# Patient Record
Sex: FEMALE | Race: WHITE
[De-identification: names, ages, dates, MRNs, and addresses within clinical notes are randomized per-mention and may not be internally consistent; named-entity substitution may affect disease eponyms.]

---

## 2017-03-10 ENCOUNTER — HOSPITAL ENCOUNTER (EMERGENCY)
Dept: HOSPITAL 25 - ED | Age: 72
Discharge: HOME | End: 2017-03-10
Payer: MEDICARE

## 2017-03-10 VITALS — SYSTOLIC BLOOD PRESSURE: 112 MMHG | DIASTOLIC BLOOD PRESSURE: 81 MMHG

## 2017-03-10 DIAGNOSIS — R05: ICD-10-CM

## 2017-03-10 DIAGNOSIS — F17.210: ICD-10-CM

## 2017-03-10 DIAGNOSIS — R06.02: ICD-10-CM

## 2017-03-10 DIAGNOSIS — J44.1: Primary | ICD-10-CM

## 2017-03-10 LAB
ALBUMIN SERPL BCG-MCNC: 4.1 G/DL (ref 3.2–5.2)
ALP SERPL-CCNC: 110 U/L (ref 34–104)
ALT SERPL W P-5'-P-CCNC: 25 U/L (ref 7–52)
ANION GAP SERPL CALC-SCNC: 8 MMOL/L (ref 2–11)
AST SERPL-CCNC: 28 U/L (ref 13–39)
BUN SERPL-MCNC: 31 MG/DL (ref 6–24)
BUN/CREAT SERPL: 17.4 (ref 8–20)
CALCIUM SERPL-MCNC: 9.4 MG/DL (ref 8.6–10.3)
CHLORIDE SERPL-SCNC: 99 MMOL/L (ref 101–111)
CK SERPL-CCNC: 69 U/L (ref 10–223)
GLOBULIN SER CALC-MCNC: 3.1 G/DL (ref 2–4)
GLUCOSE SERPL-MCNC: 105 MG/DL (ref 70–100)
HCO3 SERPL-SCNC: 30 MMOL/L (ref 22–32)
HCT VFR BLD AUTO: 43 % (ref 35–47)
HGB BLD-MCNC: 14 G/DL (ref 12–16)
MCH RBC QN AUTO: 30 PG (ref 27–31)
MCHC RBC AUTO-ENTMCNC: 33 G/DL (ref 31–36)
MCV RBC AUTO: 90 FL (ref 80–97)
POTASSIUM SERPL-SCNC: 3.5 MMOL/L (ref 3.5–5)
PROT SERPL-MCNC: 7.2 G/DL (ref 6.4–8.9)
RBC # BLD AUTO: 4.74 10^6/UL (ref 4–5.4)
SODIUM SERPL-SCNC: 137 MMOL/L (ref 133–145)
TROPONIN I SERPL-MCNC: 0.01 NG/ML (ref ?–0.04)
WBC # BLD AUTO: 8.5 10^3/UL (ref 3.5–10.8)

## 2017-03-10 PROCEDURE — 93005 ELECTROCARDIOGRAM TRACING: CPT

## 2017-03-10 PROCEDURE — 83605 ASSAY OF LACTIC ACID: CPT

## 2017-03-10 PROCEDURE — 71250 CT THORAX DX C-: CPT

## 2017-03-10 PROCEDURE — 36415 COLL VENOUS BLD VENIPUNCTURE: CPT

## 2017-03-10 PROCEDURE — 85025 COMPLETE CBC W/AUTO DIFF WBC: CPT

## 2017-03-10 PROCEDURE — 85610 PROTHROMBIN TIME: CPT

## 2017-03-10 PROCEDURE — 82550 ASSAY OF CK (CPK): CPT

## 2017-03-10 PROCEDURE — 87040 BLOOD CULTURE FOR BACTERIA: CPT

## 2017-03-10 PROCEDURE — 84484 ASSAY OF TROPONIN QUANT: CPT

## 2017-03-10 PROCEDURE — 83880 ASSAY OF NATRIURETIC PEPTIDE: CPT

## 2017-03-10 PROCEDURE — 99284 EMERGENCY DEPT VISIT MOD MDM: CPT

## 2017-03-10 PROCEDURE — 80053 COMPREHEN METABOLIC PANEL: CPT

## 2017-03-10 PROCEDURE — 86140 C-REACTIVE PROTEIN: CPT

## 2017-03-10 PROCEDURE — 87502 INFLUENZA DNA AMP PROBE: CPT

## 2017-03-10 PROCEDURE — 82553 CREATINE MB FRACTION: CPT

## 2017-03-10 PROCEDURE — 85730 THROMBOPLASTIN TIME PARTIAL: CPT

## 2017-03-10 NOTE — ED
Jamaal JAMA Billy, scribed for Harpreet Sol MD on 03/10/17 at 0911 .





Shortness of Breath





- HPI Summary


HPI Summary: 


Patient is a 71 year-old female with a history of COPD and lung cancer coming 

to [facility] for evaluation of constant SOB. She states that she has had bronchitis 

for the last 3 weeks, and her SOB worsened significantly this morning. She 

reports occasionally productive cough, mild dizziness, and night sweats. Denies 

any chest pain or N/V. 





- History of Current Complaint


Chief Complaint: EDShortnessOfBreath


Time Seen by Provider: 03/10/17 09:02


Hx Obtained From: Patient


Onset/Duration: Gradual Onset, Worse Since - 1 hour PTA


Timing: Constant


Current Severity: Moderate


Dyspnea At: Rest


Aggrevating Factors: Nothing


Alleviating Factors: Nothing


Associated Signs & Symptoms: Cough (Productive)





- Allergy/Home Medications


Allergies/Adverse Reactions: 


 Allergies











Allergy/AdvReac Type Severity Reaction Status Date / Time


 


Vancomycin Allergy Severe Wheezing Verified 10/04/16 11:35


 


Daptomycin Allergy  See Comment Verified 10/04/16 11:35


 


Doxycycline Allergy  Vomiting Verified 10/04/16 11:35


 


Levofloxacin [From Levaquin] Allergy  Rash Verified 10/04/16 11:35


 


Epinephrine AdvReac Severe Incr Verified 10/04/16 11:35





   HR/N/V/feels  





   faint  


 


Infliximab [From Remicade] AdvReac Severe arm Verified 10/04/16 11:35





   swelling/pain/itching  


 


Tetanus Toxoid AdvReac Unknown arm Verified 10/04/16 11:35





   pain/swelling/itching  


 


eggplant Allergy Severe Difficulty Uncoded 16 09:41





   Breathing  














PMH/Surg Hx/FS Hx/Imm Hx


Endocrine/Hematology History: Reports: Hx Anticoagulant Therapy - coumadin


   Denies: Hx Diabetes


Cardiovascular History: Reports: Hx Deep Vein Thrombosis, Hx Embolism - PE, Hx 

Hypotension, Hx Syncope, Other Cardiovascular Problems/Disorders - deep 

mesenteric thrombosis


   Denies: Hx Hypertension, Hx Pacemaker/ICD


Respiratory History: Reports: Hx Asthma - "off and on", Hx Chronic Bronchitis, 

Hx Chronic Obstructive Pulmonary Disease (COPD), Hx Pulmonary Embolism - , 

PE AND MESENTERIC THROMBOSIS, Hx Sleep Apnea, Other Respiratory Problems/

Disorders - wear O2 at hOME


GI History: Reports: Hx Crohn's Disease - ILEOSTOMY, Hx Ileostomy, Other GI 

Disorders - Ileostomy placement


   Comment Only: Hx Ulcer - HYPERPARATHYROIDISM


 History: Reports: Hx Acute Renal Failure, Hx Chronic Renal Failure, Other  

Problems/Disorders - Renal deficit per Dr Schroeder


   Denies: Hx Dialysis, Hx Renal Disease


Musculoskeletal History: Reports: Hx Back Problems, Hx Osteoporosis, Hx 

Scoliosis - lumbar disc problems; scoliosis


Sensory History: Reports: Hx Cataracts - [place], Hx Contacts or Glasses, Hx 

Hearing Problem - ears are "stopped up" lately


   Denies: Hx Hearing Aid


Opthamlomology History: Reports: Hx Cataracts - [place], Hx Contacts or Glasses


Neurological History: Reports: Hx Nerve Disease - neuropathy, Other Neuro 

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


Psychiatric History: Reports: Hx Anxiety - ON MEDS, Hx Depression - ON MEDS


   Denies: Hx Panic Disorder





- Cancer History


Cancer Type, Location and Year: LUNG CA


Hx Chemotherapy: No


Hx Radiation Therapy: Yes


Hx Palliative Cancer Treatment: Yes





- Surgical History


Surgery Procedure, Year, and Place: COLECTOMY AND ILEOSTOMY 3/3/2011 

APPENDECTOMY, GALLBLADDER REMOVED, CATARACTS REMOVED HO.  SEVERAL BOWEL 

RESECTIONS AND FISTULA, [facility]


Hx Anesthesia Reactions: Yes - LOW BP





- Immunization History


Date of Tetanus Vaccine: PT STATES UNSURE


Date of Influenza Vaccine: PT STATES UNSURE


Infectious Disease History: Yes


Infectious Disease History: Reports: Hx of Known/Suspected MRSA


   Denies: Traveled Outside the [place] in Last 30 Days





- Family History


Known Family History: Positive: Unknown - Parents  very young., Other - 

Crohn's Disease





- Social History


Alcohol Use: Rare


Alcohol Amount: 1 Q 2-3 MONTHS


Hx Substance Use: No


Substance Use Type: Reports: Marijuana


Substance Use Comment - Amount & Last Used: Medical Marijuana - once or twice a 

week


Hx Tobacco Use: Yes


Smoking Status (MU): Light Every Day Tobacco Smoker


Type: Cigarettes


Amount Used/How Often: 1-2 cigarettes per day


Length of Time of Smoking/Using Tobacco: 53years


Have You Smoked in the Last Year: Yes





Review of Systems


Positive: Skin Diaphoresis


Negative: Chest Pain


Positive: Shortness Of Breath, Cough


Negative: Vomiting, Nausea


Negative: Edema


All Other Systems Reviewed And Are Negative: Yes





Physical Exam





- Summary


Physical Exam Summary: 


The patient is an elderly female in moderate respiratory distress.





The skin is warm and dry and skin color reflects adequate perfusion.





HEENT:  The head is normocephalic and atraumatic. The pupils are equal and 

reactive. The conjunctivae are clear and without drainage.  Nares are patent 

and without drainage.  Mouth reveals moist mucous membranes and the throat is 

without erythema and exudate.  The external ears are intact. The ear canals are 

patent and without drainage. The tympanic membranes are intact.





Neck is supple with full range of motion and non-tender. There are no carotid 

bruits.  There is no neck vein distension.





Respiratory: Chest is non-tender. There is bilateral wheezing, crackles in the 

bases of the lungs.  She is speaking in full sentences.





Cardiovascular: Heart is regular rate and rhythm.  There is no murmur or rub 

auscultated.   There is no peripheral edema and pulses are symmetrical and 

equal.





Abdomen: The abdomen is soft and non-tender.  There are normal bowel sounds 

heard in all four quadrants and there is no organomegaly palpated.





Musculoskeletal: There is no back pain noted.  Extremities are non-tender with 

full range of motion.  There is good capillary refill.  There is no peripheral 

edema or calf tenderness elicited.





Neurological: Patient is alert and oriented to person, place and time.  The 

patient has symmetrical motor strength in all four extremities.  Cranial nerves 

are grossly intact. Deep tendon reflexes are symmetrical and equal in all four 

extremities.





Psychiatric: The patient has an appropriate affect and does not exhibit any 

anxiety or depression. 


Triage Information Reviewed: Yes


Vital Signs On Initial Exam: 


 Initial Vitals











Temp Pulse Resp BP Pulse Ox


 


 98.1 F   82   23   137/76   97 


 


 03/10/17 08:42  03/10/17 08:42  03/10/17 08:42  03/10/17 08:42  03/10/17 08:42











Vital Signs Reviewed: Yes





- Pascual Coma Scale


Coma Scale Total: 15





Diagnostics





- Vital Signs


 Vital Signs











  Temp Pulse Resp BP Pulse Ox


 


 03/10/17 08:42  98.1 F  82  23  137/76  97














- Laboratory


Lab Results: 


 Lab Results











  03/10/17 03/10/17 03/10/17 Range/Units





  09:11 09:11 09:11 


 


WBC  8.5    (3.5-10.8)  10^3/ul


 


RBC  4.74    (4.0-5.4)  10^6/ul


 


Hgb  14.0    (12.0-16.0)  g/dl


 


Hct  43    (35-47)  %


 


MCV  90    (80-97)  fL


 


MCH  30    (27-31)  pg


 


MCHC  33    (31-36)  g/dl


 


RDW  13    (10.5-15)  %


 


Plt Count  249    (150-450)  10^3/ul


 


MPV  8    (7.4-10.4)  um3


 


Neut % (Auto)  63.0    (38-83)  %


 


Lymph % (Auto)  21.3 L    (25-47)  %


 


Mono % (Auto)  10.7 H    (1-9)  %


 


Eos % (Auto)  3.9    (0-6)  %


 


Baso % (Auto)  1.1    (0-2)  %


 


Absolute Neuts (auto)  5.4    (1.5-7.7)  10^3/ul


 


Absolute Lymphs (auto)  1.8    (1.0-4.8)  10^3/ul


 


Absolute Monos (auto)  0.9 H    (0-0.8)  10^3/ul


 


Absolute Eos (auto)  0.3    (0-0.6)  10^3/ul


 


Absolute Basos (auto)  0.1    (0-0.2)  10^3/ul


 


Absolute Nucleated RBC  0.01    10^3/ul


 


Nucleated RBC %  0.2    


 


APTT   46.7 H   (26.0-36.3)  seconds


 


Sodium    137  (133-145)  mmol/L


 


Potassium    3.5  (3.5-5.0)  mmol/L


 


Chloride    99 L  (101-111)  mmol/L


 


Carbon Dioxide    30  (22-32)  mmol/L


 


Anion Gap    8  (2-11)  mmol/L


 


BUN    31 H  (6-24)  mg/dL


 


Creatinine    1.78 H  (0.51-0.95)  mg/dL


 


Est GFR ( Amer)    36.0  (>60)  


 


Est GFR (Non-Af Amer)    28.0  (>60)  


 


BUN/Creatinine Ratio    17.4  (8-20)  


 


Glucose    105 H  ()  mg/dL


 


Lactic Acid     (0.5-2.0)  mmol/L


 


Calcium    9.4  (8.6-10.3)  mg/dL


 


Total Bilirubin    0.60  (0.2-1.0)  mg/dL


 


AST    28  (13-39)  U/L


 


ALT    25  (7-52)  U/L


 


Alkaline Phosphatase    110 H  ()  U/L


 


Total Creatine Kinase    69  ()  U/L


 


CK-MB (CK-2)    2.4  (0.6-6.3)  ng/mL


 


Troponin I    0.01  (<0.04)  ng/mL


 


C-Reactive Protein    1.86  (< 5.00)  mg/L


 


B-Natriuretic Peptide    ( - 100) pg/mL


 


Total Protein    7.2  (6.4-8.9)  g/dL


 


Albumin    4.1  (3.2-5.2)  g/dL


 


Globulin    3.1  (2-4)  g/dL


 


Albumin/Globulin Ratio    1.3  (1-3)  


 


Influenza A (Rapid)     (Negative)  


 


Influenza B (Rapid)     (Negative)  














  03/10/17 03/10/17 03/10/17 Range/Units





  09:11 09:11 09:50 


 


WBC     (3.5-10.8)  10^3/ul


 


RBC     (4.0-5.4)  10^6/ul


 


Hgb     (12.0-16.0)  g/dl


 


Hct     (35-47)  %


 


MCV     (80-97)  fL


 


MCH     (27-31)  pg


 


MCHC     (31-36)  g/dl


 


RDW     (10.5-15)  %


 


Plt Count     (150-450)  10^3/ul


 


MPV     (7.4-10.4)  um3


 


Neut % (Auto)     (38-83)  %


 


Lymph % (Auto)     (25-47)  %


 


Mono % (Auto)     (1-9)  %


 


Eos % (Auto)     (0-6)  %


 


Baso % (Auto)     (0-2)  %


 


Absolute Neuts (auto)     (1.5-7.7)  10^3/ul


 


Absolute Lymphs (auto)     (1.0-4.8)  10^3/ul


 


Absolute Monos (auto)     (0-0.8)  10^3/ul


 


Absolute Eos (auto)     (0-0.6)  10^3/ul


 


Absolute Basos (auto)     (0-0.2)  10^3/ul


 


Absolute Nucleated RBC     10^3/ul


 


Nucleated RBC %     


 


APTT     (26.0-36.3)  seconds


 


Sodium     (133-145)  mmol/L


 


Potassium     (3.5-5.0)  mmol/L


 


Chloride     (101-111)  mmol/L


 


Carbon Dioxide     (22-32)  mmol/L


 


Anion Gap     (2-11)  mmol/L


 


BUN     (6-24)  mg/dL


 


Creatinine     (0.51-0.95)  mg/dL


 


Est GFR ( Amer)     (>60)  


 


Est GFR (Non-Af Amer)     (>60)  


 


BUN/Creatinine Ratio     (8-20)  


 


Glucose     ()  mg/dL


 


Lactic Acid  1.1    (0.5-2.0)  mmol/L


 


Calcium     (8.6-10.3)  mg/dL


 


Total Bilirubin     (0.2-1.0)  mg/dL


 


AST     (13-39)  U/L


 


ALT     (7-52)  U/L


 


Alkaline Phosphatase     ()  U/L


 


Total Creatine Kinase     ()  U/L


 


CK-MB (CK-2)     (0.6-6.3)  ng/mL


 


Troponin I     (<0.04)  ng/mL


 


C-Reactive Protein     (< 5.00)  mg/L


 


B-Natriuretic Peptide   98  ( - 100) pg/mL


 


Total Protein     (6.4-8.9)  g/dL


 


Albumin     (3.2-5.2)  g/dL


 


Globulin     (2-4)  g/dL


 


Albumin/Globulin Ratio     (1-3)  


 


Influenza A (Rapid)    Negative  (Negative)  


 


Influenza B (Rapid)    Negative  (Negative)  











Result Diagrams: 


 03/10/17 09:11





 03/10/17 09:11


Lab Statement: Any lab studies that have been ordered have been reviewed, and 

results considered in the medical decision making process.





- CT


  ** chest w/o


CT Interpretation Completed By: Radiologist - 1. 0.8 cm maximum dimension 

suspicious nodule at the anterior basal segment of the RIGHT lower lobe with 

significant interval increase in size from 2016 CT concerning for 

a metastasis or new bronchogenic carcinoma. Location is problematic for CT-

guided biopsy due to location adjacent to the diaphragm. The lesion is too 

small to predictably characterize with PET CT. Thoracic surgery consultation 

suggested 2. No significant change in 1.9 x 3.8 cm region of soft tissue 

density at the apical and posterior segments of the RIGHT upper lobe 

approximating the major fissure seen in association with surgical clips and 

marginal bronchiectasis as well as less confluent opacity extending caudal 

compared with the 2016 exam.  3. Stigmata of advanced chronic 

obstructive pulmonary disease and emphysema.





- EKG


  ** 0853


EKG Interpretation: NSR 69 bpm, no ST elevation





Course/Dx





- Course


Assessment/Plan: Patient is a 71 year-old female with a history of COPD and 

lung cancer coming to [facility] for evaluation of constant SOB. She states that she 

has had bronchitis for the last 3 weeks, and her SOB worsened significantly 

this morning. She reports occasionally productive cough, mild dizziness, and 

night sweats. Denies any chest pain or N/V. Bloodwork shows no increased WBC, 

she has chronic renal failure unchanged from baseline. Influenza A and B are 

negative. CT of the chest shows 0.8 cm nodule in the right lower lobe which has 

increased in size since 2016. The rest of the CT imaging is 

unchanged compared to previous. See radiologists report for further details. 

EKG shows a sinus rhythm 69 bpm without ST elevation. In the ED course, pt was 

given Duoneb and Solu-medrol with improvement. At this point, patient is 

feeling much better, has no SOB, and requested to be discharged home. Vital 

signs are stable, O2 sat is 96-98% on room air, therefore she will be 

discharged home to follow up with PCP. Hemodynamically stable, A&Ox3.  She 

reports that she has a z-william which she will start today, ordered by PCP.  I 

discussed all the findings and test results with the patient. Patient was 

instructed to return to the emergency room immediately if any of the symptoms 

return or worsens. Plan of care was discussed with the patient and understands 

and agrees. All questions were answered at patient satisfaction.  There were no 

further complaints or concerns.  Lung exam before discharge: CTA B/L. Good air 

exchange. No wheezing or crackles heard. CVS: S1 and S2 present. No murmurs 

appreciated. Patient is alert and oriented x 3. Patient is hemodynamically 

stable. Patient will be discharged home with follow up pediatrician in the next 

2-3 days





- Diagnoses


Provider Diagnoses: 


 COPD exacerbation








Discharge





- Discharge Plan


Condition: Stable


Disposition: HOME


Patient Education Materials:  COPD (Chronic Obstructive Pulmonary Disease) (ED)


Referrals: 


Kira Haile MD [Primary Care Provider] - 





The documentation as recorded by the Jamaal irwin Billy accurately reflects the 

service I personally performed and the decisions made by me, Harpreet Sol MD.

## 2017-03-10 NOTE — RAD
INDICATION: Sudden onset shortness of breath started this morning. History of lung cancer

and bronchitis.



COMPARISON: December 23, 2016 thoracic spine radiographs. September 29, 2016 CT.



TECHNIQUE: Multidetector CT images were obtained from the lung apices to the upper

abdomen.  Evaluation of the viscera is limited without IV contrast.



REPORT: Elevated lung volumes and rarefaction of the interstitial markings consistent with

chronic obstructive pulmonary disease and emphysema. No significant change in 1.9 x 3.8 cm

region of soft tissue density at the apical and posterior segments of the RIGHT upper lobe

approximating the major fissure seen in association with surgical clips and marginal

bronchiectasis as well as less confluent opacity extending caudal compared with the

September 29, 2016 exam. 0.8 x 0.6 cm nodule at the anterior basal segment of the RIGHT

lower lobe just above the diaphragm is increased from 0.5 x 0.4 cm on the September 29, 2016 exam. On the coronal reformatted series reference image 62 the margins of the lesion

appears spiculated. Mild predominant linear pleural parenchymal scarring at the LEFT lung

base. No suspicious focal LEFT lung lesions. Negative for pleural effusions.



Enlarged LEFT thyroid lobe with extension to the superior mediastinum without change.

Negative for thoracic lymphadenopathy. Negative for cardiomegaly. Coronary artery

calcifications.



Images through the upper abdomen are remarkable for prior cholecystectomy. Unchanged

severe anterior and middle column compression fracture at T6. No new thoracic fractures or

suspicious focal osseous lesions evident.



 IMPRESSION: 

1. 0.8 cm maximum dimension suspicious nodule at the anterior basal segment of the RIGHT

lower lobe with significant interval increase in size from September 29, 2016 CT

concerning for a metastasis or new bronchogenic carcinoma. Location is problematic for

CT-guided biopsy due to location adjacent to the diaphragm. The lesion is too small to

predictably characterize with PET CT. Thoracic surgery consultation suggested

2. No significant change in 1.9 x 3.8 cm region of soft tissue density at the apical and

posterior segments of the RIGHT upper lobe approximating the major fissure seen in

association with surgical clips and marginal bronchiectasis as well as less confluent

opacity extending caudal compared with the September 29, 2016 exam. 

3. Stigmata of advanced chronic obstructive pulmonary disease and emphysema.

## 2017-03-20 ENCOUNTER — HOSPITAL ENCOUNTER (INPATIENT)
Dept: HOSPITAL 25 - ED | Age: 72
LOS: 7 days | Discharge: HOSPICE | DRG: 191 | End: 2017-03-27
Attending: INTERNAL MEDICINE | Admitting: HOSPITALIST
Payer: MEDICARE

## 2017-03-20 DIAGNOSIS — F32.9: ICD-10-CM

## 2017-03-20 DIAGNOSIS — Z82.49: ICD-10-CM

## 2017-03-20 DIAGNOSIS — Z88.8: ICD-10-CM

## 2017-03-20 DIAGNOSIS — Z93.2: ICD-10-CM

## 2017-03-20 DIAGNOSIS — Z86.711: ICD-10-CM

## 2017-03-20 DIAGNOSIS — Z66: ICD-10-CM

## 2017-03-20 DIAGNOSIS — F17.210: ICD-10-CM

## 2017-03-20 DIAGNOSIS — K50.90: ICD-10-CM

## 2017-03-20 DIAGNOSIS — Z88.1: ICD-10-CM

## 2017-03-20 DIAGNOSIS — J44.1: Primary | ICD-10-CM

## 2017-03-20 DIAGNOSIS — J44.0: ICD-10-CM

## 2017-03-20 DIAGNOSIS — F41.9: ICD-10-CM

## 2017-03-20 DIAGNOSIS — M81.0: ICD-10-CM

## 2017-03-20 DIAGNOSIS — Z79.52: ICD-10-CM

## 2017-03-20 DIAGNOSIS — N18.4: ICD-10-CM

## 2017-03-20 DIAGNOSIS — J96.10: ICD-10-CM

## 2017-03-20 DIAGNOSIS — Z79.82: ICD-10-CM

## 2017-03-20 DIAGNOSIS — Z86.718: ICD-10-CM

## 2017-03-20 DIAGNOSIS — Z99.81: ICD-10-CM

## 2017-03-20 DIAGNOSIS — Z91.018: ICD-10-CM

## 2017-03-20 DIAGNOSIS — R91.1: ICD-10-CM

## 2017-03-20 DIAGNOSIS — Z79.899: ICD-10-CM

## 2017-03-20 DIAGNOSIS — G25.81: ICD-10-CM

## 2017-03-20 DIAGNOSIS — Z88.7: ICD-10-CM

## 2017-03-20 DIAGNOSIS — Z85.118: ICD-10-CM

## 2017-03-20 DIAGNOSIS — J20.9: ICD-10-CM

## 2017-03-20 DIAGNOSIS — Z79.01: ICD-10-CM

## 2017-03-20 DIAGNOSIS — Z83.3: ICD-10-CM

## 2017-03-20 LAB
ALBUMIN SERPL BCG-MCNC: 3.7 G/DL (ref 3.2–5.2)
ALP SERPL-CCNC: 92 U/L (ref 34–104)
ALT SERPL W P-5'-P-CCNC: 36 U/L (ref 7–52)
ANION GAP SERPL CALC-SCNC: (no result) MMOL/L (ref 2–11)
AST SERPL-CCNC: (no result) U/L (ref 13–39)
BUN SERPL-MCNC: 34 MG/DL (ref 6–24)
BUN/CREAT SERPL: 20.1 (ref 8–20)
CALCIUM SERPL-MCNC: 8.9 MG/DL (ref 8.6–10.3)
CHLORIDE SERPL-SCNC: 101 MMOL/L (ref 101–111)
GLOBULIN SER CALC-MCNC: 2.9 G/DL (ref 2–4)
GLUCOSE SERPL-MCNC: 132 MG/DL (ref 70–100)
HCO3 SERPL-SCNC: 27 MMOL/L (ref 22–32)
HCT VFR BLD AUTO: 41 % (ref 35–47)
HGB BLD-MCNC: 13.6 G/DL (ref 12–16)
MCH RBC QN AUTO: 29 PG (ref 27–31)
MCHC RBC AUTO-ENTMCNC: 33 G/DL (ref 31–36)
MCV RBC AUTO: 89 FL (ref 80–97)
POTASSIUM SERPL-SCNC: (no result) MMOL/L (ref 3.5–5)
PROT SERPL-MCNC: 6.6 G/DL (ref 6.4–8.9)
RBC # BLD AUTO: 4.64 10^6/UL (ref 4–5.4)
SODIUM SERPL-SCNC: 138 MMOL/L (ref 133–145)
TROPONIN I SERPL-MCNC: 0 NG/ML (ref ?–0.04)
WBC # BLD AUTO: 10.6 10^3/UL (ref 3.5–10.8)

## 2017-03-20 PROCEDURE — 87040 BLOOD CULTURE FOR BACTERIA: CPT

## 2017-03-20 PROCEDURE — 87070 CULTURE OTHR SPECIMN AEROBIC: CPT

## 2017-03-20 PROCEDURE — 36415 COLL VENOUS BLD VENIPUNCTURE: CPT

## 2017-03-20 PROCEDURE — G0378 HOSPITAL OBSERVATION PER HR: HCPCS

## 2017-03-20 PROCEDURE — 87205 SMEAR GRAM STAIN: CPT

## 2017-03-20 PROCEDURE — 94640 AIRWAY INHALATION TREATMENT: CPT

## 2017-03-20 PROCEDURE — 94760 N-INVAS EAR/PLS OXIMETRY 1: CPT

## 2017-03-20 PROCEDURE — 80053 COMPREHEN METABOLIC PANEL: CPT

## 2017-03-20 PROCEDURE — 85610 PROTHROMBIN TIME: CPT

## 2017-03-20 PROCEDURE — 87186 SC STD MICRODIL/AGAR DIL: CPT

## 2017-03-20 PROCEDURE — 93005 ELECTROCARDIOGRAM TRACING: CPT

## 2017-03-20 PROCEDURE — 71020: CPT

## 2017-03-20 PROCEDURE — 85025 COMPLETE CBC W/AUTO DIFF WBC: CPT

## 2017-03-20 PROCEDURE — 87077 CULTURE AEROBIC IDENTIFY: CPT

## 2017-03-20 PROCEDURE — 84484 ASSAY OF TROPONIN QUANT: CPT

## 2017-03-20 PROCEDURE — 83605 ASSAY OF LACTIC ACID: CPT

## 2017-03-20 RX ADMIN — LORAZEPAM PRN MG: 1 TABLET ORAL at 23:32

## 2017-03-20 RX ADMIN — LORAZEPAM PRN MG: 1 TABLET ORAL at 19:32

## 2017-03-20 RX ADMIN — METHYLPREDNISOLONE SODIUM SUCCINATE SCH MG: 125 INJECTION, POWDER, FOR SOLUTION INTRAMUSCULAR; INTRAVENOUS at 19:33

## 2017-03-20 RX ADMIN — SODIUM CHLORIDE SCH MLS/HR: 900 IRRIGANT IRRIGATION at 19:36

## 2017-03-20 RX ADMIN — PAROXETINE SCH MG: 20 TABLET, FILM COATED ORAL at 22:20

## 2017-03-20 RX ADMIN — MORPHINE TINCTURE SCH MG: 1 SOLUTION ORAL at 22:21

## 2017-03-20 RX ADMIN — MOMETASONE FUROATE AND FORMOTEROL FUMARATE DIHYDRATE SCH PUFF: 200; 5 AEROSOL RESPIRATORY (INHALATION) at 22:15

## 2017-03-20 NOTE — RAD
Indication: Shortness of breath.



2 views of the chest including dual energy PA views demonstrates no mediastinal shift.

Heart is normal size and configuration. Right upper lobe airspace disease is noted and is

unchanged from 3/10/2017 and 10/4/2016. This likely represents post radiation pneumonitis

and fibrosis. Otherwise lung fields appear hyperinflated no changes are noted.



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 Findings are not

significantly changed since October 04, 2016 with no evidence of active infiltrate.

## 2017-03-20 NOTE — ED
Nikki JAMA Alok, scribed for Sarah Ramos MD on 17 at 1445 .





Shortness of Breath





- HPI Summary


HPI Summary: 


70 y/o female presents to the ED c/o of SOB for the last two weeks and a cough 

for the last two months. PMHx includes COPD and Cron's disease. Pt takes 

steroids and is on 4L O2 at home.  








- History of Current Complaint


Chief Complaint: EDShortnessOfBreath


Time Seen by Provider: 17 14:19


Hx Obtained From: Patient


Onset/Duration: Sudden Onset, Lasting Weeks, Still Present


Timing: Constant


Current Severity: Moderate


Aggrevating Factors: Nothing


Alleviating Factors: Nothing


Associated Signs & Symptoms: Cough (Nonproductive), Wheezing





- Allergy/Home Medications


Allergies/Adverse Reactions: 


 Allergies











Allergy/AdvReac Type Severity Reaction Status Date / Time


 


Vancomycin Allergy Severe Wheezing Verified 10/04/16 11:35


 


Daptomycin Allergy  See Comment Verified 10/04/16 11:35


 


Doxycycline Allergy  Vomiting Verified 10/04/16 11:35


 


Levofloxacin [From Levaquin] Allergy  Rash Verified 10/04/16 11:35


 


Epinephrine AdvReac Severe Incr Verified 10/04/16 11:35





   HR/N/V/feels  





   faint  


 


Infliximab [From Remicade] AdvReac Severe arm Verified 10/04/16 11:35





   swelling/pain/itching  


 


Tetanus Toxoid AdvReac Unknown arm Verified 10/04/16 11:35





   pain/swelling/itching  


 


eggplant Allergy Severe Difficulty Uncoded 16 09:41





   Breathing  











Home Medications: 


 Home Medications





Albuterol Sulfate [Proair Respiclick] 1 puff INH Q6HR PRN 17 [History 

Confirmed 17]


Aspirin Low Dose CHEW TAB* [Aspirin Low Dose TAB*] 81 mg PO QAM 17 [

History Confirmed 17]


Budesonide/Formote 160/4.5(NF) [Symbicort 160/4.5 (NF)] 1 puff INH BID 17 

[History Confirmed 17]


Calcitriol CAP* [Rocaltrol  CAP*] 0.25 mcg PO QAM 17 [History Confirmed ]


Cyanocobalamin INJ * [Vitamin B12 INJ *] 1,000 mcg IM Q14D 17 [History 

Confirmed 17]


LORazepam TAB(*) [Ativan 0.5 MG TAB (*)] 0.25 mg PO QAM PRN 17 [History 

Confirmed 17]


LORazepam TAB(*) [Ativan 1 MG TAB (*)] 1 mg PO BEDTIME PRN 17 [History 

Confirmed 17]


PARoxetine HCL TAB* [Paxil TAB*] 20 mg PO BEDTIME 17 [History Confirmed ]


Tiotropium CAP.INH* [Spiriva CAP.INH*] 1 cap.inh INH DAILY 17 [History 

Confirmed 17]


Warfarin TAB(*) [Coumadin TAB(*)] 2 mg PO QPM 17 [History Confirmed ]


guaiFENesin ER TAB [Mucinex*] 600 mg PO BID PRN 17 [History Confirmed ]


predniSONE TAB* [Deltasone TAB*] 10 mg PO DAILY 17 [History Confirmed ]











PMH/Surg Hx/FS Hx/Imm Hx


Endocrine/Hematology History: Reports: Hx Anticoagulant Therapy - coumadin


   Denies: Hx Diabetes


Cardiovascular History: Reports: Hx Deep Vein Thrombosis, Hx Embolism - PE, Hx 

Hypotension, Hx Syncope, Other Cardiovascular Problems/Disorders - deep 

mesenteric thrombosis


   Denies: Hx Hypertension, Hx Pacemaker/ICD


Respiratory History: Reports: Hx Asthma - "off and on", Hx Chronic Bronchitis, 

Hx Chronic Obstructive Pulmonary Disease (COPD), Hx Pulmonary Embolism - , 

PE AND MESENTERIC THROMBOSIS, Hx Sleep Apnea, Other Respiratory Problems/

Disorders - wear O2 at hOME


GI History: Reports: Hx Crohn's Disease - ILEOSTOMY, Hx Ileostomy, Other GI 

Disorders - Ileostomy placement


   Comment Only: Hx Ulcer - HYPERPARATHYROIDISM


 History: Reports: Hx Acute Renal Failure, Hx Chronic Renal Failure, Other  

Problems/Disorders - Renal deficit per Dr Schroeder


   Denies: Hx Dialysis, Hx Renal Disease


Musculoskeletal History: Reports: Hx Back Problems, Hx Osteoporosis, Hx 

Scoliosis - lumbar disc problems; scoliosis


Sensory History: Reports: Hx Cataracts - [place], Hx Contacts or Glasses, Hx 

Hearing Problem - ears are "stopped up" lately


   Denies: Hx Hearing Aid


Opthamlomology History: Reports: Hx Cataracts - [place], Hx Contacts or Glasses


Neurological History: Reports: Hx Nerve Disease - neuropathy, Other Neuro 

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


Psychiatric History: Reports: Hx Anxiety - ON MEDS, Hx Depression - ON MEDS


   Denies: Hx Panic Disorder





- Cancer History


Cancer Type, Location and Year: LUNG CA


Hx Chemotherapy: No


Hx Radiation Therapy: Yes


Hx Palliative Cancer Treatment: Yes





- Surgical History


Surgery Procedure, Year, and Place: COLECTOMY AND ILEOSTOMY 3/3/2011 

APPENDECTOMY, GALLBLADDER REMOVED, CATARACTS REMOVED [place].  SEVERAL BOWEL 

RESECTIONS AND FISTULA, [facility]


Hx Anesthesia Reactions: Yes - LOW BP





- Immunization History


Date of Tetanus Vaccine: PT STATES UNSURE


Date of Influenza Vaccine: PT STATES UNSURE


Infectious Disease History: No


Infectious Disease History: Reports: Hx of Known/Suspected MRSA


   Denies: Traveled Outside the [place] in Last 30 Days





- Family History


Known Family History: Positive: Unknown - Parents  very young., Other - 

Crohn's Disease





- Social History


Alcohol Use: Rare


Alcohol Amount: 1 Q 2-3 MONTHS


Hx Substance Use: No


Substance Use Type: Reports: Marijuana


Substance Use Comment - Amount & Last Used: Medical Marijuana - once or twice a 

week


Hx Tobacco Use: Yes


Smoking Status (MU): Light Every Day Tobacco Smoker


Type: Cigarettes


Amount Used/How Often: 1-2 cigarettes per day


Length of Time of Smoking/Using Tobacco: 53years


Have You Smoked in the Last Year: Yes





Review of Systems


Negative: Fever


Positive: Shortness Of Breath, Cough


All Other Systems Reviewed And Are Negative: Yes





Physical Exam


Triage Information Reviewed: Yes


Vital Signs On Initial Exam: 


 Initial Vitals











Temp Pulse Resp BP Pulse Ox


 


 98.5 F   80   16   101/73   95 


 


 17 14:12  17 14:12  17 14:12  17 14:12  17 14:12











Vital Signs Reviewed: Yes


Appearance: Positive: Well-Appearing, No Pain Distress


Skin: Positive: Warm, Skin Color Reflects Adequate Perfusion, Dry


Eyes: Positive: EOMI, ANURAG


ENT: Positive: Pharynx normal, TMs normal


Neck: Positive: Supple, Nontender


Respiratory/Lung Sounds: Positive: Breath Sounds Present, Wheezes - Expiratory.

  Negative: Rales, Rhonchi


Cardiovascular: Positive: RRR.  Negative: Murmur, Rub, Other - Gallops


Abdomen Description: Positive: Nontender, Soft


Bowel Sounds: Positive: Present


Musculoskeletal: Positive: Strength/ROM Intact.  Negative: Edema Left, Edema 

Right


Neurological: Positive: Sensory/Motor Intact, Alert, Oriented to Person Place, 

Time, CN Intact II-III


Psychiatric: Positive: Affect/Mood Appropriate





- [place] Coma Scale


Coma Scale Total: 15





Diagnostics





- Vital Signs


 Vital Signs











  Temp Pulse Resp BP Pulse Ox


 


 17 14:15   76   101/73  94


 


 17 14:14   80    92


 


 17 14:12  98.5 F  80  16  101/73  95














- Laboratory


Lab Results: 


 Lab Results











  17 Range/Units





  15:10 15:10 15:10 


 


WBC  10.6    (3.5-10.8)  10^3/ul


 


RBC  4.64    (4.0-5.4)  10^6/ul


 


Hgb  13.6    (12.0-16.0)  g/dl


 


Hct  41    (35-47)  %


 


MCV  89    (80-97)  fL


 


MCH  29    (27-31)  pg


 


MCHC  33    (31-36)  g/dl


 


RDW  13    (10.5-15)  %


 


Plt Count  260    (150-450)  10^3/ul


 


MPV  8    (7.4-10.4)  um3


 


Neut % (Auto)  84.0 H    (38-83)  %


 


Lymph % (Auto)  12.1 L    (25-47)  %


 


Mono % (Auto)  2.7    (1-9)  %


 


Eos % (Auto)  0.7    (0-6)  %


 


Baso % (Auto)  0.5    (0-2)  %


 


Absolute Neuts (auto)  8.9 H    (1.5-7.7)  10^3/ul


 


Absolute Lymphs (auto)  1.3    (1.0-4.8)  10^3/ul


 


Absolute Monos (auto)  0.3    (0-0.8)  10^3/ul


 


Absolute Eos (auto)  0.1    (0-0.6)  10^3/ul


 


Absolute Basos (auto)  0.1    (0-0.2)  10^3/ul


 


Absolute Nucleated RBC  0    10^3/ul


 


Nucleated RBC %  0    


 


INR (Anticoag Therapy)   3.88 H   (0.89-1.11)  


 


Sodium    138  (133-145)  mmol/L


 


Potassium    TNP  


 


Chloride    101  (101-111)  mmol/L


 


Carbon Dioxide    27  (22-32)  mmol/L


 


Anion Gap    TNP  


 


BUN    34 H  (6-24)  mg/dL


 


Creatinine    1.69 H  (0.51-0.95)  mg/dL


 


Est GFR ( Amer)    38.3  (>60)  


 


Est GFR (Non-Af Amer)    29.7  (>60)  


 


BUN/Creatinine Ratio    20.1 H  (8-20)  


 


Glucose    132 H  ()  mg/dL


 


Lactic Acid     (0.5-2.0)  mmol/L


 


Calcium    8.9  (8.6-10.3)  mg/dL


 


Total Bilirubin    0.50  (0.2-1.0)  mg/dL


 


AST    TNP  


 


ALT    36  (7-52)  U/L


 


Alkaline Phosphatase    92  ()  U/L


 


Troponin I    0.00  (<0.04)  ng/mL


 


Total Protein    6.6  (6.4-8.9)  g/dL


 


Albumin    3.7  (3.2-5.2)  g/dL


 


Globulin    2.9  (2-4)  g/dL


 


Albumin/Globulin Ratio    1.3  (1-3)  














  // Range/Units





  15:10 


 


WBC   (3.5-10.8)  10^3/ul


 


RBC   (4.0-5.4)  10^6/ul


 


Hgb   (12.0-16.0)  g/dl


 


Hct   (35-47)  %


 


MCV   (80-97)  fL


 


MCH   (27-31)  pg


 


MCHC   (31-36)  g/dl


 


RDW   (10.5-15)  %


 


Plt Count   (150-450)  10^3/ul


 


MPV   (7.4-10.4)  um3


 


Neut % (Auto)   (38-83)  %


 


Lymph % (Auto)   (25-47)  %


 


Mono % (Auto)   (1-9)  %


 


Eos % (Auto)   (0-6)  %


 


Baso % (Auto)   (0-2)  %


 


Absolute Neuts (auto)   (1.5-7.7)  10^3/ul


 


Absolute Lymphs (auto)   (1.0-4.8)  10^3/ul


 


Absolute Monos (auto)   (0-0.8)  10^3/ul


 


Absolute Eos (auto)   (0-0.6)  10^3/ul


 


Absolute Basos (auto)   (0-0.2)  10^3/ul


 


Absolute Nucleated RBC   10^3/ul


 


Nucleated RBC %   


 


INR (Anticoag Therapy)   (0.89-1.11)  


 


Sodium   (133-145)  mmol/L


 


Potassium   


 


Chloride   (101-111)  mmol/L


 


Carbon Dioxide   (22-32)  mmol/L


 


Anion Gap   


 


BUN   (6-24)  mg/dL


 


Creatinine   (0.51-0.95)  mg/dL


 


Est GFR ( Amer)   (>60)  


 


Est GFR (Non-Af Amer)   (>60)  


 


BUN/Creatinine Ratio   (8-20)  


 


Glucose   ()  mg/dL


 


Lactic Acid  2.2 H*  (0.5-2.0)  mmol/L


 


Calcium   (8.6-10.3)  mg/dL


 


Total Bilirubin   (0.2-1.0)  mg/dL


 


AST   


 


ALT   (7-52)  U/L


 


Alkaline Phosphatase   ()  U/L


 


Troponin I   (<0.04)  ng/mL


 


Total Protein   (6.4-8.9)  g/dL


 


Albumin   (3.2-5.2)  g/dL


 


Globulin   (2-4)  g/dL


 


Albumin/Globulin Ratio   (1-3)  











Result Diagrams: 


 17 15:10





 17 16:28


Lab Statement: Any lab studies that have been ordered have been reviewed, and 

results considered in the medical decision making process.





- Radiology


  ** CXR


Xray Interpretation: Positive (See Comments) - IMPRESSION: Right upper lobe 

post radiation pneumonitis and/or fibrosis. Findings are not significantly 

changed since 2016 with no evidence of active infiltrate.


Radiology Interpretation Completed By: Radiologist





- EKG


  ** 1429


Cardiac Rate: NL


EKG Rhythm: Sinus Rhythm - 70 bpm





Course/Dx





- Course


Course Of Treatment: lactic elevated but is not likely real because labs were 

hemolyzed. INR is elevated, PE is in the differential but less likely given the 

INR, she has elevated renal function. Talked with Dr. Laguerre about pt will at 

this point leave it up to them about whether a CTA chest is required.





- Diagnoses


Provider Diagnoses: 


 Dyspnea








Discharge





- Discharge Plan


Condition: Stable


Disposition: ADMITTED TO [facility] documentation as recorded by the Nikki irwin Alok accurately reflects 

the service I personally performed and the decisions made by me, Sarah Ramos MD.

## 2017-03-21 RX ADMIN — LORAZEPAM PRN MG: 1 TABLET ORAL at 21:53

## 2017-03-21 RX ADMIN — MOMETASONE FUROATE AND FORMOTEROL FUMARATE DIHYDRATE SCH: 200; 5 AEROSOL RESPIRATORY (INHALATION) at 10:07

## 2017-03-21 RX ADMIN — ACETAMINOPHEN PRN MG: 325 TABLET ORAL at 21:57

## 2017-03-21 RX ADMIN — METHYLPREDNISOLONE SODIUM SUCCINATE SCH MG: 125 INJECTION, POWDER, FOR SOLUTION INTRAMUSCULAR; INTRAVENOUS at 06:06

## 2017-03-21 RX ADMIN — LEVALBUTEROL PRN MG: 1.25 SOLUTION, CONCENTRATE RESPIRATORY (INHALATION) at 12:12

## 2017-03-21 RX ADMIN — PAROXETINE SCH MG: 20 TABLET, FILM COATED ORAL at 21:53

## 2017-03-21 RX ADMIN — MORPHINE TINCTURE SCH MG: 1 SOLUTION ORAL at 13:40

## 2017-03-21 RX ADMIN — MORPHINE TINCTURE SCH MG: 1 SOLUTION ORAL at 09:31

## 2017-03-21 RX ADMIN — MORPHINE TINCTURE SCH MG: 1 SOLUTION ORAL at 02:18

## 2017-03-21 RX ADMIN — MORPHINE TINCTURE SCH MG: 1 SOLUTION ORAL at 21:53

## 2017-03-21 RX ADMIN — CALCITRIOL SCH MCG: 0.25 CAPSULE ORAL at 09:30

## 2017-03-21 RX ADMIN — WARFARIN SODIUM SCH: 1 TABLET ORAL at 16:40

## 2017-03-21 RX ADMIN — ASPIRIN SCH MG: 81 TABLET, CHEWABLE ORAL at 09:30

## 2017-03-21 RX ADMIN — LORAZEPAM PRN MG: 1 TABLET ORAL at 09:30

## 2017-03-21 RX ADMIN — TIOTROPIUM BROMIDE SCH: 18 CAPSULE ORAL; RESPIRATORY (INHALATION) at 10:07

## 2017-03-21 RX ADMIN — LEVALBUTEROL PRN MG: 1.25 SOLUTION, CONCENTRATE RESPIRATORY (INHALATION) at 22:56

## 2017-03-21 RX ADMIN — CINACALCET HYDROCHLORIDE SCH MG: 30 TABLET, COATED ORAL at 09:30

## 2017-03-21 RX ADMIN — LORAZEPAM PRN MG: 1 TABLET ORAL at 13:38

## 2017-03-21 RX ADMIN — LORAZEPAM PRN MG: 1 TABLET ORAL at 17:51

## 2017-03-21 RX ADMIN — MORPHINE TINCTURE SCH MG: 1 SOLUTION ORAL at 06:05

## 2017-03-21 RX ADMIN — METHYLPREDNISOLONE SODIUM SUCCINATE SCH MG: 125 INJECTION, POWDER, FOR SOLUTION INTRAMUSCULAR; INTRAVENOUS at 17:31

## 2017-03-21 RX ADMIN — MORPHINE TINCTURE SCH MG: 1 SOLUTION ORAL at 17:32

## 2017-03-21 RX ADMIN — SODIUM CHLORIDE SCH MLS/HR: 900 IRRIGANT IRRIGATION at 05:36

## 2017-03-21 NOTE — PN
Progress Note





- Progress Note


Note: 





Pulm consult f/u note 3/21/17. Pt seen and examined at bedside this am.Pt 

reported slight improvement in breathing and was also able to expectorate more 

phelghm.


 Active Medications











Generic Name Dose Route Start Last Admin





  Trade Name Freq  PRN Reason Stop Dose Admin


 


Aspirin  81 mg  03/21/17 09:00  03/21/17 09:30





  Aspirin Low Dose Tab*  PO   81 mg





  QAM BERTHA   Administration


 


Calcitriol  0.25 mcg  03/21/17 09:00  03/21/17 09:30





  Rocaltrol  Cap*  PO   0.25 mcg





  QAM BERTHA   Administration


 


Cinacalcet  30 mg  03/21/17 09:00  03/21/17 09:30





  Sensipar Tab*  PO   30 mg





  QAM BERTHA   Administration


 


Guaifenesin  600 mg  03/20/17 17:55  





  Mucinex*  PO   





  BID PRN   





  CONGESTION   


 


Sodium Chloride  1,000 mls @ 100 mls/hr  03/21/17 14:45  03/21/17 15:59





  Ns 0.9% 1000 Ml*  IV  03/22/17 00:44  100 mls/hr





  PER RATE BERTHA   Administration


 


Levalbuterol HCl  1.25 mg  03/20/17 22:27  03/21/17 12:12





  Xopenex 1.25 Mg/0.5 Ml Neb.Sol*  INH   1.25 mg





  Q6H PRN   Administration





  SOB/WHEEZING   


 


Lorazepam  1 mg  03/20/17 18:05  03/20/17 23:32





  Ativan Tab(*)  PO   1 mg





  BEDTIME PRN   Administration





  ANXIETY   


 


Lorazepam  0.25 mg  03/20/17 18:05  





  Ativan Tab(*)  PO   





  QAM PRN   





  ANXIETY   


 


Lorazepam  1 mg  03/20/17 18:05  03/21/17 13:38





  Ativan Tab(*)  PO   1 mg





  Q4H PRN   Administration





  ANXIETY   


 


Methylprednisolone Sodium Succinate  60 mg  03/20/17 18:00  03/21/17 17:31





  Solu-Medrol*  IV   60 mg





  Q12H BERTHA   Administration


 


Morphine Sulfate  2.5 mg  03/21/17 08:57  





  Morphine Oral Concentrate*  SL   





  Q1H PRN   





  SHORTNESS OF BREATH   


 


Bevespi Aerosphere  2 admin  03/21/17 13:04  03/21/17 14:09





  Mdi  INH   2 admin





  BID BERTHA   Administration


 


Opium Tincture  6 mg  03/20/17 22:00  03/21/17 17:32





  Opium Tincture*  PO   6 mg





  Q4HR BERTHA   Administration


 


Paroxetine HCl  20 mg  03/20/17 21:00  03/20/17 22:20





  Paxil Tab*  PO   20 mg





  BEDTIME BERTHA   Administration


 


Pharmacy Profile Note  1 note  03/21/17 17:00  03/21/17 16:39





  Coumadin Daily Reminder*  FOLLOW UP   Not Given





  [address] [facility]   


 


Tiotropium Bromide  1 cap  03/21/17 09:00  03/21/17 10:07





  Spiriva Cap.Inh*  INH   Not Given





  DAILY [facility]   


 


Warfarin Sodium  1.5 mg  03/21/17 17:00  03/21/17 16:40





  Coumadin Tab(*)  PO   Not Given





  DAILY@1700 [facility]   





  Protocol   








 Vital Signs











Temp Pulse Resp BP Pulse Ox


 


 98.0 F   73   16   116/42   96 


 


 03/21/17 15:26  03/21/17 15:26  03/21/17 17:32  03/21/17 15:26  03/21/17 15:26








O/E:


Gen: Pt in bed in NAD


HEENT: PERRLA, NO JVD


Lungs: scaterred wheeze present


CVS: S1, S2+


Abd: Soft, BS+


Ext: No edema


Neuro: No focal defecits














 Laboratory Results - last 24 hr











  03/21/17





  06:03


 


INR (Anticoag Therapy)  3.81 H








I/R: 71 y o f with h/o severe emphysema, O2 dependant, multiple comorbidities a/

f evaluationof worsening SOB, hypoxia being treated for acute COPD exacerbation 


Pt reprots improvement in SOB, cough


C/w nebs


Will d/c Spiriva, Symbicort


Will initiate pt on Bevaspi


c/w O2 supplementation


Flutter valve with instructions on usage


PT, OOB to chair as tolerated


Pt has h/o lung nodule in RLL with interval increase in size, pt doesnot want 

to go through biopsy

## 2017-03-21 NOTE — CONS
PULMONARY CONSULTATION REPORT:

 

DATE OF CONSULT:  03/20/17

 

CONSULTATION REQUESTED BY:  Dr. Sarah Ramos.

 

REASON FOR CONSULT:  Evaluation of shortness of breath.

 

HISTORY OF PRESENT ILLNESS:  The patient is a 71-year-old female with multiple 
comorbidities, current smoker, recently diagnosed with stage I lung cancer.  
The patient was deemed not a surgical candidate, subsequently underwent 
radiation.  The patient has been having gradually worsening shortness of 
breath.  The patient was recently evaluated in the emergency room 1 week ago 
for similar complaints.  The patient had CT scan of the chest performed at that 
time which was suggestive of interval increase in size of 0.8-cm nodule in 
right lower lobe.  The patient also had 1.9 x 3.8 cm soft tissue density in the 
apical posterior segment of right upper lobe for which she was treated with 
radiation.  The patient was recently being treated as an outpatient for acute 
COPD exacerbation with steroids and antibiotics. The patient has continued to 
have worsening shortness of breath with minimal exertion.  Her O2 requirements 
have also increased at home.  The patient uses nebulizers at home.  The patient 
did not have improvement in symptoms and decided to come in to the ED today.  
The patient was seen and examined at bedside earlier today.  The patient 
reported slight improvement in shortness of breath since she received 
treatments in the emergency room.  The patient had chest x-ray in the emergency 
room, which was personally reviewed by me.  The patient did not have acute 
changes compared to prior imaging studies.  The patient did not have white 
count today.  The patient noted to have mildly elevated lactic acid.  The 
patient did not have evidence of hypercapnia.  The patient is requiring O2 
supplementation at 4 L currently.  No acute changes on EKG were noted.  The 
patient is being admitted for management of acute COPD exacerbation.

 

PAST MEDICAL HISTORY:

1.  Crohn's disease, status post ileostomy with high-output state.

2.  Osteoporosis.

3.  Chronic kidney disease.

4.  Pulmonary embolism, on Coumadin.

5.  Thyroid disease.

6.  Lung cancer, adenocarcinoma diagnosed in 2015,  slow growing, not a 
surgical candidate, received XRT.

7.  COPD, moderate obstruction, severely decreased DLCO.

8.  Obstructive sleep apnea.

9.  Current smoking history.

10.  Gout.

11.  Depressive disorder.

12.  Osteoporosis.

13.  Anemia.

 

PAST SURGICAL HISTORY:

1.  Colectomy, status post ileal resection x2.

2.  Rectal abscess and fistula.

 

The patient was hospitalized in [place] in January of 2015 for severe MRSA 
pneumonia.

 

ALLERGIES:  EPINEPHRINE, REMICADE, TETANUS, DAPTOMYCIN, VANCOMYCIN, EGGPLANT, 
LEVAQUIN, DOXYCYCLINE, AUGMENTIN.

 

FAMILY HISTORY:  Heart disease in father.  Suicide in mother.

 

SOCIAL HISTORY:  The patient lives alone at home.  Retired professor.

 

Current smoker, smokes every day, at least half pack per day.  Currently 
smoking 2 to 3 cigarettes per day.  No alcohol or drug abuse.

 

REVIEW OF SYSTEMS:  All 14 systems reviewed and as per HPI.

 

PHYSICAL EXAM:  The patient sitting in bed and leaning forward in tripod 
positioning.  Vital Signs:  Temperature 98.5, pulse 78 beats per minute, 
respiratory rate 20 per minute, O2 sat 95% on 4 L, blood pressure 100/59.  HEENT
: Pupils equal, reactive to light.  Mucous membranes moist.  Lungs:  Scattered 
wheeze present bilaterally, rhonchi present.  Cardiovascular:  S1, S2 present, 
regular. Abdomen:  Soft, nontender, nondistended.  Bowel sounds present.  
Extremities: Normal range of motion.  No edema.  Skin:  No rash or bruises.  
Neuro:  No focal deficits.

 

DIAGNOSTIC STUDIES/LAB DATA:  WBC count 10.6, hemoglobin 13.6, hematocrit 41, 
platelet count 260.  INR 3.88.

 

Sodium 138, potassium 4.4, chloride 101, bicarb 27, BUN 34, creatinine 1.69, 
lactic acid 2.2.  LFTs within normal limits.

 

Influenza A and B negative a week ago.

 

Chest x-ray as described above in HPI.

 

IMPRESSION AND RECOMMENDATIONS:  71-year-old female known to me from outpatient 
evaluation with history of severe chronic obstructive pulmonary disease, O2 
dependent; history of adenocarcinoma of the lung, not a candidate for surgery, 
so underwent radiation with recent emergency room evaluation for worsening 
shortness of breath, now with acute chronic obstructive pulmonary disease 
exacerbation.

 

1.  Acute chronic obstructive pulmonary disease exacerbation, likely secondary 
to viral bronchitis.



The patient with diffuse wheezing, however, with clear phlegm. The patient 
being treated as an outpatient with no significant improvement.

Will admit the patient for IV prednisone, IV Solu-Medrol, bronchodilators.

 The patient on Spiriva and Symbicort as an outpatient and does not see 
significant improvement.

 Will try Bevaspi.

Continue with nebulizers.Continue with O2 supplementation. Will reassess in the 
morning.



Further management of other underlying conditions as per hospitalist team.

 



 [id number]/[facility] #: [id number]

[facility]

## 2017-03-21 NOTE — PN
Subjective


Date of Service: 03/21/17


Interval History: 





Patient seen and examined at bedside. She reports mild improvement in breathing 

today. Denies CP, abd pain, n/v. Patient has tried new inhaler provided by Dr. Dacosta. Has requested to continue IVF x 1 more liter due to high ileostomy 

output. No other acute concerns.








Family History: Unchanged from Admission


Social History: Unchanged from Admission


Past Medical History: Unchanged from Admission





Objective


Active Medications: 








Aspirin (Aspirin Low Dose Tab*)  81 mg PO QAM [facility]


   Last Admin: 03/21/17 09:30 Dose:  81 mg


Calcitriol (Rocaltrol  Cap*)  0.25 mcg PO QAM [facility]


   Last Admin: 03/21/17 09:30 Dose:  0.25 mcg


Cinacalcet (Sensipar Tab*)  30 mg PO QAM [facility]


   Last Admin: 03/21/17 09:30 Dose:  30 mg


Guaifenesin (Mucinex*)  600 mg PO BID PRN


   PRN Reason: CONGESTION


Sodium Chloride (Ns 0.9% 1000 Ml*)  1,000 mls @ 100 mls/hr IV PER RATE [facility]


   Last Admin: 03/21/17 05:36 Dose:  100 mls/hr


Levalbuterol HCl (Xopenex 1.25 Mg/0.5 Ml Neb.Sol*)  1.25 mg INH Q6H PRN


   PRN Reason: SOB/WHEEZING


   Last Admin: 03/21/17 12:12 Dose:  1.25 mg


Lorazepam (Ativan Tab(*))  1 mg PO BEDTIME PRN


   PRN Reason: ANXIETY


   Last Admin: 03/20/17 23:32 Dose:  1 mg


Lorazepam (Ativan Tab(*))  0.25 mg PO QAM PRN


   PRN Reason: ANXIETY


Lorazepam (Ativan Tab(*))  1 mg PO Q4H PRN


   PRN Reason: ANXIETY


   Last Admin: 03/21/17 09:30 Dose:  1 mg


Methylprednisolone Sodium Succinate (Solu-Medrol*)  60 mg IV Q12H [facility]


   Last Admin: 03/21/17 06:06 Dose:  60 mg


Mometasone Furoate/Formoterol Fumar (Dulera 200/5 Mdi*)  2 puff INH BID [facility]


   Last Admin: 03/21/17 10:07 Dose:  Not Given


Morphine Sulfate (Morphine Oral Concentrate*)  2.5 mg SL Q1H PRN


   PRN Reason: SHORTNESS OF BREATH


Non-Formulary Medication (Non Formulary Med10*)  2 admin INH BID [facility]


Opium Tincture (Opium Tincture*)  6 mg PO Q4HR [facility]


   Last Admin: 03/21/17 09:31 Dose:  6 mg


Paroxetine HCl (Paxil Tab*)  20 mg PO BEDTIME [facility]


   Last Admin: 03/20/17 22:20 Dose:  20 mg


Pharmacy Profile Note (Coumadin Daily Reminder*)  1 note FOLLOW UP 1700 [facility]


Tiotropium Bromide (Spiriva Cap.Inh*)  1 cap INH DAILY [facility]


   Last Admin: 03/21/17 10:07 Dose:  Not Given


Warfarin Sodium (Coumadin Tab(*))  1.5 mg PO DAILY@1700 [facility]


   PRN Reason: Protocol








 Vital Signs











  03/20/17 03/20/17 03/20/17





  18:00 18:59 19:32


 


Temperature  98.1 F 


 


Pulse Rate 70 73 


 


Respiratory 18 16 22





Rate   


 


Blood Pressure  131/65 





(mmHg)   


 


O2 Sat by Pulse 94 97 





Oximetry   














  03/20/17 03/20/17 03/20/17





  19:48 21:32 22:21


 


Temperature   


 


Pulse Rate 70  


 


Respiratory 18 20 22





Rate   


 


Blood Pressure   





(mmHg)   


 


O2 Sat by Pulse 94  





Oximetry   














  03/20/17 03/20/17 03/21/17





  22:50 23:32 00:19


 


Temperature   98.3 F


 


Pulse Rate   83


 


Respiratory 20 20 18





Rate   


 


Blood Pressure   129/52





(mmHg)   


 


O2 Sat by Pulse   94





Oximetry   














  03/21/17 03/21/17 03/21/17





  00:21 01:32 02:18


 


Temperature   


 


Pulse Rate   


 


Respiratory 20 18 18





Rate   


 


Blood Pressure   





(mmHg)   


 


O2 Sat by Pulse   





Oximetry   














  03/21/17 03/21/17 03/21/17





  04:03 04:18 06:05


 


Temperature 97.9 F  


 


Pulse Rate 85  


 


Respiratory 16 18 18





Rate   


 


Blood Pressure 118/50  





(mmHg)   


 


O2 Sat by Pulse 93  





Oximetry   














  03/21/17 03/21/17 03/21/17





  07:56 08:05 09:30


 


Temperature 97.6 F  


 


Pulse Rate 52  


 


Respiratory 16 16 16





Rate   


 


Blood Pressure 123/50  





(mmHg)   


 


O2 Sat by Pulse 97  





Oximetry   














  03/21/17 03/21/17 03/21/17





  09:31 11:30 11:49


 


Temperature   97.7 F


 


Pulse Rate   50


 


Respiratory 16 16 16





Rate   


 


Blood Pressure   124/56





(mmHg)   


 


O2 Sat by Pulse   96





Oximetry   














  03/21/17





  12:36


 


Temperature 


 


Pulse Rate 55


 


Respiratory 16





Rate 


 


Blood Pressure 





(mmHg) 


 


O2 Sat by Pulse 97





Oximetry 











Oxygen Devices in Use Now: Nasal Cannula - 2Lnc


Appearance: Chronically ill appearing female, lying in bed, eating lunch, in NAD


Eyes: PERRLA


Ears/Nose/Mouth/Throat: Mucous Membranes Moist


Neck: NL Appearance and Movements; NL JVP


Respiratory: Symmetrical Chest Expansion and Respiratory Effort, - - scattered 

expiratory wheezing


Cardiovascular: NL Sounds; No Murmurs; No JVD, RRR


Abdominal: NL Sounds; No Tenderness; No Distention


Extremities: No Edema


Skin: No Rash or Ulcers


Neurological: Alert and Oriented x 3, NL Muscle Strength and Tone


Lines/Tubes/Other Access: Clean, Dry and Intact Peripheral IV


Nutrition: Taking PO's


Result Diagrams: 


 03/20/17 15:10





 03/20/17 16:28


Additional Lab and Data: 


 Lab Results











  03/20/17 03/20/17 03/20/17 Range/Units





  15:10 15:10 15:10 


 


WBC  10.6    (3.5-10.8)  10^3/ul


 


RBC  4.64    (4.0-5.4)  10^6/ul


 


Hgb  13.6    (12.0-16.0)  g/dl


 


Hct  41    (35-47)  %


 


MCV  89    (80-97)  fL


 


MCH  29    (27-31)  pg


 


MCHC  33    (31-36)  g/dl


 


RDW  13    (10.5-15)  %


 


Plt Count  260    (150-450)  10^3/ul


 


MPV  8    (7.4-10.4)  um3


 


Neut % (Auto)  84.0 H    (38-83)  %


 


Lymph % (Auto)  12.1 L    (25-47)  %


 


Mono % (Auto)  2.7    (1-9)  %


 


Eos % (Auto)  0.7    (0-6)  %


 


Baso % (Auto)  0.5    (0-2)  %


 


Absolute Neuts (auto)  8.9 H    (1.5-7.7)  10^3/ul


 


Absolute Lymphs (auto)  1.3    (1.0-4.8)  10^3/ul


 


Absolute Monos (auto)  0.3    (0-0.8)  10^3/ul


 


Absolute Eos (auto)  0.1    (0-0.6)  10^3/ul


 


Absolute Basos (auto)  0.1    (0-0.2)  10^3/ul


 


Absolute Nucleated RBC  0    10^3/ul


 


Nucleated RBC %  0    


 


INR (Anticoag Therapy)   3.88 H   (0.89-1.11)  


 


Sodium    138  (133-145)  mmol/L


 


Potassium    TNP  


 


Chloride    101  (101-111)  mmol/L


 


Carbon Dioxide    27  (22-32)  mmol/L


 


Anion Gap    TNP  


 


BUN    34 H  (6-24)  mg/dL


 


Creatinine    1.69 H  (0.51-0.95)  mg/dL


 


Est GFR ( Amer)    38.3  (>60)  


 


Est GFR (Non-Af Amer)    29.7  (>60)  


 


BUN/Creatinine Ratio    20.1 H  (8-20)  


 


Glucose    132 H  ()  mg/dL


 


Lactic Acid     (0.5-2.0)  mmol/L


 


Calcium    8.9  (8.6-10.3)  mg/dL


 


Total Bilirubin    0.50  (0.2-1.0)  mg/dL


 


AST    TNP  


 


ALT    36  (7-52)  U/L


 


Alkaline Phosphatase    92  ()  U/L


 


Troponin I    0.00  (<0.04)  ng/mL


 


Total Protein    6.6  (6.4-8.9)  g/dL


 


Albumin    3.7  (3.2-5.2)  g/dL


 


Globulin    2.9  (2-4)  g/dL


 


Albumin/Globulin Ratio    1.3  (1-3)  














  03/20/17 Range/Units





  15:10 


 


WBC   (3.5-10.8)  10^3/ul


 


RBC   (4.0-5.4)  10^6/ul


 


Hgb   (12.0-16.0)  g/dl


 


Hct   (35-47)  %


 


MCV   (80-97)  fL


 


MCH   (27-31)  pg


 


MCHC   (31-36)  g/dl


 


RDW   (10.5-15)  %


 


Plt Count   (150-450)  10^3/ul


 


MPV   (7.4-10.4)  um3


 


Neut % (Auto)   (38-83)  %


 


Lymph % (Auto)   (25-47)  %


 


Mono % (Auto)   (1-9)  %


 


Eos % (Auto)   (0-6)  %


 


Baso % (Auto)   (0-2)  %


 


Absolute Neuts (auto)   (1.5-7.7)  10^3/ul


 


Absolute Lymphs (auto)   (1.0-4.8)  10^3/ul


 


Absolute Monos (auto)   (0-0.8)  10^3/ul


 


Absolute Eos (auto)   (0-0.6)  10^3/ul


 


Absolute Basos (auto)   (0-0.2)  10^3/ul


 


Absolute Nucleated RBC   10^3/ul


 


Nucleated RBC %   


 


INR (Anticoag Therapy)   (0.89-1.11)  


 


Sodium   (133-145)  mmol/L


 


Potassium   


 


Chloride   (101-111)  mmol/L


 


Carbon Dioxide   (22-32)  mmol/L


 


Anion Gap   


 


BUN   (6-24)  mg/dL


 


Creatinine   (0.51-0.95)  mg/dL


 


Est GFR ( Amer)   (>60)  


 


Est GFR (Non-Af Amer)   (>60)  


 


BUN/Creatinine Ratio   (8-20)  


 


Glucose   ()  mg/dL


 


Lactic Acid  2.2 H*  (0.5-2.0)  mmol/L


 


Calcium   (8.6-10.3)  mg/dL


 


Total Bilirubin   (0.2-1.0)  mg/dL


 


AST   


 


ALT   (7-52)  U/L


 


Alkaline Phosphatase   ()  U/L


 


Troponin I   (<0.04)  ng/mL


 


Total Protein   (6.4-8.9)  g/dL


 


Albumin   (3.2-5.2)  g/dL


 


Globulin   (2-4)  g/dL


 


Albumin/Globulin Ratio   (1-3)  














Assess/Plan/Problems-Billing


Assessment: 











- Patient Problems


(1) COPD exacerbation


Code(s): J44.1 - CHRONIC OBSTRUCTIVE PULMONARY DISEASE W (ACUTE) EXACERBATION   

Comment: 


Failed outpatient treatment. Appreciate pulmonology consult.


Continue Bevaspi, d/c Dulera and Spiriva.


Continue Solu-medrol, prn O2, flutter valve.   





(2) Crohn's disease


Code(s): K50.90 - CROHN'S DISEASE, UNSPECIFIED, WITHOUT COMPLICATIONS   Comment

: 


With ileostomy.


Continue tincture of opium.   





(3) History of pulmonary embolism


Code(s): Z86.711 - PERSONAL HISTORY OF PULMONARY EMBOLISM   Comment: 


Continue home warfarin at reduced dose due to supratherapeutic INR.   





(4) CKD (chronic kidney disease), stage IV


Code(s): N18.4 - CHRONIC KIDNEY DISEASE, STAGE 4 (SEVERE)   Comment: 


Creatinine within baseline   





(5) Depression


Code(s): F32.9 - MAJOR DEPRESSIVE DISORDER, SINGLE EPISODE, UNSPECIFIED   

Comment: 


Continue Paxil   





(6) DVT prophylaxis


Code(s): [id number] -    Comment: 


Warfarin   





(7) DNR (do not resuscitate)





Status and Disposition: 





OBV to inpatient admit. Plan to d/c home when medically stable.

## 2017-03-22 RX ADMIN — ACETAMINOPHEN PRN MG: 325 TABLET ORAL at 10:20

## 2017-03-22 RX ADMIN — PAROXETINE SCH MG: 20 TABLET, FILM COATED ORAL at 21:13

## 2017-03-22 RX ADMIN — LORAZEPAM PRN MG: 1 TABLET ORAL at 10:11

## 2017-03-22 RX ADMIN — LORAZEPAM PRN MG: 1 TABLET ORAL at 15:17

## 2017-03-22 RX ADMIN — ACETAMINOPHEN PRN MG: 325 TABLET ORAL at 15:23

## 2017-03-22 RX ADMIN — SODIUM CHLORIDE SCH MLS/HR: 900 IRRIGANT IRRIGATION at 18:39

## 2017-03-22 RX ADMIN — MORPHINE TINCTURE SCH MG: 1 SOLUTION ORAL at 06:11

## 2017-03-22 RX ADMIN — MORPHINE TINCTURE SCH MG: 1 SOLUTION ORAL at 22:26

## 2017-03-22 RX ADMIN — LORAZEPAM PRN MG: 1 TABLET ORAL at 01:48

## 2017-03-22 RX ADMIN — MORPHINE TINCTURE SCH MG: 1 SOLUTION ORAL at 09:54

## 2017-03-22 RX ADMIN — CALCITRIOL SCH MCG: 0.25 CAPSULE ORAL at 09:53

## 2017-03-22 RX ADMIN — CINACALCET HYDROCHLORIDE SCH MG: 30 TABLET, COATED ORAL at 09:53

## 2017-03-22 RX ADMIN — METHYLPREDNISOLONE SODIUM SUCCINATE SCH MG: 125 INJECTION, POWDER, FOR SOLUTION INTRAMUSCULAR; INTRAVENOUS at 06:11

## 2017-03-22 RX ADMIN — TIOTROPIUM BROMIDE SCH CAP: 18 CAPSULE ORAL; RESPIRATORY (INHALATION) at 08:49

## 2017-03-22 RX ADMIN — LORAZEPAM PRN MG: 1 TABLET ORAL at 21:13

## 2017-03-22 RX ADMIN — ACETAMINOPHEN PRN MG: 325 TABLET ORAL at 22:26

## 2017-03-22 RX ADMIN — LORAZEPAM PRN MG: 1 TABLET ORAL at 06:11

## 2017-03-22 RX ADMIN — ASPIRIN SCH MG: 81 TABLET, CHEWABLE ORAL at 09:54

## 2017-03-22 RX ADMIN — MORPHINE TINCTURE SCH MG: 1 SOLUTION ORAL at 18:35

## 2017-03-22 RX ADMIN — WARFARIN SODIUM SCH: 1 TABLET ORAL at 15:27

## 2017-03-22 RX ADMIN — METHYLPREDNISOLONE SODIUM SUCCINATE SCH MG: 125 INJECTION, POWDER, FOR SOLUTION INTRAMUSCULAR; INTRAVENOUS at 18:36

## 2017-03-22 RX ADMIN — MORPHINE TINCTURE SCH MG: 1 SOLUTION ORAL at 01:48

## 2017-03-22 RX ADMIN — MORPHINE TINCTURE SCH MG: 1 SOLUTION ORAL at 15:17

## 2017-03-22 RX ADMIN — GUAIFENESIN PRN MG: 600 TABLET, EXTENDED RELEASE ORAL at 06:11

## 2017-03-22 NOTE — PN
Subjective


Date of Service: 03/22/17


Interval History: 





Patient seen and examined at bedside. She reports mild improvement in her 

breathing but fatigues easily. She recognizes that this "most likely won't get 

better." She denies CP, abd pain, n/v. Her ileostomy output appears WNL. She 

has requested palliative care consult because she is interested in hospice or 

respite via hospice. 








Family History: Unchanged from Admission


Social History: Unchanged from Admission


Past Medical History: Unchanged from Admission





Objective


Active Medications: 








Acetaminophen (Tylenol Tab*)  650 mg PO Q4H PRN


   PRN Reason: FEVER/PAIN


   Last Admin: 03/22/17 10:20 Dose:  650 mg


[facility]in ([facility] Low Dose Tab*)  [facility] [place]


   Last Admin: 03/22/17 09:54 Dose:  81 mg


Calcitriol (Rocaltrol  Cap*)  [facility] [place]


   Last Admin: 03/22/17 09:53 Dose:  0.25 mcg


Cinacalcet (Sensipar Tab*)  [facility] [place]


   Last Admin: 03/22/17 09:53 Dose:  30 mg


Guaifenesin (Mucinex*)  600 mg PO BID PRN


   PRN Reason: CONGESTION


   Last Admin: 03/22/17 06:11 Dose:  600 mg


Levalbuterol HCl (Xopenex 1.25 Mg/0.5 Ml Neb.Sol*)  1.25 mg INH Q6H PRN


   PRN Reason: SOB/WHEEZING


   Last Admin: 03/21/17 22:56 Dose:  1.25 mg


Lorazepam (Ativan Tab(*))  1 mg PO BEDTIME PRN


   PRN Reason: ANXIETY


   Last Admin: 03/21/17 21:53 Dose:  1 mg


Lorazepam (Ativan Tab(*))  0.25 mg PO QAM PRN


   PRN Reason: ANXIETY


Lorazepam (Ativan Tab(*))  1 mg PO Q4H PRN


   PRN Reason: ANXIETY


   Last Admin: 03/22/17 10:11 Dose:  1 mg


Methylprednisolone Sodium Succinate (Solu-Medrol*)  60 mg IV Q12H [facility]


   Last Admin: 03/22/17 06:11 Dose:  60 mg


Morphine Sulfate (Morphine Oral Concentrate*)  2.5 mg SL Q1H PRN


   PRN Reason: SHORTNESS OF BREATH


Bevespi Aerosphere (Mdi)  2 admin INH BID [facility]


   Last Admin: 03/22/17 09:56 Dose:  2 admin


Opium Tincture (Opium Tincture*)  6 mg PO Q4HR [facility]


   Last Admin: 03/22/17 09:54 Dose:  6 mg


Paroxetine HCl (Paxil Tab*)  20 mg PO BEDTIME [facility]


   Last Admin: 03/21/17 21:53 Dose:  20 mg


Pharmacy Profile Note (Coumadin Daily Reminder*)  1 note FOLLOW UP 1700 [facility]


   Last Admin: 03/21/17 16:39 Dose:  Not Given


Tiotropium Bromide (Spiriva Cap.Inh*)  1 cap INH DAILY [facility]


   Last Admin: 03/22/17 08:49 Dose:  1 cap


Warfarin Sodium (Coumadin Tab(*))  1.5 mg PO DAILY@1700 [facility]


   PRN Reason: Protocol


   Last Admin: 03/21/17 16:40 Dose:  Not Given








 Vital Signs











  03/21/17 03/21/17 03/21/17





  17:32 17:51 19:40


 


Temperature   98.2 F


 


Pulse Rate   82


 


Respiratory 16 16 16





Rate   


 


Blood Pressure   123/58





(mmHg)   


 


O2 Sat by Pulse   95





Oximetry   














  03/21/17 03/21/17 03/21/17





  19:51 20:00 20:15


 


Temperature   


 


Pulse Rate   


 


Respiratory 16 18 16





Rate   


 


Blood Pressure   





(mmHg)   


 


O2 Sat by Pulse   





Oximetry   














  03/21/17 03/21/17 03/21/17





  21:53 22:57 23:28


 


Temperature   98.3 F


 


Pulse Rate  82 88


 


Respiratory 20 16 17





Rate   


 


Blood Pressure   113/35





(mmHg)   


 


O2 Sat by Pulse  95 97





Oximetry   














  03/21/17 03/22/17 03/22/17





  23:53 01:48 03:30


 


Temperature   98.0 F


 


Pulse Rate   61


 


Respiratory 16 18 17





Rate   


 


Blood Pressure   126/54





(mmHg)   


 


O2 Sat by Pulse   99





Oximetry   














  03/22/17 03/22/17 03/22/17





  03:48 06:11 07:10


 


Temperature   98.1 F


 


Pulse Rate   56


 


Respiratory 17 18 18





Rate   


 


Blood Pressure   115/53





(mmHg)   


 


O2 Sat by Pulse   100





Oximetry   














  03/22/17 03/22/17 03/22/17





  08:11 09:54 10:00


 


Temperature   


 


Pulse Rate   


 


Respiratory 20 20 18





Rate   


 


Blood Pressure   





(mmHg)   


 


O2 Sat by Pulse   





Oximetry   














  03/22/17 03/22/17





  10:11 11:58


 


Temperature  98.2 F


 


Pulse Rate  64


 


Respiratory 18 16





Rate  


 


Blood Pressure  137/56





(mmHg)  


 


O2 Sat by Pulse  97





Oximetry  











Oxygen Devices in Use Now: Nasal Cannula - 2-3Lnc


Appearance: Female patient, lying in bed, in NAD


Eyes: PERRLA


Ears/Nose/Mouth/Throat: Clear Oropharnyx, Mucous Membranes Moist


Neck: NL Appearance and Movements; NL JVP


Respiratory: Symmetrical Chest Expansion and Respiratory Effort, - - diminished

, scattered wheezing


Cardiovascular: RRR


Abdominal: NL Sounds; No Tenderness; No Distention, - - ileostomy


Extremities: No Edema


Skin: No Rash or Ulcers


Neurological: Alert and Oriented x 3


Lines/Tubes/Other Access: Clean, Dry and Intact Peripheral IV


Nutrition: Taking PO's


Result Diagrams: 


 03/20/17 15:10





 03/20/17 16:28


Additional Lab and Data: 


 Lab Results











  03/20/17 03/20/17 03/20/17 Range/Units





  15:10 15:10 15:10 


 


WBC  10.6    (3.5-10.8)  10^3/ul


 


RBC  4.64    (4.0-5.4)  10^6/ul


 


Hgb  13.6    (12.0-16.0)  g/dl


 


Hct  41    (35-47)  %


 


MCV  89    (80-97)  fL


 


MCH  29    (27-31)  pg


 


MCHC  33    (31-36)  g/dl


 


RDW  13    (10.5-15)  %


 


Plt Count  260    (150-450)  10^3/ul


 


MPV  8    (7.4-10.4)  um3


 


Neut % (Auto)  84.0 H    (38-83)  %


 


Lymph % (Auto)  12.1 L    (25-47)  %


 


Mono % (Auto)  2.7    (1-9)  %


 


Eos % (Auto)  0.7    (0-6)  %


 


Baso % (Auto)  0.5    (0-2)  %


 


Absolute Neuts (auto)  8.9 H    (1.5-7.7)  10^3/ul


 


Absolute Lymphs (auto)  1.3    (1.0-4.8)  10^3/ul


 


Absolute Monos (auto)  0.3    (0-0.8)  10^3/ul


 


Absolute Eos (auto)  0.1    (0-0.6)  10^3/ul


 


Absolute Basos (auto)  0.1    (0-0.2)  10^3/ul


 


Absolute Nucleated RBC  0    10^3/ul


 


Nucleated RBC %  0    


 


INR (Anticoag Therapy)   3.88 H   (0.89-1.11)  


 


Sodium    138  (133-145)  mmol/L


 


Potassium    TNP  


 


Chloride    101  (101-111)  mmol/L


 


Carbon Dioxide    27  (22-32)  mmol/L


 


Anion Gap    TNP  


 


BUN    34 H  (6-24)  mg/dL


 


Creatinine    1.69 H  (0.51-0.95)  mg/dL


 


Est GFR ( Amer)    38.3  (>60)  


 


Est GFR (Non-Af Amer)    29.7  (>60)  


 


BUN/Creatinine Ratio    20.1 H  (8-20)  


 


Glucose    132 H  ()  mg/dL


 


Lactic Acid     (0.5-2.0)  mmol/L


 


Calcium    8.9  (8.6-10.3)  mg/dL


 


Total Bilirubin    0.50  (0.2-1.0)  mg/dL


 


AST    TNP  


 


ALT    36  (7-52)  U/L


 


Alkaline Phosphatase    92  ()  U/L


 


Troponin I    0.00  (<0.04)  ng/mL


 


Total Protein    6.6  (6.4-8.9)  g/dL


 


Albumin    3.7  (3.2-5.2)  g/dL


 


Globulin    2.9  (2-4)  g/dL


 


Albumin/Globulin Ratio    1.3  (1-3)  














  03/20/17 Range/Units





  15:10 


 


WBC   (3.5-10.8)  10^3/ul


 


RBC   (4.0-5.4)  10^6/ul


 


Hgb   (12.0-16.0)  g/dl


 


Hct   (35-47)  %


 


MCV   (80-97)  fL


 


MCH   (27-31)  pg


 


MCHC   (31-36)  g/dl


 


RDW   (10.5-15)  %


 


Plt Count   (150-450)  10^3/ul


 


MPV   (7.4-10.4)  um3


 


Neut % (Auto)   (38-83)  %


 


Lymph % (Auto)   (25-47)  %


 


Mono % (Auto)   (1-9)  %


 


Eos % (Auto)   (0-6)  %


 


Baso % (Auto)   (0-2)  %


 


Absolute Neuts (auto)   (1.5-7.7)  10^3/ul


 


Absolute Lymphs (auto)   (1.0-4.8)  10^3/ul


 


Absolute Monos (auto)   (0-0.8)  10^3/ul


 


Absolute Eos (auto)   (0-0.6)  10^3/ul


 


Absolute Basos (auto)   (0-0.2)  10^3/ul


 


Absolute Nucleated RBC   10^3/ul


 


Nucleated RBC %   


 


INR (Anticoag Therapy)   (0.89-1.11)  


 


Sodium   (133-145)  mmol/L


 


Potassium   


 


Chloride   (101-111)  mmol/L


 


Carbon Dioxide   (22-32)  mmol/L


 


Anion Gap   


 


BUN   (6-24)  mg/dL


 


Creatinine   (0.51-0.95)  mg/dL


 


Est GFR ( Amer)   (>60)  


 


Est GFR (Non-Af Amer)   (>60)  


 


BUN/Creatinine Ratio   (8-20)  


 


Glucose   ()  mg/dL


 


Lactic Acid  2.2 H*  (0.5-2.0)  mmol/L


 


Calcium   (8.6-10.3)  mg/dL


 


Total Bilirubin   (0.2-1.0)  mg/dL


 


AST   


 


ALT   (7-52)  U/L


 


Alkaline Phosphatase   ()  U/L


 


Troponin I   (<0.04)  ng/mL


 


Total Protein   (6.4-8.9)  g/dL


 


Albumin   (3.2-5.2)  g/dL


 


Globulin   (2-4)  g/dL


 


Albumin/Globulin Ratio   (1-3)  














Assess/Plan/Problems-Billing


Assessment: 











- Patient Problems


(1) COPD exacerbation


Code(s): J44.1 - CHRONIC OBSTRUCTIVE PULMONARY DISEASE W (ACUTE) EXACERBATION   

Comment: 


Failed outpatient treatment. Appreciate pulmonology consult.


Continue Bevaspi, d/c Dulera and Spiriva.


Continue Solu-medrol, prn O2, flutter valve.


Palliative care consult for advanced disease process   





(2) Crohn's disease


Code(s): K50.90 - CROHN'S DISEASE, UNSPECIFIED, WITHOUT COMPLICATIONS   Comment

: 


With ileostomy.


Continue tincture of opium.   





(3) History of pulmonary embolism


Code(s): Z86.711 - PERSONAL HISTORY OF PULMONARY EMBOLISM   Comment: 


Continue home warfarin at reduced dose due to supratherapeutic INR.


Hold today's dose due to supratherapeutic INR, and allow INR to drift down.


Daily INR   





(4) CKD (chronic kidney disease), stage IV


Code(s): N18.4 - CHRONIC KIDNEY DISEASE, STAGE 4 (SEVERE)   Comment: 


Creatinine within baseline   





(5) Depression


Code(s): F32.9 - MAJOR DEPRESSIVE DISORDER, SINGLE EPISODE, UNSPECIFIED   

Comment: 


Continue Paxil   





(6) DVT prophylaxis


Code(s): [id number] -    Comment: 


Warfarin - hold today's dose due to supratherapeutic INR   





(7) DNR (do not resuscitate)





Status and Disposition: 





Inpatient admit. Palliative care consult placed in order to guide discharge 

plan.

## 2017-03-22 NOTE — PN
Progress Note





- Progress Note


Note: 








Pulm consult f/u note 3/22/17. Pt seen and examined at bedside this am.Pt 

reported slight improvement in breathing. is getting winded with minimal 

exertion.





 Active Medications











Generic Name Dose Route Start Last Admin





  Trade Name Freq  PRN Reason Stop Dose Admin


 


Acetaminophen  650 mg  03/21/17 20:48  03/22/17 15:23





  Tylenol Tab*  PO   650 mg





  Q4H PRN   Administration





  FEVER/PAIN   


 


Aspirin  81 mg  03/21/17 09:00  03/22/17 09:54





  Aspirin Low Dose Tab*  PO   81 mg





  QAM BERTHA   Administration


 


Calcitriol  0.25 mcg  03/21/17 09:00  03/22/17 09:53





  Rocaltrol  Cap*  PO   0.25 mcg





  QAM BERTHA   Administration


 


Cinacalcet  30 mg  03/21/17 09:00  03/22/17 09:53





  Sensipar Tab*  PO   30 mg





  QAM BERTHA   Administration


 


Guaifenesin  600 mg  03/20/17 17:55  03/22/17 06:11





  Mucinex*  PO   600 mg





  BID PRN   Administration





  CONGESTION   


 


Sodium Chloride  1,000 mls @ 75 mls/hr  03/22/17 17:30  03/22/17 18:39





  Ns 0.9% 1000 Ml*  IV   75 mls/hr





  PER RATE BERTHA   Administration


 


Levalbuterol HCl  1.25 mg  03/20/17 22:27  03/21/17 22:56





  Xopenex 1.25 Mg/0.5 Ml Neb.Sol*  INH   1.25 mg





  Q6H PRN   Administration





  SOB/WHEEZING   


 


Lorazepam  1 mg  03/20/17 18:05  03/21/17 21:53





  Ativan Tab(*)  PO   1 mg





  BEDTIME PRN   Administration





  ANXIETY   


 


Lorazepam  0.25 mg  03/20/17 18:05  





  Ativan Tab(*)  PO   





  QAM PRN   





  ANXIETY   


 


Lorazepam  1 mg  03/20/17 18:05  03/22/17 15:17





  Ativan Tab(*)  PO   1 mg





  Q4H PRN   Administration





  ANXIETY   


 


Methylprednisolone Sodium Succinate  60 mg  03/20/17 18:00  03/22/17 18:36





  Solu-Medrol*  IV   60 mg





  Q12H BERTHA   Administration


 


Morphine Sulfate  2.5 mg  03/21/17 08:57  





  Morphine Oral Concentrate*  SL   





  Q1H PRN   





  SHORTNESS OF BREATH   


 


Bevespi Aerosphere  2 admin  03/21/17 13:04  03/22/17 09:56





  Mdi  INH   2 admin





  BID BERTHA   Administration


 


Opium Tincture  6 mg  03/20/17 22:00  03/22/17 18:35





  Opium Tincture*  PO   6 mg





  Q4HR BERTHA   Administration


 


Paroxetine HCl  20 mg  03/20/17 21:00  03/21/17 21:53





  Paxil Tab*  PO   20 mg





  BEDTIME BERTHA   Administration


 


Pharmacy Profile Note  1 note  03/21/17 17:00  03/22/17 15:26





  Coumadin Daily Reminder*  FOLLOW UP   Not Given





  1700 On license of [facility]   


 


Tiotropium Bromide  1 cap  03/21/17 09:00  03/22/17 08:49





  Spiriva Cap.Inh*  INH   1 cap





  DAILY BERHTA   Administration


 


Warfarin Sodium  1.5 mg  03/21/17 17:00  03/22/17 15:27





  Coumadin Tab(*)  PO   Not Given





  DAILY@1700 BERTHA   





  Protocol   











 Vital Signs











Temp Pulse Resp BP Pulse Ox


 


 97.6 F   73   18   110/42   97 


 


 03/22/17 15:21  03/22/17 15:21  03/22/17 18:35  03/22/17 15:21  03/22/17 15:21

















O/E:


Gen: Pt in bed in NAD


HEENT: PERRLA, NO JVD


Lungs: scaterred wheeze present


CVS: S1, S2+


Abd: Soft, BS+


Ext: No edema


Neuro: No focal defecits





 Laboratory Results - last 24 hr











  03/22/17





  08:44


 


INR (Anticoag Therapy)  3.74 H

















I/R: 71 y o f with h/o severe emphysema, O2 dependant, multiple comorbidities a/

f evaluationof worsening SOB, hypoxia being treated for acute COPD exacerbation 


Pt reprots improvement in SOB, cough


C/w nebs


c/w Bevaspi


c/w O2 supplementation


Flutter valve with instructions on usage


PT, OOB to chair as tolerated


Pt has h/o lung nodule in RLL with interval increase in size, pt doesnot want 

to go through biopsy


Recommend palliative care

## 2017-03-23 RX ADMIN — CALCITRIOL SCH MCG: 0.25 CAPSULE ORAL at 08:35

## 2017-03-23 RX ADMIN — GUAIFENESIN PRN MG: 600 TABLET, EXTENDED RELEASE ORAL at 20:27

## 2017-03-23 RX ADMIN — MORPHINE TINCTURE SCH MG: 1 SOLUTION ORAL at 22:28

## 2017-03-23 RX ADMIN — SODIUM CHLORIDE SCH MLS/HR: 900 IRRIGANT IRRIGATION at 14:13

## 2017-03-23 RX ADMIN — PAROXETINE SCH MG: 20 TABLET, FILM COATED ORAL at 22:27

## 2017-03-23 RX ADMIN — LEVALBUTEROL PRN MG: 1.25 SOLUTION, CONCENTRATE RESPIRATORY (INHALATION) at 10:09

## 2017-03-23 RX ADMIN — LEVALBUTEROL PRN MG: 1.25 SOLUTION, CONCENTRATE RESPIRATORY (INHALATION) at 18:13

## 2017-03-23 RX ADMIN — ACETAMINOPHEN PRN MG: 325 TABLET ORAL at 20:26

## 2017-03-23 RX ADMIN — ACETAMINOPHEN PRN MG: 325 TABLET ORAL at 09:09

## 2017-03-23 RX ADMIN — MORPHINE TINCTURE SCH MG: 1 SOLUTION ORAL at 06:02

## 2017-03-23 RX ADMIN — LEVALBUTEROL PRN MG: 1.25 SOLUTION, CONCENTRATE RESPIRATORY (INHALATION) at 00:57

## 2017-03-23 RX ADMIN — MORPHINE TINCTURE SCH MG: 1 SOLUTION ORAL at 14:10

## 2017-03-23 RX ADMIN — LORAZEPAM PRN MG: 1 TABLET ORAL at 14:10

## 2017-03-23 RX ADMIN — IPRATROPIUM BROMIDE AND ALBUTEROL SULFATE SCH NEB: .5; 3 SOLUTION RESPIRATORY (INHALATION) at 23:18

## 2017-03-23 RX ADMIN — GLYCERIN PRN DROP: .002; .002; .01 SOLUTION/ DROPS OPHTHALMIC at 22:29

## 2017-03-23 RX ADMIN — LORAZEPAM PRN MG: 1 TABLET ORAL at 18:19

## 2017-03-23 RX ADMIN — GUAIFENESIN PRN MG: 600 TABLET, EXTENDED RELEASE ORAL at 08:35

## 2017-03-23 RX ADMIN — MORPHINE TINCTURE SCH MG: 1 SOLUTION ORAL at 08:36

## 2017-03-23 RX ADMIN — LORAZEPAM PRN MG: 1 TABLET ORAL at 08:35

## 2017-03-23 RX ADMIN — LORAZEPAM PRN MG: 1 TABLET ORAL at 00:57

## 2017-03-23 RX ADMIN — TIOTROPIUM BROMIDE SCH CAP: 18 CAPSULE ORAL; RESPIRATORY (INHALATION) at 08:57

## 2017-03-23 RX ADMIN — LORAZEPAM PRN MG: 1 TABLET ORAL at 22:27

## 2017-03-23 RX ADMIN — CINACALCET HYDROCHLORIDE SCH MG: 30 TABLET, COATED ORAL at 08:35

## 2017-03-23 RX ADMIN — ASPIRIN SCH MG: 81 TABLET, CHEWABLE ORAL at 08:35

## 2017-03-23 RX ADMIN — WARFARIN SODIUM SCH: 1 TABLET ORAL at 17:15

## 2017-03-23 RX ADMIN — MORPHINE TINCTURE SCH MG: 1 SOLUTION ORAL at 18:11

## 2017-03-23 RX ADMIN — METHYLPREDNISOLONE SODIUM SUCCINATE SCH MG: 125 INJECTION, POWDER, FOR SOLUTION INTRAMUSCULAR; INTRAVENOUS at 06:02

## 2017-03-23 RX ADMIN — METHYLPREDNISOLONE SODIUM SUCCINATE SCH MG: 125 INJECTION, POWDER, FOR SOLUTION INTRAMUSCULAR; INTRAVENOUS at 18:11

## 2017-03-23 RX ADMIN — MORPHINE TINCTURE SCH MG: 1 SOLUTION ORAL at 01:53

## 2017-03-23 RX ADMIN — IPRATROPIUM BROMIDE AND ALBUTEROL SULFATE SCH: .5; 3 SOLUTION RESPIRATORY (INHALATION) at 18:43

## 2017-03-23 NOTE — CONS
PALLIATIVE CARE CONSULT REPORT:

 

DATE OF CONSULT:  17

 

PRIMARY CARE PHYSICIAN:  Kira Haile MD

 

REQUESTING PROVIDER FOR CONSULT:  Rosalia Carson NP

 

REASON FOR CONSULT:  Evaluation for hospice.

 

HOSPITAL COURSE:  This is a 71-year-old female with a past medical history of 
COPD, on 3 to 4 L at home; history of lung cancer, status post chemotherapy; 
Crohn's disease, with ileostomy with recurrent dehydration and infection, who 
presented to the emergency room on the  with shortness of breath and cough.
  The patient states that she was only using oxygen at bedtime up until above 2-
1/2 weeks ago and then at that time it came on abruptly where she needed oxygen 
regularly.  She has been followed by Dr. Dacosta as an outpatient, as she also 
was in the emergency room on the , for shortness of breath at that time.  
She was started on steroids few days after that, but showed no improvement in 
her symptoms.  On arrival to the emergency room, the patient was admitted for 
COPD exacerbation and decline over the past month or so.  The patient states 
she has lost about 13 pounds over the past few months.  She is still 
complaining of shortness of breath and she has a significant amount of back pain
, currently pain-free as long as she does not move, she states.  The patient 
was also seen by Dr. Dacosta in consultation, who agreed that this was COPD 
exacerbation secondary to viral bronchitis, started on IV steroids and 
bronchodilators.  Dr. Dacosta feels that patient has severe emphysema. Also, of 
note, she has a history of right lower lobe lung nodule with interval increase 
in size.  The patient has been followed by oncologist at [place] and had lung 
cancer in the past that was treated with chemotherapy.  She states her 
oncologist in [place] is not worried about this lung nodule _____ have gotten 
much bigger and she has no interest in pursuing further evaluation for this.  
The patient states back in , she was enrolled in [facility] when she had a 
thoracic compression fracture, it was felt that she had bone cancer, and was 
discharged to [facility] at that time.  Since her Crohn's back in , she 
became critically ill with  an ileostomy, has had recurrent episodes of 
dehydration and recurrent infections of her port.  She has no port placement at 
this time, but battles with recurrent dehydration suffered with chronic kidney 
disease as a result of this. Otherwise, remaining review of systems is 
negative.  The patient states that she would like to have more resources at 
home.  She refuses go to rehab.  She refuses go to a nursing home.  She lives 
at home with her wife, who works and states that she needs more assistance 
during the day at home.  We discussed her eligibility for hospice that she is a 
candidate with her end-stage COPD, but it only provides an hour per day.  She 
states that whatever resources will give her more help at home, whether be [facility] 
or hospice services, she is agreeable to.

 

PAST MEDICAL HISTORY:

1.  COPD, on 3 to 4 L continuous oxygen.

2.  History of lung cancer and question of recurrent lung cancer at this time, 
status post chemotherapy, followed by oncologist at [place].

3.  CKD, stage 3.

4.  History of Crohn's disease, status post ileostomy.

5.  DVT and PE, on Coumadin.

6.  Restless legs syndrome.

7.  Anxiety.

8.  Depression.

 

MEDICATIONS:

1.  Tylenol 650 mg every 4 hours as needed.

2.  Aspirin 81 mg daily.

3.  Calcitriol 0.25 mcg daily in the morning.

4.  Cinacalcet 30 mg in the morning.

5.  Lorazepam 1 mg at bedtime.

6.  Lorazepam 0.25 mg in the morning as needed.

7.  Lorazepam 1 mg every 4 hours as needed.

8.  Levalbuterol 1.25 mg every 6 hours as needed.

9.  Morphine 2.5 mg sublingual every hour as needed and normal saline 1 L.

10.  Bevespi inhaled twice a day.

11.  Opium tincture 6 mg every 4 hours.

12.  Paroxetine 20 mg at bedtime.

13.  Spiriva daily.

14.  Warfarin 1.5 mg daily.

15.  Guaifenesin  mg p.o. b.i.d. as needed.

16.  Solu-Medrol 60 mg IV q.12.

 

ALLERGIES:  VANCOMYCIN, wheezing; DAPTOMYCIN; DOXYCYCLINE; LEVOFLOXACIN; 
EPINEPHRINE; INFLIXIMAB; TETANUS TOXOID; EGGPLANT.

 

FAMILY HISTORY:  Father  from heart disease.

 

SOCIAL HISTORY:  The patient lives at home with her wife, Sarah Alvarado, who is 
her healthcare proxy.  The patient has been a heavy smoker, she did quit 3 
weeks ago. She does use marijuana on regular basis for her anxiety.  No alcohol 
use.  Her MOLST form is a DNR/DNI.

 

REVIEW OF SYSTEMS:  As mentioned in the HPI.

 

PHYSICAL EXAM:  Vitals:  Temp 97.6, pulse rate 73, respiratory rate 18, oxygen 
saturation 97% on 2 L nasal cannula.  General:  Frail, elderly female, in no 
acute distress.  HEENT:  Neck supple.  No lymphadenopathy.  Pupils are equal 
and reactive, anicteric.  Head:  Normocephalic.  Oropharynx, mucous membranes 
moist. No erythema or exudate.  Cardiac:  Regular rate and rhythm.  No murmur, 
rubs, or gallops.  Respiratory:  Diminished breath sounds.  Prolonged 
expiratory phase.  No wheezes, rhonchi, or rales.  Abdomen:  Soft, nontender, 
nondistended.  Extremities: No clubbing, cyanosis, or edema.  +1 DPs.  
Neurological:  Alert and oriented x3. No focal neurologic deficits.

 

DIAGNOSTIC STUDIES/LAB DATA:  White count 10.6, hemoglobin 13.6, hematocrit 41, 
platelets 260.  INR is 3.74.  Sodium 138, potassium 4.4, chloride 101, bicarb 27
, BUN 34, creatinine 1.69, glucose 132, lactic acid 2.2.

 

Chest x-ray on admission shows right upper lobe postradiation pneumonitis and/
or fibrosis, findings are not significantly changed since 2016.

 

ASSESSMENT AND PLAN:  This is a 71-year-old female with a past medical history 
of end-stage chronic obstructive pulmonary disease with history of lung cancer 
with suggestion of an enlarging nodule, who presents to the emergency room and 
admitted for chronic obstructive pulmonary disease exacerbation.  The patient 
and the family were interested in palliative care.  Dr. Dacosta states the 
patient has severe emphysema, end-stage chronic obstructive pulmonary disease, 
which makes her eligible for hospice.  The patient is refusing to go to any 
skilled nursing facility and would like to be enrolled at home with hospice 
services, which I think is appropriate for her for her end-stage chronic 
obstructive pulmonary disease. The patient is adequately controlled with her 
pain and her breathing at this time. I would recommend increasing her morphine 
as air hunger becomes more of an issue. 



Thank you for this consultation.  I will follow along with you.

 

PATIENT TIME:  Greater than 60 minutes was spent doing this consultation, more 
than half the time spent in direct patient contact.



CC:  Kira Haile MD*

 

 [id number]/[facility] #: [id number]

LAUREN

## 2017-03-23 NOTE — PN
Subjective


Date of Service: 03/23/17


Interval History: 





Patient seen and examined at bedside. Denies fever, chills, chest discomfort, N/

V/D. Pt reports that he ostomy output is at her baseline and thicker than 

normal. Reports shortness of breath and dry non-productive cough. Reports 

feeling more anxious and a tremor around her steroids and albuterol. Pt states 

that steroids often make her "manic".





Pt would like to continue IV fluids for now.





Family History: Unchanged from Admission


Social History: Unchanged from Admission


Past Medical History: Unchanged from Admission





Objective


Active Medications: 





Acetaminophen (Tylenol Tab*)  650 mg PO Q4H PRN Reason: FEVER/PAIN


Albuterol/Ipratropium (Duoneb (Albuterol 2.5 Mg/Ipratropium 0.5 Mg))  1 neb INH 

[id number] [facility]


Aspirin (Aspirin Low Dose Tab*)  81 mg PO QAM [facility]


Calcitriol (Rocaltrol  Cap*)  0.25 mcg PO QAM [facility]


Cinacalcet (Sensipar Tab*)  30 mg PO QAM [facility]


Guaifenesin (Mucinex*)  600 mg PO BID PRN Reason: CONGESTION


Sodium Chloride (Ns 0.9% 1000 Ml*)  1,000 mls @ 75 mls/hr IV PER RATE [facility]


Lorazepam (Ativan Tab(*))  1 mg PO BEDTIME PRN Reason: ANXIETY


Lorazepam (Ativan Tab(*))  0.25 mg PO QAM PRN Reason: ANXIETY


Lorazepam (Ativan Tab(*))  1 mg PO Q4H PRN Reason: ANXIETY


Methylprednisolone Sodium Succinate (Solu-Medrol*)  60 mg IV Q12H [facility]


Morphine Sulfate (Morphine Oral Concentrate*)  2.5 mg SL Q1H PRN Reason: 

SHORTNESS OF BREATH


Bevespi Aerosphere (Mdi)  2 admin INH BID [facility]


Opium Tincture (Opium Tincture*)  6 mg PO Q4HR [facility]


Paroxetine HCl (Paxil Tab*)  20 mg PO BEDTIME [facility]


Pharmacy Profile Note (Coumadin Daily Reminder*)  1 note FOLLOW UP 1700 [facility]


Tiotropium Bromide (Spiriva Cap.Inh*)  1 cap INH DAILY [facility]


Warfarin Sodium (Coumadin Tab(*))  1.5 mg PO DAILY@1700 [facility] Reason: Protocol





 Vital Signs











  03/22/17 03/22/17 03/22/17





  20:03 21:13 22:26


 


Temperature 98.0 F  


 


Pulse Rate 76  


 


Respiratory 16 20 18





Rate   


 


Blood Pressure 116/49  





(mmHg)   


 


O2 Sat by Pulse 95  





Oximetry   














  03/22/17 03/22/17 03/22/17





  22:47 23:13 23:38


 


Temperature   98.0 F


 


Pulse Rate   74


 


Respiratory 20 18 21





Rate   


 


Blood Pressure   135/47





(mmHg)   


 


O2 Sat by Pulse   94





Oximetry   

















  03/23/17 03/23/17 03/23/17





  02:57 03:39 03:53


 


Temperature  97.9 F 


 


Pulse Rate  75 


 


Respiratory 18 20 18





Rate   


 


Blood Pressure  136/52 





(mmHg)   


 


O2 Sat by Pulse  97 





Oximetry   














  03/23/17 03/23/17 03/23/17





  06:02 07:24 08:02


 


Temperature  97.8 F 


 


Pulse Rate  79 


 


Respiratory 18 18 16





Rate   


 


Blood Pressure  123/60 





(mmHg)   


 


O2 Sat by Pulse  99 





Oximetry   

















  03/23/17 03/23/17 03/23/17





  10:36 11:33 14:10


 


Temperature  97.7 F 


 


Pulse Rate  66 


 


Respiratory 20 16 16





Rate   


 


Blood Pressure  111/45 





(mmHg)   


 


O2 Sat by Pulse  98 





Oximetry   














  03/23/17 03/23/17 03/23/17





  15:32 16:10 18:11


 


Temperature   


 


Pulse Rate 70  


 


Respiratory 18 20 16





Rate   


 


Blood Pressure 122/49  





(mmHg)   


 


O2 Sat by Pulse 96  





Oximetry   














  03/23/17 03/23/17





  18:14 18:19


 


Temperature  


 


Pulse Rate 73 


 


Respiratory 24 18





Rate  


 


Blood Pressure  





(mmHg)  


 


O2 Sat by Pulse 96 





Oximetry  











Oxygen Devices in Use Now: Nasal Cannula - 3L


Appearance: NAD, sitting up in bed


Eyes: No Scleral Icterus, PERRLA


Respiratory: Symmetrical Chest Expansion and Respiratory Effort, - - Bilateral 

rhonchi that clear with cough


Cardiovascular: NL Sounds; No Murmurs; No JVD, RRR


Extremities: No Edema


Neurological: Alert and Oriented x 3, NL Muscle Strength and Tone


Lines/Tubes/Other Access: Clean, Dry and Intact Peripheral IV - site benign


Nutrition: Taking PO's


Result Diagrams: 


 03/20/17 15:10





 03/20/17 16:28


Additional Lab and Data: 


 














Assess/Plan/Problems-Billing


Assessment: 





Ms. Perez is a 70 yo female with PMH significant for COPD on home O2, with 

lung malignancy, Crohn disease, CKD stage 3 who presented to the hospital with 

worsening shortness of breath.





- Patient Problems


(1) COPD exacerbation


Code(s): J44.1 - CHRONIC OBSTRUCTIVE PULMONARY DISEASE W (ACUTE) EXACERBATION   

SNOMED Code(s): 149298728


   Comment: 


- Failed outpatient treatment. Appreciate pulmonology consult.


- Continue Bevaspi, d/c Dulera and Spiriva.


- Continue prn O2, flutter valve.


- Palliative care consult for advanced disease process


- Will change Solu-medrol to Prednisone in the morning   





(2) Crohn's disease


Code(s): K50.90 - CROHN'S DISEASE, UNSPECIFIED, WITHOUT COMPLICATIONS   SNOMED 

Code(s): 57031658


   Comment: 


- With ileostomy.


- Continue tincture of opium.   





(3) History of pulmonary embolism


Code(s): Z86.711 - PERSONAL HISTORY OF PULMONARY EMBOLISM   SNOMED Code(s): 

705417519


   Comment: 


- Continue home warfarin at reduced dose due to supratherapeutic INR.


- Hold today's dose due to supratherapeutic INR, and allow INR to drift down.


- Daily INR   





(4) CKD (chronic kidney disease), stage IV


Code(s): N18.4 - CHRONIC KIDNEY DISEASE, STAGE 4 (SEVERE)   SNOMED Code(s): 

810080290


   Comment: 


- Creatinine within baseline   





(5) Depression


Code(s): F32.9 - MAJOR DEPRESSIVE DISORDER, SINGLE EPISODE, UNSPECIFIED   

SNOMED Code(s): 38294919


   Comment: 


- Continue Paxil   





(6) DVT prophylaxis


Code(s): EFX7310 -    SNOMED Code(s): 578028584


   Comment: 


- Warfarin - hold today's dose due to supratherapeutic INR   





(7) DNR (do not resuscitate)





Status and Disposition: 





Inpatient admit. Palliative care consult to guide discharge plan.

## 2017-03-23 NOTE — PN
Progress Note





- Progress Note


Note: 











Pulm consult f/u note 3/23/17. Pt seen and examined at bedside this am.Pt 

reported worse breathing and dry cough today. is getting winded with minimal 

exertion.





 Active Medications











Generic Name Dose Route Start Last Admin





  Trade Name Freq  PRN Reason Stop Dose Admin


 


Acetaminophen  650 mg  03/21/17 20:48  03/23/17 09:09





  Tylenol Tab*  PO   650 mg





  Q4H PRN   Administration





  FEVER/PAIN   


 


Aspirin  81 mg  03/21/17 09:00  03/23/17 08:35





  Aspirin Low Dose Tab*  PO   81 mg





  QAM [facility]   Administration


 


Calcitriol  0.25 mcg  03/21/17 09:00  03/23/17 08:35





  Rocaltrol  Cap*  PO   0.25 mcg





  QAM [facility]   Administration


 


Cinacalcet  30 mg  03/21/17 09:00  03/23/17 08:35





  Sensipar Tab*  PO   30 mg





  QAM [facility]   Administration


 


Guaifenesin  600 mg  03/20/17 17:55  03/23/17 08:35





  Mucinex*  PO   600 mg





  BID PRN   Administration





  CONGESTION   


 


Sodium Chloride  1,000 mls @ 75 mls/hr  03/22/17 17:30  03/23/17 14:13





  Ns 0.9% 1000 Ml*  IV   75 mls/hr





  PER RATE [facility]   Administration


 


Levalbuterol HCl  1.25 mg  03/20/17 22:27  03/23/17 18:13





  Xopenex 1.25 Mg/0.5 Ml Neb.Sol*  INH   1.25 mg





  Q6H PRN   Administration





  SOB/WHEEZING   


 


Lorazepam  1 mg  03/20/17 18:05  03/22/17 21:13





  Ativan Tab(*)  PO   1 mg





  BEDTIME PRN   Administration





  ANXIETY   


 


Lorazepam  0.25 mg  03/20/17 18:05  





  Ativan Tab(*)  PO   





  QAM PRN   





  ANXIETY   


 


Lorazepam  1 mg  03/20/17 18:05  03/23/17 18:19





  Ativan Tab(*)  PO   1 mg





  Q4H PRN   Administration





  ANXIETY   


 


Methylprednisolone Sodium Succinate  60 mg  03/20/17 18:00  03/23/17 18:11





  Solu-Medrol*  IV   60 mg





  Q12H [facility]   Administration


 


Morphine Sulfate  2.5 mg  03/21/17 08:57  





  Morphine Oral Concentrate*  SL   





  Q1H PRN   





  SHORTNESS OF BREATH   


 


Bevespi Aerosphere  2 admin  03/21/17 13:04  03/23/17 09:01





  Mdi  INH   2 admin





  BID [facility]   Administration


 


Opium Tincture  6 mg  03/20/17 22:00  03/23/17 18:11





  Opium Tincture*  PO   6 mg





  Q4HR [facility]   Administration


 


Paroxetine HCl  20 mg  03/20/17 21:00  03/22/17 21:13





  Paxil Tab*  PO   20 mg





  BEDTIME [facility]   Administration


 


Pharmacy Profile Note  1 note  03/21/17 17:00  03/23/17 17:15





  Coumadin Daily Reminder*  FOLLOW UP   Not Given





  1700 [facility]   


 


Tiotropium Bromide  1 cap  03/21/17 09:00  03/23/17 08:57





  Spiriva Cap.Inh*  INH   1 cap





  DAILY [facility]   Administration


 


Warfarin Sodium  1.5 mg  03/21/17 17:00  03/23/17 17:15





  Coumadin Tab(*)  PO   Not Given





  DAILY@1700 [facility]   





  Protocol   











 Vital Signs











Temp Pulse Resp BP Pulse Ox


 


 97.7 F   73   18   122/49   96 


 


 03/23/17 11:33  03/23/17 18:14  03/23/17 18:19  03/23/17 15:32  03/23/17 18:14























O/E:


Gen: Pt in bed in NAD


HEENT: PERRLA, NO JVD


Lungs: scaterred wheeze present


CVS: S1, S2+


Abd: Soft, BS+


Ext: No edema


Neuro: No focal defecits


 Laboratory Results - last 24 hr











  03/23/17





  07:05


 


INR (Anticoag Therapy)  3.31 H


























I/R: 71 y o f with h/o severe emphysema, O2 dependant, multiple comorbidities a/

f evaluationof worsening SOB, hypoxia being treated for acute COPD exacerbation 


Pt reports worsening SOB, cough today


Reports that she didnot receive her nebs today


Discussed with RT, pt to get nebs


Will change orer to standing


c/w Bevaspi


c/w O2 supplementation


Flutter valve with instructions on usage


PT, OOB to chair as tolerated


Pt has h/o lung nodule in RLL with interval increase in size, pt doesnot want 

to go through biopsy


palliative care evaluation noted

## 2017-03-24 RX ADMIN — ACETAMINOPHEN PRN MG: 325 TABLET ORAL at 22:25

## 2017-03-24 RX ADMIN — ASPIRIN SCH MG: 81 TABLET, CHEWABLE ORAL at 08:02

## 2017-03-24 RX ADMIN — ACETAMINOPHEN PRN MG: 325 TABLET ORAL at 00:41

## 2017-03-24 RX ADMIN — IPRATROPIUM BROMIDE AND ALBUTEROL SULFATE SCH NEB: .5; 3 SOLUTION RESPIRATORY (INHALATION) at 12:56

## 2017-03-24 RX ADMIN — IPRATROPIUM BROMIDE AND ALBUTEROL SULFATE SCH: .5; 3 SOLUTION RESPIRATORY (INHALATION) at 17:59

## 2017-03-24 RX ADMIN — IPRATROPIUM BROMIDE AND ALBUTEROL SULFATE SCH NEB: .5; 3 SOLUTION RESPIRATORY (INHALATION) at 03:28

## 2017-03-24 RX ADMIN — MORPHINE TINCTURE SCH MG: 1 SOLUTION ORAL at 10:13

## 2017-03-24 RX ADMIN — MORPHINE TINCTURE SCH MG: 1 SOLUTION ORAL at 02:46

## 2017-03-24 RX ADMIN — CALCITRIOL SCH MCG: 0.25 CAPSULE ORAL at 08:02

## 2017-03-24 RX ADMIN — SODIUM CHLORIDE SCH MLS/HR: 900 IRRIGANT IRRIGATION at 06:25

## 2017-03-24 RX ADMIN — PAROXETINE SCH MG: 20 TABLET, FILM COATED ORAL at 22:09

## 2017-03-24 RX ADMIN — MORPHINE TINCTURE SCH MG: 1 SOLUTION ORAL at 18:18

## 2017-03-24 RX ADMIN — SALINE NASAL SPRAY PRN SPRAY: 1.5 SOLUTION NASAL at 22:29

## 2017-03-24 RX ADMIN — WARFARIN SODIUM SCH MG: 1 TABLET ORAL at 16:21

## 2017-03-24 RX ADMIN — IPRATROPIUM BROMIDE AND ALBUTEROL SULFATE SCH NEB: .5; 3 SOLUTION RESPIRATORY (INHALATION) at 21:39

## 2017-03-24 RX ADMIN — MORPHINE TINCTURE SCH MG: 1 SOLUTION ORAL at 06:23

## 2017-03-24 RX ADMIN — MORPHINE TINCTURE SCH MG: 1 SOLUTION ORAL at 16:21

## 2017-03-24 RX ADMIN — PREDNISONE SCH MG: 20 TABLET ORAL at 08:02

## 2017-03-24 RX ADMIN — LORAZEPAM PRN MG: 1 TABLET ORAL at 08:02

## 2017-03-24 RX ADMIN — LORAZEPAM PRN MG: 1 TABLET ORAL at 02:55

## 2017-03-24 RX ADMIN — MORPHINE TINCTURE SCH MG: 1 SOLUTION ORAL at 22:10

## 2017-03-24 RX ADMIN — LORAZEPAM PRN MG: 1 TABLET ORAL at 22:09

## 2017-03-24 RX ADMIN — LORAZEPAM PRN MG: 1 TABLET ORAL at 16:21

## 2017-03-24 RX ADMIN — CINACALCET HYDROCHLORIDE SCH MG: 30 TABLET, COATED ORAL at 08:02

## 2017-03-24 RX ADMIN — IPRATROPIUM BROMIDE AND ALBUTEROL SULFATE SCH: .5; 3 SOLUTION RESPIRATORY (INHALATION) at 08:39

## 2017-03-24 RX ADMIN — IPRATROPIUM BROMIDE AND ALBUTEROL SULFATE SCH: .5; 3 SOLUTION RESPIRATORY (INHALATION) at 19:40

## 2017-03-24 RX ADMIN — GLYCERIN PRN DROP: .002; .002; .01 SOLUTION/ DROPS OPHTHALMIC at 22:26

## 2017-03-24 NOTE — PN
Subjective


Date of Service: 03/24/17


Interval History: 





Patient seen and examined at bedside. Pt reports a dry cough today and 

increased shortness of breath with exertion. She also reports feeling more 

fatigued today. Pt continues to want to have IV fluids while she is here. 

Denies fever, chills, N/V/D.








Family History: Unchanged from Admission


Social History: Unchanged from Admission


Past Medical History: Unchanged from Admission





Objective


Active Medications: 





Acetaminophen (Tylenol Tab*)  650 mg PO Q4H PRN Reason: FEVER/PAIN


Albuterol/Ipratropium (Duoneb (Albuterol 2.5 Mg/Ipratropium 0.5 Mg))  1 neb INH 

[id number] AWAKE [facility]


Aspirin (Aspirin Low Dose Tab*)  81 mg PO QAM [facility]


Calcitriol (Rocaltrol  Cap*)  0.25 mcg PO QAM [facility]


Cinacalcet (Sensipar Tab*)  30 mg PO QAM [facility]


Guaifenesin (Mucinex*)  600 mg PO BID PRN Reason: CONGESTION


Sodium Chloride (Ns 0.9% 1000 Ml*)  1,000 mls @ 75 mls/hr IV PER RATE [facility]


Lorazepam (Ativan Tab(*))  1 mg PO BEDTIME PRN Reason: ANXIETY


Lorazepam (Ativan Tab(*))  0.25 mg PO QAM PRN Reason: ANXIETY


Lorazepam (Ativan Tab(*))  1 mg PO Q4H PRN Reason: ANXIETY


Morphine Sulfate (Morphine Oral Concentrate*)  2.5 mg SL Q1H PRN Reason: 

SHORTNESS OF BREATH


Bevespi Aerosphere (Mdi)  2 admin INH BID [facility]


Opium Tincture (Opium Tincture*)  6 mg PO Q4HR [facility]


Paroxetine HCl (Paxil Tab*)  20 mg PO BEDTIME [facility]


Pharmacy Profile Note (Coumadin Daily Reminder*)  1 note FOLLOW UP 1700 [facility]


Polyvinyl Alcohol (Polyvinyl Alcohol 1.4% Opth*)  1 drop BOTH EYES Q2H PRN 

Reason: DRY EYE


Prednisone (Deltasone Tab*)  60 mg PO DAILY [facility]


Warfarin Sodium (Coumadin Tab(*))  1.5 mg PO DAILY@1700 [facility] Reason: Protocol





 Vital Signs











  03/23/17 03/23/17 03/23/17





  18:11 18:14 18:19


 


Temperature   


 


Pulse Rate  73 


 


Respiratory 16 24 18





Rate   


 


Blood Pressure   





(mmHg)   


 


O2 Sat by Pulse  96 





Oximetry   














  03/23/17 03/23/17 03/23/17





  19:43 19:47 20:00


 


Temperature 98.6 F  


 


Pulse Rate 84 85 


 


Respiratory 17  18





Rate   


 


Blood Pressure 131/35 132/46 





(mmHg)   


 


O2 Sat by Pulse 94  





Oximetry   

















  03/23/17 03/23/17 03/23/17





  22:28 23:13 23:18


 


Temperature  98.2 F 


 


Pulse Rate  83 80


 


Respiratory 18 23 





Rate   


 


Blood Pressure  139/52 





(mmHg)   


 


O2 Sat by Pulse  94 94





Oximetry   

















  03/24/17 03/24/17 03/24/17





  02:46 02:55 03:28


 


Temperature   98.2 F


 


Pulse Rate   86


 


Respiratory 22 20 18





Rate   


 


Blood Pressure   88/67





(mmHg)   


 


O2 Sat by Pulse   97





Oximetry   














  03/24/17 03/24/17 03/24/17





  03:45 04:46 04:55


 


Temperature   


 


Pulse Rate   


 


Respiratory  16 16





Rate   


 


Blood Pressure 116/68  





(mmHg)   


 


O2 Sat by Pulse   





Oximetry   














  03/24/17 03/24/17 03/24/17





  06:23 07:50 08:02


 


Temperature  97.6 F 


 


Pulse Rate  77 


 


Respiratory 22 18 20





Rate   


 


Blood Pressure  126/50 





(mmHg)   


 


O2 Sat by Pulse  97 





Oximetry   

















  03/24/17 03/24/17 03/24/17





  12:13 12:30 12:56


 


Temperature  97.8 F 


 


Pulse Rate  79 79


 


Respiratory 18 18 18





Rate   


 


Blood Pressure  134/57 





(mmHg)   


 


O2 Sat by Pulse  96 98





Oximetry   














  03/24/17 03/24/17 03/24/17





  12:57 15:16 16:21


 


Temperature  98.0 F 


 


Pulse Rate 79 82 


 


Respiratory 18 20 22





Rate   


 


Blood Pressure  129/48 





(mmHg)   


 


O2 Sat by Pulse 96 95 





Oximetry   











Oxygen Devices in Use Now: Nasal Cannula - 3L


Appearance: NAD, sitting up in bed


Eyes: No Scleral Icterus, PERRLA


Respiratory: Symmetrical Chest Expansion and Respiratory Effort, Clear to 

Auscultation - , diminished


Cardiovascular: NL Sounds; No Murmurs; No JVD, RRR


Abdominal: NL Sounds; No Tenderness; No Distention


Extremities: No Edema


Skin: No Rash or Ulcers


Neurological: Alert and Oriented x 3, NL Muscle Strength and Tone


Lines/Tubes/Other Access: Clean, Dry and Intact Peripheral IV - site benign


Nutrition: Taking PO's


Result Diagrams: 


 03/20/17 15:10





 03/20/17 16:28


Additional Lab and Data: 


 














Assess/Plan/Problems-Billing


Assessment: 





Ms. Perez is a 72 yo female with PMH significant for COPD on home O2, with 

lung malignancy, Crohn disease, CKD stage 3 who presented to the hospital with 

worsening shortness of breath.





- Patient Problems


(1) COPD exacerbation


Code(s): J44.1 - CHRONIC OBSTRUCTIVE PULMONARY DISEASE W (ACUTE) EXACERBATION   

SNOMED Code(s): 998678510


   Comment: 


- Failed outpatient treatment. Appreciate pulmonology consult.


- Continue Bevaspi, d/c Dulera and Spiriva.


- Continue prn O2, flutter valve.


- Palliative care consult for advanced disease process


- Continue Prednisone taper   





(2) Crohn's disease


Code(s): K50.90 - CROHN'S DISEASE, UNSPECIFIED, WITHOUT COMPLICATIONS   SNOMED 

Code(s): 67782833


   Comment: 


- With ileostomy.


- Continue tincture of opium.   





(3) History of pulmonary embolism


Code(s): Z86.711 - PERSONAL HISTORY OF PULMONARY EMBOLISM   SNOMED Code(s): 

019226817


   Comment: 


- Resume home warfarin 


- Daily INR   





(4) CKD (chronic kidney disease), stage IV


Code(s): N18.4 - CHRONIC KIDNEY DISEASE, STAGE 4 (SEVERE)   SNOMED Code(s): 

475817402


   Comment: 


- Creatinine within baseline   





(5) Depression


Code(s): F32.9 - MAJOR DEPRESSIVE DISORDER, SINGLE EPISODE, UNSPECIFIED   

SNOMED Code(s): 46596070


   Comment: 


- Continue Paxil   





(6) DVT prophylaxis


Code(s): WGH0856 -    SNOMED Code(s): 976922157


   Comment: 


- Warfarin   





(7) DNR (do not resuscitate)





Status and Disposition: 





Inpatient admit. Palliative care consult to guide discharge plan.

## 2017-03-24 NOTE — PN
Progress Note





- Progress Note


Note: 








Pulm consult f/u note 3/24/17. Pt seen and examined at bedside this am.Pt 

reported worse breathing and dry cough today. is getting winded with minimal 

exertion.








 Active Medications











Generic Name Dose Route Start Last Admin





  Trade Name Freq  PRN Reason Stop Dose Admin


 


Acetaminophen  650 mg  03/21/17 20:48  03/24/17 00:41





  Tylenol Tab*  PO   650 mg





  Q4H PRN   Administration





  FEVER/PAIN   


 


Albuterol/Ipratropium  1 neb  03/23/17 19:00  03/24/17 12:56





  Duoneb (Albuterol 2.5 Mg/Ipratropium 0.5 Mg)  INH   1 neb





  [id number] AWAKE [facility]   Administration


 


Aspirin  81 mg  03/21/17 09:00  03/24/17 08:02





  Aspirin Low Dose Tab*  PO   81 mg





  QAM [facility]   Administration


 


Calcitriol  0.25 mcg  03/21/17 09:00  03/24/17 08:02





  Rocaltrol  Cap*  PO   0.25 mcg





  QAM [facility]   Administration


 


Cinacalcet  30 mg  03/21/17 09:00  03/24/17 08:02





  Sensipar Tab*  PO   30 mg





  QAM [facility]   Administration


 


Guaifenesin  600 mg  03/20/17 17:55  03/23/17 20:27





  Mucinex*  PO   600 mg





  BID PRN   Administration





  CONGESTION   


 


Sodium Chloride  1,000 mls @ 75 mls/hr  03/22/17 17:30  03/24/17 06:25





  Ns 0.9% 1000 Ml*  IV   75 mls/hr





  PER RATE [facility]   Administration


 


Lorazepam  1 mg  03/20/17 18:05  03/23/17 22:27





  Ativan Tab(*)  PO   1 mg





  BEDTIME PRN   Administration





  ANXIETY   


 


Lorazepam  0.25 mg  03/20/17 18:05  





  Ativan Tab(*)  PO   





  QAM PRN   





  ANXIETY   


 


Lorazepam  1 mg  03/20/17 18:05  03/24/17 08:02





  Ativan Tab(*)  PO   1 mg





  Q4H PRN   Administration





  ANXIETY   


 


Morphine Sulfate  2.5 mg  03/21/17 08:57  





  Morphine Oral Concentrate*  SL   





  Q1H PRN   





  SHORTNESS OF BREATH   


 


Bevespi Aerosphere  2 admin  03/21/17 13:04  03/24/17 08:01





  Mdi  INH   2 admin





  BID [facility]   Administration


 


Opium Tincture  6 mg  03/20/17 22:00  03/24/17 10:13





  Opium Tincture*  PO   6 mg





  Q4HR [facility]   Administration


 


Paroxetine HCl  20 mg  03/20/17 21:00  03/23/17 22:27





  Paxil Tab*  PO   20 mg





  BEDTIME [facility]   Administration


 


Pharmacy Profile Note  1 note  03/21/17 17:00  03/23/17 17:15





  Coumadin Daily Reminder*  FOLLOW UP   Not Given





  1700 [facility]   


 


Polyvinyl Alcohol  1 drop  03/23/17 22:14  03/23/17 22:29





  Polyvinyl Alcohol 1.4% Opth*  BOTH EYES   1 drop





  Q2H PRN   Administration





  DRY EYE   


 


Prednisone  60 mg  03/24/17 09:00  03/24/17 08:02





  Deltasone Tab*  PO   60 mg





  DAILY [facility]   Administration








 Vital Signs











Temp Pulse Resp BP Pulse Ox


 


 97.8 F   79   18   134/57   96 


 


 03/24/17 12:30  03/24/17 12:57  03/24/17 12:57  03/24/17 12:30  03/24/17 12:57




















O/E:


Gen: Pt in bed in NAD


HEENT: PERRLA, NO JVD


Lungs: diminished a/e b/l, no wheeze


CVS: S1, S2+


Abd: Soft, BS+


Ext: No edema


Neuro: No focal defecits


 Laboratory Last Values











WBC  10.6 10^3/ul (3.5-10.8)   03/20/17  15:10    


 


RBC  4.64 10^6/ul (4.0-5.4)   03/20/17  15:10    


 


Hgb  13.6 g/dl (12.0-16.0)   03/20/17  15:10    


 


Hct  41 % (35-47)   03/20/17  15:10    


 


MCV  89 fL (80-97)   03/20/17  15:10    


 


MCH  29 pg (27-31)   03/20/17  15:10    


 


MCHC  33 g/dl (31-36)   03/20/17  15:10    


 


RDW  13 % (10.5-15)   03/20/17  15:10    


 


Plt Count  260 10^3/ul (150-450)   03/20/17  15:10    


 


MPV  8 um3 (7.4-10.4)   03/20/17  15:10    


 


Neut % (Auto)  84.0 % (38-83)  H  03/20/17  15:10    


 


Lymph % (Auto)  12.1 % (25-47)  L  03/20/17  15:10    


 


Mono % (Auto)  2.7 % (1-9)   03/20/17  15:10    


 


Eos % (Auto)  0.7 % (0-6)   03/20/17  15:10    


 


Baso % (Auto)  0.5 % (0-2)   03/20/17  15:10    


 


Absolute Neuts (auto)  8.9 10^3/ul (1.5-7.7)  H  03/20/17  15:10    


 


Absolute Lymphs (auto)  1.3 10^3/ul (1.0-4.8)   03/20/17  15:10    


 


Absolute Monos (auto)  0.3 10^3/ul (0-0.8)   03/20/17  15:10    


 


Absolute Eos (auto)  0.1 10^3/ul (0-0.6)   03/20/17  15:10    


 


Absolute Basos (auto)  0.1 10^3/ul (0-0.2)   03/20/17  15:10    


 


Absolute Nucleated RBC  0 10^3/ul  03/20/17  15:10    


 


Nucleated RBC %  0   03/20/17  15:10    


 


INR (Anticoag Therapy)  1.38  (0.89-1.11)  H  03/24/17  07:37    


 


Sodium  138 mmol/L (133-145)   03/20/17  15:10    


 


Potassium  4.4 mmol/L (3.5-5.0)   03/20/17  16:28    


 


Chloride  101 mmol/L (101-111)   03/20/17  15:10    


 


Carbon Dioxide  27 mmol/L (22-32)   03/20/17  15:10    


 


Anion Gap  TNP   03/20/17  15:10    


 


BUN  34 mg/dL (6-24)  H  03/20/17  15:10    


 


Creatinine  1.69 mg/dL (0.51-0.95)  H  03/20/17  15:10    


 


Est GFR ( Amer)  38.3  (>60)   03/20/17  15:10    


 


Est GFR (Non-Af Amer)  29.7  (>60)   03/20/17  15:10    


 


BUN/Creatinine Ratio  20.1  (8-20)  H  03/20/17  15:10    


 


Glucose  132 mg/dL ()  H  03/20/17  15:10    


 


Lactic Acid  2.2 mmol/L (0.5-2.0)  H*  03/20/17  15:10    


 


Calcium  8.9 mg/dL (8.6-10.3)   03/20/17  15:10    


 


Total Bilirubin  0.50 mg/dL (0.2-1.0)   03/20/17  15:10    


 


AST  19 U/L (13-39)   03/20/17  16:28    


 


ALT  36 U/L (7-52)   03/20/17  15:10    


 


Alkaline Phosphatase  92 U/L ()   03/20/17  15:10    


 


Troponin I  0.00 ng/mL (<0.04)   03/20/17  15:10    


 


Total Protein  6.6 g/dL (6.4-8.9)   03/20/17  15:10    


 


Albumin  3.7 g/dL (3.2-5.2)   03/20/17  15:10    


 


Globulin  2.9 g/dL (2-4)   03/20/17  15:10    


 


Albumin/Globulin Ratio  1.3  (1-3)   03/20/17  15:10    



































I/R: 71 y o f with h/o severe emphysema, O2 dependant, multiple comorbidities a/

f evaluationof worsening SOB, hypoxia being treated for acute COPD exacerbation 


Pt reports worsening SOB today


Has standing order for nebs


c/w Bevaspi


Will begin steroid taper


c/w O2 supplementation


Flutter valve with instructions on usage


PT, OOB to chair as tolerated


Pt has h/o lung nodule in RLL with interval increase in size, pt doesnot want 

to go through biopsy


palliative care evaluation noted


It is likely that her breathing might not improve any further given terminal 

COPD

## 2017-03-25 RX ADMIN — NYSTATIN SCH UNITS: 100000 SUSPENSION ORAL at 21:04

## 2017-03-25 RX ADMIN — IPRATROPIUM BROMIDE AND ALBUTEROL SULFATE SCH: .5; 3 SOLUTION RESPIRATORY (INHALATION) at 03:00

## 2017-03-25 RX ADMIN — MORPHINE TINCTURE SCH MG: 1 SOLUTION ORAL at 10:03

## 2017-03-25 RX ADMIN — LORAZEPAM PRN MG: 1 TABLET ORAL at 22:06

## 2017-03-25 RX ADMIN — MORPHINE SULFATE PRN MG: 100 SOLUTION ORAL at 21:21

## 2017-03-25 RX ADMIN — MORPHINE SULFATE PRN MG: 100 SOLUTION ORAL at 00:03

## 2017-03-25 RX ADMIN — GLYCERIN PRN DROP: .002; .002; .01 SOLUTION/ DROPS OPHTHALMIC at 22:08

## 2017-03-25 RX ADMIN — SALINE NASAL SPRAY PRN SPRAY: 1.5 SOLUTION NASAL at 02:57

## 2017-03-25 RX ADMIN — MORPHINE SULFATE PRN MG: 100 SOLUTION ORAL at 10:14

## 2017-03-25 RX ADMIN — PREDNISONE SCH MG: 20 TABLET ORAL at 10:04

## 2017-03-25 RX ADMIN — IPRATROPIUM BROMIDE AND ALBUTEROL SULFATE SCH: .5; 3 SOLUTION RESPIRATORY (INHALATION) at 07:43

## 2017-03-25 RX ADMIN — WARFARIN SODIUM SCH MG: 1 TABLET ORAL at 17:50

## 2017-03-25 RX ADMIN — MORPHINE TINCTURE SCH MG: 1 SOLUTION ORAL at 02:23

## 2017-03-25 RX ADMIN — MORPHINE SULFATE PRN MG: 100 SOLUTION ORAL at 17:12

## 2017-03-25 RX ADMIN — NYSTATIN SCH UNITS: 100000 SUSPENSION ORAL at 10:05

## 2017-03-25 RX ADMIN — MORPHINE SULFATE PRN MG: 100 SOLUTION ORAL at 06:02

## 2017-03-25 RX ADMIN — ACETAMINOPHEN PRN MG: 325 TABLET ORAL at 06:04

## 2017-03-25 RX ADMIN — IPRATROPIUM BROMIDE AND ALBUTEROL SULFATE SCH: .5; 3 SOLUTION RESPIRATORY (INHALATION) at 15:15

## 2017-03-25 RX ADMIN — PAROXETINE SCH MG: 20 TABLET, FILM COATED ORAL at 21:03

## 2017-03-25 RX ADMIN — LORAZEPAM PRN MG: 1 TABLET ORAL at 06:04

## 2017-03-25 RX ADMIN — MORPHINE TINCTURE SCH MG: 1 SOLUTION ORAL at 22:07

## 2017-03-25 RX ADMIN — CALCITRIOL SCH MCG: 0.25 CAPSULE ORAL at 10:04

## 2017-03-25 RX ADMIN — MORPHINE TINCTURE SCH MG: 1 SOLUTION ORAL at 06:04

## 2017-03-25 RX ADMIN — MORPHINE SULFATE PRN MG: 100 SOLUTION ORAL at 13:22

## 2017-03-25 RX ADMIN — CINACALCET HYDROCHLORIDE SCH MG: 30 TABLET, COATED ORAL at 10:04

## 2017-03-25 RX ADMIN — NYSTATIN SCH: 100000 SUSPENSION ORAL at 15:02

## 2017-03-25 RX ADMIN — ASPIRIN SCH MG: 81 TABLET, CHEWABLE ORAL at 10:04

## 2017-03-25 RX ADMIN — MORPHINE SULFATE PRN MG: 100 SOLUTION ORAL at 02:23

## 2017-03-25 RX ADMIN — IPRATROPIUM BROMIDE AND ALBUTEROL SULFATE SCH NEB: .5; 3 SOLUTION RESPIRATORY (INHALATION) at 12:19

## 2017-03-25 RX ADMIN — IPRATROPIUM BROMIDE AND ALBUTEROL SULFATE SCH: .5; 3 SOLUTION RESPIRATORY (INHALATION) at 11:06

## 2017-03-25 RX ADMIN — MORPHINE TINCTURE SCH MG: 1 SOLUTION ORAL at 13:23

## 2017-03-25 RX ADMIN — ACETAMINOPHEN PRN MG: 325 TABLET ORAL at 23:33

## 2017-03-25 RX ADMIN — IPRATROPIUM BROMIDE AND ALBUTEROL SULFATE SCH NEB: .5; 3 SOLUTION RESPIRATORY (INHALATION) at 19:00

## 2017-03-25 RX ADMIN — IPRATROPIUM BROMIDE AND ALBUTEROL SULFATE SCH NEB: .5; 3 SOLUTION RESPIRATORY (INHALATION) at 23:12

## 2017-03-25 RX ADMIN — MORPHINE TINCTURE SCH MG: 1 SOLUTION ORAL at 17:49

## 2017-03-25 NOTE — PN
Subjective


Date of Service: 03/25/17


Interval History: 





Patient seen and examined at bedside. Reports that her dyspnea with exertion is 

improved with the use of morphine. She continues to have a dry non productive 

cough. Denies fever, chills, chest discomfort, N/V/D.








Family History: Unchanged from Admission


Social History: Unchanged from Admission


Past Medical History: Unchanged from Admission





Objective


Active Medications: 





Acetaminophen (Tylenol Tab*)  650 mg PO Q4H PRN Reason: FEVER/PAIN


Albuterol/Ipratropium (Duoneb (Albuterol 2.5 Mg/Ipratropium 0.5 Mg))  1 neb INH 

[id number] AWAKE [facility]


Aspirin (Aspirin Low Dose Tab*)  81 mg PO QAM [facility]


Calcitriol (Rocaltrol  Cap*)  0.25 mcg PO QAM [facility]


Cinacalcet (Sensipar Tab*)  30 mg PO QAM [facility]


Guaifenesin (Mucinex*)  600 mg PO BID PRN Reason: CONGESTION


Lorazepam (Ativan Tab(*))  1 mg PO BEDTIME PRN Reason: ANXIETY


Lorazepam (Ativan Tab(*))  0.25 mg PO QAM PRN Reason: ANXIETY


Lorazepam (Ativan Tab(*))  1 mg PO Q4H PRN Reason: ANXIETY


Morphine Sulfate (Morphine Oral Concentrate*)  2.5 mg SL Q1H PRN Reason: 

SHORTNESS OF BREATH


Bevespi Aerosphere (Mdi)  2 admin INH BID [facility]


Nystatin (Nystatin Suspension*)  500,000 units SWISH SWAL TID [facility]


Opium Tincture (Opium Tincture*)  6 mg PO Q4HR [facility]


Paroxetine HCl (Paxil Tab*)  20 mg PO BEDTIME [facility]


Pharmacy Profile Note (Coumadin Daily Reminder*)  1 note FOLLOW UP 1700 [facility]


Polyvinyl Alcohol (Polyvinyl Alcohol 1.4% Opth*)  1 drop BOTH EYES Q2H PRN 

Reason: DRY EYE


Prednisone (Deltasone Tab*)  60 mg PO DAILY [facility]


Sodium Chloride (Sodium Chloride 0.65% Nasal Spray*)  1 spray BOTH NARES Q4H 

PRN Reason: CONGESTION


Warfarin Sodium (Coumadin Tab(*))  1.5 mg PO DAILY@1700 [facility] Reason: Protocol


 Vital Signs











  03/24/17 03/24/17 03/24/17





  19:05 20:18 21:00


 


Temperature 98.1 F  


 


Pulse Rate 90  


 


Respiratory 18 24 24





Rate   


 


Blood Pressure 138/61  





(mmHg)   


 


O2 Sat by Pulse 93  





Oximetry   














  03/24/17 03/24/17 03/24/17





  21:40 22:09 22:10


 


Temperature   


 


Pulse Rate 95  


 


Respiratory  20 20





Rate   


 


Blood Pressure   





(mmHg)   


 


O2 Sat by Pulse 92  





Oximetry   














  03/24/17 03/25/17 03/25/17





  23:25 00:03 02:23


 


Temperature 98.5 F  


 


Pulse Rate 98  


 


Respiratory 16 22 22





Rate   


 


Blood Pressure 126/40  





(mmHg)   


 


O2 Sat by Pulse 95  





Oximetry   














  03/25/17 03/25/17 03/25/17





  02:26 03:32 04:23


 


Temperature  98.0 F 


 


Pulse Rate  85 


 


Respiratory 22 20 20





Rate   


 


Blood Pressure  133/56 





(mmHg)   


 


O2 Sat by Pulse  97 





Oximetry   

















  03/25/17 03/25/17 03/25/17





  07:14 08:00 08:02


 


Temperature 97.7 F  


 


Pulse Rate 74  


 


Respiratory 22 22 22





Rate   


 


Blood Pressure 114/47  





(mmHg)   


 


O2 Sat by Pulse 93  





Oximetry   

















  03/25/17 03/25/17 03/25/17





  11:59 12:03 12:14


 


Temperature 98.6 F  


 


Pulse Rate 72  


 


Respiratory 24 21 21





Rate   


 


Blood Pressure 118/53  





(mmHg)   


 


O2 Sat by Pulse 93  





Oximetry   














  03/25/17 03/25/17 03/25/17





  12:23 13:22 13:23


 


Temperature   


 


Pulse Rate 72  


 


Respiratory 16 22 22





Rate   


 


Blood Pressure   





(mmHg)   


 


O2 Sat by Pulse 92  





Oximetry   














  03/25/17 03/25/17 03/25/17





  15:22 16:05 17:12


 


Temperature  98.6 F 


 


Pulse Rate  72 


 


Respiratory 20 24 22





Rate   


 


Blood Pressure  124/44 





(mmHg)   


 


O2 Sat by Pulse  94 





Oximetry   














Oxygen Devices in Use Now: Nasal Cannula - 3L


Appearance: NAD, sitting up in bed


Eyes: No Scleral Icterus, PERRLA


Respiratory: Symmetrical Chest Expansion and Respiratory Effort, - - Lung 

sounds with rhonchi bilateral


Cardiovascular: NL Sounds; No Murmurs; No JVD, RRR


Abdominal: NL Sounds; No Tenderness; No Distention


Extremities: No Edema


Skin: No Rash or Ulcers


Neurological: Alert and Oriented x 3, NL Muscle Strength and Tone


Lines/Tubes/Other Access: Clean, Dry and Intact Peripheral IV - site benign


Nutrition: Taking PO's


Result Diagrams: 


 03/20/17 15:10





 03/20/17 16:28


Additional Lab and Data: 


 














Assess/Plan/Problems-Billing


Assessment: 





Ms. Perez is a 70 yo female with PMH significant for COPD on home O2, with 

lung malignancy, Crohn disease, CKD stage 3 who presented to the hospital with 

worsening shortness of breath.





- Patient Problems


(1) COPD exacerbation


Code(s): J44.1 - CHRONIC OBSTRUCTIVE PULMONARY DISEASE W (ACUTE) EXACERBATION   

SNOMED Code(s): 023934508


   Comment: 


- Failed outpatient treatment. Appreciate pulmonology consult.


- Continue Bevaspi, d/c Dulera and Spiriva.


- Continue prn O2, flutter valve.


- Palliative care consult for advanced disease process


- Continue Prednisone taper   





(2) Crohn's disease


Code(s): K50.90 - CROHN'S DISEASE, UNSPECIFIED, WITHOUT COMPLICATIONS   SNOMED 

Code(s): 04905695


   Comment: 


- With ileostomy.


- Continue tincture of opium.   





(3) History of pulmonary embolism


Code(s): Z86.711 - PERSONAL HISTORY OF PULMONARY EMBOLISM   SNOMED Code(s): 

144868781


   Comment: 


- Resume home warfarin 


- Daily INR   





(4) CKD (chronic kidney disease), stage IV


Code(s): N18.4 - CHRONIC KIDNEY DISEASE, STAGE 4 (SEVERE)   SNOMED Code(s): 

451383705


   Comment: 


- Creatinine within baseline   





(5) Depression


Code(s): F32.9 - MAJOR DEPRESSIVE DISORDER, SINGLE EPISODE, UNSPECIFIED   

SNOMED Code(s): 39775948


   Comment: 


- Continue Paxil   





(6) DVT prophylaxis


Code(s): IML3251 -    SNOMED Code(s): 194317900


   Comment: 


- Warfarin   





(7) DNR (do not resuscitate)





Status and Disposition: 





Inpatient admit. Plan for discharge to home with Hospice services on Monday.

## 2017-03-26 RX ADMIN — NYSTATIN SCH UNITS: 100000 SUSPENSION ORAL at 13:23

## 2017-03-26 RX ADMIN — LEVALBUTEROL SCH MG: 1.25 SOLUTION, CONCENTRATE RESPIRATORY (INHALATION) at 12:50

## 2017-03-26 RX ADMIN — NYSTATIN SCH UNITS: 100000 SUSPENSION ORAL at 07:56

## 2017-03-26 RX ADMIN — MORPHINE SULFATE PRN MG: 100 SOLUTION ORAL at 01:54

## 2017-03-26 RX ADMIN — LEVALBUTEROL SCH MG: 1.25 SOLUTION, CONCENTRATE RESPIRATORY (INHALATION) at 19:30

## 2017-03-26 RX ADMIN — IPRATROPIUM BROMIDE SCH MG: 0.5 SOLUTION RESPIRATORY (INHALATION) at 19:30

## 2017-03-26 RX ADMIN — LORAZEPAM PRN MG: 1 TABLET ORAL at 01:56

## 2017-03-26 RX ADMIN — SALINE NASAL SPRAY PRN SPRAY: 1.5 SOLUTION NASAL at 10:08

## 2017-03-26 RX ADMIN — SALINE NASAL SPRAY PRN SPRAY: 1.5 SOLUTION NASAL at 13:24

## 2017-03-26 RX ADMIN — LEVALBUTEROL SCH MG: 1.25 SOLUTION, CONCENTRATE RESPIRATORY (INHALATION) at 03:32

## 2017-03-26 RX ADMIN — MORPHINE TINCTURE SCH MG: 1 SOLUTION ORAL at 21:33

## 2017-03-26 RX ADMIN — MORPHINE TINCTURE SCH MG: 1 SOLUTION ORAL at 17:35

## 2017-03-26 RX ADMIN — PREDNISONE SCH MG: 20 TABLET ORAL at 07:56

## 2017-03-26 RX ADMIN — LORAZEPAM PRN MG: 1 TABLET ORAL at 22:44

## 2017-03-26 RX ADMIN — IPRATROPIUM BROMIDE AND ALBUTEROL SULFATE SCH: .5; 3 SOLUTION RESPIRATORY (INHALATION) at 03:20

## 2017-03-26 RX ADMIN — MORPHINE TINCTURE SCH MG: 1 SOLUTION ORAL at 13:23

## 2017-03-26 RX ADMIN — SALINE NASAL SPRAY PRN SPRAY: 1.5 SOLUTION NASAL at 01:58

## 2017-03-26 RX ADMIN — IPRATROPIUM BROMIDE SCH MG: 0.5 SOLUTION RESPIRATORY (INHALATION) at 08:04

## 2017-03-26 RX ADMIN — MORPHINE SULFATE PRN MG: 100 SOLUTION ORAL at 22:43

## 2017-03-26 RX ADMIN — ACETAMINOPHEN PRN MG: 325 TABLET ORAL at 08:01

## 2017-03-26 RX ADMIN — ACETAMINOPHEN PRN MG: 325 TABLET ORAL at 13:21

## 2017-03-26 RX ADMIN — CALCITRIOL SCH MCG: 0.25 CAPSULE ORAL at 07:56

## 2017-03-26 RX ADMIN — ASPIRIN SCH MG: 81 TABLET, CHEWABLE ORAL at 07:56

## 2017-03-26 RX ADMIN — MORPHINE TINCTURE SCH MG: 1 SOLUTION ORAL at 01:55

## 2017-03-26 RX ADMIN — ACETAMINOPHEN PRN MG: 325 TABLET ORAL at 18:55

## 2017-03-26 RX ADMIN — LEVALBUTEROL SCH MG: 1.25 SOLUTION, CONCENTRATE RESPIRATORY (INHALATION) at 08:03

## 2017-03-26 RX ADMIN — MORPHINE SULFATE PRN MG: 100 SOLUTION ORAL at 07:55

## 2017-03-26 RX ADMIN — IPRATROPIUM BROMIDE SCH MG: 0.5 SOLUTION RESPIRATORY (INHALATION) at 12:51

## 2017-03-26 RX ADMIN — MORPHINE TINCTURE SCH MG: 1 SOLUTION ORAL at 06:20

## 2017-03-26 RX ADMIN — NYSTATIN SCH UNITS: 100000 SUSPENSION ORAL at 21:34

## 2017-03-26 RX ADMIN — PAROXETINE SCH MG: 20 TABLET, FILM COATED ORAL at 21:34

## 2017-03-26 RX ADMIN — ACETAMINOPHEN PRN MG: 325 TABLET ORAL at 23:45

## 2017-03-26 RX ADMIN — LORAZEPAM PRN MG: 1 TABLET ORAL at 13:21

## 2017-03-26 RX ADMIN — IPRATROPIUM BROMIDE SCH: 0.5 SOLUTION RESPIRATORY (INHALATION) at 03:50

## 2017-03-26 RX ADMIN — WARFARIN SODIUM SCH MG: 1 TABLET ORAL at 17:40

## 2017-03-26 RX ADMIN — CINACALCET HYDROCHLORIDE SCH MG: 30 TABLET, COATED ORAL at 07:56

## 2017-03-26 RX ADMIN — MORPHINE TINCTURE SCH MG: 1 SOLUTION ORAL at 10:08

## 2017-03-26 NOTE — PN
Subjective


Date of Service: 03/26/17


Interval History: 





Patient seen and examined at bedside. Denies fever, chills, chest discomfort, N/

V/D. Pt reports that her shortness of breath on exertion has improved with the 

use of SL morphine. 





Pt is wondering about getting IV fluids overnight, we discussed her goals and 

that she had requested Palliative/Hospice care. Pt agrees that she doesn't need 

the IV fluids and is eating and drinking well. We also discussed stopping some 

of her medications, and she would like to continue all of them at this time.








Family History: Unchanged from Admission


Social History: Unchanged from Admission


Past Medical History: Unchanged from Admission





Objective


Active Medications: 





Acetaminophen (Tylenol Tab*)  650 mg PO Q4H PRN Reason: FEVER/PAIN


Aspirin (Aspirin Low Dose Tab*)  81 mg PO QAM [facility]


Calcitriol (Rocaltrol  Cap*)  0.25 mcg PO QAM [facility]


Cinacalcet (Sensipar Tab*)  30 mg PO QAM [facility]


Guaifenesin (Mucinex*)  600 mg PO BID PRN Reason: CONGESTION


Ipratropium Bromide (Atrovent 0.5 Mg Neb.Sol*)  0.5 mg INH [id number] AWAKE 

[facility]


Levalbuterol HCl (Xopenex 1.25 Mg/0.5 Ml Neb.Sol*)  1.25 mg INH Q6H [facility]


Lorazepam (Ativan Tab(*))  1 mg PO BEDTIME PRN Reason: ANXIETY


Lorazepam (Ativan Tab(*))  0.25 mg PO QAM PRN Reason: ANXIETY


Lorazepam (Ativan Tab(*))  1 mg PO Q4H PRN Reason: ANXIETY


Morphine Sulfate (Morphine Oral Concentrate*)  2.5 mg SL Q1H PRN Reason: 

SHORTNESS OF BREATH


Bevespi Aerosphere (Mdi)  2 admin INH BID [facility]


Nystatin (Nystatin Suspension*)  500,000 units SWISH SWAL TID [facility]


Opium Tincture (Opium Tincture*)  6 mg PO Q4HR [facility]


Paroxetine HCl (Paxil Tab*)  20 mg PO BEDTIME [facility]


Pharmacy Profile Note (Coumadin Daily Reminder*)  1 note FOLLOW UP 1700 [facility]


Polyvinyl Alcohol (Polyvinyl Alcohol 1.4% Opth*)  1 drop BOTH EYES Q2H PRN 

Reason: DRY EYE


Prednisone (Deltasone Tab*)  50 mg PO DAILY [facility]


Sodium Chloride (Sodium Chloride 0.65% Nasal Spray*)  1 spray BOTH NARES Q4H 

PRN Reason: CONGESTION


Vitamin B Complex/Vitamin E (Complex B-100*)  1 tab PO DAILY [facility]


Warfarin Sodium (Coumadin Tab(*))  1.5 mg PO DAILY@1700 [facility] Reason: Protocol





 Vital Signs











  03/25/17 03/25/17 03/25/17





  20:26 21:21 22:06


 


Temperature 98.4 F  


 


Pulse Rate 79  


 


Respiratory 16 28 18





Rate   


 


Blood Pressure 120/53  





(mmHg)   


 


O2 Sat by Pulse 93  





Oximetry   














  03/25/17 03/25/17 03/25/17





  22:07 23:21 23:44


 


Temperature   97.9 F


 


Pulse Rate   108


 


Respiratory 18 22 20





Rate   


 


Blood Pressure   124/59





(mmHg)   


 


O2 Sat by Pulse   96





Oximetry   

















  03/26/17 03/26/17 03/26/17





  01:56 03:35 03:52


 


Temperature   98.0 F


 


Pulse Rate  74 85


 


Respiratory 24 18 20





Rate   


 


Blood Pressure   142/56





(mmHg)   


 


O2 Sat by Pulse  96 92





Oximetry   














  03/26/17 03/26/17 03/26/17





  03:56 06:20 07:46


 


Temperature   97.6 F


 


Pulse Rate   78


 


Respiratory 20 18 20





Rate   


 


Blood Pressure   127/61





(mmHg)   


 


O2 Sat by Pulse   94





Oximetry   














  03/26/17 03/26/17 03/26/17





  07:55 08:00 08:05


 


Temperature   


 


Pulse Rate   82


 


Respiratory 20 20 17





Rate   


 


Blood Pressure   





(mmHg)   


 


O2 Sat by Pulse   95





Oximetry   

















  03/26/17 03/26/17 03/26/17





  12:08 12:43 12:52


 


Temperature  98.0 F 


 


Pulse Rate  74 79


 


Respiratory 18 16 18





Rate   


 


Blood Pressure  124/45 





(mmHg)   


 


O2 Sat by Pulse  96 96





Oximetry   

















  03/26/17 03/26/17 03/26/17





  15:59 17:35 19:32


 


Temperature 97.9 F  


 


Pulse Rate 82  80


 


Respiratory 16 18 18





Rate   


 


Blood Pressure 110/51  





(mmHg)   


 


O2 Sat by Pulse 94  94





Oximetry   











Oxygen Devices in Use Now: Nasal Cannula - 3L


Appearance: NAD, sitting up in a chair


Respiratory: Symmetrical Chest Expansion and Respiratory Effort, - - Rhonchi 

bilateral, that clear with cough. LS diminished.


Cardiovascular: RRR


Abdominal: NL Sounds; No Tenderness; No Distention


Extremities: No Edema


Skin: No Rash or Ulcers


Neurological: Alert and Oriented x 3, NL Muscle Strength and Tone


Lines/Tubes/Other Access: Clean, Dry and Intact Peripheral IV - site benign


Nutrition: Taking PO's


Result Diagrams: 


 03/20/17 15:10





 03/20/17 16:28


Additional Lab and Data: 


 











Microbiology and Other Data: 


 Microbiology











 03/26/17 03:50 Gram Stain - Final





 Sputum Expectorated 














Assess/Plan/Problems-Billing


Assessment: 





Ms. Perez is a 72 yo female with PMH significant for COPD on home O2, with 

lung malignancy, Crohn disease, CKD stage 3 who presented to the hospital with 

worsening shortness of breath.





- Patient Problems


(1) COPD exacerbation


Code(s): J44.1 - CHRONIC OBSTRUCTIVE PULMONARY DISEASE W (ACUTE) EXACERBATION   

SNOMED Code(s): 378165847


   Comment: 


- Failed outpatient treatment. Appreciate pulmonology consult.


- Continue Bevaspi, d/c Dulera and Spiriva.


- Continue prn O2, flutter valve.


- Palliative care consult for advanced disease process


- Continue Prednisone taper   





(2) Crohn's disease


Code(s): K50.90 - CROHN'S DISEASE, UNSPECIFIED, WITHOUT COMPLICATIONS   SNOMED 

Code(s): 21722047


   Comment: 


- With ileostomy.


- Continue tincture of opium.   





(3) History of pulmonary embolism


Code(s): Z86.711 - PERSONAL HISTORY OF PULMONARY EMBOLISM   SNOMED Code(s): 

705706716


   Comment: 


- Resume home warfarin 


- Daily INR   





(4) CKD (chronic kidney disease), stage IV


Code(s): N18.4 - CHRONIC KIDNEY DISEASE, STAGE 4 (SEVERE)   SNOMED Code(s): 

539825815


   Comment: 


- Creatinine within baseline   





(5) Depression


Code(s): F32.9 - MAJOR DEPRESSIVE DISORDER, SINGLE EPISODE, UNSPECIFIED   

SNOMED Code(s): 05929471


   Comment: 


- Continue Paxil   





(6) DVT prophylaxis


Code(s): EPI9097 -    SNOMED Code(s): 721812364


   Comment: 


- Warfarin   





(7) DNR (do not resuscitate)





Status and Disposition: 





Inpatient admit. Plan for discharge to home with Hospice services on Monday.

## 2017-03-27 VITALS — SYSTOLIC BLOOD PRESSURE: 137 MMHG | DIASTOLIC BLOOD PRESSURE: 51 MMHG

## 2017-03-27 RX ADMIN — LORAZEPAM PRN MG: 1 TABLET ORAL at 03:38

## 2017-03-27 RX ADMIN — MORPHINE TINCTURE SCH MG: 1 SOLUTION ORAL at 06:14

## 2017-03-27 RX ADMIN — LEVALBUTEROL SCH MG: 1.25 SOLUTION, CONCENTRATE RESPIRATORY (INHALATION) at 09:40

## 2017-03-27 RX ADMIN — MORPHINE SULFATE PRN MG: 100 SOLUTION ORAL at 10:02

## 2017-03-27 RX ADMIN — NYSTATIN SCH UNITS: 100000 SUSPENSION ORAL at 10:03

## 2017-03-27 RX ADMIN — CINACALCET HYDROCHLORIDE SCH MG: 30 TABLET, COATED ORAL at 09:08

## 2017-03-27 RX ADMIN — MORPHINE TINCTURE SCH MG: 1 SOLUTION ORAL at 10:11

## 2017-03-27 RX ADMIN — ACETAMINOPHEN PRN MG: 325 TABLET ORAL at 10:54

## 2017-03-27 RX ADMIN — LEVALBUTEROL SCH MG: 1.25 SOLUTION, CONCENTRATE RESPIRATORY (INHALATION) at 01:01

## 2017-03-27 RX ADMIN — IPRATROPIUM BROMIDE SCH MG: 0.5 SOLUTION RESPIRATORY (INHALATION) at 09:40

## 2017-03-27 RX ADMIN — MORPHINE TINCTURE SCH MG: 1 SOLUTION ORAL at 02:02

## 2017-03-27 RX ADMIN — ASPIRIN SCH MG: 81 TABLET, CHEWABLE ORAL at 09:08

## 2017-03-27 RX ADMIN — MORPHINE SULFATE PRN MG: 100 SOLUTION ORAL at 06:21

## 2017-03-27 RX ADMIN — IPRATROPIUM BROMIDE SCH MG: 0.5 SOLUTION RESPIRATORY (INHALATION) at 01:01

## 2017-03-27 RX ADMIN — CALCITRIOL SCH MCG: 0.25 CAPSULE ORAL at 09:08

## 2017-03-27 RX ADMIN — MORPHINE SULFATE PRN MG: 100 SOLUTION ORAL at 03:38

## 2017-03-27 NOTE — DS
DATE OF ADMISSION:  03/20/2017.

 

DATE OF DISCHARGE:  03/27/2017.

 

PROVIDER:  Shasta Easton NP.

 

ATTENDING PHYSICIAN:  Dr. Bray* (reported dictated by Shasta Easton NP).

 

PRIMARY CARE PROVIDER:  Dr. Haile.

 

PALLIATIVE PROVIDER:  Dr. Jamee Calvin.

 

DISCHARGE DIAGNOSES:

1.  Chronic respiratory failure secondary to end-stage COPD with severe 
emphysema and history of lung cancer with suggestion of an enlarging nodule.

2.  Chronic kidney disease, stage 3 to 4.

3.  Plan for hospice services.

 

SECONDARY DIAGNOSES:

1.  DVT with PE on chronic Coumadin.

2.  Restless leg syndrome.

3.  Anxiety and depression.

4.  Crohn's disease with placement of an ileostomy.

 

DISCHARGE MEDICATIONS:

1.  Albuterol one puff INH q.4 to 6 hours prn.

2.  Vitamin B12 injection 1000 mcg IM q.14 days.

3.  Opium Tincture 0.6 ml p.o. q.4 hours for Crohn's disease.

4.  Paxil 20 mg p.o. at bedtime.

5.  Coumadin 2 mg p.o. q.p.m.

6.  Calcitriol 0.25 mcg p.o. q.a.m.

7.  Sensipar 30 mg p.o. q.a.m.

8.  Aspirin 81 mg p.o. q.a.m.

9.  Prednisone taper starting at 50 mg p.o. times 3 days, taper by 5 mg every 3 
days.

10. Nystatin suspension 500,000 units swish and swallow t.i.d.

11. Bevespi Aerosphere two puffs INH b.i.d.

12. Morphine oral concentrate 5 mg p.o. q.4 hours prn, maximum daily dose 30 mg.

13. Ativan 1 mg p.o. q.4 hours prn, max daily dose 6 mg.

 

*Medications to be reviewed by Hospice physician.

 

HISTORY OF PRESENT ILLNESS AND HOSPITAL COURSE:  Please see history and 
physical by Sara Kinney NP for full admission details.  In summary, this is a 71
-year-old female with a past medical history of end-stage COPD with severe 
emphysema and history of lung malignancies, status post radiation, not 
currently on treatment with suggesting enlarging nodule, also with a history of 
Crohn's disease, status post ileostomy, and chronic kidney disease, stage 3 to 
4 who presented on 03/20/2017 with complaint of increasing shortness of breath 
and cough.  The patient reported she started feeling worse approximately a 
month prior to admission with increasing shortness of breath and cough and two 
weeks prior she was unable to walk more than five to six steps without becoming 
very short of breath and dyspneic. She also reported increasing anxiety.  She 
reports she has had poor appetite and has lost approximately 12 pounds over the 
past four weeks.  She did come to the emergency department on 03/10/2017 and at 
that time she had a chest CT which showed no change from previous with a 
history of right lung nodule and soft tissue density on the right.  There is no 
evidence of pneumonia.  At that time, she completed a course of the Azithromycin
, but was not put on steroids.  She did contact Dr. Dacosta regarding her 
shortness of breath a few days after being seen on 03/10/2017 and did start on 
a steroid taper.  The patient reported no improvement of her symptoms from her 
steroid taper and decided to return to the emergency department on 03/20/2017 
for further evaluation.  The patient was admitted for COPD exacerbation and she 
was started on IV steroids, duo nebulizers, and an increase in Ativan to help 
with her anxiety and shortness of breath.  The patient was seen in consultation 
by Dr. Dacosta, pulmonologist, who noted the CT scan of her chest was suggestive 
interval increase in size of a 0.8 cm nodule on the right lobe and also noted 
to be a 1.9 x 3.8 cm soft tissue density in the apical posterior segment of the 
right upper lobe for which she was treated with radiation.  Dr. Dacosta thought 
possibly she had acute chronic obstructive pulmonary disease with exacerbation, 
likely secondary to a viral bronchitis.  The patient and her spouse decided 
they did not want aggressive treatment and asked for a palliative consult.  The 
patient was enrolled in [facility] back in 2012 when she had a thoracic 
compression fracture.  It was felt that she had bone cancer and was discharged 
to [facility] at that time, in which she graduated from.  The patient was felt 
to meet criteria for eligibility for hospice, as well these are the patient's 
wishes.  Dr. Dacosta states the patient has severe emphysema with end-stage 
chronic obstructive pulmonary disease which makes her eligible for hospice.  As 
well she is now requiring oxygenation 24/7 and has chronic respiratory failure.
  The patient wishes to spend her time at home and refuses skilled nursing 
facility and feels that she can be managed at home.

 

Today, the patient on evaluation is alert and oriented times three.  She is 
looking forward to going home.  At this time, we discussed continuing her home 
medications and discussed with the hospice physician if there are any 
recommended medications she should discontinue.

 

DISCHARGE PLAN:

1.  Plan for discharge to home with hospice intake today with Dr. Jamee Calvin and hospice RN.

2.  [facility] as stated as DNR/DNI.

3.  The patient has a follow-up with Dr. Haile on 03/27/2017 for 10:00 p.m.

 

TIME SPENT:  Sixty minutes have been spent on this discharge.

 

____________________________________ SHASTA EASTON NP

 

CC:  Dr. Haile*



[id number]/CPS #: [id number]

LAUREN

## 2017-03-27 NOTE — DCNOTE
Subjective


Date of Service: 03/27/17


Interval History: 





.


 Pt reports she is looking forward to going home. Reports her pain is 

controlled. Some diarrhea but states this is her baseline. Reports good 

appetite - no N/V. No other complaints. 








Family History: Unchanged from Admission


Social History: Unchanged from Admission


Past Medical History: Unchanged from Admission





Objective


Active Medications: 








Acetaminophen (Tylenol Tab*)  650 mg PO Q4H PRN


   PRN Reason: FEVER/PAIN


   Last Admin: 03/26/17 23:45 Dose:  650 mg


Aspirin (Aspirin Low Dose Tab*)  81 mg PO QAM [facility]


   Last Admin: 03/27/17 09:08 Dose:  81 mg


Calcitriol (Rocaltrol  Cap*)  0.25 mcg PO QAM [facility]


   Last Admin: 03/27/17 09:08 Dose:  0.25 mcg


Cinacalcet (Sensipar Tab*)  30 mg PO QAM [facility]


   Last Admin: 03/27/17 09:08 Dose:  30 mg


Guaifenesin (Mucinex*)  600 mg PO BID PRN


   PRN Reason: CONGESTION


   Last Admin: 03/23/17 20:27 Dose:  600 mg


Ipratropium Bromide (Atrovent 0.5 Mg Neb.Sol*)  0.5 mg INH [id number] AWAKE 

[facility]


   Last Admin: 03/27/17 09:40 Dose:  0.5 mg


Levalbuterol HCl (Xopenex 1.25 Mg/0.5 Ml Neb.Sol*)  1.25 mg INH Q6H [facility]


   Last Admin: 03/27/17 09:40 Dose:  1.25 mg


Lorazepam (Ativan Tab(*))  1 mg PO BEDTIME PRN


   PRN Reason: ANXIETY


   Last Admin: 03/27/17 03:38 Dose:  1 mg


Lorazepam (Ativan Tab(*))  0.25 mg PO QAM PRN


   PRN Reason: ANXIETY


Lorazepam (Ativan Tab(*))  1 mg PO Q4H PRN


   PRN Reason: ANXIETY


   Last Admin: 03/26/17 13:21 Dose:  1 mg


Morphine Sulfate (Morphine Oral Concentrate*)  2.5 mg SL Q1H PRN


   PRN Reason: SHORTNESS OF BREATH


   Last Admin: 03/27/17 06:21 Dose:  2.5 mg


Bevespi Aerosphere (Mdi)  2 admin INH BID [facility]


   Last Admin: 03/26/17 21:34 Dose:  2 admin


Nystatin (Nystatin Suspension*)  500,000 units SWISH SWAL TID [facility]


   Last Admin: 03/26/17 21:34 Dose:  500,000 units


Opium Tincture (Opium Tincture*)  6 mg PO Q4HR [facility]


   Last Admin: 03/27/17 06:14 Dose:  6 mg


Paroxetine HCl (Paxil Tab*)  20 mg PO BEDTIME [facility]


   Last Admin: 03/26/17 21:34 Dose:  20 mg


Pharmacy Profile Note (Coumadin Daily Reminder*)  1 note FOLLOW UP 1700 [facility]


   Last Admin: 03/26/17 17:40 Dose:  1 note


Polyvinyl Alcohol (Polyvinyl Alcohol 1.4% Opth*)  1 drop BOTH EYES Q2H PRN


   PRN Reason: DRY EYE


   Last Admin: 03/25/17 22:08 Dose:  1 drop


Prednisone (Deltasone Tab*)  50 mg PO DAILY [facility]


   Last Admin: 03/27/17 09:08 Dose:  50 mg


Sodium Chloride (Sodium Chloride 0.65% Nasal Spray*)  1 spray BOTH NARES Q4H PRN


   PRN Reason: CONGESTION


   Last Admin: 03/26/17 13:24 Dose:  1 spray


Vitamin B Complex/Vitamin E (Complex B-100*)  1 tab PO DAILY [facility]


   Last Admin: 03/27/17 09:08 Dose:  1 tab








 Vital Signs











  03/26/17 03/26/17 03/26/17





  10:08 12:08 12:43


 


Temperature   98.0 F


 


Pulse Rate   74


 


Respiratory 16 18 16





Rate   


 


Blood Pressure   124/45





(mmHg)   


 


O2 Sat by Pulse   96





Oximetry   














  03/26/17 03/26/17 03/26/17





  12:52 13:21 13:23


 


Temperature   


 


Pulse Rate 79  


 


Respiratory 18 16 16





Rate   


 


Blood Pressure   





(mmHg)   


 


O2 Sat by Pulse 96  





Oximetry   














  03/26/17 03/26/17 03/26/17





  15:21 15:59 17:35


 


Temperature  97.9 F 


 


Pulse Rate  82 


 


Respiratory 18 16 18





Rate   


 


Blood Pressure  110/51 





(mmHg)   


 


O2 Sat by Pulse  94 





Oximetry   














  03/26/17 03/26/17 03/26/17





  19:27 19:32 19:35


 


Temperature 98.5 F  


 


Pulse Rate 79 80 


 


Respiratory 20 18 16





Rate   


 


Blood Pressure 130/56  





(mmHg)   


 


O2 Sat by Pulse 94 94 





Oximetry   














  03/26/17 03/26/17 03/26/17





  21:30 21:33 22:43


 


Temperature   


 


Pulse Rate   


 


Respiratory 18 18 22





Rate   


 


Blood Pressure   





(mmHg)   


 


O2 Sat by Pulse   





Oximetry   














  03/26/17 03/26/17 03/26/17





  22:44 23:33 23:50


 


Temperature   98.6 F


 


Pulse Rate   89


 


Respiratory 22 18 18





Rate   


 


Blood Pressure   137/57





(mmHg)   


 


O2 Sat by Pulse   95





Oximetry   














  03/27/17 03/27/17 03/27/17





  00:44 01:04 02:02


 


Temperature   


 


Pulse Rate  83 


 


Respiratory 18 18 18





Rate   


 


Blood Pressure   





(mmHg)   


 


O2 Sat by Pulse  94 





Oximetry   














  03/27/17 03/27/17 03/27/17





  03:38 03:44 04:02


 


Temperature  98.4 F 


 


Pulse Rate  78 


 


Respiratory 18 18 16





Rate   


 


Blood Pressure  137/51 





(mmHg)   


 


O2 Sat by Pulse  96 





Oximetry   














  03/27/17 03/27/17 03/27/17





  05:38 06:14 06:21


 


Temperature   


 


Pulse Rate   


 


Respiratory 18 18 16





Rate   


 


Blood Pressure   





(mmHg)   


 


O2 Sat by Pulse   





Oximetry   














  03/27/17 03/27/17 03/27/17





  08:14 08:21 09:46


 


Temperature   


 


Pulse Rate   83


 


Respiratory 18 18 18





Rate   


 


Blood Pressure   





(mmHg)   


 


O2 Sat by Pulse   92





Oximetry   











Oxygen Devices in Use Now: Nasal Cannula - 2L at baseline


Appearance: 72 yo chronically ill appearing female sitting up in bed in NAD 

eating breakfast A+ox3


Eyes: No Scleral Icterus, PERRLA


Ears/Nose/Mouth/Throat: NL Teeth, Lips, Gums


Neck: NL Appearance and Movements; NL JVP


Respiratory: Symmetrical Chest Expansion and Respiratory Effort


Cardiovascular: NL Sounds; No Murmurs; No JVD, RRR, No Edema


Abdominal: NL Sounds; No Tenderness; No Distention


Extremities: No Edema, No Clubbing, Cyanosis


Skin: No Rash or Ulcers, No Nodules or Sclerosis


Neurological: Alert and Oriented x 3, NL Sensation, NL Muscle Strength and Tone


Lines/Tubes/Other Access: Clean, Dry and Intact Peripheral IV


Nutrition: Taking PO's


Result Diagrams: 


 03/20/17 15:10





 03/20/17 16:28


Additional Lab and Data: 


 











Microbiology and Other Data: 


 Microbiology











 03/26/17 03:50 Gram Stain - Final





 Sputum Expectorated 














Assess/Plan/Problems-Billing


Assessment: 





Ms. 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

, with lung malignancy, Crohn disease, CKD stage 3-4 who presented to the 

hospital with worsening shortness of breath; plan is for Hospice at home. 











- Patient Problems


(1) Chronic respiratory failure with hypoxia


Comment: 


- End-stage COPD with severe emphysema and hx of lung cancer with suggestion of 

enlarging nodule. 


- Appears to be new baseline - was on home O2 only at night up until recently, 

now requiring O2 at all times. Using 2-4L NC. 


- Continue Bevaspi


- prednisone taper


- Plan fofr Hospice Intake today at home with goals of palliative treatment. 


- Continue Oral morphine and atvian prn   





(2) CKD (chronic kidney disease), stage IV


Comment: 


- Looking back at the baseline GFR appears to flucuate between Stage G3B-G4


- Creatinine within baseline   





(3) Crohn's disease


Comment: 


- With ileostomy.


- Continue tincture of opium.   





(4) Depression


Comment: 


- Continue Paxil   





(5) History of pulmonary embolism


Comment: 


- Resume home warfarin 


- Daily INR   


Status and Disposition: 





Inpatient. Plan for DC to home with Hospice sign on today.

## 2017-08-27 ENCOUNTER — HOSPITAL ENCOUNTER (INPATIENT)
Dept: HOSPITAL 25 - ED | Age: 72
LOS: 9 days | Discharge: HOME HEALTH | DRG: 190 | End: 2017-09-05
Attending: HOSPITALIST | Admitting: INTERNAL MEDICINE
Payer: MEDICARE

## 2017-08-27 DIAGNOSIS — I12.9: ICD-10-CM

## 2017-08-27 DIAGNOSIS — Z88.1: ICD-10-CM

## 2017-08-27 DIAGNOSIS — M81.0: ICD-10-CM

## 2017-08-27 DIAGNOSIS — Z98.42: ICD-10-CM

## 2017-08-27 DIAGNOSIS — Z98.41: ICD-10-CM

## 2017-08-27 DIAGNOSIS — Z86.711: ICD-10-CM

## 2017-08-27 DIAGNOSIS — J44.1: Primary | ICD-10-CM

## 2017-08-27 DIAGNOSIS — R74.8: ICD-10-CM

## 2017-08-27 DIAGNOSIS — Z66: ICD-10-CM

## 2017-08-27 DIAGNOSIS — Z82.49: ICD-10-CM

## 2017-08-27 DIAGNOSIS — Z79.82: ICD-10-CM

## 2017-08-27 DIAGNOSIS — M41.9: ICD-10-CM

## 2017-08-27 DIAGNOSIS — R40.2412: ICD-10-CM

## 2017-08-27 DIAGNOSIS — F32.9: ICD-10-CM

## 2017-08-27 DIAGNOSIS — C34.90: ICD-10-CM

## 2017-08-27 DIAGNOSIS — Z72.89: ICD-10-CM

## 2017-08-27 DIAGNOSIS — E11.22: ICD-10-CM

## 2017-08-27 DIAGNOSIS — R79.1: ICD-10-CM

## 2017-08-27 DIAGNOSIS — R51: ICD-10-CM

## 2017-08-27 DIAGNOSIS — G25.81: ICD-10-CM

## 2017-08-27 DIAGNOSIS — F41.9: ICD-10-CM

## 2017-08-27 DIAGNOSIS — Z88.8: ICD-10-CM

## 2017-08-27 DIAGNOSIS — G47.30: ICD-10-CM

## 2017-08-27 DIAGNOSIS — Z99.81: ICD-10-CM

## 2017-08-27 DIAGNOSIS — Z93.2: ICD-10-CM

## 2017-08-27 DIAGNOSIS — Z88.4: ICD-10-CM

## 2017-08-27 DIAGNOSIS — Z88.7: ICD-10-CM

## 2017-08-27 DIAGNOSIS — E11.40: ICD-10-CM

## 2017-08-27 DIAGNOSIS — J96.90: ICD-10-CM

## 2017-08-27 DIAGNOSIS — Z86.718: ICD-10-CM

## 2017-08-27 DIAGNOSIS — N18.3: ICD-10-CM

## 2017-08-27 DIAGNOSIS — F17.210: ICD-10-CM

## 2017-08-27 DIAGNOSIS — K50.90: ICD-10-CM

## 2017-08-27 DIAGNOSIS — Z90.49: ICD-10-CM

## 2017-08-27 LAB
ADD DIFF/SLIDE REVIEW?: (no result)
ALBUMIN SERPL BCG-MCNC: 3.7 G/DL (ref 3.2–5.2)
ALP SERPL-CCNC: 53 U/L (ref 34–104)
ALT SERPL W P-5'-P-CCNC: 23 U/L (ref 7–52)
ANION GAP SERPL CALC-SCNC: 7 MMOL/L (ref 2–11)
AST SERPL-CCNC: 30 U/L (ref 13–39)
BUN SERPL-MCNC: 25 MG/DL (ref 6–24)
BUN/CREAT SERPL: 16.3 (ref 8–20)
CALCIUM SERPL-MCNC: 8.9 MG/DL (ref 8.6–10.3)
CHLORIDE SERPL-SCNC: 102 MMOL/L (ref 101–111)
CK SERPL-CCNC: 67 U/L (ref 10–223)
GLOBULIN SER CALC-MCNC: 2.8 G/DL (ref 2–4)
GLUCOSE SERPL-MCNC: 110 MG/DL (ref 70–100)
HCO3 SERPL-SCNC: 28 MMOL/L (ref 22–32)
HCT VFR BLD AUTO: 41 % (ref 35–47)
HGB BLD-MCNC: 13.9 G/DL (ref 12–16)
MCH RBC QN AUTO: 31 PG (ref 27–31)
MCHC RBC AUTO-ENTMCNC: 34 G/DL (ref 31–36)
MCV RBC AUTO: 92 FL (ref 80–97)
POTASSIUM SERPL-SCNC: 4.3 MMOL/L (ref 3.5–5)
PROT SERPL-MCNC: 6.5 G/DL (ref 6.4–8.9)
RBC # BLD AUTO: 4.42 10^6/UL (ref 4–5.4)
SODIUM SERPL-SCNC: 137 MMOL/L (ref 133–145)
TROPONIN I SERPL-MCNC: 0.09 NG/ML (ref ?–0.04)
VARIANT LYMPHS # BLD MANUAL: 3 % (ref 0–6)
WBC # BLD AUTO: 10.9 10^3/UL (ref 3.5–10.8)

## 2017-08-27 PROCEDURE — 94760 N-INVAS EAR/PLS OXIMETRY 1: CPT

## 2017-08-27 PROCEDURE — 84484 ASSAY OF TROPONIN QUANT: CPT

## 2017-08-27 PROCEDURE — 85610 PROTHROMBIN TIME: CPT

## 2017-08-27 PROCEDURE — 71250 CT THORAX DX C-: CPT

## 2017-08-27 PROCEDURE — 81015 MICROSCOPIC EXAM OF URINE: CPT

## 2017-08-27 PROCEDURE — 87205 SMEAR GRAM STAIN: CPT

## 2017-08-27 PROCEDURE — 85060 BLOOD SMEAR INTERPRETATION: CPT

## 2017-08-27 PROCEDURE — 94640 AIRWAY INHALATION TREATMENT: CPT

## 2017-08-27 PROCEDURE — 87077 CULTURE AEROBIC IDENTIFY: CPT

## 2017-08-27 PROCEDURE — 71010: CPT

## 2017-08-27 PROCEDURE — 83605 ASSAY OF LACTIC ACID: CPT

## 2017-08-27 PROCEDURE — 93005 ELECTROCARDIOGRAM TRACING: CPT

## 2017-08-27 PROCEDURE — 87040 BLOOD CULTURE FOR BACTERIA: CPT

## 2017-08-27 PROCEDURE — 87502 INFLUENZA DNA AMP PROBE: CPT

## 2017-08-27 PROCEDURE — 87086 URINE CULTURE/COLONY COUNT: CPT

## 2017-08-27 PROCEDURE — 82553 CREATINE MB FRACTION: CPT

## 2017-08-27 PROCEDURE — 93306 TTE W/DOPPLER COMPLETE: CPT

## 2017-08-27 PROCEDURE — 82550 ASSAY OF CK (CPK): CPT

## 2017-08-27 PROCEDURE — 87899 AGENT NOS ASSAY W/OPTIC: CPT

## 2017-08-27 PROCEDURE — 36415 COLL VENOUS BLD VENIPUNCTURE: CPT

## 2017-08-27 PROCEDURE — 87070 CULTURE OTHR SPECIMN AEROBIC: CPT

## 2017-08-27 PROCEDURE — 80053 COMPREHEN METABOLIC PANEL: CPT

## 2017-08-27 PROCEDURE — 80048 BASIC METABOLIC PNL TOTAL CA: CPT

## 2017-08-27 PROCEDURE — 94660 CPAP INITIATION&MGMT: CPT

## 2017-08-27 PROCEDURE — 85379 FIBRIN DEGRADATION QUANT: CPT

## 2017-08-27 PROCEDURE — 83880 ASSAY OF NATRIURETIC PEPTIDE: CPT

## 2017-08-27 PROCEDURE — 85025 COMPLETE CBC W/AUTO DIFF WBC: CPT

## 2017-08-27 PROCEDURE — 85730 THROMBOPLASTIN TIME PARTIAL: CPT

## 2017-08-27 PROCEDURE — 96360 HYDRATION IV INFUSION INIT: CPT

## 2017-08-27 PROCEDURE — 86140 C-REACTIVE PROTEIN: CPT

## 2017-08-27 PROCEDURE — 81003 URINALYSIS AUTO W/O SCOPE: CPT

## 2017-08-27 RX ADMIN — MORPHINE TINCTURE SCH MG: 1 SOLUTION ORAL at 23:19

## 2017-08-27 RX ADMIN — MOMETASONE FUROATE AND FORMOTEROL FUMARATE DIHYDRATE SCH PUFF: 200; 5 AEROSOL RESPIRATORY (INHALATION) at 22:49

## 2017-08-27 RX ADMIN — LEVALBUTEROL SCH MG: 1.25 SOLUTION, CONCENTRATE RESPIRATORY (INHALATION) at 22:47

## 2017-08-27 RX ADMIN — MORPHINE TINCTURE SCH MG: 1 SOLUTION ORAL at 19:38

## 2017-08-27 RX ADMIN — LORAZEPAM PRN MG: 0.5 TABLET ORAL at 23:19

## 2017-08-27 RX ADMIN — AZITHROMYCIN SCH MLS/HR: 500 INJECTION, POWDER, LYOPHILIZED, FOR SOLUTION INTRAVENOUS at 17:55

## 2017-08-27 RX ADMIN — LEVALBUTEROL SCH MG: 1.25 SOLUTION, CONCENTRATE RESPIRATORY (INHALATION) at 18:00

## 2017-08-27 RX ADMIN — PREDNISONE SCH MG: 20 TABLET ORAL at 17:52

## 2017-08-27 RX ADMIN — LORAZEPAM PRN MG: 0.5 TABLET ORAL at 17:53

## 2017-08-27 RX ADMIN — IPRATROPIUM BROMIDE SCH MG: 0.5 SOLUTION RESPIRATORY (INHALATION) at 18:00

## 2017-08-27 RX ADMIN — PAROXETINE SCH MG: 20 TABLET, FILM COATED ORAL at 21:25

## 2017-08-27 RX ADMIN — WATER SCH: 100 INJECTION, SOLUTION INTRAVENOUS at 21:46

## 2017-08-27 RX ADMIN — IPRATROPIUM BROMIDE SCH MG: 0.5 SOLUTION RESPIRATORY (INHALATION) at 22:44

## 2017-08-27 NOTE — ED
Letty JAMA Edward, scribed for Megan Dominguez MD on 08/27/17 at 1304 .





Shortness of Breath





- HPI Summary


HPI Summary: 





73 y/o female BIBA c/o gradual onset SOB for one week that became worse this 

morning. Pt has used albuterol inhaler, ipratropium nebs and oxygen to 

alleviate the SOB this past week. Pt had 1 nebulizer treatment this morning. 

The symptoms are also alleviated by CPAP at night but not in the morning - pt 

started using CPAP at 08/21/17 but has now stopped it because when she stops it

, it makes her sxs of SOB worse. Pt has increased her oxygen intake this 

morning from 2L to 5L. Denies fever, CP. Associated sx: productive cough, HA. 

PMHx COPD getting progressively worse for 3 weeks, Crohn's disease, lung CA R 

side, PE. SHx ostomy bag. Denies history of MI. Pt is on warfarin. Pt takes 

tincture of opium drops, not for pain, but to decrease her ostomy output. Pt 

had been on hospice in past, but not currently.  Pt remains a DNR and confirms 

it with female partner witnessing. 





- History of Current Complaint


Chief Complaint: EDShortnessOfBreath


Hx Obtained From: Patient, Family/Caretaker - female partner is with pt


Onset/Duration: Gradual Onset, Lasting Weeks - 1, Still Present, Worse Since - 

This morning


Current Severity: Moderate


Dyspnea At: Rest


Aggrevating Factors: Deep Breaths


Alleviating Factors: Bronchodilators - Albuterol, Oxygen


Associated Signs & Symptoms: Cough (Productive)





- Risk Factors


Pulmonary Embolism: Malignancy, Bedrest, Previous PE


Pseudomonas: Chronic Lung Disease


Tuberculosis: Corticosteriod Use





- Allergy/Home Medications


Allergies/Adverse Reactions: 


 Allergies











Allergy/AdvReac Type Severity Reaction Status Date / Time


 


Vancomycin Allergy Severe Wheezing Verified 08/25/17 13:39


 


Daptomycin Allergy  See Comment Verified 08/25/17 13:39


 


Levofloxacin [From Levaquin] Allergy  Rash Verified 08/25/17 13:39


 


Epinephrine AdvReac Severe Incr Verified 08/25/17 13:39





   HR/N/V/feels  





   faint  


 


Infliximab [From Remicade] AdvReac Severe arm Verified 08/25/17 13:39





   swelling/pain/itching  


 


Tetanus Toxoid AdvReac Unknown arm Verified 08/25/17 13:39





   pain/swelling/itching  


 


Doxycycline AdvReac  Vomiting Verified 08/25/17 13:39


 


eggplant Allergy Severe Difficulty Uncoded 08/25/17 13:39





   Breathing  











Home Medications: 


 Home Medications





Ipratropium 0.5MG/2.5ML NEB* [Atrovent 0.5 MG NEB.SOL*] 0.5 mg INH Q4H PRN 08/27 /17 [History Confirmed 08/27/17]


LORazepam TAB(*) [Ativan 1 MG TAB (*)] 0.5 mg PO BID PRN MDD 6 08/27/17 [

History Confirmed 08/27/17]


Tiotropium CAP.INH* [Spiriva CAP.INH*] 1 cap.inh INH DAILY 08/27/17 [History 

Confirmed 08/27/17]


predniSONE TAB* [Deltasone TAB*] 5 mg PO DAILY 08/27/17 [History Confirmed 08/27 /17]











PMH/Surg Hx/FS Hx/Imm Hx


Previously Healthy: No


Endocrine/Hematology History: Reports: Hx Anticoagulant Therapy - coumadin


   Denies: Hx Diabetes


Cardiovascular History: Reports: Hx Deep Vein Thrombosis, Hx Embolism - PE, Hx 

Hypotension, Hx Hypertension, Hx Syncope


   Denies: Hx Pacemaker/ICD


Respiratory History: Reports: Hx Chronic Bronchitis, Hx Chronic Obstructive 

Pulmonary Disease (COPD), Hx Pulmonary Embolism - 2011, Hx Sleep Apnea, Other 

Respiratory Problems/Disorders - O2 at home


GI History: Reports: Hx Crohn's Disease, Hx Ileostomy, Other GI Disorders - 

Ileostomy placement


 History: Reports: Hx Acute Renal Failure, Hx Chronic Renal Failure


   Denies: Hx Dialysis, Hx Renal Disease


Musculoskeletal History: Reports: Hx Back Problems, Hx Osteoporosis, Hx 

Scoliosis - lumbar disc problems; scoliosis


Sensory History: Reports: Hx Cataracts, Hx Contacts or Glasses, Hx Hearing 

Problem


   Denies: Hx Hearing Aid


Opthamlomology History: Reports: Hx Cataracts, Hx Contacts or Glasses


Neurological History: Reports: Hx Nerve Disease - neuropathy, Other Neuro 

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


Psychiatric History: Reports: Hx Anxiety, Hx Depression


   Denies: Hx Panic Disorder





- Cancer History


Cancer Type, Location and Year: LUNG CA


Hx Chemotherapy: No


Hx Radiation Therapy: Yes


Hx Palliative Cancer Treatment: Yes





- Surgical History


Surgery Procedure, Year, and Place: COLECTOMY AND ILEOSTOMY 3/3/2011 

APPENDECTOMY, GALLBLADDER REMOVED, CATARACTS REMOVED [place].  SEVERAL BOWEL 

RESECTIONS AND FISTULA, [facility]


Hx Anesthesia Reactions: Yes - LOW BP





- Immunization History


Date of Tetanus Vaccine: PT STATES UNSURE


Date of Influenza Vaccine: PT STATES UNSURE


Infectious Disease History: Yes


Infectious Disease History: Reports: Hx of Known/Suspected MRSA


   Denies: Traveled Outside the [place] in Last 30 Days





- Family History


Known Family History: Positive: Other - Crohn's Disease





- Social History


Alcohol Use: Rare


Alcohol Amount: 1 Q 2-3 MONTHS


Hx Substance Use: No


Substance Use Type: Reports: Other


Substance Use Comment - Amount & Last Used: prescribed opium


Hx Tobacco Use: Yes


Smoking Status (MU): Light Every Day Tobacco Smoker


Type: Cigarettes


Amount Used/How Often: 1-2 cigarettes per day


Length of Time of Smoking/Using Tobacco: 53 years


Have You Smoked in the Last Year: Yes





Review of Systems


Constitutional: Negative


Eyes: Negative


ENT: Negative


Cardiovascular: Negative


Positive: Shortness Of Breath, Cough - productive


Gastrointestinal: Negative


Genitourinary: Negative


Musculoskeletal: Negative


Skin: Negative


Positive: Headache


Psychological: Normal


All Other Systems Reviewed And Are Negative: Yes





Physical Exam


Triage Information Reviewed: Yes


Vital Signs On Initial Exam: 


 Initial Vitals











Temp Pulse Resp BP Pulse Ox


 


 98.5 F   105   20   121/55   100 


 


 08/27/17 12:43  08/27/17 12:43  08/27/17 12:43  08/27/17 12:43  08/27/17 12:43











Vital Signs Reviewed: Yes


Appearance: Positive: No Pain Distress, Well-Nourished, Ill-Appearing


Skin: Positive: Warm, Skin Color Reflects Adequate Perfusion


Head/Face: Positive: Normal Head/Face Inspection


Eyes: Positive: Conjunctiva Clear


ENT: Positive: Normal ENT inspection


Neck: Positive: Supple


Respiratory/Lung Sounds: Positive: Breath Sounds Present, Wheezes - Inspiratory 

wheezes with every breath in all lung fields


Cardiovascular: Positive: RRR, Pulses are Symmetrical in both Upper and Lower 

Extremities, Other - Brisk capillary refill


Abdomen Description: Positive: Nontender, Soft


Bowel Sounds: Positive: Present


Musculoskeletal: Positive: Strength/ROM Intact, Other - neg Pierce's bilat, neg 

edema bilat


Neurological: Positive: Sensory/Motor Intact, Alert, Oriented to Person Place, 

Time, Facial Symmetry, Speech Normal


Psychiatric: Positive: Normal





- Pascual Coma Scale


Coma Scale Total: 15





Diagnostics





- Vital Signs


 Vital Signs











  Temp Pulse Resp BP Pulse Ox


 


 08/27/17 12:43  98.5 F  105  20  121/55  100














- Laboratory


Lab Results: 


 Lab Results











  08/27/17 Range/Units





  14:30 


 


WBC  10.9 H  (3.5-10.8)  10^3/ul


 


RBC  4.42  (4.0-5.4)  10^6/ul


 


Hgb  13.9  (12.0-16.0)  g/dl


 


Hct  41  (35-47)  %


 


MCV  92  (80-97)  fL


 


MCH  31  (27-31)  pg


 


MCHC  34  (31-36)  g/dl


 


RDW  14  (10.5-15)  %


 


Plt Count  249  (150-450)  10^3/ul


 


MPV  8  (7.4-10.4)  um3


 


Absolute Neuts (auto)  Pending  


 


Absolute Lymphs (auto)  Pending  


 


Absolute Monos (auto)  Pending  


 


Absolute Eos (auto)  Pending  


 


Absolute Basos (auto)  Pending  


 


Absolute Nucleated RBC  Pending  


 


Neutrophils %  Pending  


 


Normal RBC Morphology  Pending  


 


Hem Pathologist Commnt  Pending  











Result Diagrams: 


 08/27/17 14:30





 08/27/17 14:30


Lab Statement: Any lab studies that have been ordered have been reviewed, and 

results considered in the medical decision making process.





- Radiology


  ** CXR


Xray Interpretation: No Acute Changes - HYPERINFLATION. NO ACTIVE 

CARDIOPULMONARY DISEASE. ED PHYSICIAN AGREEABLE


Radiology Interpretation Completed By: Radiologist





- Additional Comments


Diagnostic Additional Comments: 





EKG @ 12:58 - SINUS RHYTHM @ 61 BPM. Normal AV, IV, and QTc. L axis -17. No 

STEMI. NSTW Changes. Q wave III is new from 03/20/2017.





Course/Dx





- Course


Assessment/Plan: 73 y/o female BIBA c/o gradual onset SOB for one week that 

became worse this morning. Pt has used albuterol inhaler(1x emergency), 

ipratropium nebs and oxygen to alleviate the SOB this past week. Pt had 1 

nebulizer treatment this morning. The symptoms are also alleviated by CPAP at 

night but not in the morning - pt started using CPAP at 08/21/17. Pt has 

increased her oxygen intake this morning. Denies fever, CP. Associated sx: 

productive cough, HA. PMHx COPD getting progressively worse for 3 weeks, Crohn'

s disease, lung CA R side, PE. SHx ostomy bag. Denies history of MI. Pt is on 

Wh[place]. EKG @ 12:58 - SINUS RHYTHM @ 61 BPM. Normal AV, IV, and QTc. L axis -

17. No STEMI. NSTW Changes. Q wave III is new from 03/20/2017. TROPONIN 0.09, 

higher than normal. Dr. Bray accepted the pt for admission @ 15:57. CXR SHOWS 

HYPERINFLATION. NO ACTIVE CARDIOPULMONARY DISEASE.





- Diagnoses


Differential Diagnosis/HQI/PQRI: Positive: Asthma, CHF, COPD Exacerbation, 

Pneumonia, Pulmonary Embolism


Provider Diagnoses: 


 Elevated troponin I level, COPD exacerbation








- Physician Notifications


Discussed Care of Patient With: Angelita Bray


Time Discussed With Above Provider: 15:57


Instructed by Provider To: Admit As Inpatient





- Critical Care Time


Critical Care Time: 30-74 min - 30 minutes





Discharge





- Discharge Plan


Condition: Stable


Disposition: ADMITTED TO [facility] documentation as recorded by the Letty irwin Edward accurately reflects the 

service I personally performed and the decisions made by Alberto ball Barbara J, MD.

## 2017-08-27 NOTE — RAD
HISTORY: Shortness of breath, inspiratory wheezing, lung cancer



COMPARISONS: July 27, 2017



VIEWS:1: Single frontal portable view of the chest at 4:51 PM



FINDINGS:

LINES AND TUBES: None.

CARDIOMEDIASTINAL SILHOUETTE: The cardiomediastinal silhouette is normal for portable

technique.

PLEURA: The costophrenic angles are sharp. No pleural abnormalities are noted.

LUNG PARENCHYMA: There is hyperinflation.

ABDOMEN: The upper abdomen is clear. There is no subphrenic gas.

BONES AND SOFT TISSUES: No bone or soft tissue abnormalities are noted.



IMPRESSION: HYPERINFLATION. NO ACTIVE CARDIOPULMONARY DISEASE.

## 2017-08-28 LAB
ANION GAP SERPL CALC-SCNC: 14 MMOL/L (ref 2–11)
BUN SERPL-MCNC: 24 MG/DL (ref 6–24)
BUN/CREAT SERPL: 14.9 (ref 8–20)
CALCIUM SERPL-MCNC: 8.4 MG/DL (ref 8.6–10.3)
CHLORIDE SERPL-SCNC: 104 MMOL/L (ref 101–111)
GLUCOSE SERPL-MCNC: 268 MG/DL (ref 70–100)
HCO3 SERPL-SCNC: 22 MMOL/L (ref 22–32)
HCT VFR BLD AUTO: 37 % (ref 35–47)
HGB BLD-MCNC: 12.6 G/DL (ref 12–16)
MCH RBC QN AUTO: 32 PG (ref 27–31)
MCHC RBC AUTO-ENTMCNC: 34 G/DL (ref 31–36)
MCV RBC AUTO: 93 FL (ref 80–97)
POTASSIUM SERPL-SCNC: 3.8 MMOL/L (ref 3.5–5)
RBC # BLD AUTO: 3.95 10^6/UL (ref 4–5.4)
SODIUM SERPL-SCNC: 140 MMOL/L (ref 133–145)
WBC # BLD AUTO: 10.7 10^3/UL (ref 3.5–10.8)

## 2017-08-28 RX ADMIN — LEVALBUTEROL SCH: 1.25 SOLUTION, CONCENTRATE RESPIRATORY (INHALATION) at 11:34

## 2017-08-28 RX ADMIN — IPRATROPIUM BROMIDE SCH MG: 0.5 SOLUTION RESPIRATORY (INHALATION) at 20:48

## 2017-08-28 RX ADMIN — MORPHINE TINCTURE SCH MG: 1 SOLUTION ORAL at 09:43

## 2017-08-28 RX ADMIN — MOMETASONE FUROATE AND FORMOTEROL FUMARATE DIHYDRATE SCH PUFF: 200; 5 AEROSOL RESPIRATORY (INHALATION) at 20:51

## 2017-08-28 RX ADMIN — IPRATROPIUM BROMIDE SCH: 0.5 SOLUTION RESPIRATORY (INHALATION) at 23:42

## 2017-08-28 RX ADMIN — MORPHINE TINCTURE SCH MG: 1 SOLUTION ORAL at 21:24

## 2017-08-28 RX ADMIN — PAROXETINE SCH MG: 20 TABLET, FILM COATED ORAL at 21:20

## 2017-08-28 RX ADMIN — LORAZEPAM PRN MG: 0.5 TABLET ORAL at 21:29

## 2017-08-28 RX ADMIN — IPRATROPIUM BROMIDE SCH MG: 0.5 SOLUTION RESPIRATORY (INHALATION) at 09:03

## 2017-08-28 RX ADMIN — LEVALBUTEROL SCH MG: 1.25 SOLUTION, CONCENTRATE RESPIRATORY (INHALATION) at 02:48

## 2017-08-28 RX ADMIN — LEVALBUTEROL SCH MG: 1.25 SOLUTION, CONCENTRATE RESPIRATORY (INHALATION) at 20:50

## 2017-08-28 RX ADMIN — ACETAMINOPHEN PRN MG: 325 TABLET ORAL at 15:39

## 2017-08-28 RX ADMIN — MORPHINE TINCTURE SCH MG: 1 SOLUTION ORAL at 03:41

## 2017-08-28 RX ADMIN — LORAZEPAM PRN MG: 0.5 TABLET ORAL at 10:42

## 2017-08-28 RX ADMIN — ACETAMINOPHEN PRN MG: 325 TABLET ORAL at 10:42

## 2017-08-28 RX ADMIN — ASPIRIN SCH MG: 81 TABLET, CHEWABLE ORAL at 09:44

## 2017-08-28 RX ADMIN — IPRATROPIUM BROMIDE SCH MG: 0.5 SOLUTION RESPIRATORY (INHALATION) at 16:14

## 2017-08-28 RX ADMIN — AZITHROMYCIN SCH MLS/HR: 500 INJECTION, POWDER, LYOPHILIZED, FOR SOLUTION INTRAVENOUS at 16:50

## 2017-08-28 RX ADMIN — LEVALBUTEROL SCH MG: 1.25 SOLUTION, CONCENTRATE RESPIRATORY (INHALATION) at 16:14

## 2017-08-28 RX ADMIN — IPRATROPIUM BROMIDE SCH: 0.5 SOLUTION RESPIRATORY (INHALATION) at 11:33

## 2017-08-28 RX ADMIN — MORPHINE TINCTURE SCH MG: 1 SOLUTION ORAL at 15:36

## 2017-08-28 RX ADMIN — LEVALBUTEROL SCH MG: 1.25 SOLUTION, CONCENTRATE RESPIRATORY (INHALATION) at 09:02

## 2017-08-28 RX ADMIN — CALCITRIOL SCH MCG: 0.25 CAPSULE ORAL at 09:44

## 2017-08-28 RX ADMIN — MOMETASONE FUROATE AND FORMOTEROL FUMARATE DIHYDRATE SCH PUFF: 200; 5 AEROSOL RESPIRATORY (INHALATION) at 09:03

## 2017-08-28 RX ADMIN — WATER SCH NOTE: 100 INJECTION, SOLUTION INTRAVENOUS at 21:47

## 2017-08-28 RX ADMIN — CINACALCET HYDROCHLORIDE SCH MG: 30 TABLET, COATED ORAL at 09:45

## 2017-08-28 RX ADMIN — NICOTINE SCH PATCH: 7 PATCH TRANSDERMAL at 09:45

## 2017-08-28 RX ADMIN — ACETAMINOPHEN PRN MG: 325 TABLET ORAL at 21:29

## 2017-08-28 RX ADMIN — TIOTROPIUM BROMIDE SCH: 18 CAPSULE ORAL; RESPIRATORY (INHALATION) at 17:01

## 2017-08-28 RX ADMIN — IPRATROPIUM BROMIDE SCH MG: 0.5 SOLUTION RESPIRATORY (INHALATION) at 02:46

## 2017-08-28 RX ADMIN — LEVALBUTEROL SCH: 1.25 SOLUTION, CONCENTRATE RESPIRATORY (INHALATION) at 23:42

## 2017-08-28 RX ADMIN — PREDNISONE SCH MG: 20 TABLET ORAL at 09:46

## 2017-08-28 RX ADMIN — MORPHINE TINCTURE SCH MG: 1 SOLUTION ORAL at 10:45

## 2017-08-28 NOTE — ECHO
Patient:      PAULINA WADDELL Rec#:     [id number]            :          1945          

Date:         2017            Age:          72y                 

Account#:     [id number]          Height:       152.4 cm / 60.0 in

Accession#:   [id number]           Weight:       52.16 kg / 115.0 lbs

Sex:          F                     BSA:          1.48

Room#:        [facility]                   

Admit Date#:  2017          

Type:         Inpatient

 

Referring:    Jose Maria Fuentes NP

Reading:      Guy Garcia MD

Sonographer:  Kylah Warner RDCS,RDMS

CC:           Kira Haile MD

CC:           EFREM SINGH

______________________________________________________________________

 

Transthoracic Echocardiogram

 

Indication:

SOB

BP:           134/58

HR:           75

Rhythm:       NSR

 

Findings     

History:

COPD, lung cancer, radiation, CKD, DVT, PE 

 

Technical Comments:

The study is technically limited due to poor parasternal windows.  

 

Left Ventricle:

The left ventricular chamber size is normal. Global left ventricular

wall motion and contractility are within normal limits. There is normal

left ventricular systolic function. The estimated ejection fraction is

60-65%.  There is no consistent Doppler evidence of clinically

significant     diastolic dysfunction. 

 

Left Atrium:

The left atrial chamber size is normal. 

 

Right Ventricle:

The right ventricle wall thickness is mildly increased. The right

ventricular cavity size is normal. The right ventricular global systolic

function is hyperdynamic. 

 

Right Atrium:

The right atrial cavity size is normal. 

 

Aortic Valve:

The aortic valve leaflets are mildly thickened. There is aortic annular

calcification. There is no evidence of aortic regurgitation. There is no

evidence of aortic stenosis. 

 

Mitral Valve:

The mitral valve leaflets are mildly thickened. There is no evidence of

mitral regurgitation. There is no evidence of mitral stenosis. 

 

Tricuspid Valve:

The tricuspid valve leaflets are normal.  There is trace tricuspid

regurgitation.  Unable to estimate the right ventricular systolic

pressure.   

 

Pulmonic Valve:

The pulmonic valve structure is not well visualized. 

 

Pericardium:

There is no significant pericardial effusion. 

 

Aorta:

The ascending aorta is not well visualized. There is no dilatation of

the aortic arch. There is no dilation of the aortic root. 

 

Pulmonary Artery:

The main pulmonary artery is not well visualized. 

 

Venous:

The inferior vena cava is not visualized. 

 

Summary:

There are no significant changes when compared to the previous study

done on 11 

 

Conclusions

Global left ventricular wall motion and contractility are within normal

limits.

There is normal left ventricular systolic function.

The estimated ejection fraction is 60-65%. 

There is no evidence of aortic stenosis.

There is no evidence of mitral regurgitation.

There is trace tricuspid regurgitation. 

Unable to estimate the right ventricular systolic pressure.  

There is no significant pericardial effusion.

There are no significant changes when compared to the previous study

done on 11

 

Measurements     

Name                    Value         Normal Range            

RVDdMajor (2D)          2.8 cm        (2.2 - 4.4)             

RAd ISD 4CH             4 cm          (3.4 - 4.9)             

RA (A4C)W               2.8 cm        (2.9 - 4.6)             

Aortic Annulus          1.9 cm        (1.4 - 2.6)             

Ao root diameter (2D)   2.6 cm        (2.1 - 3.5)             

Aortic arch             2.8 cm        (1.8 - 3.4)             

LAd ISD 4CH             4.6 cm        (2.9 - 5.3)             

LA ISD 4CH W            3.5 cm        (2.5 - 4.5)             

 

Name                    Value         Normal Range            

LA ESV SP 4CH (A/L)     37.61 ml      -                        

LA ESV SP 2CH (A/L)     31.83 ml      -                        

LA ESV BP (A/L)         34.83 ml      -                        

LA ESV BP (A/L) index   23.5 ml/m2    -                        

LA ESV SP 4CH (MOD)     35.67 ml      -                        

LA ESV SP 2CH (MOD)     29.44 ml      -                        

 

Name                    Value         Normal Range            

MV E-wave Vmax          0.9 m/sec     -                        

MV deceleration time    165 msec      -                        

MV A-wave Vmax          0.8 m/sec     -                        

MV E:A ratio            1.1 ratio     -                        

P. vein S-wave Vmax     0.6 m/sec     -                        

P. vein D-wave Vmax     0.5 m/sec     -                        

P. vein S:D Vmax ratio  1.2 ratio     -                        

P. vein A-wave duration 133.1 msec    -                        

LV lateral e' Vmax      0.1 m/sec     -                        

LV E:e' lateral ratio   9 ratio       -                        

 

Name                    Value         Normal Range            

AV Vmax                 1.6 m/sec     -                        

AV VTI                  32.7 cm       -                        

AV peak gradient        10 mmHg       -                        

AV mean gradient        5.8 mmHg      -                        

LVOT Vmax               1.7 m/sec     -                        

LVOT VTI                34.7 cm       -                        

LVOT peak gradient      12.7 mmHg     -                        

LVOT mean gradient      5.4 mmHg      -                        

ALVARO Vmax                0.5 m/sec     -                        

 

Name                    Value         Normal Range            

RAP                     8 mmHg        -                        

 

Name                    Value         Normal Range            

PV Vmax                 0.8 m/sec     -                        

PV peak gradient        2.6 mmHg      -                        

 

Electronically signed by: Guy Garcia MD on 2017 16:27:06

## 2017-08-28 NOTE — PN
Subjective


Date of Service: 08/28/17


Interval History: 





Still has a productive cough, SOB, FOSTER.  Headache after using flutter valve.  





Objective


Active Medications: 








Acetaminophen (Tylenol Tab*)  650 mg PO Q4H PRN


   PRN Reason: FEVER/PAIN


   Last Admin: 08/27/17 21:41 Dose:  650 mg


Acetaminophen (Tylenol Tab*)  650 mg PO Q4H PRN


   PRN Reason: PAIN


Aspirin (Aspirin Low Dose Tab*)  81 mg PO [facility]


   Last Admin: 08/28/17 09:44 Dose:  81 mg


Calcitri[facility] ([facility]oc[facility]  Cap*)  [facility]


   Last Admin: 08/28/17 09:44 Dose:  0.25 mcg


Cinacalcet (Sensipar Tab*)  30 mg PO [facility]


   Last Admin: 08/28/17 09:45 Dose:  30 mg


Guaifenesin (Mucinex*)  600 mg PO BID PRN


   PRN Reason: CONGESTION


Azithromycin 500 mg/ Sodium (Chloride)  250 mls @ 250 mls/hr IVPB Q24H [facility]


   Last Admin: 08/27/17 17:55 Dose:  250 mls/hr


Ipratropium Bromide (Atrovent 0.5 Mg Neb.Sol*)  0.5 mg INH Q4H [facility]


   Last Admin: 08/28/17 09:03 Dose:  0.5 mg


Levalbuterol HCl (Xopenex 1.25 Mg/0.5 Ml Neb.Sol*)  1.25 mg INH Q2H PRN


   PRN Reason: SOB/WHEEZING


Levalbuterol HCl (Xopenex 1.25 Mg/0.5 Ml Neb.Sol*)  1.25 mg INH Q4H [facility]


   Last Admin: 08/28/17 09:02 Dose:  1.25 mg


Lorazepam (Ativan Tab(*))  0.5 mg PO BID PRN


   PRN Reason: ANXIETY


   Last Admin: 08/27/17 23:19 Dose:  0.5 mg


Mometasone Furoate/Formoterol Fumar (Dulera 200/5 Mdi*)  2 puff INH BID [facility]


   Last Admin: 08/28/17 09:03 Dose:  2 puff


Nicotine (Nicotine Patch  7 Mg/24 Hr*)  1 patch TRANSDERM DAILY [facility]


   Last Admin: 08/28/17 09:45 Dose:  1 patch


Ondansetron HCl (Zofran Inj*)  4 mg IV Q6H PRN


   PRN Reason: NAUSEA


Opium Tincture (Opium Tincture*)  6 mg PO Q4H [facility]


   Last Admin: 08/28/17 09:43 Dose:  6 mg


Paroxetine HCl (Paxil Tab*)  30 mg PO BEDTIME [facility]


   Last Admin: 08/27/17 21:25 Dose:  30 mg


Pharmacy Profile Note (Coumadin Per Pharmacy*)  1 note FOLLOW UP .PER PHARMACY 

PROTOC [facility]


   PRN Reason: Protocol


Pharmacy Profile Note (Nicotine Patch Removal Note*)  1 note FOLLOW UP 2100 [facility]


   Last Admin: 08/27/17 21:46 Dose:  Not Given


Prednisone (Deltasone Tab*)  40 mg PO DAILY [facility]


   Last Admin: 08/28/17 09:46 Dose:  40 mg


Prednisone (Deltasone Tab*)  40 mg PO DAILY [facility]


Warfarin Sodium (Coumadin Tab(*))  2.5 mg PO DAILY@1700 [facility]


   PRN Reason: Protocol








 Vital Signs











  08/27/17 08/27/17 08/27/17





  17:36 17:53 18:04


 


Temperature 98.6 F  


 


Pulse Rate 68  74


 


Respiratory 18 18 22





Rate   


 


Blood Pressure 117/57  





(mmHg)   


 


O2 Sat by Pulse 94  93





Oximetry   














  08/27/17 08/27/17 08/27/17





  19:31 19:38 19:53


 


Temperature 98.9 F  


 


Pulse Rate 90  


 


Respiratory 20 20 20





Rate   


 


Blood Pressure 103/54  





(mmHg)   


 


O2 Sat by Pulse 96  





Oximetry   














  08/27/17 08/27/17 08/27/17





  21:38 22:50 22:52


 


Temperature   


 


Pulse Rate  90 


 


Respiratory 18 20 





Rate   


 


Blood Pressure   





(mmHg)   


 


O2 Sat by Pulse  94 94





Oximetry   














  08/27/17 08/28/17 08/28/17





  23:19 00:51 01:19


 


Temperature  98.4 F 


 


Pulse Rate  92 


 


Respiratory 18 16 20





Rate   


 


Blood Pressure  108/51 





(mmHg)   


 


O2 Sat by Pulse  94 





Oximetry   














  08/28/17 08/28/17 08/28/17





  02:48 03:36 03:41


 


Temperature   


 


Pulse Rate 75  


 


Respiratory 20 19 19





Rate   


 


Blood Pressure   





(mmHg)   


 


O2 Sat by Pulse 97  





Oximetry   














  08/28/17 08/28/17 08/28/17





  04:33 04:36 07:27


 


Temperature 97.7 F  97.9 F


 


Pulse Rate 96  77


 


Respiratory 20 15 18





Rate   


 


Blood Pressure 134/58  110/43





(mmHg)   


 


O2 Sat by Pulse 98  96





Oximetry   














  08/28/17 08/28/17 08/28/17





  09:06 09:07 09:43


 


Temperature   


 


Pulse Rate  79 


 


Respiratory  17 18





Rate   


 


Blood Pressure   





(mmHg)   


 


O2 Sat by Pulse 95 95 





Oximetry   











Oxygen Devices in Use Now: Nasal Cannula


Appearance: Alert, partly up and on her side in bed.  Looks fatigued.  No cough 

during my visit.


Eyes: No Scleral Icterus


Neck: NL Appearance and Movements; NL JVP, No Thyroid Enlargement, Masses


Respiratory: Symmetrical Chest Expansion and Respiratory Effort, Clear to 

Percussion, - - mild to mod rhonchi all lung fields


Cardiovascular: NL Sounds; No Murmurs; No JVD, RRR, No Edema, -


Extremities: No Edema, No Clubbing, Cyanosis, -


Skin: No Rash or Ulcers, No Nodules or Sclerosis, -


Neurological: Alert and Oriented x 3, NL Sensation


Result Diagrams: 


 08/28/17 04:37





 08/28/17 04:37


Additional Lab and Data: 


 Lab Results











  08/27/17 Range/Units





  14:30 


 


WBC  10.9 H  (3.5-10.8)  10^3/ul


 


RBC  4.42  (4.0-5.4)  10^6/ul


 


Hgb  13.9  (12.0-16.0)  g/dl


 


Hct  41  (35-47)  %


 


MCV  92  (80-97)  fL


 


MCH  31  (27-31)  pg


 


MCHC  34  (31-36)  g/dl


 


RDW  14  (10.5-15)  %


 


Plt Count  249  (150-450)  10^3/ul


 


MPV  8  (7.4-10.4)  um3


 


Absolute Neuts (auto)  Pending  


 


Absolute Lymphs (auto)  Pending  


 


Absolute Monos (auto)  Pending  


 


Absolute Eos (auto)  Pending  


 


Absolute Basos (auto)  Pending  


 


Absolute Nucleated RBC  Pending  


 


Neutrophils %  Pending  


 


Normal RBC Morphology  Pending  


 


Hem Pathologist Commnt  Pending  











Microbiology and Other Data: 


 Microbiology











 08/27/17 19:56 Gram Stain - Final





 Sputum 


 


 08/27/17 19:40 Legionella Urinary Antigen - Final





 Urine    Negative Legionella





 Streptococcus pneumoniae Ag Screen - Final





    Negative S. pneumo Antigen














Assess/Plan/Problems-Billing


Assessment: 











- Patient Problems


(1) COPD exacerbation


Current Visit: No   Status: Acute   Code(s): J44.1 - CHRONIC OBSTRUCTIVE 

PULMONARY DISEASE W (ACUTE) EXACERBATION   SNOMED Code(s): 687588650


   Comment: Continue azithromycin, resume tiotropium.


Continue Prednisone 40 mg daily, note she was on 5 mg maintenance dose at home 

as well as mometasone/formoterol inhaler.    





(2) History of pulmonary embolism


Current Visit: No   Status: Chronic   Priority: Medium   Code(s): Z86.711 - 

PERSONAL HISTORY OF PULMONARY EMBOLISM   SNOMED Code(s): 032087031


   Comment: Recently ate a lot of spinach.  Increase warfarin to 2.5 mg daily.  

Note she received extra 5 mg 8/27.   





(3) CKD (chronic kidney disease), stage IV


Current Visit: No   Status: Chronic   Code(s): N18.4 - CHRONIC KIDNEY DISEASE, 

STAGE 4 (SEVERE)   SNOMED Code(s): 754936960


   Comment: 


- Creatinine at her baseline   





(4) Crohn's disease


Current Visit: No   Status: Chronic   Code(s): K50.90 - CROHN'S DISEASE, 

UNSPECIFIED, WITHOUT COMPLICATIONS   SNOMED Code(s): 09628881


   Comment: 


S/P ileostomy.


   





(5) Headache


Current Visit: Yes   Status: Acute   Code(s): R51 - HEADACHE   SNOMED Code(s): 

41883241


   Comment: D/T flutter valve use.  APAP ordered as she requested.

## 2017-08-28 NOTE — HP
CC:  Dr. Haile*

 

HISTORY AND PHYSICAL:

 

DATE OF ADMISSION:  08/27/17

 

PRIMARY CARE PROVIDER:  Dr. Haile.

 

ATTENDING PHYSICIAN WHILE IN THE HOSPITAL:  Dr. Angelita Bray* (report 
dictated by Jose Maria Fuentes NP).

 

CHIEF COMPLAINT:  Shortness of breath.

 

HISTORY OF PRESENT ILLNESS:  Ms. Santiago is a 72-year-old female patient, who 
carries a history of COPD; Crohn's; lung cancer; CKD, stage 3; diabetes; PE; 
restless legs syndrome; anxiety; depression.  She came in today saying the last 
couple of weeks, she has had progressive worsening shortness of breath.  It was 
much worse today with minimal exertion.  She was concerned because she had been 
coughing more, not really productive cough.  She stated she has been feeling 
chilled.  No fevers that she knows of.  No vomiting and her ostomy output has 
been at her baseline, has not been any worse or better.  She was concerned 
because of the cough and the fact that she was having more and more shortness 
of breath, so she decided to come into the ER to be evaluated.  She denied 
having any chest pain. Does state that breathing treatments are making her feel 
better.  She took a breathing treatment at home before coming in.  It helped a 
little while but then shortly thereafter, she was having more shortness of 
breath.  She states that she has not had any recent sick contacts. She denies 
having any chest pain with taking a deep breath and denied having any rhinorrhea
, sore throat, or feeling congested. She came into the ER, she was evaluated 
and there was concern for COPD exacerbation.  We are asked to evaluate for 
admission.

 

PAST MEDICAL HISTORY:  Significant for:

1.  COPD.

2.  Crohn's.

3.  Lung cancer.

4.  CKD, stage 3.

5.  DVT and PE.

6.  Restless legs syndrome.

7.  Anxiety.

8.  Depression.

 

PAST SURGICAL HISTORY:  She has had an ileostomy and colon resection.  She has 
had AV fistulas put in the left upper extremity x3 and they failed and she has 
had multiple abdominal surgeries related to Crohn's.

 

MEDICATIONS:  Home meds according to the list that they provided include:

1.  Atrovent 0.5 mg inhaled every 4 hours as needed.

2.  Spiriva 1 capsule inhaled daily.

3.  B12 1000 mcg IM every 2 weeks.

4.  Sensipar 30 mg daily.

5.  Calcitriol 0.25 mcg p.o. daily.

6.  Aspirin 81 mg daily.

7.  Opium tincture 0.6 cc p.o. every 4 hours.

8.  Ativan 0.5 mg b.i.d. as needed.

9.  Prednisone 5 mg daily.

10.  Mucinex 600 mg p.o. b.i.d. as needed.

11.  Warfarin 2 mg p.o. daily.

12.  Paxil 30 mg p.o. bedtime.

 

ALLERGIES TO MEDICATIONS:  Include VANCO, DAPTOMYCIN, LEVAQUIN, EPINEPHRINE, 
REMICADE, TETANUS, and DOXYCYCLINE.

 

FAMILY HISTORY:  The patient had a history of suicidal ideation.  The father 
had a history of heart disease.

 

SOCIAL HISTORY:  She does still continue to smoke about a cigarette a day.  She 
was smoking at her peak 3 packs a day.  She has been smoking for about 50 plus 
years. She smokes marijuana occasionally, also ingests marijuana occasionally.  
She denies any other recreational drugs.  Her surrogate decision maker is her 
significant other, Sarah.

 

REVIEW OF SYSTEMS:  There is no documented fever.  She denied having any 
significant weight change.  There was no double vision.  She denies having any 
ear discharge.  There was no rhinorrhea.  There was no sore throat.  There was 
no thyroid enlargement.  She denies having any chest pain. There is no orthopnea
, but there is dyspnea on exertion.  There is no abdominal pain.  There was no 
vomiting. No dysuria, no frequency.  No loss of consciousness.  No pruritus and 
no skin ulcerations.  Review of 14 systems completed, all others negative.

 

                               PHYSICAL EXAMINATION

 

GENERAL:  Ms. Santiago is a 72-year-old female patient.  She is chronically ill 
appearing.  She is sitting in the ER stretcher.  She does not appear to be in 
any acute respiratory distress.

 

VITAL SIGNS:  Reveal blood pressure 121/55; pulse 105; respirations 20; O2 sat 
100%, she was on 5 L, she is now on 

3 L at 95%; her temperature was 98.5.

 

HEENT:  Head, atraumatic.  Eyes:  EOMs intact.  Sclerae anicteric.  Throat:  
Oral mucosa appears to be dry.  No oropharyngeal erythema.

 

NECK:  Supple.

 

LUNGS:  She did have rhonchi noted in the left base.  She had equal 
diaphragmatic expansion.

 

HEART:  Sounds S1, S2.  Regular rate and rhythm.  No murmurs, rubs, or gallops.

 

ABDOMEN:  Soft.  It was flat.  It was nontender.  She has an ileostomy, which 
again appeared to be functioning appropriately.

 

EXTREMITIES:  Pulses were 2+ throughout.  She had no peripheral edema.  She is 
able to move all 4 extremities with 5/5 strength.

 

NEUROLOGIC:  The patient is awake, she is alert, and she is oriented x3.  No 
gross focal deficit.

 

SKIN:  Grossly intact.

 

 DIAGNOSTIC STUDIES/LAB DATA:  Today revealed WBC 10.9, RBC of 4.42, hemoglobin 
13.9, hematocrit of 41, platelet count of 249.  The INR was 1.29, PTT is 32.4.  
D- dimer less than 200.  Sodium 137, potassium of 4.3, chloride 102, bicarb 28, 
BUN 25, creatinine 1.53, glucose 110, lactate 1.4, calcium 8.9.  Total bili 0.5
, AST 30, ALT 23, alk phos 53.  CK 67.  Troponin was 0.09.  CRP less than 1.  
BNP 60. Serology was negative for flu.

 

She did have an EKG obtained today, which revealed a normal sinus rhythm.  No 
ST elevations or T-wave inversions were noted, rate of 61.

 

Chest x-ray is pending.  Old medical records were reviewed.

 

ASSESSMENT AND PLAN:  Ms. Santiago is a 72-year-old female patient, coming into 
the ER today with complaints of progressively worsening shortness of breath.  
We are asked to evaluate for admission for chronic obstructive pulmonary 
disease exacerbation.  She will be admitted under inpatient status for:

 

1.  Chronic obstructive pulmonary disease exacerbation.  At this point, chest x-
ray is pending.  She does have some rhonchi on exam.  I am not going to order 
Rocephin, I am just going to order azithromycin.  If the x-ray does show an 
infiltrate, probably we will add on Rocephin.  I am going to get legionella and 
Strep pneumo antigen.  We will try to get a sputum culture.  I have ordered 
flutter valve for pulmonary toileting.  I will put her on Xopenex with Atrovent 
every 4 hours along with Dulera.  I have increased her prednisone to 40.  In 
addition to this, I have also ordered again, like I said, inhaled steroids in 
the form of Dulera.

2.  Elevated troponin.  The patient is not having any chest pain.  This is 
probably demand ischemia in the setting of chronic obstructive pulmonary 
disease exacerbation.  We will trend these.  If they continue to elevate, we 
will get Cardiology consult and I will check an echo if they continue to 
elevate.  We will trend them now.

3.  Crohn's.  Continue with her meds as prescribed.

4.  Lung cancer.  She should follow up with primary.  We are getting a chest x-
ray.

5.  Chronic kidney disease, stage 3.  She is at her baseline creatinine.  
Continue meds as prescribed.

6.  History of deep venous thrombosis, pulmonary embolism.  Unfortunately, her 
INR is only 1.2.  I am going to give her one dose of Lovenox now to bridge her 
and I have increased her Coumadin.  We will check her INR tomorrow. I gave her 
a therapeutic dose.  Her GFR is 27, so I gave her 15 mg x1 and that should 
cover her for 24 hours.  We will reevaluate her INR tomorrow after giving her 
an increased dose of Coumadin tonight.  If it is still low, we may need to 
consider repeating Lovenox tomorrow.

7.  History of restless legs syndrome.  Continue meds as prescribed.

8.  Anxiety and depression.  Continue meds as prescribed.

9.  DVT prophylaxis.  Again, I am ordering SCDs, giving her a shot of Lovenox 
tonight, and continue the Coumadin.

10.  Tobacco abuse.  I did order a nicotine patch.

11.  Code status.  She is a DNR.

12.  Fluids, electrolytes, nutrition.  She can have a regular diet.

 

TIME SPENT:  On the admission is 60 minutes, greater than half the time spent 
face- to-face with the patient obtaining my history and physical, the other 
half time was spent going over the plan of care with the patient and 
implementing plan of care. I did discuss the plan of care with my attending, 
Dr. Bray; she is in agreement.

 

 ____________________________________ JOSE MARIA FUENTES NP

 

[id number]/[facility] #: [id number]

LAUREN

## 2017-08-29 RX ADMIN — WATER SCH NOTE: 100 INJECTION, SOLUTION INTRAVENOUS at 21:20

## 2017-08-29 RX ADMIN — ASPIRIN SCH MG: 81 TABLET, CHEWABLE ORAL at 08:09

## 2017-08-29 RX ADMIN — MORPHINE TINCTURE SCH MG: 1 SOLUTION ORAL at 12:17

## 2017-08-29 RX ADMIN — MORPHINE TINCTURE SCH MG: 1 SOLUTION ORAL at 19:16

## 2017-08-29 RX ADMIN — TIOTROPIUM BROMIDE SCH: 18 CAPSULE ORAL; RESPIRATORY (INHALATION) at 11:37

## 2017-08-29 RX ADMIN — MORPHINE TINCTURE SCH MG: 1 SOLUTION ORAL at 16:25

## 2017-08-29 RX ADMIN — LEVALBUTEROL SCH: 1.25 SOLUTION, CONCENTRATE RESPIRATORY (INHALATION) at 14:46

## 2017-08-29 RX ADMIN — LEVALBUTEROL SCH: 1.25 SOLUTION, CONCENTRATE RESPIRATORY (INHALATION) at 02:56

## 2017-08-29 RX ADMIN — MOMETASONE FUROATE AND FORMOTEROL FUMARATE DIHYDRATE SCH: 200; 5 AEROSOL RESPIRATORY (INHALATION) at 11:37

## 2017-08-29 RX ADMIN — LEVALBUTEROL SCH MG: 1.25 SOLUTION, CONCENTRATE RESPIRATORY (INHALATION) at 23:47

## 2017-08-29 RX ADMIN — CINACALCET HYDROCHLORIDE SCH MG: 30 TABLET, COATED ORAL at 08:09

## 2017-08-29 RX ADMIN — MORPHINE TINCTURE SCH MG: 1 SOLUTION ORAL at 04:00

## 2017-08-29 RX ADMIN — GUAIFENESIN SCH MG: 600 TABLET, EXTENDED RELEASE ORAL at 21:20

## 2017-08-29 RX ADMIN — NICOTINE SCH PATCH: 7 PATCH TRANSDERMAL at 08:09

## 2017-08-29 RX ADMIN — MORPHINE TINCTURE SCH MG: 1 SOLUTION ORAL at 08:12

## 2017-08-29 RX ADMIN — MORPHINE TINCTURE SCH MG: 1 SOLUTION ORAL at 23:45

## 2017-08-29 RX ADMIN — LEVALBUTEROL SCH: 1.25 SOLUTION, CONCENTRATE RESPIRATORY (INHALATION) at 11:37

## 2017-08-29 RX ADMIN — IPRATROPIUM BROMIDE SCH: 0.5 SOLUTION RESPIRATORY (INHALATION) at 02:56

## 2017-08-29 RX ADMIN — MOMETASONE FUROATE AND FORMOTEROL FUMARATE DIHYDRATE SCH PUFF: 200; 5 AEROSOL RESPIRATORY (INHALATION) at 19:51

## 2017-08-29 RX ADMIN — CALCITRIOL SCH MCG: 0.25 CAPSULE ORAL at 08:09

## 2017-08-29 RX ADMIN — MORPHINE TINCTURE SCH MG: 1 SOLUTION ORAL at 00:03

## 2017-08-29 RX ADMIN — AZITHROMYCIN SCH MLS/HR: 500 INJECTION, POWDER, LYOPHILIZED, FOR SOLUTION INTRAVENOUS at 16:25

## 2017-08-29 RX ADMIN — LEVALBUTEROL SCH MG: 1.25 SOLUTION, CONCENTRATE RESPIRATORY (INHALATION) at 19:50

## 2017-08-29 RX ADMIN — LEVALBUTEROL SCH: 1.25 SOLUTION, CONCENTRATE RESPIRATORY (INHALATION) at 07:33

## 2017-08-29 RX ADMIN — ACETAMINOPHEN PRN MG: 325 TABLET ORAL at 21:19

## 2017-08-29 RX ADMIN — LORAZEPAM PRN MG: 0.5 TABLET ORAL at 09:57

## 2017-08-29 RX ADMIN — LORAZEPAM PRN MG: 0.5 TABLET ORAL at 21:21

## 2017-08-29 RX ADMIN — PAROXETINE SCH MG: 20 TABLET, FILM COATED ORAL at 21:20

## 2017-08-29 NOTE — PN
Subjective


Date of Service: 08/29/17


Interval History: 





No subj change.





Objective


Active Medications: 








Acetaminophen (Tylenol Tab*)  650 mg PO Q4H PRN


   PRN Reason: PAIN


   Last Admin: 08/28/17 21:29 Dose:  650 mg


[facility] ([facility] Dose [facility]*)  [facility]


   Last Admin: 08/29/17 08:09 Dose:  81 mg


Calcitriol (Rocaltrol  Cap*)  0.[facility]cg [facility] [place]


   Last Admin: 08/29/17 08:09 Dose:  0.25 mcg


Cinacalcet (Sensipar Tab*)  [facility] [place]


   Last Admin: 08/29/17 08:09 Dose:  30 mg


Guaifenesin (Mucinex*)  600 mg PO BID PRN


   PRN Reason: CONGESTION


Azithromycin 500 mg/ Sodium (Chloride)  250 mls @ 250 mls/hr IVPB Q24H [facility]


   Last Admin: 08/28/17 16:50 Dose:  250 mls/hr


Levalbuterol HCl (Xopenex 1.25 Mg/0.5 Ml Neb.Sol*)  1.25 mg INH Q2H PRN


   PRN Reason: SOB/WHEEZING


   Last Admin: 08/29/17 04:12 Dose:  1.25 mg


Levalbuterol HCl (Xopenex 1.25 Mg/0.5 Ml Neb.Sol*)  1.25 mg INH [id number] 

AWAKE [facility]


   Last Admin: 08/29/17 07:33 Dose:  Not Given


Lorazepam (Ativan Tab(*))  0.5 mg PO BID PRN


   PRN Reason: ANXIETY


   Last Admin: 08/29/17 09:57 Dose:  0.5 mg


Mometasone Furoate/Formoterol Fumar (Dulera 200/5 Mdi*)  2 puff INH BID [facility]


   Last Admin: 08/28/17 20:51 Dose:  2 puff


Nicotine (Nicotine Patch  7 Mg/24 Hr*)  1 patch TRANSDERM DAILY [facility]


   Last Admin: 08/29/17 08:09 Dose:  1 patch


Ondansetron HCl (Zofran Inj*)  4 mg IV Q6H PRN


   PRN Reason: NAUSEA


Opium Tincture (Opium Tincture*)  6 mg PO Q4H [facility]


   Last Admin: 08/29/17 08:12 Dose:  6 mg


Paroxetine HCl (Paxil Tab*)  30 mg PO BEDTIME [facility]


   Last Admin: 08/28/17 21:20 Dose:  30 mg


Pharmacy Profile Note (Nicotine Patch Removal Note*)  1 note FOLLOW UP 2100 [facility]


   Last Admin: 08/28/17 21:47 Dose:  1 note


Prednisone (Deltasone Tab*)  60 mg PO DAILY [facility]


Tiotropium Bromide (Spiriva Cap.Inh*)  1 cap INH DAILY [facility]


   Last Admin: 08/28/17 17:01 Dose:  Not Given








 Vital Signs











  08/28/17 08/28/17 08/28/17





  10:42 10:45 11:05


 


Temperature   97.9 F


 


Pulse Rate   107


 


Respiratory 19 19 18





Rate   


 


Blood Pressure   101/38





(mmHg)   


 


O2 Sat by Pulse   91





Oximetry   














  08/28/17 08/28/17 08/28/17





  11:43 12:42 12:45


 


Temperature   


 


Pulse Rate   


 


Respiratory 18 18 17





Rate   


 


Blood Pressure   





(mmHg)   


 


O2 Sat by Pulse   





Oximetry   














  08/28/17 08/28/17 08/28/17





  15:05 15:36 16:19


 


Temperature 98.6 F  


 


Pulse Rate 70  88


 


Respiratory 20 19 18





Rate   


 


Blood Pressure 121/49  





(mmHg)   


 


O2 Sat by Pulse 93  93





Oximetry   














  08/28/17 08/28/17 08/28/17





  17:36 19:35 20:00


 


Temperature  98.5 F 


 


Pulse Rate  76 


 


Respiratory 19 20 20





Rate   


 


Blood Pressure  120/65 





(mmHg)   


 


O2 Sat by Pulse  96 





Oximetry   














  08/28/17 08/28/17 08/28/17





  20:54 20:57 21:24


 


Temperature   


 


Pulse Rate 76  


 


Respiratory 20  20





Rate   


 


Blood Pressure   





(mmHg)   


 


O2 Sat by Pulse 92 92 





Oximetry   














  08/28/17 08/28/17 08/28/17





  21:29 23:24 23:29


 


Temperature   


 


Pulse Rate   


 


Respiratory 20 20 20





Rate   


 


Blood Pressure   





(mmHg)   


 


O2 Sat by Pulse   





Oximetry   














  08/29/17 08/29/17 08/29/17





  00:03 00:20 02:03


 


Temperature  98.6 F 


 


Pulse Rate  82 


 


Respiratory 20 20 20





Rate   


 


Blood Pressure  122/57 





(mmHg)   


 


O2 Sat by Pulse  97 





Oximetry   














  08/29/17 08/29/17 08/29/17





  03:21 04:00 04:14


 


Temperature 98.4 F  


 


Pulse Rate 73  75


 


Respiratory 18 20 20





Rate   


 


Blood Pressure 117/51  





(mmHg)   


 


O2 Sat by Pulse 98  99





Oximetry   














  08/29/17 08/29/17 08/29/17





  04:41 06:00 06:41


 


Temperature   


 


Pulse Rate   


 


Respiratory 20 20 16





Rate   


 


Blood Pressure   





(mmHg)   


 


O2 Sat by Pulse   





Oximetry   














  08/29/17 08/29/17 08/29/17





  07:39 08:12 09:17


 


Temperature   97.8 F


 


Pulse Rate   72


 


Respiratory 16 16 16





Rate   


 


Blood Pressure   114/42





(mmHg)   


 


O2 Sat by Pulse   96





Oximetry   














  08/29/17





  09:57


 


Temperature 


 


Pulse Rate 


 


Respiratory 16





Rate 


 


Blood Pressure 





(mmHg) 


 


O2 Sat by Pulse 





Oximetry 











Oxygen Devices in Use Now: Nasal Cannula


Appearance: Alert, sitting up in bed.  Looks discouraged but otherwise 

comfortable.


Eyes: No Scleral Icterus


Neck: NL Appearance and Movements; NL JVP, No Thyroid Enlargement, Masses


Respiratory: Clear to Percussion, - - mod rhonchi BL


Extremities: No Edema, No Clubbing, Cyanosis, -


Skin: No Rash or Ulcers, No Nodules or Sclerosis, -


Neurological: Alert and Oriented x 3, NL Sensation, NL Gait, NL Muscle Strength 

and Tone, -


Result Diagrams: 


 08/28/17 04:37





 08/28/17 04:37


Additional Lab and Data: 


 Lab Results











  08/27/17 Range/Units





  14:30 


 


WBC  10.9 H  (3.5-10.8)  10^3/ul


 


RBC  4.42  (4.0-5.4)  10^6/ul


 


Hgb  13.9  (12.0-16.0)  g/dl


 


Hct  41  (35-47)  %


 


MCV  92  (80-97)  fL


 


MCH  31  (27-31)  pg


 


MCHC  34  (31-36)  g/dl


 


RDW  14  (10.5-15)  %


 


Plt Count  249  (150-450)  10^3/ul


 


MPV  8  (7.4-10.4)  um3


 


Absolute Neuts (auto)  Pending  


 


Absolute Lymphs (auto)  Pending  


 


Absolute Monos (auto)  Pending  


 


Absolute Eos (auto)  Pending  


 


Absolute Basos (auto)  Pending  


 


Absolute Nucleated RBC  Pending  


 


Neutrophils %  Pending  


 


Normal RBC Morphology  Pending  


 


Hem Pathologist Commnt  Pending  











Microbiology and Other Data: 


 Microbiology











 08/27/17 19:56 Gram Stain - Final





 Sputum 


 


 08/27/17 19:40 Legionella Urinary Antigen - Final





 Urine    Negative Legionella





 Streptococcus pneumoniae Ag Screen - Final





    Negative S. pneumo Antigen














Assess/Plan/Problems-Billing


Assessment: 











- Patient Problems


(1) COPD exacerbation


Current Visit: No   Status: Acute   Code(s): J44.1 - CHRONIC OBSTRUCTIVE 

PULMONARY DISEASE W (ACUTE) EXACERBATION   SNOMED Code(s): 441983046


   Comment: Continue azithromycin, resume tiotropium.


Increase Prednisone to 60 mg daily 8/29, note she was on 5 mg maintenance dose 

at home as well as mometasone/formoterol inhaler.    





(2) History of pulmonary embolism


Current Visit: No   Status: Chronic   Priority: Medium   Code(s): Z86.711 - 

PERSONAL HISTORY OF PULMONARY EMBOLISM   SNOMED Code(s): 774154773


   Comment: Recently ate a lot of spinach.  Hold warfarin 8/29.  INR 8/30. Note 

she received extra 5 mg 8/27.   





(3) CKD (chronic kidney disease), stage IV


Current Visit: No   Status: Chronic   Code(s): N18.4 - CHRONIC KIDNEY DISEASE, 

STAGE 4 (SEVERE)   SNOMED Code(s): 274530246


   Comment: 


- Creatinine at her baseline   





(4) Crohn's disease


Current Visit: No   Status: Chronic   Code(s): K50.90 - CROHN'S DISEASE, 

UNSPECIFIED, WITHOUT COMPLICATIONS   SNOMED Code(s): 66211882


   Comment: 


S/P ileostomy.


   





(5) Headache


Current Visit: Yes   Status: Acute   Code(s): R51 - HEADACHE   SNOMED Code(s): 

77120850


   Comment: D/T flutter valve use.  APAP PRN ordered as she requested.

## 2017-08-30 RX ADMIN — GUAIFENESIN SCH MG: 600 TABLET, EXTENDED RELEASE ORAL at 20:11

## 2017-08-30 RX ADMIN — MORPHINE TINCTURE SCH MG: 1 SOLUTION ORAL at 19:51

## 2017-08-30 RX ADMIN — ASPIRIN SCH MG: 81 TABLET, CHEWABLE ORAL at 09:16

## 2017-08-30 RX ADMIN — PREDNISONE SCH MG: 20 TABLET ORAL at 09:17

## 2017-08-30 RX ADMIN — CINACALCET HYDROCHLORIDE SCH MG: 30 TABLET, COATED ORAL at 09:26

## 2017-08-30 RX ADMIN — MOMETASONE FUROATE AND FORMOTEROL FUMARATE DIHYDRATE SCH PUFF: 200; 5 AEROSOL RESPIRATORY (INHALATION) at 19:48

## 2017-08-30 RX ADMIN — MORPHINE TINCTURE SCH MG: 1 SOLUTION ORAL at 11:50

## 2017-08-30 RX ADMIN — LORAZEPAM PRN MG: 0.5 TABLET ORAL at 07:41

## 2017-08-30 RX ADMIN — LORAZEPAM PRN MG: 0.5 TABLET ORAL at 00:50

## 2017-08-30 RX ADMIN — TIOTROPIUM BROMIDE SCH CAP: 18 CAPSULE ORAL; RESPIRATORY (INHALATION) at 07:30

## 2017-08-30 RX ADMIN — PAROXETINE SCH MG: 20 TABLET, FILM COATED ORAL at 19:50

## 2017-08-30 RX ADMIN — LEVALBUTEROL SCH MG: 1.25 SOLUTION, CONCENTRATE RESPIRATORY (INHALATION) at 07:30

## 2017-08-30 RX ADMIN — LEVALBUTEROL SCH MG: 1.25 SOLUTION, CONCENTRATE RESPIRATORY (INHALATION) at 11:51

## 2017-08-30 RX ADMIN — LEVALBUTEROL SCH MG: 1.25 SOLUTION, CONCENTRATE RESPIRATORY (INHALATION) at 19:48

## 2017-08-30 RX ADMIN — WATER SCH NOTE: 100 INJECTION, SOLUTION INTRAVENOUS at 20:47

## 2017-08-30 RX ADMIN — NICOTINE SCH PATCH: 7 PATCH TRANSDERMAL at 09:17

## 2017-08-30 RX ADMIN — LORAZEPAM PRN MG: 0.5 TABLET ORAL at 21:28

## 2017-08-30 RX ADMIN — MOMETASONE FUROATE AND FORMOTEROL FUMARATE DIHYDRATE SCH PUFF: 200; 5 AEROSOL RESPIRATORY (INHALATION) at 07:30

## 2017-08-30 RX ADMIN — LEVALBUTEROL SCH MG: 1.25 SOLUTION, CONCENTRATE RESPIRATORY (INHALATION) at 15:30

## 2017-08-30 RX ADMIN — ACETAMINOPHEN PRN MG: 325 TABLET ORAL at 07:41

## 2017-08-30 RX ADMIN — MORPHINE TINCTURE SCH MG: 1 SOLUTION ORAL at 07:41

## 2017-08-30 RX ADMIN — GUAIFENESIN SCH MG: 600 TABLET, EXTENDED RELEASE ORAL at 09:16

## 2017-08-30 RX ADMIN — LEVALBUTEROL SCH: 1.25 SOLUTION, CONCENTRATE RESPIRATORY (INHALATION) at 04:04

## 2017-08-30 RX ADMIN — MORPHINE TINCTURE SCH MG: 1 SOLUTION ORAL at 23:24

## 2017-08-30 RX ADMIN — CALCITRIOL SCH MCG: 0.25 CAPSULE ORAL at 09:26

## 2017-08-30 RX ADMIN — MORPHINE TINCTURE SCH MG: 1 SOLUTION ORAL at 03:38

## 2017-08-30 RX ADMIN — AZITHROMYCIN SCH MLS/HR: 500 INJECTION, POWDER, LYOPHILIZED, FOR SOLUTION INTRAVENOUS at 19:52

## 2017-08-30 RX ADMIN — MORPHINE TINCTURE SCH MG: 1 SOLUTION ORAL at 15:26

## 2017-08-30 RX ADMIN — ACETAMINOPHEN PRN MG: 325 TABLET ORAL at 20:44

## 2017-08-30 RX ADMIN — LEVALBUTEROL SCH MG: 1.25 SOLUTION, CONCENTRATE RESPIRATORY (INHALATION) at 23:36

## 2017-08-30 NOTE — PN
Subjective


Date of Service: 08/30/17


Interval History: 





Slightly better today.  No more sputum  She is tired from frequent neb 

treatments during the night but does sleep between them.  Severe FOSTER still. No 

new c/o.





Objective


Active Medications: 








Acetaminophen (Tylenol Tab*)  650 mg PO Q4H PRN


   PRN Reason: PAIN


   Last Admin: 08/30/17 07:41 Dose:  650 mg


Aspirin (Aspirin Low Dose Tab*)  81 mg PO QAM [facility]


   Last Admin: 08/30/17 09:16 Dose:  81 mg


Calcitriol (Rocaltrol  Cap*)  0.25 mcg PO QAM [facility]


   Last Admin: 08/30/17 09:26 Dose:  0.25 mcg


Cinacalcet (Sensipar Tab*)  30 mg PO QAM [facility]


   Last Admin: 08/30/17 09:26 Dose:  30 mg


Guaifenesin (Mucinex*)  600 mg PO BID [facility]


   Last Admin: 08/30/17 09:16 Dose:  600 mg


Azithromycin 500 mg/ Sodium (Chloride)  250 mls @ 250 mls/hr IVPB Q24H [facility]


   Last Admin: 08/29/17 16:25 Dose:  250 mls/hr


Levalbuterol HCl (Xopenex 1.25 Mg/0.5 Ml Neb.Sol*)  1.25 mg INH [id number] 

AWAKE [facility]


   Last Admin: 08/30/17 11:51 Dose:  1.25 mg


Lorazepam (Ativan Tab(*))  0.5 mg PO BID PRN


   PRN Reason: ANXIETY


   Last Admin: 08/30/17 07:41 Dose:  0.5 mg


Mometasone Furoate/Formoterol Fumar (Dulera 200/5 Mdi*)  2 puff INH BID [facility]


   Last Admin: 08/30/17 07:30 Dose:  2 puff


Nicotine (Nicotine Patch  7 Mg/24 Hr*)  1 patch TRANSDERM DAILY [facility]


   Last Admin: 08/30/17 09:17 Dose:  1 patch


Ondansetron HCl (Zofran Inj*)  4 mg IV Q6H PRN


   PRN Reason: NAUSEA


Opium Tincture (Opium Tincture*)  6 mg PO Q4H [facility]


   Last Admin: 08/30/17 11:50 Dose:  6 mg


Paroxetine HCl (Paxil Tab*)  30 mg PO BEDTIME [facility]


   Last Admin: 08/29/17 21:20 Dose:  30 mg


Pharmacy Profile Note (Nicotine Patch Removal Note*)  1 note FOLLOW UP 2100 [facility]


   Last Admin: 08/29/17 21:20 Dose:  1 note


Prednisone (Deltasone Tab*)  60 mg PO DAILY [facility]


   Last Admin: 08/30/17 09:17 Dose:  60 mg


Tiotropium Bromide (Spiriva Cap.Inh*)  1 cap INH DAILY [facility]


   Last Admin: 08/30/17 07:30 Dose:  1 cap








 Vital Signs











  08/29/17 08/29/17 08/29/17





  12:17 14:17 15:12


 


Temperature   98.2 F


 


Pulse Rate   68


 


Respiratory 16 16 18





Rate   


 


Blood Pressure   119/50





(mmHg)   


 


O2 Sat by Pulse   97





Oximetry   














  08/29/17 08/29/17 08/29/17





  16:25 18:25 19:16


 


Temperature   


 


Pulse Rate   


 


Respiratory 18 18 16





Rate   


 


Blood Pressure   





(mmHg)   


 


O2 Sat by Pulse   





Oximetry   














  08/29/17 08/29/17 08/29/17





  19:17 19:53 19:56


 


Temperature 97.9 F  


 


Pulse Rate 76 79 


 


Respiratory 16 20 





Rate   


 


Blood Pressure 122/54  





(mmHg)   


 


O2 Sat by Pulse 98 97 98





Oximetry   














  08/29/17 08/29/17 08/29/17





  20:00 21:16 21:21


 


Temperature   


 


Pulse Rate   


 


Respiratory 18 18 18





Rate   


 


Blood Pressure   





(mmHg)   


 


O2 Sat by Pulse   





Oximetry   














  08/29/17 08/29/17 08/29/17





  23:21 23:45 23:48


 


Temperature   98.0 F


 


Pulse Rate   65


 


Respiratory 18 18 16





Rate   


 


Blood Pressure   127/48





(mmHg)   


 


O2 Sat by Pulse   98





Oximetry   














  08/30/17 08/30/17 08/30/17





  00:50 01:45 03:38


 


Temperature   


 


Pulse Rate   


 


Respiratory 18 20 20





Rate   


 


Blood Pressure   





(mmHg)   


 


O2 Sat by Pulse   





Oximetry   














  08/30/17 08/30/17 08/30/17





  03:43 05:38 07:12


 


Temperature 97.7 F  97.9 F


 


Pulse Rate 70  57


 


Respiratory 20 20 18





Rate   


 


Blood Pressure 115/52  133/66





(mmHg)   


 


O2 Sat by Pulse 98  100





Oximetry   














  08/30/17 08/30/17 08/30/17





  07:33 07:40 07:41


 


Temperature   


 


Pulse Rate 65  


 


Respiratory 18 16 16





Rate   


 


Blood Pressure   





(mmHg)   


 


O2 Sat by Pulse 98  





Oximetry   














  08/30/17 08/30/17 08/30/17





  09:41 11:50 11:52


 


Temperature   


 


Pulse Rate   68


 


Respiratory 14 14 14





Rate   


 


Blood Pressure   





(mmHg)   


 


O2 Sat by Pulse   98





Oximetry   











Oxygen Devices in Use Now: Nasal Cannula


Appearance: Alert, supine in bed.  In fair spirits but looks tired.


Eyes: No Scleral Icterus


Neck: NL Appearance and Movements; NL JVP, No Thyroid Enlargement, Masses


Respiratory: Symmetrical Chest Expansion and Respiratory Effort, Clear to 

Percussion - mild to mod rhonchi BL


Cardiovascular: NL Sounds; No Murmurs; No JVD, RRR, No Edema, -


Extremities: No Edema, No Clubbing, Cyanosis, -


Skin: No Rash or Ulcers, No Nodules or Sclerosis, -


Neurological: Alert and Oriented x 3, NL Sensation


Result Diagrams: 


 08/28/17 04:37





 08/28/17 04:37


Additional Lab and Data: 


 Lab Results











  08/27/17 Range/Units





  14:30 


 


WBC  10.9 H  (3.5-10.8)  10^3/ul


 


RBC  4.42  (4.0-5.4)  10^6/ul


 


Hgb  13.9  (12.0-16.0)  g/dl


 


Hct  41  (35-47)  %


 


MCV  92  (80-97)  fL


 


MCH  31  (27-31)  pg


 


MCHC  34  (31-36)  g/dl


 


RDW  14  (10.5-15)  %


 


Plt Count  249  (150-450)  10^3/ul


 


MPV  8  (7.4-10.4)  um3


 


Absolute Neuts (auto)  Pending  


 


Absolute Lymphs (auto)  Pending  


 


Absolute Monos (auto)  Pending  


 


Absolute Eos (auto)  Pending  


 


Absolute Basos (auto)  Pending  


 


Absolute Nucleated RBC  Pending  


 


Neutrophils %  Pending  


 


Normal RBC Morphology  Pending  


 


Hem Pathologist Commnt  Pending  











Microbiology and Other Data: 


 Microbiology











 08/27/17 19:56 Gram Stain - Final





 Sputum 


 


 08/27/17 19:40 Legionella Urinary Antigen - Final





 Urine    Negative Legionella





 Streptococcus pneumoniae Ag Screen - Final





    Negative S. pneumo Antigen














Assess/Plan/Problems-Billing


Assessment: 











- Patient Problems


(1) COPD exacerbation


Current Visit: No   Status: Acute   Code(s): J44.1 - CHRONIC OBSTRUCTIVE 

PULMONARY DISEASE W (ACUTE) EXACERBATION   SNOMED Code(s): 192333979


   Comment: Continue azithromycin, resume tiotropium.


Continue Prednisone to 60 mg, note she was on 5 mg maintenance dose at home as 

well as mometasone/formoterol inhaler. Should get neb tx q 4 hr ATC.    





(2) History of pulmonary embolism


Current Visit: No   Status: Chronic   Priority: Medium   Code(s): Z86.711 - 

PERSONAL HISTORY OF PULMONARY EMBOLISM   SNOMED Code(s): 276456750


   Comment: Recently ate a lot of spinach.  Hold warfarin starting 8/29.  INR 8/ 31. Note she received extra 5 mg of warfarin 8/27.   





(3) CKD (chronic kidney disease), stage IV


Current Visit: No   Status: Chronic   Code(s): N18.4 - CHRONIC KIDNEY DISEASE, 

STAGE 4 (SEVERE)   SNOMED Code(s): 688971673


   Comment:  Creatinine at her baseline


BMP 8/31.   





(4) Crohn's disease


Current Visit: No   Status: Chronic   Code(s): K50.90 - CROHN'S DISEASE, 

UNSPECIFIED, WITHOUT COMPLICATIONS   SNOMED Code(s): 74719939


   Comment: 


S/P ileostomy.


Continue tincture of opium q 4 hr ATC.


   





(5) Headache


Current Visit: Yes   Status: Acute   Code(s): R51 - HEADACHE   SNOMED Code(s): 

68048831


   Comment: D/T flutter valve use.  APAP PRN ordered as she requested.

## 2017-08-31 LAB
ADD DIFF/SLIDE REVIEW?: (no result)
ANION GAP SERPL CALC-SCNC: 9 MMOL/L (ref 2–11)
BUN SERPL-MCNC: 34 MG/DL (ref 6–24)
BUN/CREAT SERPL: 23.3 (ref 8–20)
CALCIUM SERPL-MCNC: 8.1 MG/DL (ref 8.6–10.3)
CHLORIDE SERPL-SCNC: 105 MMOL/L (ref 101–111)
GLUCOSE SERPL-MCNC: 129 MG/DL (ref 70–100)
HCO3 SERPL-SCNC: 24 MMOL/L (ref 22–32)
HCT VFR BLD AUTO: 37 % (ref 35–47)
HGB BLD-MCNC: 12.1 G/DL (ref 12–16)
MCH RBC QN AUTO: 31 PG (ref 27–31)
MCHC RBC AUTO-ENTMCNC: 33 G/DL (ref 31–36)
MCV RBC AUTO: 94 FL (ref 80–97)
POTASSIUM SERPL-SCNC: 3.8 MMOL/L (ref 3.5–5)
RBC # BLD AUTO: 3.96 10^6/UL (ref 4–5.4)
SODIUM SERPL-SCNC: 138 MMOL/L (ref 133–145)
WBC # BLD AUTO: 12 10^3/UL (ref 3.5–10.8)

## 2017-08-31 RX ADMIN — NICOTINE SCH PATCH: 7 PATCH TRANSDERMAL at 10:53

## 2017-08-31 RX ADMIN — MOMETASONE FUROATE AND FORMOTEROL FUMARATE DIHYDRATE SCH PUFF: 200; 5 AEROSOL RESPIRATORY (INHALATION) at 08:32

## 2017-08-31 RX ADMIN — LEVALBUTEROL SCH MG: 1.25 SOLUTION, CONCENTRATE RESPIRATORY (INHALATION) at 03:16

## 2017-08-31 RX ADMIN — MOMETASONE FUROATE AND FORMOTEROL FUMARATE DIHYDRATE SCH PUFF: 200; 5 AEROSOL RESPIRATORY (INHALATION) at 20:14

## 2017-08-31 RX ADMIN — LORAZEPAM PRN MG: 0.5 TABLET ORAL at 12:19

## 2017-08-31 RX ADMIN — LORAZEPAM PRN MG: 0.5 TABLET ORAL at 22:03

## 2017-08-31 RX ADMIN — MORPHINE TINCTURE SCH MG: 1 SOLUTION ORAL at 20:42

## 2017-08-31 RX ADMIN — ACETAMINOPHEN PRN MG: 325 TABLET ORAL at 23:32

## 2017-08-31 RX ADMIN — MORPHINE TINCTURE SCH MG: 1 SOLUTION ORAL at 03:22

## 2017-08-31 RX ADMIN — WATER SCH NOTE: 100 INJECTION, SOLUTION INTRAVENOUS at 22:17

## 2017-08-31 RX ADMIN — LEVALBUTEROL SCH MG: 1.25 SOLUTION, CONCENTRATE RESPIRATORY (INHALATION) at 12:43

## 2017-08-31 RX ADMIN — TIOTROPIUM BROMIDE SCH CAP: 18 CAPSULE ORAL; RESPIRATORY (INHALATION) at 08:31

## 2017-08-31 RX ADMIN — GUAIFENESIN SCH MG: 600 TABLET, EXTENDED RELEASE ORAL at 20:42

## 2017-08-31 RX ADMIN — LEVALBUTEROL SCH MG: 1.25 SOLUTION, CONCENTRATE RESPIRATORY (INHALATION) at 20:14

## 2017-08-31 RX ADMIN — CINACALCET HYDROCHLORIDE SCH MG: 30 TABLET, COATED ORAL at 10:43

## 2017-08-31 RX ADMIN — OXYCODONE HYDROCHLORIDE AND ACETAMINOPHEN PRN TAB: 5; 325 TABLET ORAL at 00:02

## 2017-08-31 RX ADMIN — MORPHINE TINCTURE SCH MG: 1 SOLUTION ORAL at 17:09

## 2017-08-31 RX ADMIN — MORPHINE TINCTURE SCH MG: 1 SOLUTION ORAL at 09:09

## 2017-08-31 RX ADMIN — ASPIRIN SCH MG: 81 TABLET, CHEWABLE ORAL at 10:44

## 2017-08-31 RX ADMIN — PAROXETINE SCH MG: 20 TABLET, FILM COATED ORAL at 20:40

## 2017-08-31 RX ADMIN — MORPHINE TINCTURE SCH: 1 SOLUTION ORAL at 13:09

## 2017-08-31 RX ADMIN — GUAIFENESIN SCH MG: 600 TABLET, EXTENDED RELEASE ORAL at 10:43

## 2017-08-31 RX ADMIN — LEVALBUTEROL SCH MG: 1.25 SOLUTION, CONCENTRATE RESPIRATORY (INHALATION) at 08:32

## 2017-08-31 RX ADMIN — LEVALBUTEROL SCH MG: 1.25 SOLUTION, CONCENTRATE RESPIRATORY (INHALATION) at 16:56

## 2017-08-31 RX ADMIN — ACETAMINOPHEN PRN MG: 325 TABLET ORAL at 17:09

## 2017-08-31 RX ADMIN — PREDNISONE SCH MG: 20 TABLET ORAL at 10:42

## 2017-08-31 RX ADMIN — AZITHROMYCIN SCH MLS/HR: 500 INJECTION, POWDER, LYOPHILIZED, FOR SOLUTION INTRAVENOUS at 17:05

## 2017-08-31 RX ADMIN — LEVALBUTEROL SCH MG: 1.25 SOLUTION, CONCENTRATE RESPIRATORY (INHALATION) at 23:11

## 2017-08-31 RX ADMIN — CALCITRIOL SCH MCG: 0.25 CAPSULE ORAL at 10:43

## 2017-08-31 NOTE — PN
Subjective


Date of Service: 08/31/17


Interval History: 





Feels worse.  Some yellow sputum.  





Objective


Active Medications: 








Acetaminophen (Tylenol Tab*)  650 mg PO Q4H PRN


   PRN Reason: PAIN


   Last Admin: 08/31/17 17:09 Dose:  650 mg


Aspirin (Aspirin Low Dose Tab*)  81 mg PO QAM [facility]


   Last Admin: 08/31/17 10:44 Dose:  81 mg


Calcitriol (Rocaltrol  Cap*)  0.25 mcg PO QAM [facility]


   Last Admin: 08/31/17 10:43 Dose:  0.25 mcg


Cinacalcet (Sensipar Tab*)  30 mg PO QAM [facility]


   Last Admin: 08/31/17 10:43 Dose:  30 mg


Cyanocobalamin (Vitamin B12 Inj *)  1,000 mcg IM Q14D [facility]


   Last Admin: 08/30/17 19:51 Dose:  1,000 mcg


Guaifenesin (Mucinex*)  600 mg PO BID [facility]


   Last Admin: 08/31/17 10:43 Dose:  600 mg


Azithromycin 500 mg/ Sodium (Chloride)  250 mls @ 250 mls/hr IVPB Q24H [facility]


   Stop: 08/31/17 23:00


   Last Admin: 08/31/17 17:05 Dose:  250 mls/hr


Levalbuterol HCl (Xopenex 1.25 Mg/0.5 Ml Neb.Sol*)  1.25 mg INH [id number] 

AWAKE [facility]


   Last Admin: 08/31/17 16:56 Dose:  1.25 mg


Lorazepam (Ativan Tab(*))  0.5 mg PO BID PRN


   PRN Reason: ANXIETY


   Last Admin: 08/31/17 12:19 Dose:  0.5 mg


Mometasone Furoate/Formoterol Fumar (Dulera 200/5 Mdi*)  2 puff INH BID [facility]


   Last Admin: 08/31/17 08:32 Dose:  2 puff


Nicotine (Nicotine Patch  7 Mg/24 Hr*)  1 patch TRANSDERM DAILY [facility]


   Last Admin: 08/31/17 10:53 Dose:  1 patch


Ondansetron HCl (Zofran Inj*)  4 mg IV Q6H PRN


   PRN Reason: NAUSEA


Opium Tincture (Opium Tincture*)  6 mg PO Q4H [facility]


   Last Admin: 08/31/17 17:09 Dose:  6 mg


Oxycodone/Acetaminophen (Percocet 5/325 Tab*)  1 tab PO Q6H PRN


   PRN Reason: PAIN


   Last Admin: 08/31/17 00:02 Dose:  1 tab


Paroxetine HCl (Paxil Tab*)  30 mg PO BEDTIME [facility]


   Last Admin: 08/30/17 19:50 Dose:  30 mg


Pharmacy Profile Note (Nicotine Patch Removal Note*)  1 note FOLLOW UP 2100 [facility]


   Last Admin: 08/30/17 20:47 Dose:  1 note


Pharmacy Profile Note (Coumadin Daily Reminder*)  0 note FOLLOW UP 1700 [facility]


   Last Admin: 08/31/17 17:11 Dose:  Not Given


Prednisone (Deltasone Tab*)  60 mg PO DAILY [facility]


   Last Admin: 08/31/17 10:42 Dose:  60 mg


Tiotropium Bromide (Spiriva Cap.Inh*)  1 cap INH DAILY [facility]


   Last Admin: 08/31/17 08:31 Dose:  1 cap








 Vital Signs











  08/30/17 08/30/17 08/30/17





  19:40 19:49 19:51


 


Temperature  98.4 F 


 


Pulse Rate  78 67


 


Respiratory 20 20 20





Rate   


 


Blood Pressure  135/60 





(mmHg)   


 


O2 Sat by Pulse  94 97





Oximetry   














  08/30/17 08/30/17 08/30/17





  20:11 21:28 21:51


 


Temperature   


 


Pulse Rate   


 


Respiratory 20 20 20





Rate   


 


Blood Pressure   





(mmHg)   


 


O2 Sat by Pulse   





Oximetry   














  08/30/17 08/30/17 08/30/17





  23:20 23:24 23:28


 


Temperature 98.3 F  


 


Pulse Rate 73  


 


Respiratory 20 20 20





Rate   


 


Blood Pressure 123/54  





(mmHg)   


 


O2 Sat by Pulse 91  





Oximetry   














  08/30/17 08/31/17 08/31/17





  23:38 00:02 01:24


 


Temperature   


 


Pulse Rate 70  


 


Respiratory 18 20 20





Rate   


 


Blood Pressure   





(mmHg)   


 


O2 Sat by Pulse 98  





Oximetry   














  08/31/17 08/31/17 08/31/17





  01:33 02:02 03:18


 


Temperature   


 


Pulse Rate   72


 


Respiratory  20 18





Rate   


 


Blood Pressure   





(mmHg)   


 


O2 Sat by Pulse 97  98





Oximetry   














  08/31/17 08/31/17 08/31/17





  03:22 03:26 04:02


 


Temperature  97.2 F 


 


Pulse Rate  109 


 


Respiratory 20 20 20





Rate   


 


Blood Pressure  126/84 





(mmHg)   


 


O2 Sat by Pulse  100 





Oximetry   














  08/31/17 08/31/17 08/31/17





  07:33 08:00 08:36


 


Temperature 98.5 F  


 


Pulse Rate 77  68


 


Respiratory 22 18 14





Rate   


 


Blood Pressure 142/63  





(mmHg)   


 


O2 Sat by Pulse 98  95





Oximetry   














  08/31/17 08/31/17 08/31/17





  09:09 11:06 12:19


 


Temperature  98.0 F 


 


Pulse Rate  66 


 


Respiratory 18 20 22





Rate   


 


Blood Pressure  137/52 





(mmHg)   


 


O2 Sat by Pulse  97 





Oximetry   














  08/31/17 08/31/17 08/31/17





  12:45 14:19 16:57


 


Temperature   


 


Pulse Rate 61  68


 


Respiratory 14 18 18





Rate   


 


Blood Pressure   





(mmHg)   


 


O2 Sat by Pulse 98  97





Oximetry   














  08/31/17





  17:09


 


Temperature 


 


Pulse Rate 


 


Respiratory 22





Rate 


 


Blood Pressure 





(mmHg) 


 


O2 Sat by Pulse 





Oximetry 











Oxygen Devices in Use Now: Nasal Cannula


Appearance: Partly up in bed.  Alert.  In fair spirits.  Some tachypnea.  No 

cough during my visit.


Neck: NL Appearance and Movements; NL JVP, No Thyroid Enlargement, Masses


Respiratory: Symmetrical Chest Expansion and Respiratory Effort, Clear to 

Percussion - Moderate rhonchi BL, possibly worse than yesterday


Extremities: No Edema, No Clubbing, Cyanosis, -


Skin: No Rash or Ulcers, No Nodules or Sclerosis, -


Neurological: Alert and Oriented x 3, NL Sensation


Result Diagrams: 


 08/31/17 05:41





 08/31/17 05:41


Additional Lab and Data: 


 Lab Results











  08/27/17 Range/Units





  14:30 


 


WBC  10.9 H  (3.5-10.8)  10^3/ul


 


RBC  4.42  (4.0-5.4)  10^6/ul


 


Hgb  13.9  (12.0-16.0)  g/dl


 


Hct  41  (35-47)  %


 


MCV  92  (80-97)  fL


 


MCH  31  (27-31)  pg


 


MCHC  34  (31-36)  g/dl


 


RDW  14  (10.5-15)  %


 


Plt Count  249  (150-450)  10^3/ul


 


MPV  8  (7.4-10.4)  um3


 


Absolute Neuts (auto)  Pending  


 


Absolute Lymphs (auto)  Pending  


 


Absolute Monos (auto)  Pending  


 


Absolute Eos (auto)  Pending  


 


Absolute Basos (auto)  Pending  


 


Absolute Nucleated RBC  Pending  


 


Neutrophils %  Pending  


 


Normal RBC Morphology  Pending  


 


Hem Pathologist Commnt  Pending  











Microbiology and Other Data: 


 Microbiology











 08/27/17 19:56 Gram Stain - Final





 Sputum 


 


 08/27/17 19:40 Legionella Urinary Antigen - Final





 Urine    Negative Legionella





 Streptococcus pneumoniae Ag Screen - Final





    Negative S. pneumo Antigen














Assess/Plan/Problems-Billing


Assessment: 











- Patient Problems


(1) COPD exacerbation


Current Visit: No   Status: Acute   Code(s): J44.1 - CHRONIC OBSTRUCTIVE 

PULMONARY DISEASE W (ACUTE) EXACERBATION   SNOMED Code(s): 700775930


   Comment: 5th dose of azithromycin 8/31, start pip/elham 8/31.   Continue 

tiotropium.


Continue Prednisone 60 mg, note she was on 5 mg maintenance dose at home as 

well as mometasone/formoterol inhaler. Should get neb tx q 4 hr ATC. 


Consult request for Dr. Dacosta.   





(2) History of pulmonary embolism


Current Visit: No   Status: Chronic   Priority: Medium   Code(s): Z86.711 - 

PERSONAL HISTORY OF PULMONARY EMBOLISM   SNOMED Code(s): 209749797


   Comment: Recently ate a lot of spinach.  Hold warfarin starting 8/29.  INR 8/ 31. Note she received extra 5 mg of warfarin 8/27.


Continue to hold warfarin in case an invasive procedure is indicated.    





(3) CKD (chronic kidney disease), stage IV


Current Visit: No   Status: Chronic   Code(s): N18.4 - CHRONIC KIDNEY DISEASE, 

STAGE 4 (SEVERE)   SNOMED Code(s): 955679149


   Comment:  Creatinine down to 1.46 on 8/31.   





(4) Crohn's disease


Current Visit: No   Status: Chronic   Code(s): K50.90 - CROHN'S DISEASE, 

UNSPECIFIED, WITHOUT COMPLICATIONS   SNOMED Code(s): 47463643


   Comment: 


S/P ileostomy.


Continue tincture of opium q 4 hr ATC.


   





(5) Headache


Current Visit: Yes   Status: Acute   Code(s): R51 - HEADACHE   SNOMED Code(s): 

10915329


   Comment: D/T flutter valve use, now exacerbated by increased cough.  APAP 

PRN ordered as she requested.    





(6) Lung cancer


Current Visit: Yes   Status: Acute   Code(s): C34.90 - MALIGNANT NEOPLASM OF 

UNSP PART OF UNSP BRONCHUS OR LUNG   SNOMED Code(s): 233901719


   Comment: Had gamma knife tx about 2 years ago.  CT chest ordered 8/31.   





(7) Tobacco abuse


Current Visit: Yes   Status: Acute   Code(s): Z72.0 - TOBACCO USE   SNOMED Code(

s): 174281692


   Comment: Continue nicotine patch.

## 2017-08-31 NOTE — RAD
Indication: Lung cancer.



CT of the chest was performed without IV contrast. Comparison is made with previous exam

dated March 10, 2017.



Right upper lobe mass is again identified. This is essentially unchanged from previous

exam measuring 4.3 x 13 mm. This is likely due to slightly different orientation of the

lesion. This extends into the right hilum.



Again noted is a mass in the right lower lobe which has increased in size since previous

exam. This now measures approximately 12 mm previously measuring 8 x 6 mm. Scarring is

noted in the left base with hyperinflated lung fields. Emphysematous changes are noted.



There is an enlarged left lobe of the thyroid. This is unchanged from previous exam. The

heart demonstrates no pericardial effusion. Mild. Cardiomediastinal thickening is noted.



The bony structures demonstrates kyphosis deformity with compression fracture of T6

vertebra. This is likely old. No bony sclerotic or lytic lesions are noted.



IMPRESSION: INCREASING SIZE OF THE RIGHT LOWER LOBE NODULE NOW MEASURING 12 MM PREVIOUSLY

WITH A MAXIMUM DIMENSION OF 8 MM.



RIGHT UPPER LOBE MASS ADJACENT TO THE FISSURE MEASURES APPROXIMATELY 40 3X 3X 13 MM BUT IS

LIKELY NOT SIGNIFICANTLY CHANGED IN SIZE OR DUE TO SLIGHTLY DIFFERENT ORIENTATION OF THE

LESION. THERE IS LIKELY RIGHT HILAR ADENOPATHY NOTED. NO SUBCARINAL ADENOPATHY IS NOTED.



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

## 2017-09-01 RX ADMIN — LEVALBUTEROL SCH MG: 1.25 SOLUTION, CONCENTRATE RESPIRATORY (INHALATION) at 14:02

## 2017-09-01 RX ADMIN — NICOTINE SCH PATCH: 7 PATCH TRANSDERMAL at 08:43

## 2017-09-01 RX ADMIN — MORPHINE TINCTURE SCH MG: 1 SOLUTION ORAL at 20:17

## 2017-09-01 RX ADMIN — LEVALBUTEROL SCH MG: 1.25 SOLUTION, CONCENTRATE RESPIRATORY (INHALATION) at 22:44

## 2017-09-01 RX ADMIN — MOMETASONE FUROATE AND FORMOTEROL FUMARATE DIHYDRATE SCH PUFF: 200; 5 AEROSOL RESPIRATORY (INHALATION) at 10:09

## 2017-09-01 RX ADMIN — MORPHINE TINCTURE SCH MG: 1 SOLUTION ORAL at 00:40

## 2017-09-01 RX ADMIN — GUAIFENESIN SCH MG: 600 TABLET, EXTENDED RELEASE ORAL at 08:44

## 2017-09-01 RX ADMIN — ACETAMINOPHEN PRN MG: 325 TABLET ORAL at 21:43

## 2017-09-01 RX ADMIN — PREDNISONE SCH: 50 TABLET ORAL at 09:08

## 2017-09-01 RX ADMIN — LEVALBUTEROL SCH MG: 1.25 SOLUTION, CONCENTRATE RESPIRATORY (INHALATION) at 09:55

## 2017-09-01 RX ADMIN — MORPHINE TINCTURE SCH MG: 1 SOLUTION ORAL at 08:50

## 2017-09-01 RX ADMIN — CALCITRIOL SCH MCG: 0.25 CAPSULE ORAL at 08:45

## 2017-09-01 RX ADMIN — LORAZEPAM PRN MG: 0.5 TABLET ORAL at 08:50

## 2017-09-01 RX ADMIN — MORPHINE TINCTURE SCH MG: 1 SOLUTION ORAL at 13:14

## 2017-09-01 RX ADMIN — GUAIFENESIN SCH MG: 600 TABLET, EXTENDED RELEASE ORAL at 20:14

## 2017-09-01 RX ADMIN — LEVALBUTEROL SCH MG: 1.25 SOLUTION, CONCENTRATE RESPIRATORY (INHALATION) at 02:59

## 2017-09-01 RX ADMIN — MOMETASONE FUROATE AND FORMOTEROL FUMARATE DIHYDRATE SCH PUFF: 200; 5 AEROSOL RESPIRATORY (INHALATION) at 19:50

## 2017-09-01 RX ADMIN — MORPHINE TINCTURE SCH MG: 1 SOLUTION ORAL at 17:31

## 2017-09-01 RX ADMIN — LEVALBUTEROL SCH MG: 1.25 SOLUTION, CONCENTRATE RESPIRATORY (INHALATION) at 19:43

## 2017-09-01 RX ADMIN — CINACALCET HYDROCHLORIDE SCH MG: 30 TABLET, COATED ORAL at 09:49

## 2017-09-01 RX ADMIN — ACETAMINOPHEN PRN MG: 325 TABLET ORAL at 05:17

## 2017-09-01 RX ADMIN — LORAZEPAM SCH MG: 0.5 TABLET ORAL at 20:14

## 2017-09-01 RX ADMIN — MORPHINE TINCTURE SCH MG: 1 SOLUTION ORAL at 04:59

## 2017-09-01 RX ADMIN — ASPIRIN SCH MG: 81 TABLET, CHEWABLE ORAL at 08:44

## 2017-09-01 RX ADMIN — PAROXETINE SCH MG: 20 TABLET, FILM COATED ORAL at 20:15

## 2017-09-01 RX ADMIN — LEVALBUTEROL SCH: 1.25 SOLUTION, CONCENTRATE RESPIRATORY (INHALATION) at 10:54

## 2017-09-01 RX ADMIN — PREDNISONE SCH MG: 20 TABLET ORAL at 08:44

## 2017-09-01 RX ADMIN — WATER SCH NOTE: 100 INJECTION, SOLUTION INTRAVENOUS at 20:20

## 2017-09-01 RX ADMIN — TIOTROPIUM BROMIDE SCH CAP: 18 CAPSULE ORAL; RESPIRATORY (INHALATION) at 10:10

## 2017-09-01 NOTE — PN
Subjective


Date of Service: 09/01/17


Interval History: 





Feels better today.  Little cough.  No new c/o.





Objective


Active Medications: 








Acetaminophen (Tylenol Tab*)  650 mg PO Q4H PRN


   PRN Reason: PAIN


   Last Admin: 09/01/17 05:17 Dose:  650 mg


Aspirin (Aspirin Low Dose Tab*)  81 mg PO QAM [facility]


   Last Admin: 09/01/17 08:44 Dose:  81 mg


Calcitriol (Rocaltrol  Cap*)  0.25 mcg PO QAM [facility]


   Last Admin: 09/01/17 08:45 Dose:  0.25 mcg


Cinacalcet (Sensipar Tab*)  30 mg PO QAM [facility]


   Last Admin: 09/01/17 09:49 Dose:  30 mg


Cyanocobalamin (Vitamin B12 Inj *)  1,000 mcg IM Q14D [facility]


   Last Admin: 08/30/17 19:51 Dose:  1,000 mcg


Guaifenesin (Mucinex*)  600 mg PO BID [facility]


   Last Admin: 09/01/17 08:44 Dose:  600 mg


Piperacillin Sod/Tazobactam (Sod 3.375 gm/ Sodium Chloride)  100 mls @ 25 mls/

hr IVPB Q8HR [facility]


   Last Admin: 09/01/17 13:14 Dose:  25 mls/hr


Levalbuterol HCl (Xopenex 1.25 Mg/0.5 Ml Neb.Sol*)  1.25 mg INH [id number] 

AWAKE [facility]


   Last Admin: 09/01/17 14:02 Dose:  1.25 mg


Lorazepam (Ativan Tab(*))  0.5 mg PO 1300 PRN


   PRN Reason: ANXIETY


Lorazepam (Ativan Tab(*))  0.5 mg PO BID [facility]


Mometasone Furoate/Formoterol Fumar (Dulera 200/5 Mdi*)  2 puff INH BID [facility]


   Last Admin: 09/01/17 10:09 Dose:  2 puff


Nicotine (Nicotine Patch  7 Mg/24 Hr*)  1 patch TRANSDERM DAILY [facility]


   Last Admin: 09/01/17 08:43 Dose:  1 patch


Ondansetron HCl (Zofran Inj*)  4 mg IV Q6H PRN


   PRN Reason: NAUSEA


Opium Tincture (Opium Tincture*)  6 mg PO Q4H [facility]


   Last Admin: 09/01/17 13:14 Dose:  6 mg


Oxycodone/Acetaminophen (Percocet 5/325 Tab*)  1 tab PO Q6H PRN


   PRN Reason: PAIN


   Last Admin: 08/31/17 00:02 Dose:  1 tab


Paroxetine HCl (Paxil Tab*)  30 mg PO BEDTIME [facility]


   Last Admin: 08/31/17 20:40 Dose:  30 mg


Pharmacy Profile Note (Nicotine Patch Removal Note*)  1 note FOLLOW UP 2100 [facility]


   Last Admin: 08/31/17 22:17 Dose:  1 note


Pharmacy Profile Note (Coumadin Daily Reminder*)  0 note FOLLOW UP 1700 [facility]


   Last Admin: 08/31/17 17:11 Dose:  Not Given


Prednisone (Deltasone Tab*)  50 mg PO DAILY [facility]


   Last Admin: 09/01/17 09:08 Dose:  Not Given


Tiotropium Bromide (Spiriva Cap.Inh*)  1 cap INH DAILY [facility]


   Last Admin: 09/01/17 10:10 Dose:  1 cap








 Vital Signs











  08/31/17 08/31/17 08/31/17





  16:57 17:09 19:09


 


Temperature   


 


Pulse Rate 68  


 


Respiratory 18 22 16





Rate   


 


Blood Pressure   





(mmHg)   


 


O2 Sat by Pulse 97  





Oximetry   














  08/31/17 08/31/17 08/31/17





  19:32 20:00 20:30


 


Temperature 98.9 F  


 


Pulse Rate 73  71


 


Respiratory 16 16 18





Rate   


 


Blood Pressure 130/63  





(mmHg)   


 


O2 Sat by Pulse 98  97





Oximetry   














  08/31/17 08/31/17 08/31/17





  20:42 22:03 22:42


 


Temperature   


 


Pulse Rate   


 


Respiratory 18 18 22





Rate   


 


Blood Pressure   





(mmHg)   


 


O2 Sat by Pulse   





Oximetry   














  08/31/17 09/01/17 09/01/17





  23:16 00:03 00:22


 


Temperature   98.5 F


 


Pulse Rate 99  80


 


Respiratory 98 22 22





Rate   


 


Blood Pressure   134/62





(mmHg)   


 


O2 Sat by Pulse 18  97





Oximetry   














  09/01/17 09/01/17 09/01/17





  00:40 01:16 02:40


 


Temperature   


 


Pulse Rate   


 


Respiratory 20 18 18





Rate   


 


Blood Pressure   





(mmHg)   


 


O2 Sat by Pulse   





Oximetry   














  09/01/17 09/01/17 09/01/17





  02:50 03:00 03:16


 


Temperature 97.8 F  


 


Pulse Rate 73 89 


 


Respiratory 20 18 18





Rate   


 


Blood Pressure 123/61  





(mmHg)   


 


O2 Sat by Pulse 95 98 





Oximetry   














  09/01/17 09/01/17 09/01/17





  04:59 07:17 08:00


 


Temperature  98.1 F 


 


Pulse Rate  67 


 


Respiratory 24 16 18





Rate   


 


Blood Pressure  147/56 





(mmHg)   


 


O2 Sat by Pulse  97 





Oximetry   














  09/01/17 09/01/17 09/01/17





  08:50 10:50 11:39


 


Temperature   98.3 F


 


Pulse Rate   72


 


Respiratory 18 16 16





Rate   


 


Blood Pressure   131/54





(mmHg)   


 


O2 Sat by Pulse   92





Oximetry   














  09/01/17 09/01/17





  13:14 15:14


 


Temperature  


 


Pulse Rate  


 


Respiratory 18 18





Rate  


 


Blood Pressure  





(mmHg)  


 


O2 Sat by Pulse  





Oximetry  











Oxygen Devices in Use Now: Nasal Cannula


Appearance: Alert, partly up in bed.  In good spirits.  Looks comfortable.


Eyes: No Scleral Icterus


Neck: NL Appearance and Movements; NL JVP, No Thyroid Enlargement, Masses


Respiratory: Symmetrical Chest Expansion and Respiratory Effort, Clear to 

Percussion - Diminished BS BL, no rhonchi


Cardiovascular: NL Sounds; No Murmurs; No JVD, RRR, No Edema, -


Extremities: No Edema, No Clubbing, Cyanosis, -


Skin: No Rash or Ulcers, No Nodules or Sclerosis, -


Neurological: Alert and Oriented x 3, NL Sensation


Result Diagrams: 


 08/31/17 05:41





 08/31/17 05:41


Additional Lab and Data: 


 Lab Results











  08/27/17 Range/Units





  14:30 


 


WBC  10.9 H  (3.5-10.8)  10^3/ul


 


RBC  4.42  (4.0-5.4)  10^6/ul


 


Hgb  13.9  (12.0-16.0)  g/dl


 


Hct  41  (35-47)  %


 


MCV  92  (80-97)  fL


 


MCH  31  (27-31)  pg


 


MCHC  34  (31-36)  g/dl


 


RDW  14  (10.5-15)  %


 


Plt Count  249  (150-450)  10^3/ul


 


MPV  8  (7.4-10.4)  um3


 


Absolute Neuts (auto)  Pending  


 


Absolute Lymphs (auto)  Pending  


 


Absolute Monos (auto)  Pending  


 


Absolute Eos (auto)  Pending  


 


Absolute Basos (auto)  Pending  


 


Absolute Nucleated RBC  Pending  


 


Neutrophils %  Pending  


 


Normal RBC Morphology  Pending  


 


Hem Pathologist Commnt  Pending  











Microbiology and Other Data: 


 Microbiology











 08/27/17 19:56 Gram Stain - Final





 Sputum 


 


 08/27/17 19:40 Legionella Urinary Antigen - Final





 Urine    Negative Legionella





 Streptococcus pneumoniae Ag Screen - Final





    Negative S. pneumo Antigen














Assess/Plan/Problems-Billing


Assessment: 











- Patient Problems


(1) COPD exacerbation


Current Visit: No   Status: Acute   Code(s): J44.1 - CHRONIC OBSTRUCTIVE 

PULMONARY DISEASE W (ACUTE) EXACERBATION   SNOMED Code(s): 625370660


   Comment: Striking improvement in chest exam since starting pip/elham 8/31.  

Plan a few more days of pip/elham, then consider d/c on amox/clav, start to taper 

prednisone 9/2.  Pt state she gets agitated/manic when the taper gets to about 

20 mg daily.     





(2) History of pulmonary embolism


Current Visit: No   Status: Chronic   Priority: Medium   Code(s): Z86.711 - 

PERSONAL HISTORY OF PULMONARY EMBOLISM   SNOMED Code(s): 877255573


   Comment: Recently ate a lot of spinach.  Hold warfarin starting 8/29.  INR 8/ 31 down to 2.82. Note she received extra 5 mg of warfarin 8/27.


Continue to hold warfarin in case an invasive procedure is indicated.    





(3) CKD (chronic kidney disease), stage IV


Current Visit: No   Status: Chronic   Code(s): N18.4 - CHRONIC KIDNEY DISEASE, 

STAGE 4 (SEVERE)   SNOMED Code(s): 473216209


   Comment:  Creatinine down to 1.46 on 8/31.   





(4) Crohn's disease


Current Visit: No   Status: Chronic   Code(s): K50.90 - CROHN'S DISEASE, 

UNSPECIFIED, WITHOUT COMPLICATIONS   SNOMED Code(s): 52479070


   Comment: 


S/P ileostomy.


Continue tincture of opium q 4 hr ATC.


   





(5) Headache


Current Visit: Yes   Status: Acute   Code(s): R51 - HEADACHE   SNOMED Code(s): 

58271770


   Comment: D/T flutter valve use, now exacerbated by increased cough.  APAP 

PRN ordered as she requested.    





(6) Lung cancer


Current Visit: Yes   Status: Acute   Code(s): C34.90 - MALIGNANT NEOPLASM OF 

UNSP PART OF UNSP BRONCHUS OR LUNG   SNOMED Code(s): 146150276


   Comment: Had gamma knife tx about 2 years ago.  CT 8/31 show 2 small nodules

, one sl large than 3/17, one abou the same size.   





(7) Tobacco abuse


Current Visit: Yes   Status: Acute   Code(s): Z72.0 - TOBACCO USE   SNOMED Code(

s): 885630228


   Comment: Continue nicotine patch.

## 2017-09-02 RX ADMIN — MOMETASONE FUROATE AND FORMOTEROL FUMARATE DIHYDRATE SCH PUFF: 200; 5 AEROSOL RESPIRATORY (INHALATION) at 11:41

## 2017-09-02 RX ADMIN — LORAZEPAM SCH MG: 0.5 TABLET ORAL at 11:02

## 2017-09-02 RX ADMIN — MORPHINE TINCTURE SCH MG: 1 SOLUTION ORAL at 05:45

## 2017-09-02 RX ADMIN — GUAIFENESIN SCH MG: 600 TABLET, EXTENDED RELEASE ORAL at 20:48

## 2017-09-02 RX ADMIN — PREDNISONE SCH MG: 50 TABLET ORAL at 11:02

## 2017-09-02 RX ADMIN — CINACALCET HYDROCHLORIDE SCH MG: 30 TABLET, COATED ORAL at 11:02

## 2017-09-02 RX ADMIN — ACETAMINOPHEN PRN MG: 325 TABLET ORAL at 06:10

## 2017-09-02 RX ADMIN — MORPHINE TINCTURE SCH MG: 1 SOLUTION ORAL at 18:00

## 2017-09-02 RX ADMIN — MORPHINE TINCTURE SCH MG: 1 SOLUTION ORAL at 20:49

## 2017-09-02 RX ADMIN — LEVALBUTEROL SCH MG: 1.25 SOLUTION, CONCENTRATE RESPIRATORY (INHALATION) at 20:04

## 2017-09-02 RX ADMIN — LORAZEPAM SCH MG: 0.5 TABLET ORAL at 20:48

## 2017-09-02 RX ADMIN — LEVALBUTEROL SCH: 1.25 SOLUTION, CONCENTRATE RESPIRATORY (INHALATION) at 03:44

## 2017-09-02 RX ADMIN — GUAIFENESIN SCH MG: 600 TABLET, EXTENDED RELEASE ORAL at 11:02

## 2017-09-02 RX ADMIN — MOMETASONE FUROATE AND FORMOTEROL FUMARATE DIHYDRATE SCH PUFF: 200; 5 AEROSOL RESPIRATORY (INHALATION) at 20:17

## 2017-09-02 RX ADMIN — TIOTROPIUM BROMIDE SCH CAP: 18 CAPSULE ORAL; RESPIRATORY (INHALATION) at 11:41

## 2017-09-02 RX ADMIN — WATER SCH NOTE: 100 INJECTION, SOLUTION INTRAVENOUS at 20:51

## 2017-09-02 RX ADMIN — LEVALBUTEROL SCH MG: 1.25 SOLUTION, CONCENTRATE RESPIRATORY (INHALATION) at 23:41

## 2017-09-02 RX ADMIN — MORPHINE TINCTURE SCH MG: 1 SOLUTION ORAL at 14:00

## 2017-09-02 RX ADMIN — LORAZEPAM PRN MG: 0.5 TABLET ORAL at 18:06

## 2017-09-02 RX ADMIN — ACETAMINOPHEN PRN MG: 325 TABLET ORAL at 19:19

## 2017-09-02 RX ADMIN — MORPHINE TINCTURE SCH MG: 1 SOLUTION ORAL at 11:00

## 2017-09-02 RX ADMIN — LEVALBUTEROL SCH: 1.25 SOLUTION, CONCENTRATE RESPIRATORY (INHALATION) at 09:00

## 2017-09-02 RX ADMIN — MORPHINE TINCTURE SCH MG: 1 SOLUTION ORAL at 00:42

## 2017-09-02 RX ADMIN — LEVALBUTEROL SCH MG: 1.25 SOLUTION, CONCENTRATE RESPIRATORY (INHALATION) at 15:55

## 2017-09-02 RX ADMIN — CALCITRIOL SCH MCG: 0.25 CAPSULE ORAL at 11:01

## 2017-09-02 RX ADMIN — MORPHINE TINCTURE SCH MG: 1 SOLUTION ORAL at 06:08

## 2017-09-02 RX ADMIN — LEVALBUTEROL SCH MG: 1.25 SOLUTION, CONCENTRATE RESPIRATORY (INHALATION) at 11:39

## 2017-09-02 RX ADMIN — NICOTINE SCH PATCH: 7 PATCH TRANSDERMAL at 11:01

## 2017-09-02 RX ADMIN — ASPIRIN SCH MG: 81 TABLET, CHEWABLE ORAL at 11:02

## 2017-09-02 RX ADMIN — PAROXETINE SCH MG: 20 TABLET, FILM COATED ORAL at 20:48

## 2017-09-02 NOTE — CONS
PULMONARY CONSULTATION REPORT:

 

DATE OF CONSULTATION:  09/01/17

 

CONSULTATION REQUESTED BY:  Zackery Lee MD

 

REASON FOR CONSULTATION:  Evaluation of COPD exacerbation.

 

HISTORY OF PRESENT ILLNESS:  The patient is a 72-year-old female with severe 
COPD known to me from outpatient and inpatient evaluation in the past.  The 
patient has severe COPD, significant smoking history, also has been smoking 
intermittently recently, history of Crohn's disease with high output ileostomy, 
lung cancer, chronic kidney disease stage 3, diabetes, PE, restless leg syndrome
, anxiety, and depression.  The patient came into the emergency room for 
evaluation of worsening shortness of breath, the patient was seen recently by 
me in outpatient clinic.  She is on chronic prednisone therapy.  She was also 
diagnosed with sleep apnea recently and was initiated on auto CPAP.  She did 
not qualify for Trilogy.  The patient reports worsening shortness of breath and 
productive cough.  Her cough has turned more yellow since admission.  The 
patient denies any sick contacts or recent travel.  The patient's breathing 
treatments at home did not help and she decided to come into the emergency room 
for further evaluation.  She was seen and examined at bedside by me today.  The 
patient reported slight improvement in her breathing. She is coughing up more 
and bringing more phlegm.  The patient also reports significant anxiety and has 
requested increasing dose of her lorazepam.  The patient also is concerned 
about her CT scan.  The patient has known history of lung cancer, underwent 
stereotactic radiation.  The patient was found to have recurrence and opted to 
just watch without considering treatment.  She was following up with cancer 
specialist at [place].  The patient is currently on broad-spectrum 
antibiotics.  She was started on Zithromax and Zosyn was added yesterday.  
Sputum culture grew yeast and normal leigha.  Legionella and Strep pneumo 
antigen have been negative.  Blood pressures have been negative.  She is 
requiring O2 supplementation at 3 L per minute at rest, which is her home 
requirement.

 

PAST MEDICAL HISTORY:

1.  COPD, on home O2 and chronic prednisone dependent with multiple 
exacerbations.

2.  Crohn's disease, status post high output ileostomy.

3.  Lung caner.

4.  Chronic kidney disease stage 3.

5.  DVT, PE.

6.  Restless leg syndrome.

7.  Anxiety.

8.  Depression.

 

PAST SURGICAL HISTORY:

1.  Ileostomy.

2.  Colon resection.

3.  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

4.  Multiple abdominal surgeries related to Crohn's.

 

MEDICATIONS:

1.  Atrovent.

2.  Spiriva.

3.  B12.

4.  Sensipar.

5.  Calcitriol.

6.  Aspirin.

7.  Opium tincture.

8.  Ativan.

9.  Prednisone.

10.  Mucinex.

11.  Warfarin.

12.  Paxil.

 

ALLERGIES:  VANCO, DAPTOMYCIN, LEVAQUIN, EPINEPHRINE, REMICADE, TETANUS, and 
DOXYCYCLINE.

 

FAMILY HISTORY:  Suicidal ideation, heart disease in father.

 

SOCIAL HISTORY:  Continues to smoke a cigarette a day, was smoking 3 packs for 
50 plus years, smokes marijuana occasionally, also ingests marijuana 
occasionally. Denies any other recreational drugs.  Her healthcare proxy is her 
significant other, Sarah.

 

REVIEW OF SYSTEMS:  All 14 systems reviewed and as per HPI.

 

PHYSICAL EXAMINATION:  Vital Signs:  Temperature 98.3, heart rate 72 beats per 
minute, respiratory rate 18 per minute, O2 sat 92% on 3 L, blood pressure 131/
54. HEENT:  Pupils are equal and reactive to light, mucous membranes moist, 
sclerae anicteric.  Neck:  Supple.  Lungs:  Rhonchi bilaterally.  Cardiovascular
:  S1, S2 present.  Regular rate and rhythm.  No murmurs, gallops, or rubs.  
Abdominal: Soft, nontender, nondistended.  Bowel sounds present.  Ileostomy 
present. Extremities:  2+ pulses, moves all extremities.  Neurologic:  No focal 
deficits. Skin:  No rash.

 

LABORATORY DATA:  WBC 12, hemoglobin 12.1, hematocrit 37, platelet count 242. 
Sodium 138, potassium 3.8, chloride 105, bicarb 24, BUN 34, creatinine 1.46. 
Influenza A and B negative.

 

Microbiological cultures as described above in HPI.

 

CT of chest was personally reviewed by me:  Right upper lobe mass unchanged 
from previous exam measuring 4.3 x 13 mm and extends into the right hilum.  The 
patient also noted to have right lower lobe 12 mm nodule, which is slightly 
increased in size.  Evidence of emphysematous changes bilaterally.  Enlarged 
left lobe of thyroid.

 

IMPRESSION AND RECOMMENDATIONS: 72-year-old female with severe chronic 
obstructive pulmonary disease admitted with worsening shortness of breath being 
treated for acute chronic obstructive pulmonary disease exacerbation and 
possible pneumonia.

 

1.  Acute chronic obstructive pulmonary disease exacerbation.

2.  Bacterial pneumonia with possibility for pseudomonas.



Agree with current management of chronic obstructive pulmonary disease.

 The patient with slight improvement in shortness of breath. Continue with CPAP 
at night.

 Continue with O2 supplementation.

Continue with nebs q.4 hours.

 CT does show increase in size of right lower lobe lesion, which is being 
watched.  The patient would want to see Oncology locally, will refer to 
Oncology as outpatient.

Physical therapy.

Overall prognosis is poor.



 

Thank you for allowing me to participate in Ms. Santiago's care.

 

 [id number]/[facility] #: [id number]

MTDD

## 2017-09-02 NOTE — PN
Subjective


Date of Service: 09/02/17


Interval History: 





Breathing continues to improve since addition of zosyn. Anxious about discharge/

disease. Wanting additional ativan prn available if necessary after exertion 

getting out of bed to change her ostomy bag. Yellow sputum. 





Objective


Active Medications: 








Acetaminophen (Tylenol Tab*)  650 mg PO Q4H PRN


   PRN Reason: PAIN


   Last Admin: 09/02/17 06:10 Dose:  650 mg


[facility]in ([facility] Low Dose Tab*)  [facility]


   Last Admin: 09/02/17 11:02 Dose:  81 mg


Calcitriol (Rocaltrol  Cap*)  0.25 mcg PO QAM [facility]


   Last Admin: 09/02/17 11:01 Dose:  0.25 mcg


Cinacalcet (Sensipar Tab*)  30 mg PO [facility]


   Last Admin: 09/02/17 11:02 Dose:  30 mg


Cyanocobalamin (Vitamin B12 Inj *)  1,000 mcg IM Q14D [facility]


   Last Admin: 08/30/17 19:51 Dose:  1,000 mcg


Guaifenesin (Mucinex*)  600 mg PO BID [facility]


   Last Admin: 09/02/17 11:02 Dose:  600 mg


Piperacillin Sod/Tazobactam (Sod 3.375 gm/ Sodium Chloride)  100 mls @ 25 mls/

hr IVPB Q8HR [facility]


   Last Admin: 09/02/17 06:11 Dose:  25 mls/hr


Levalbuterol HCl (Xopenex 1.25 Mg/0.5 Ml Neb.Sol*)  1.25 mg INH [id number] 

AWAKE [facility]


   Last Admin: 09/02/17 11:39 Dose:  1.25 mg


Lorazepam (Ativan Tab(*))  0.5 mg PO BID [facility]


   Last Admin: 09/02/17 11:02 Dose:  0.5 mg


Mometasone Furoate/Formoterol Fumar (Dulera 200/5 Mdi*)  2 puff INH BID [facility]


   Last Admin: 09/02/17 11:41 Dose:  2 puff


Nicotine (Nicotine Patch  7 Mg/24 Hr*)  1 patch TRANSDERM DAILY [facility]


   Last Admin: 09/02/17 11:01 Dose:  1 patch


Ondansetron HCl (Zofran Inj*)  4 mg IV Q6H PRN


   PRN Reason: NAUSEA


Opium Tincture (Opium Tincture*)  6 mg PO Q4H [facility]


   Last Admin: 09/02/17 11:00 Dose:  6 mg


Oxycodone/Acetaminophen (Percocet 5/325 Tab*)  1 tab PO Q6H PRN


   PRN Reason: PAIN


   Last Admin: 08/31/17 00:02 Dose:  1 tab


Paroxetine HCl (Paxil Tab*)  30 mg PO BEDTIME [facility]


   Last Admin: 09/01/17 20:15 Dose:  30 mg


Pharmacy Profile Note (Nicotine Patch Removal Note*)  1 note FOLLOW UP 2100 [facility]


   Last Admin: 09/01/17 20:20 Dose:  1 note


Prednisone (Deltasone Tab*)  50 mg PO DAILY [facility]


   Last Admin: 09/02/17 11:02 Dose:  50 mg


Tiotropium Bromide (Spiriva Cap.Inh*)  1 cap INH DAILY [facility]


   Last Admin: 09/02/17 11:41 Dose:  1 cap








 Vital Signs











  09/01/17 09/01/17 09/01/17





  13:14 15:14 15:47


 


Temperature   98.1 F


 


Pulse Rate   79


 


Respiratory 18 18 16





Rate   


 


Blood Pressure   133/62





(mmHg)   


 


O2 Sat by Pulse   100





Oximetry   














  09/01/17 09/01/17 09/01/17





  17:31 18:33 19:31


 


Temperature   


 


Pulse Rate  3 


 


Respiratory 18  21





Rate   


 


Blood Pressure   





(mmHg)   


 


O2 Sat by Pulse   





Oximetry   














  09/01/17 09/01/17 09/01/17





  19:45 20:00 20:09


 


Temperature   98.2 F


 


Pulse Rate 84  84


 


Respiratory 18 19 20





Rate   


 


Blood Pressure   110/62





(mmHg)   


 


O2 Sat by Pulse 98  98





Oximetry   














  09/01/17 09/01/17 09/01/17





  20:14 20:17 22:13


 


Temperature   


 


Pulse Rate   


 


Respiratory 20 20 17





Rate   


 


Blood Pressure   





(mmHg)   


 


O2 Sat by Pulse   





Oximetry   














  09/01/17 09/01/17 09/01/17





  22:17 22:47 23:14


 


Temperature   


 


Pulse Rate   


 


Respiratory 18 18 20





Rate   


 


Blood Pressure   





(mmHg)   


 


O2 Sat by Pulse  98 





Oximetry   














  09/01/17 09/02/17 09/02/17





  23:29 00:42 01:14


 


Temperature 98.1 F  


 


Pulse Rate 86  


 


Respiratory 20 19 20





Rate   


 


Blood Pressure 146/55  





(mmHg)   


 


O2 Sat by Pulse 99  





Oximetry   














  09/02/17 09/02/17 09/02/17





  02:42 03:41 05:45


 


Temperature  97.8 F 


 


Pulse Rate  69 


 


Respiratory 20 20 19





Rate   


 


Blood Pressure  134/68 





(mmHg)   


 


O2 Sat by Pulse  97 





Oximetry   














  09/02/17 09/02/17 09/02/17





  07:31 11:00 11:02


 


Temperature 97.7 F  


 


Pulse Rate 60  


 


Respiratory 21 16 18





Rate   


 


Blood Pressure 147/70  





(mmHg)   


 


O2 Sat by Pulse 98  





Oximetry   














  09/02/17





  11:43


 


Temperature 


 


Pulse Rate 70


 


Respiratory 16





Rate 


 


Blood Pressure 





(mmHg) 


 


O2 Sat by Pulse 97





Oximetry 











Oxygen Devices in Use Now: Nasal Cannula


Eyes: No Scleral Icterus, PERRLA


Ears/Nose/Mouth/Throat: Mucous Membranes Moist


Neck: NL Appearance and Movements; NL JVP


Respiratory: - - Expiratory wheezing and diffuse rhonchi. Decent air exchange


Cardiovascular: NL Sounds; No Murmurs; No JVD


Abdominal: NL Sounds; No Tenderness; No Distention, - - ostomy


Extremities: No Edema, No Clubbing, Cyanosis


Skin: No Rash or Ulcers


Neurological: Alert and Oriented x 3, NL Sensation, NL Muscle Strength and Tone


Nutrition: Taking PO's


Result Diagrams: 


 08/31/17 05:41





 08/31/17 05:41


Additional Lab and Data: 


 Lab Results











  08/27/17 Range/Units





  14:30 


 


WBC  10.9 H  (3.5-10.8)  10^3/ul


 


RBC  4.42  (4.0-5.4)  10^6/ul


 


Hgb  13.9  (12.0-16.0)  g/dl


 


Hct  41  (35-47)  %


 


MCV  92  (80-97)  fL


 


MCH  31  (27-31)  pg


 


MCHC  34  (31-36)  g/dl


 


RDW  14  (10.5-15)  %


 


Plt Count  249  (150-450)  10^3/ul


 


MPV  8  (7.4-10.4)  um3


 


Absolute Neuts (auto)  Pending  


 


Absolute Lymphs (auto)  Pending  


 


Absolute Monos (auto)  Pending  


 


Absolute Eos (auto)  Pending  


 


Absolute Basos (auto)  Pending  


 


Absolute Nucleated RBC  Pending  


 


Neutrophils %  Pending  


 


Normal RBC Morphology  Pending  


 


Hem Pathologist Commnt  Pending  











Microbiology and Other Data: 


 Microbiology











 08/27/17 19:56 Gram Stain - Final





 Sputum 


 


 08/27/17 19:40 Legionella Urinary Antigen - Final





 Urine    Negative Legionella





 Streptococcus pneumoniae Ag Screen - Final





    Negative S. pneumo Antigen














Assess/Plan/Problems-Billing


Assessment: 72 year old PMH severe COPD (on home 3L), REYNA (CPAP), anxiety, 

depression, lung cancer s/p sterotaxtic radiation with likely recurrence p/w 

SOB likely 2/2 COPD exaccerbation. 











- Patient Problems


(1) COPD exacerbation


Current Visit: Yes   Status: Acute   Priority: High   Code(s): J44.1 - CHRONIC 

OBSTRUCTIVE PULMONARY DISEASE W (ACUTE) EXACERBATION   SNOMED Code(s): 597224878


   Comment: Feels much better since starting pip/elham 8/31 PM.  No bacterial 

growth on sputum culture but given clinical improvement continue current zosyn. 

Consider d/c on amox/clav. Taper prednisone to 50mg today. Continue mometasone/

formoterol, tiotropium, nebs. Appreciate Dr. Praneeth lara.    





(2) Lung cancer


Current Visit: Yes   Status: Acute   Code(s): C34.90 - MALIGNANT NEOPLASM OF 

UNSP PART OF UNSP BRONCHUS OR LUNG   SNOMED Code(s): 573319317


   Comment: Likely recurrence based off new nodules. Needs outpatient f/u with 

new local oncologist as can't travel to [place] consistently.   





(3) Anxiety


Current Visit: Yes   Status: Acute   Code(s): F41.9 - ANXIETY DISORDER, 

UNSPECIFIED   SNOMED Code(s): 51003700


   Comment: Continue 0.5mg ativan BID with additional breakthrough prn with 

panic attacks after she changes ostomy   





(4) Tobacco abuse


Current Visit: Yes   Status: Acute   Code(s): Z72.0 - TOBACCO USE   SNOMED Code(

s): 194201366


   Comment: Continue nicotine patch.   





(5) CKD (chronic kidney disease) stage 3, GFR 30-59 ml/min


Current Visit: Yes   Status: Chronic   Code(s): N18.3 - CHRONIC KIDNEY DISEASE, 

STAGE 3 (MODERATE)   SNOMED Code(s): 803947632


   Comment: CRT stable/improved.    





(6) Depression


Current Visit: Yes   Status: Chronic   Priority: Medium   Code(s): F32.9 - 

MAJOR DEPRESSIVE DISORDER, SINGLE EPISODE, UNSPECIFIED   SNOMED Code(s): 

71570980


   Comment: - Continue Paxil   


Status and Disposition: 





inpatient, anticipated discharge 9/5 once VNS and health aides can be arranged. 


Attending: Devon Vora

## 2017-09-03 LAB
ANION GAP SERPL CALC-SCNC: 5 MMOL/L (ref 2–11)
BUN SERPL-MCNC: 37 MG/DL (ref 6–24)
BUN/CREAT SERPL: 22 (ref 8–20)
CALCIUM SERPL-MCNC: 7.5 MG/DL (ref 8.6–10.3)
CHLORIDE SERPL-SCNC: 106 MMOL/L (ref 101–111)
GLUCOSE SERPL-MCNC: 153 MG/DL (ref 70–100)
HCO3 SERPL-SCNC: 27 MMOL/L (ref 22–32)
POTASSIUM SERPL-SCNC: 3.8 MMOL/L (ref 3.5–5)
SODIUM SERPL-SCNC: 138 MMOL/L (ref 133–145)

## 2017-09-03 RX ADMIN — ASPIRIN SCH MG: 81 TABLET, CHEWABLE ORAL at 08:09

## 2017-09-03 RX ADMIN — MORPHINE TINCTURE SCH MG: 1 SOLUTION ORAL at 20:49

## 2017-09-03 RX ADMIN — ACETAMINOPHEN PRN MG: 325 TABLET ORAL at 13:52

## 2017-09-03 RX ADMIN — LORAZEPAM SCH MG: 0.5 TABLET ORAL at 08:10

## 2017-09-03 RX ADMIN — LORAZEPAM PRN MG: 0.5 TABLET ORAL at 01:24

## 2017-09-03 RX ADMIN — MOMETASONE FUROATE AND FORMOTEROL FUMARATE DIHYDRATE SCH PUFF: 200; 5 AEROSOL RESPIRATORY (INHALATION) at 19:37

## 2017-09-03 RX ADMIN — MORPHINE TINCTURE SCH MG: 1 SOLUTION ORAL at 01:23

## 2017-09-03 RX ADMIN — GUAIFENESIN SCH MG: 600 TABLET, EXTENDED RELEASE ORAL at 08:09

## 2017-09-03 RX ADMIN — GUAIFENESIN SCH MG: 600 TABLET, EXTENDED RELEASE ORAL at 20:50

## 2017-09-03 RX ADMIN — PAROXETINE SCH MG: 20 TABLET, FILM COATED ORAL at 20:50

## 2017-09-03 RX ADMIN — CALCITRIOL SCH MCG: 0.25 CAPSULE ORAL at 08:09

## 2017-09-03 RX ADMIN — LEVALBUTEROL SCH: 1.25 SOLUTION, CONCENTRATE RESPIRATORY (INHALATION) at 12:16

## 2017-09-03 RX ADMIN — WATER SCH NOTE: 100 INJECTION, SOLUTION INTRAVENOUS at 22:01

## 2017-09-03 RX ADMIN — LEVALBUTEROL PRN MG: 1.25 SOLUTION, CONCENTRATE RESPIRATORY (INHALATION) at 19:41

## 2017-09-03 RX ADMIN — CINACALCET HYDROCHLORIDE SCH MG: 30 TABLET, COATED ORAL at 08:09

## 2017-09-03 RX ADMIN — LEVALBUTEROL SCH: 1.25 SOLUTION, CONCENTRATE RESPIRATORY (INHALATION) at 03:33

## 2017-09-03 RX ADMIN — LORAZEPAM SCH MG: 0.5 TABLET ORAL at 20:51

## 2017-09-03 RX ADMIN — WARFARIN SODIUM SCH MG: 2 TABLET ORAL at 18:14

## 2017-09-03 RX ADMIN — PREDNISONE SCH MG: 50 TABLET ORAL at 08:09

## 2017-09-03 RX ADMIN — MORPHINE TINCTURE SCH MG: 1 SOLUTION ORAL at 13:47

## 2017-09-03 RX ADMIN — TIOTROPIUM BROMIDE SCH CAP: 18 CAPSULE ORAL; RESPIRATORY (INHALATION) at 08:24

## 2017-09-03 RX ADMIN — MORPHINE TINCTURE SCH MG: 1 SOLUTION ORAL at 18:14

## 2017-09-03 RX ADMIN — NICOTINE SCH PATCH: 7 PATCH TRANSDERMAL at 08:10

## 2017-09-03 RX ADMIN — MOMETASONE FUROATE AND FORMOTEROL FUMARATE DIHYDRATE SCH PUFF: 200; 5 AEROSOL RESPIRATORY (INHALATION) at 08:24

## 2017-09-03 RX ADMIN — MORPHINE TINCTURE SCH MG: 1 SOLUTION ORAL at 05:18

## 2017-09-03 RX ADMIN — MORPHINE TINCTURE SCH MG: 1 SOLUTION ORAL at 08:10

## 2017-09-03 NOTE — PN
Subjective


Date of Service: 09/03/17


Interval History: 


Continued improvement in breathing. Less cough but more productive. Feeling 

better. Night owl, went to bed 4am and slept most of morning. 





Objective


Active Medications: 








Acetaminophen (Tylenol Tab*)  650 mg PO Q4H PRN


   PRN Reason: PAIN


   Last Admin: 09/03/17 13:52 Dose:  650 mg


Aspirin (Aspirin Low Dose Tab*)  81 mg PO QAM [facility]


   Last Admin: 09/03/17 08:09 Dose:  81 mg


Calcitriol (Rocaltrol  Cap*)  0.25 mcg PO QAM [facility]


   Last Admin: 09/03/17 08:09 Dose:  0.25 mcg


Cinacalcet (Sensipar Tab*)  30 mg PO QAM [facility]


   Last Admin: 09/03/17 08:09 Dose:  30 mg


Cyanocobalamin (Vitamin B12 Inj *)  1,000 mcg IM Q14D [facility]


   Last Admin: 08/30/17 19:51 Dose:  1,000 mcg


Guaifenesin (Mucinex*)  600 mg PO BID [facility]


   Last Admin: 09/03/17 08:09 Dose:  600 mg


Piperacillin Sod/Tazobactam (Sod 3.375 gm/ Sodium Chloride)  100 mls @ 25 mls/

hr IVPB Q8HR [facility]


   Last Admin: 09/03/17 13:47 Dose:  25 mls/hr


Levalbuterol HCl (Xopenex 1.25 Mg/0.5 Ml Neb.Sol*)  1.25 mg INH Q6H PRN


   PRN Reason: SOB/WHEEZING


Lorazepam (Ativan Tab(*))  0.5 mg PO BID [facility]


   Last Admin: 09/03/17 08:10 Dose:  0.5 mg


Lorazepam (Ativan Tab(*))  0.5 mg PO Q2H PRN


   PRN Reason: ANXIETY


   Last Admin: 09/03/17 01:24 Dose:  0.5 mg


Mometasone Furoate/Formoterol Fumar (Dulera 200/5 Mdi*)  2 puff INH BID [facility]


   Last Admin: 09/03/17 08:24 Dose:  2 puff


Nicotine (Nicotine Patch  7 Mg/24 Hr*)  1 patch TRANSDERM DAILY [facility]


   Last Admin: 09/03/17 08:10 Dose:  1 patch


Ondansetron HCl (Zofran Inj*)  4 mg IV Q6H PRN


   PRN Reason: NAUSEA


Opium Tincture (Opium Tincture*)  6 mg PO Q4H [facility]


   Last Admin: 09/03/17 13:47 Dose:  6 mg


Oxycodone/Acetaminophen (Percocet 5/325 Tab*)  1 tab PO Q6H PRN


   PRN Reason: PAIN


   Last Admin: 08/31/17 00:02 Dose:  1 tab


Paroxetine HCl (Paxil Tab*)  30 mg PO BEDTIME [facility]


   Last Admin: 09/02/17 20:48 Dose:  30 mg


Pharmacy Profile Note (Nicotine Patch Removal Note*)  1 note FOLLOW UP 2100 [facility]


   Last Admin: 09/02/17 20:51 Dose:  1 note


Prednisone (Deltasone Tab*)  50 mg PO DAILY [facility]


   Last Admin: 09/03/17 08:09 Dose:  50 mg


Tiotropium Bromide (Spiriva Cap.Inh*)  1 cap INH DAILY [facility]


   Last Admin: 09/03/17 08:24 Dose:  1 cap


Warfarin Sodium (Coumadin Tab(*))  2 mg PO DAILY@1700 [facility]


   PRN Reason: Protocol








 Vital Signs











  09/02/17 09/02/17 09/02/17





  15:15 16:00 16:17


 


Temperature 98.9 F  


 


Pulse Rate   77


 


Respiratory  14 22





Rate   


 


Blood Pressure   147/65





(mmHg)   


 


O2 Sat by Pulse   98





Oximetry   














  09/02/17 09/02/17 09/02/17





  18:00 18:06 19:29


 


Temperature   97.6 F


 


Pulse Rate   73


 


Respiratory 22 14 22





Rate   


 


Blood Pressure   138/57





(mmHg)   


 


O2 Sat by Pulse   98





Oximetry   














  09/02/17 09/02/17 09/02/17





  20:00 20:06 20:18


 


Temperature   


 


Pulse Rate   74


 


Respiratory 20 20 18





Rate   


 


Blood Pressure   





(mmHg)   


 


O2 Sat by Pulse   99





Oximetry   














  09/02/17 09/02/17 09/02/17





  20:48 20:49 22:48


 


Temperature   


 


Pulse Rate   


 


Respiratory 18 18 18





Rate   


 


Blood Pressure   





(mmHg)   


 


O2 Sat by Pulse   





Oximetry   














  09/02/17 09/02/17 09/02/17





  22:49 23:48 23:58


 


Temperature  97.9 F 


 


Pulse Rate  71 67


 


Respiratory 18 16 18





Rate   


 


Blood Pressure  124/58 





(mmHg)   


 


O2 Sat by Pulse  97 98





Oximetry   














  09/03/17 09/03/17 09/03/17





  01:23 01:24 02:58


 


Temperature   98.2 F


 


Pulse Rate   62


 


Respiratory 20 20 18





Rate   


 


Blood Pressure   122/51





(mmHg)   


 


O2 Sat by Pulse   100





Oximetry   














  09/03/17 09/03/17 09/03/17





  03:23 03:24 05:18


 


Temperature   


 


Pulse Rate   


 


Respiratory 18 18 19





Rate   


 


Blood Pressure   





(mmHg)   


 


O2 Sat by Pulse   





Oximetry   














  09/03/17 09/03/17 09/03/17





  08:00 08:04 08:10


 


Temperature   


 


Pulse Rate  62 


 


Respiratory 18 18 18





Rate   


 


Blood Pressure   





(mmHg)   


 


O2 Sat by Pulse  100 





Oximetry   














  09/03/17 09/03/17 09/03/17





  08:15 08:28 08:29


 


Temperature   97.7 F


 


Pulse Rate  62 59


 


Respiratory  18 18





Rate   


 


Blood Pressure   156/67





(mmHg)   


 


O2 Sat by Pulse 100 100 100





Oximetry   














  09/03/17 09/03/17





  10:10 13:47


 


Temperature  


 


Pulse Rate  


 


Respiratory 18 18





Rate  


 


Blood Pressure  





(mmHg)  


 


O2 Sat by Pulse  





Oximetry  











Oxygen Devices in Use Now: Nasal Cannula


Appearance: no acute distress. resting in bed


Eyes: No Scleral Icterus, PERRLA


Ears/Nose/Mouth/Throat: NL Teeth, Lips, Gums, Mucous Membranes Moist


Neck: NL Appearance and Movements; NL JVP


Respiratory: Symmetrical Chest Expansion and Respiratory Effort, Clear to 

Auscultation


Cardiovascular: NL Sounds; No Murmurs; No JVD, RRR, No Edema


Abdominal: NL Sounds; No Tenderness; No Distention


Extremities: No Edema


Skin: - - some echymoses near pIV


Neurological: Alert and Oriented x 3, NL Muscle Strength and Tone


Nutrition: Taking PO's


Result Diagrams: 


 08/31/17 05:41





 09/03/17 06:40


Additional Lab and Data: 


 Lab Results











  08/27/17 Range/Units





  14:30 


 


WBC  10.9 H  (3.5-10.8)  10^3/ul


 


RBC  4.42  (4.0-5.4)  10^6/ul


 


Hgb  13.9  (12.0-16.0)  g/dl


 


Hct  41  (35-47)  %


 


MCV  92  (80-97)  fL


 


MCH  31  (27-31)  pg


 


MCHC  34  (31-36)  g/dl


 


RDW  14  (10.5-15)  %


 


Plt Count  249  (150-450)  10^3/ul


 


MPV  8  (7.4-10.4)  um3


 


Absolute Neuts (auto)  Pending  


 


Absolute Lymphs (auto)  Pending  


 


Absolute Monos (auto)  Pending  


 


Absolute Eos (auto)  Pending  


 


Absolute Basos (auto)  Pending  


 


Absolute Nucleated RBC  Pending  


 


Neutrophils %  Pending  


 


Normal RBC Morphology  Pending  


 


Hem Pathologist Commnt  Pending  











Microbiology and Other Data: 


 Microbiology











 08/27/17 19:56 Gram Stain - Final





 Sputum 


 


 08/27/17 19:40 Legionella Urinary Antigen - Final





 Urine    Negative Legionella





 Streptococcus pneumoniae Ag Screen - Final





    Negative S. pneumo Antigen














Assess/Plan/Problems-Billing


Assessment: 72 year old PMH severe COPD (on home 3L), REYNA (CPAP), anxiety, 

depression, lung cancer s/p sterotaxtic radiation with concern for recurrence p/

w SOB likely 2/2 COPD exaccerbation. Improved since zosyn. On steroid taper 











- Patient Problems


(1) COPD exacerbation


Current Visit: Yes   Status: Acute   Priority: High   Code(s): J44.1 - CHRONIC 

OBSTRUCTIVE PULMONARY DISEASE W (ACUTE) EXACERBATION   SNOMED Code(s): 932041189


   Comment: Feels much better since starting pip/elham 8/31 PM.  No bacterial 

growth on sputum culture but given clinical improvement continue current zosyn. 

Consider d/c on amox/clav. Day 2 of 3 prednisone to 50mg today. Continue 

mometasone/formoterol, tiotropium, nebs.    





(2) Lung cancer


Current Visit: Yes   Status: Acute   Code(s): C34.90 - MALIGNANT NEOPLASM OF 

UNSP PART OF UNSP BRONCHUS OR LUNG   SNOMED Code(s): 374983191


   Comment: Concern for possible recurrence based off growth of nodules from 8 

to 12mm. Needs outpatient f/u upon discharge with a new local oncologist as can'

t travel to [place] consistently.   





(3) Anxiety


Current Visit: Yes   Status: Acute   Code(s): F41.9 - ANXIETY DISORDER, 

UNSPECIFIED   SNOMED Code(s): 38354201


   Comment: Continue 0.5mg ativan BID with additional breakthrough prn with 

panic attacks after she changes ostomy   





(4) Tobacco abuse


Current Visit: Yes   Status: Acute   Code(s): Z72.0 - TOBACCO USE   SNOMED Code(

s): 273608112


   Comment: Continue nicotine patch.   





(5) CKD (chronic kidney disease) stage 3, GFR 30-59 ml/min


Current Visit: Yes   Status: Chronic   Code(s): N18.3 - CHRONIC KIDNEY DISEASE, 

STAGE 3 (MODERATE)   SNOMED Code(s): 275541505


   Comment: CRT stable. Reported baseline 1.4-1.6   





(6) Anticoagulation goal of INR 2 to 3


Current Visit: Yes   Status: Chronic   Code(s): Z51.81 - ENCOUNTER FOR 

THERAPEUTIC DRUG LEVEL MONITORING; Z79.01 - LONG TERM (CURRENT) USE OF 

ANTICOAGULANTS   SNOMED Code(s): 94223607


   Comment: For Hx of pulmonary embolism. INR down to 1.56, restart home 

coumadin 2mg with dinner.   





(7) Depression


Current Visit: Yes   Status: Chronic   Priority: Medium   Code(s): F32.9 - 

MAJOR DEPRESSIVE DISORDER, SINGLE EPISODE, UNSPECIFIED   SNOMED Code(s): 

52961449


   Comment: - Continue Paxil   


Status and Disposition: 





inpatient, anticipated discharge 9/5 once VNS and health aides can be arranged. 


Attending: Devon Vora

## 2017-09-04 RX ADMIN — CALCITRIOL SCH MCG: 0.25 CAPSULE ORAL at 09:23

## 2017-09-04 RX ADMIN — NICOTINE SCH PATCH: 7 PATCH TRANSDERMAL at 08:44

## 2017-09-04 RX ADMIN — GUAIFENESIN SCH MG: 600 TABLET, EXTENDED RELEASE ORAL at 08:45

## 2017-09-04 RX ADMIN — MORPHINE TINCTURE SCH MG: 1 SOLUTION ORAL at 21:28

## 2017-09-04 RX ADMIN — ACETAMINOPHEN PRN MG: 325 TABLET ORAL at 18:03

## 2017-09-04 RX ADMIN — MOMETASONE FUROATE AND FORMOTEROL FUMARATE DIHYDRATE SCH PUFF: 200; 5 AEROSOL RESPIRATORY (INHALATION) at 07:25

## 2017-09-04 RX ADMIN — ASPIRIN SCH MG: 81 TABLET, CHEWABLE ORAL at 08:45

## 2017-09-04 RX ADMIN — MORPHINE TINCTURE SCH MG: 1 SOLUTION ORAL at 05:48

## 2017-09-04 RX ADMIN — LORAZEPAM PRN MG: 0.5 TABLET ORAL at 01:23

## 2017-09-04 RX ADMIN — CINACALCET HYDROCHLORIDE SCH MG: 30 TABLET, COATED ORAL at 09:23

## 2017-09-04 RX ADMIN — MORPHINE TINCTURE SCH MG: 1 SOLUTION ORAL at 01:22

## 2017-09-04 RX ADMIN — MORPHINE TINCTURE SCH MG: 1 SOLUTION ORAL at 08:43

## 2017-09-04 RX ADMIN — LEVALBUTEROL PRN MG: 1.25 SOLUTION, CONCENTRATE RESPIRATORY (INHALATION) at 01:41

## 2017-09-04 RX ADMIN — WATER SCH NOTE: 100 INJECTION, SOLUTION INTRAVENOUS at 21:31

## 2017-09-04 RX ADMIN — TIOTROPIUM BROMIDE SCH CAP: 18 CAPSULE ORAL; RESPIRATORY (INHALATION) at 07:25

## 2017-09-04 RX ADMIN — PAROXETINE SCH MG: 20 TABLET, FILM COATED ORAL at 21:26

## 2017-09-04 RX ADMIN — Medication SCH: at 21:29

## 2017-09-04 RX ADMIN — PREDNISONE SCH MG: 50 TABLET ORAL at 08:45

## 2017-09-04 RX ADMIN — Medication SCH TAB: at 14:46

## 2017-09-04 RX ADMIN — MOMETASONE FUROATE AND FORMOTEROL FUMARATE DIHYDRATE SCH PUFF: 200; 5 AEROSOL RESPIRATORY (INHALATION) at 19:41

## 2017-09-04 RX ADMIN — WARFARIN SODIUM SCH MG: 2 TABLET ORAL at 17:09

## 2017-09-04 RX ADMIN — ACETAMINOPHEN PRN MG: 325 TABLET ORAL at 07:49

## 2017-09-04 RX ADMIN — GUAIFENESIN SCH MG: 600 TABLET, EXTENDED RELEASE ORAL at 21:26

## 2017-09-04 RX ADMIN — LORAZEPAM SCH MG: 0.5 TABLET ORAL at 08:45

## 2017-09-04 RX ADMIN — MORPHINE TINCTURE SCH MG: 1 SOLUTION ORAL at 17:09

## 2017-09-04 RX ADMIN — LORAZEPAM SCH MG: 0.5 TABLET ORAL at 21:25

## 2017-09-04 RX ADMIN — OXYCODONE HYDROCHLORIDE AND ACETAMINOPHEN PRN TAB: 5; 325 TABLET ORAL at 22:47

## 2017-09-04 RX ADMIN — MORPHINE TINCTURE SCH MG: 1 SOLUTION ORAL at 12:43

## 2017-09-04 RX ADMIN — LEVALBUTEROL PRN MG: 1.25 SOLUTION, CONCENTRATE RESPIRATORY (INHALATION) at 19:38

## 2017-09-04 RX ADMIN — LEVALBUTEROL PRN MG: 1.25 SOLUTION, CONCENTRATE RESPIRATORY (INHALATION) at 13:46

## 2017-09-04 NOTE — PN
Subjective


Date of Service: 09/04/17


Interval History: 





Pt continues to feel improved from respiratory and cough standpoint. INR low. 

IV replaced. 





Objective


Active Medications: 








Acetaminophen (Tylenol Tab*)  650 mg PO Q4H PRN


   PRN Reason: PAIN


   Last Admin: 09/04/17 07:49 Dose:  650 mg


Aspirin (Aspirin Low Dose Tab*)  81 mg PO QAM [facility]


   Last Admin: 09/04/17 08:45 Dose:  81 mg


Calcitriol (Rocaltrol  Cap*)  0.25 mcg PO QAM [facility]


   Last Admin: 09/04/17 09:23 Dose:  0.25 mcg


Cinacalcet (Sensipar Tab*)  30 mg PO QAM [facility]


   Last Admin: 09/04/17 09:23 Dose:  30 mg


Cyanocobalamin (Vitamin B12 Inj *)  1,000 mcg IM Q14D [facility]


   Last Admin: 08/30/17 19:51 Dose:  1,000 mcg


Guaifenesin (Mucinex*)  600 mg PO BID [facility]


   Last Admin: 09/04/17 08:45 Dose:  600 mg


Piperacillin Sod/Tazobactam (Sod 3.375 gm/ Sodium Chloride)  100 mls @ 25 mls/

hr IVPB Q8HR [facility]


   Last Admin: 09/04/17 05:48 Dose:  25 mls/hr


Levalbuterol HCl (Xopenex 1.25 Mg/0.5 Ml Neb.Sol*)  1.25 mg INH Q6H PRN


   PRN Reason: SOB/WHEEZING


   Last Admin: 09/04/17 01:41 Dose:  1.25 mg


Lorazepam (Ativan Tab(*))  0.5 mg PO BID [facility]


   Last Admin: 09/04/17 08:45 Dose:  0.5 mg


Lorazepam (Ativan Tab(*))  0.5 mg PO Q2H PRN


   PRN Reason: ANXIETY


   Last Admin: 09/04/17 01:23 Dose:  0.5 mg


Mometasone Furoate/Formoterol Fumar (Dulera 200/5 Mdi*)  2 puff INH BID [facility]


   Last Admin: 09/04/17 07:25 Dose:  2 puff


Nicotine (Nicotine Patch  7 Mg/24 Hr*)  1 patch TRANSDERM DAILY [facility]


   Last Admin: 09/04/17 08:44 Dose:  1 patch


Ondansetron HCl (Zofran Inj*)  4 mg IV Q6H PRN


   PRN Reason: NAUSEA


Opium Tincture (Opium Tincture*)  6 mg PO Q4H [facility]


   Last Admin: 09/04/17 12:43 Dose:  6 mg


Oxycodone/Acetaminophen (Percocet 5/325 Tab*)  1 tab PO Q6H PRN


   PRN Reason: PAIN


   Last Admin: 08/31/17 00:02 Dose:  1 tab


Paroxetine HCl (Paxil Tab*)  30 mg PO BEDTIME [facility]


   Last Admin: 09/03/17 20:50 Dose:  30 mg


Pharmacy Profile Note (Nicotine Patch Removal Note*)  1 note FOLLOW UP 2100 [facility]


   Last Admin: 09/03/17 22:01 Dose:  1 note


Prednisone (Deltasone Tab*)  50 mg PO DAILY [facility]


   Last Admin: 09/04/17 08:45 Dose:  50 mg


Tiotropium Bromide (Spiriva Cap.Inh*)  1 cap INH DAILY [facility]


   Last Admin: 09/04/17 07:25 Dose:  1 cap


Vitamin B Complex/Vitamin E (Complex B-100*)  1 tab PO DAILY [facility]


Warfarin Sodium (Coumadin Tab(*))  2 mg PO DAILY@1700 [facility]


   PRN Reason: Protocol


   Last Admin: 09/03/17 18:14 Dose:  2 mg








 Vital Signs











  09/03/17 09/03/17 09/03/17





  13:47 15:47 15:48


 


Temperature   


 


Pulse Rate   70


 


Respiratory 18 18 20





Rate   


 


Blood Pressure   121/49





(mmHg)   


 


O2 Sat by Pulse   97





Oximetry   














  09/03/17 09/03/17 09/03/17





  15:51 18:14 19:43


 


Temperature 98.1 F  


 


Pulse Rate   72


 


Respiratory  18 20





Rate   


 


Blood Pressure   





(mmHg)   


 


O2 Sat by Pulse   99





Oximetry   














  09/03/17 09/03/17 09/03/17





  19:47 20:00 20:14


 


Temperature 98.2 F  


 


Pulse Rate 66  


 


Respiratory 20 18 16





Rate   


 


Blood Pressure 135/59  





(mmHg)   


 


O2 Sat by Pulse 99  





Oximetry   














  09/03/17 09/03/17 09/03/17





  20:49 20:51 22:49


 


Temperature   


 


Pulse Rate   


 


Respiratory 18 18 16





Rate   


 


Blood Pressure   





(mmHg)   


 


O2 Sat by Pulse   





Oximetry   














  09/03/17 09/03/17 09/04/17





  22:51 23:23 01:22


 


Temperature  98.2 F 


 


Pulse Rate  67 


 


Respiratory 16 16 16





Rate   


 


Blood Pressure  129/59 





(mmHg)   


 


O2 Sat by Pulse  99 





Oximetry   














  09/04/17 09/04/17 09/04/17





  01:23 01:37 03:22


 


Temperature   


 


Pulse Rate  58 


 


Respiratory 16 20 16





Rate   


 


Blood Pressure   





(mmHg)   


 


O2 Sat by Pulse  99 





Oximetry   














  09/04/17 09/04/17 09/04/17





  03:23 04:31 05:48


 


Temperature  98.2 F 


 


Pulse Rate  59 


 


Respiratory 16 16 18





Rate   


 


Blood Pressure  111/50 





(mmHg)   


 


O2 Sat by Pulse  100 





Oximetry   














  09/04/17 09/04/17 09/04/17





  07:37 07:48 08:00


 


Temperature 98.1 F  


 


Pulse Rate 62  


 


Respiratory 16 16 16





Rate   


 


Blood Pressure 123/57  





(mmHg)   


 


O2 Sat by Pulse 98  





Oximetry   














  09/04/17 09/04/17 09/04/17





  08:43 08:45 10:43


 


Temperature   


 


Pulse Rate   


 


Respiratory 16 16 16





Rate   


 


Blood Pressure   





(mmHg)   


 


O2 Sat by Pulse   





Oximetry   














  09/04/17 09/04/17





  10:45 12:43


 


Temperature  


 


Pulse Rate  


 


Respiratory 16 16





Rate  


 


Blood Pressure  





(mmHg)  


 


O2 Sat by Pulse  





Oximetry  











Oxygen Devices in Use Now: Nasal Cannula


Appearance: resting in bed, chronically ill appearing


Eyes: No Scleral Icterus, PERRLA


Ears/Nose/Mouth/Throat: NL Teeth, Lips, Gums


Neck: NL Appearance and Movements; NL JVP


Respiratory: Symmetrical Chest Expansion and Respiratory Effort, Clear to 

Auscultation, - - best she has sounded. 


Cardiovascular: NL Sounds; No Murmurs; No JVD, RRR


Abdominal: NL Sounds; No Tenderness; No Distention


Extremities: No Edema, No Clubbing, Cyanosis


Skin: No Rash or Ulcers


Neurological: Alert and Oriented x 3, NL Sensation, NL Muscle Strength and Tone


Result Diagrams: 


 08/31/17 05:41





 09/03/17 06:40


Additional Lab and Data: 


 Lab Results











  08/27/17 Range/Units





  14:30 


 


WBC  10.9 H  (3.5-10.8)  10^3/ul


 


RBC  4.42  (4.0-5.4)  10^6/ul


 


Hgb  13.9  (12.0-16.0)  g/dl


 


Hct  41  (35-47)  %


 


MCV  92  (80-97)  fL


 


MCH  31  (27-31)  pg


 


MCHC  34  (31-36)  g/dl


 


RDW  14  (10.5-15)  %


 


Plt Count  249  (150-450)  10^3/ul


 


MPV  8  (7.4-10.4)  um3


 


Absolute Neuts (auto)  Pending  


 


Absolute Lymphs (auto)  Pending  


 


Absolute Monos (auto)  Pending  


 


Absolute Eos (auto)  Pending  


 


Absolute Basos (auto)  Pending  


 


Absolute Nucleated RBC  Pending  


 


Neutrophils %  Pending  


 


Normal RBC Morphology  Pending  


 


Hem Pathologist Commnt  Pending  











Microbiology and Other Data: 


 Microbiology











 08/27/17 19:56 Gram Stain - Final





 Sputum 


 


 08/27/17 19:40 Legionella Urinary Antigen - Final





 Urine    Negative Legionella





 Streptococcus pneumoniae Ag Screen - Final





    Negative S. pneumo Antigen














Assess/Plan/Problems-Billing


Assessment: 72 year old PMH severe COPD (on home 3L), REYNA (CPAP), anxiety, 

depression, lung cancer s/p sterotaxtic radiation with concern for recurrence p/

w SOB likely 2/2 COPD exaccerbation. Improved since zosyn, now day 5. On 

steroid taper . Plan discharge to home 9/5 with VNS 











- Patient Problems


(1) COPD exacerbation


Current Visit: Yes   Status: Acute   Priority: High   Code(s): J44.1 - CHRONIC 

OBSTRUCTIVE PULMONARY DISEASE W (ACUTE) EXACERBATION   SNOMED Code(s): 663718608


   Comment: Feels much better since starting pip/elham 8/31 PM. Day 5 of planned 

6. No bacterial growth on sputum culture but given clinical improvement 

continue current zosyn. Consider d/c on amox/clav. Day 3 of 3 prednisone to 

50mg today. 40mg tomorrow. Continue mometasone/formoterol, tiotropium, nebs.    





(2) Lung cancer


Current Visit: Yes   Status: Acute   Code(s): C34.90 - MALIGNANT NEOPLASM OF 

UNSP PART OF UNSP BRONCHUS OR LUNG   SNOMED Code(s): 257429320


   Comment: Concern for possible recurrence based off growth of nodules from 8 

to 12mm. Needs outpatient f/u upon discharge with a new local oncologist as can'

t travel to [place] consistently.   





(3) Anxiety


Current Visit: Yes   Status: Acute   Code(s): F41.9 - ANXIETY DISORDER, 

UNSPECIFIED   SNOMED Code(s): 23263903


   Comment: Continue 0.5mg ativan BID with additional breakthrough prn with 

panic attacks after she changes ostomy   





(4) Tobacco abuse


Current Visit: Yes   Status: Acute   Code(s): Z72.0 - TOBACCO USE   SNOMED Code(

s): 101768845


   Comment: Continue nicotine patch.   





(5) CKD (chronic kidney disease) stage 3, GFR 30-59 ml/min


Current Visit: Yes   Status: Chronic   Code(s): N18.3 - CHRONIC KIDNEY DISEASE, 

STAGE 3 (MODERATE)   SNOMED Code(s): 603377220


   Comment: CRT stable. Reported baseline 1.4-1.6   





(6) Anticoagulation goal of INR 2 to 3


Current Visit: Yes   Status: Chronic   Code(s): Z51.81 - ENCOUNTER FOR 

THERAPEUTIC DRUG LEVEL MONITORING; Z79.01 - LONG TERM (CURRENT) USE OF 

ANTICOAGULANTS   SNOMED Code(s): 51814446


   Comment: For Hx of pulmonary embolism. INR down to 1.41, restarted home 

coumadin 2mg on 9/3   





(7) Depression


Current Visit: Yes   Status: Chronic   Priority: Medium   Code(s): F32.9 - 

MAJOR DEPRESSIVE DISORDER, SINGLE EPISODE, UNSPECIFIED   SNOMED Code(s): 

74783435


   Comment: - Continue Paxil   


Status and Disposition: 





inpatient, anticipated discharge 9/5 once VNS and health aides can be arranged. 


Attending: Devon Vora

## 2017-09-05 VITALS — DIASTOLIC BLOOD PRESSURE: 49 MMHG | SYSTOLIC BLOOD PRESSURE: 115 MMHG

## 2017-09-05 RX ADMIN — Medication SCH ML: at 03:36

## 2017-09-05 RX ADMIN — LORAZEPAM PRN MG: 0.5 TABLET ORAL at 06:11

## 2017-09-05 RX ADMIN — MOMETASONE FUROATE AND FORMOTEROL FUMARATE DIHYDRATE SCH PUFF: 200; 5 AEROSOL RESPIRATORY (INHALATION) at 07:52

## 2017-09-05 RX ADMIN — GUAIFENESIN SCH MG: 600 TABLET, EXTENDED RELEASE ORAL at 10:11

## 2017-09-05 RX ADMIN — TIOTROPIUM BROMIDE SCH CAP: 18 CAPSULE ORAL; RESPIRATORY (INHALATION) at 07:52

## 2017-09-05 RX ADMIN — ASPIRIN SCH MG: 81 TABLET, CHEWABLE ORAL at 10:12

## 2017-09-05 RX ADMIN — LORAZEPAM SCH MG: 0.5 TABLET ORAL at 10:11

## 2017-09-05 RX ADMIN — MORPHINE TINCTURE SCH MG: 1 SOLUTION ORAL at 02:47

## 2017-09-05 RX ADMIN — MORPHINE TINCTURE SCH MG: 1 SOLUTION ORAL at 10:10

## 2017-09-05 RX ADMIN — MORPHINE TINCTURE SCH MG: 1 SOLUTION ORAL at 06:07

## 2017-09-05 RX ADMIN — LEVALBUTEROL PRN MG: 1.25 SOLUTION, CONCENTRATE RESPIRATORY (INHALATION) at 00:29

## 2017-09-05 RX ADMIN — CALCITRIOL SCH MCG: 0.25 CAPSULE ORAL at 10:11

## 2017-09-05 RX ADMIN — NICOTINE SCH PATCH: 7 PATCH TRANSDERMAL at 10:11

## 2017-09-05 RX ADMIN — LEVALBUTEROL PRN MG: 1.25 SOLUTION, CONCENTRATE RESPIRATORY (INHALATION) at 05:46

## 2017-09-05 RX ADMIN — ACETAMINOPHEN PRN MG: 325 TABLET ORAL at 02:46

## 2017-09-05 RX ADMIN — Medication SCH ML: at 12:11

## 2017-09-05 RX ADMIN — Medication SCH TAB: at 10:12

## 2017-09-05 RX ADMIN — CINACALCET HYDROCHLORIDE SCH MG: 30 TABLET, COATED ORAL at 10:11

## 2017-09-05 RX ADMIN — MORPHINE TINCTURE SCH MG: 1 SOLUTION ORAL at 14:53

## 2017-09-05 RX ADMIN — LORAZEPAM PRN MG: 0.5 TABLET ORAL at 00:08

## 2017-09-05 NOTE — PN
Progress Note





- Progress Note


Date of Service: 09/05/17


Note: 





Pt seen and examined at bedside. Pt reports feeling better, SOB is improved, 

waiting ot be d/ovidio home.


 Active Medications











Generic Name Dose Route Start Last Admin





  Trade Name Freq  PRN Reason Stop Dose Admin


 


Acetaminophen  650 mg  08/28/17 10:13  09/05/17 02:46





  Tylenol Tab*  PO   650 mg





  Q4H PRN   Administration





  PAIN   


 


Aspirin  81 mg  08/28/17 09:00  09/05/17 10:12





  Aspirin Low Dose Tab*  PO   81 mg





  QAM BERTHA   Administration


 


Calcitriol  0.25 mcg  08/28/17 09:00  09/05/17 10:11





  Rocaltrol  Cap*  PO   0.25 mcg





  QAM BERTHA   Administration


 


Cinacalcet  30 mg  08/28/17 09:00  09/05/17 10:11





  Sensipar Tab*  PO   30 mg





  QAM BERTHA   Administration


 


Cyanocobalamin  1,000 mcg  08/30/17 18:00  08/30/17 19:51





  Vitamin B12 Inj *  IM   1,000 mcg





  Q14D BERTHA   Administration


 


Guaifenesin  600 mg  08/29/17 21:00  09/05/17 10:11





  Mucinex*  PO   600 mg





  BID BERTHA   Administration


 


Heparin Sodium (Porcine)  0 ml  09/04/17 21:00  09/05/17 12:11





  Heparin Flush Picc/Ml/Cvc(*)  FLUSH   1 ml





  0600,1800 BERTHA   Administration


 


Piperacillin Sod/Tazobactam  100 mls @ 25 mls/hr  08/31/17 22:15  09/05/17 06:07





  Sod 3.375 gm/ Sodium Chloride  IVPB   25 mls/hr





  Q8HR BERTHA   Administration


 


Levalbuterol HCl  1.25 mg  09/03/17 08:16  09/05/17 05:46





  Xopenex 1.25 Mg/0.5 Ml Neb.Sol*  INH   1.25 mg





  Q6H PRN   Administration





  SOB/WHEEZING   


 


Lorazepam  0.5 mg  09/01/17 21:00  09/05/17 10:11





  Ativan Tab(*)  PO   0.5 mg





  BID BERTHA   Administration


 


Lorazepam  0.5 mg  09/02/17 12:45  09/05/17 06:11





  Ativan Tab(*)  PO   0.5 mg





  Q2H PRN   Administration





  ANXIETY   


 


Mometasone Furoate/Formoterol Fumar  2 puff  08/27/17 21:00  09/05/17 07:52





  Dulera 200/5 Mdi*  INH   2 puff





  BID BERTHA   Administration


 


Nicotine  1 patch  08/28/17 09:00  09/05/17 10:11





  Nicotine Patch  7 Mg/24 Hr*  TRANSDERM   1 patch





  DAILY BERTHA   Administration


 


Ondansetron HCl  4 mg  08/27/17 16:05  





  Zofran Inj*  IV   





  Q6H PRN   





  NAUSEA   


 


Opium Tincture  6 mg  09/01/17 05:00  09/05/17 10:10





  Opium Tincture*  PO   6 mg





  Q4H BERTHA   Administration


 


Oxycodone/Acetaminophen  1 tab  08/30/17 23:35  09/04/17 22:47





  Percocet 5/325 Tab*  PO   1 tab





  Q6H PRN   Administration





  PAIN   


 


Paroxetine HCl  30 mg  08/27/17 21:00  09/04/17 21:26





  Paxil Tab*  PO   30 mg





  BEDTIME BERTHA   Administration


 


Pharmacy Profile Note  1 note  08/27/17 21:00  09/04/17 21:31





  Nicotine Patch Removal Note*  FOLLOW UP   1 note





  2100 BERTHA   Administration


 


Prednisone  40 mg  09/05/17 09:00  09/05/17 10:11





  Deltasone Tab*  PO   40 mg





  DAILY BERTHA   Administration


 


Tiotropium Bromide  1 cap  08/28/17 11:00  09/05/17 07:52





  Spiriva Cap.Inh*  INH   1 cap





  DAILY BERTHA   Administration


 


Vitamin B Complex/Vitamin E  1 tab  09/04/17 14:00  09/05/17 10:12





  Complex B-100*  PO   1 tab





  DAILY BERTHA   Administration


 


Warfarin Sodium  2 mg  09/03/17 17:00  09/04/17 17:09





  Coumadin Tab(*)  PO   2 mg





  DAILY@1700 BERTHA   Administration





  Protocol   








 Vital Signs











Temp Pulse Resp BP Pulse Ox


 


 98.2 F   70   16   115/49   99 


 


 09/05/17 08:17  09/05/17 08:17  09/05/17 12:11  09/05/17 08:17  09/05/17 08:17








Gen: Lying in bed in NAD


HEENT: No Scleral Icterus, PERRLA


Neck: NL Appearance and Movements; NL JVP


Respiratory: Symmetrical Chest Expansion and Respiratory Effort, Clear to 

Auscultation, No accessory muscle usage


Cardiovascular: NL Sounds; No Murmurs; No JVD, RRR


Abdominal: NL Sounds; No Tenderness; No Distention


Extremities: No Edema, No Clubbing, Cyanosis


Skin: No Rash or Ulcers


Neurological: Alert and Oriented x 3, NL Sensation, No focal defecits





 Laboratory Results - last 24 hr











  09/05/17





  03:30


 


INR (Anticoag Therapy)  2.06 H








I/R: 72 y o f with significant smoking history a/w worsening SOB, treated for 

acute COPD exacerbation, improving


C/w steroid taper


Pt was treated with ZOsyn for possible Pseudomonal bronchitis


c/w bronchodilators


For D/c today


CT chest showed increase in size of lung nodule concerning for recurrence of 

malignancy, pt would prefer to see oncologist locally, she was f/u oncology at 

[place], will arrange as out pt

## 2017-09-06 NOTE — DS
DISCHARGE SUMMARY:

 

DATE OF ADMISSION:  08/27/17

 

DATE OF DISCHARGE:  09/05/17

 

ADMITTING PROVIDER:  Jose Maria Fuentes NP

 

ATTENDING PHYSICIAN:  Devon Vora MD

 

PRIMARY DIAGNOSIS:  Chronic obstructive pulmonary disease exacerbation with 
respiratory failure.

 

SECONDARY DIAGNOSES:

1.  Chronic kidney disease stage 3.

2.  History of pulmonary embolism with anticoagulation goal INR 2 to 3 on 
Coumadin.

3.  Depression and anxiety.

4.  Lung cancer with concern for recurrence.

5.  Tobacco abuse, current

 

HISTORY OF PRESENT ILLNESS AND HOSPITAL COURSE:  Ms. Santiago is a 72-year-old 
female with past medical history of COPD; Crohn's disease, status post ileostomy
; lung cancer; CKD stage 3; diabetes; pulmonary embolism, on Coumadin for 
lifelong anticoagulation; restless leg syndrome; anxiety; depression.  Please 
see H and P of 08/27 for further details, but briefly she came in with 
progressive worsening of shortness of breath with minimal exertion, dry cough, 
chills.  At baseline, she is on home oxygen 3 L.  The patient was tachycardic 
in the ED to 105.  She was admitted to medicine unit for COPD exacerbation and 
treated initially with ceftriaxone and azithromycin.  Sputum culture was 
obtained without bacterial growth.  She was continued on inhalers and 
prednisone 60 mg daily initially with slow taper.  She did not improve 
initially until antibiotic was switched to Zosyn on 08/31/17, after which she 
did show gradual and substantial improvement in her breathing function.  She 
completed a 6-day course of Zosyn and will be discharged on the prednisone taper
; currently 40 mg for another 4 days, followed by 30 mg x5 days, 20 mg x5 days, 
and then 10 mg continuously given likely adrenal suppression given decades long 
use of prednisone.  Imaging demonstrated interval increase in the size of a 
right lower lobe nodule from 8 mm to 12 mm.  Given her deconditioned state and 
inability to travel long distances, she has been discharged from her previous 
oncology practice located in [place] and is seeking further local Hematology/
Oncology followup with Dr. Rothman, which has been arranged.  She was 
initially subtherapeutic with her INR, got 5 mg of Coumadin which given 
interaction with Zosyn boosted her INR to supratherapeutic range.  On discharge
, she had restarted her 2 mg Coumadin with last INR 2.1.  She was sent home 
with visiting nurses services and plan to reengage with her private home health 
aides.  Of note, her significant other Sarah is away to [facility] on  a trip for 3 
weeks and patient will not be able to travel to any appointments outside that 
timeframe i.e. after 09/25/17.  The patient suffers from anxiety and depression 
and was quite dae that she would rather kill herself than go into a subacute 
rehab facility.  She was given a nicotine patch for her current tobacco use (2 
to 3 cigarettes a day).  She was treated with her home Paxil for her 
depression.  Her lung exam on day of discharge included respiratory exam, clear 
lungs without wheezing or rhonchi.  She was at her baseline 2 to 3 L of oxygen.

 

DISPOSITION:  Home with VNS and home health aides.

 

DIET:  Regular.

 

FOLLOWUP:  With Dr. Rothman on Wednesday 09/27/17 at 2:40 p.m. has been 
arranged. The patient should also follow up with  PCP, Dr. Haile, as she is 
able when her significant other returns to the [facility] (after 9/25).

 

DISCHARGE MEDICATIONS:  Include:

1.  Aspirin 81 mg daily.

2.  Calcitriol 0.25 mcg p.o. q.a.m.

3.  Sensipar 30 mg p.o. q.a.m.

4.  Vitamin B12 injections 1000 mcg weekly.

5.  Ipratropium nebulizer q.4 hours p.r.n.

6.  Lorazepam 0.5 mg p.o. b.i.d. with an additional p.r.n. dose with panic 
attacks following heavy exertion.

7.  Zofran 4 mg q.6 p.r.n.

8.  Opium tincture 2 mg/mL 0.6 mL p.o. q.48.

9.  Paxil 30 mg p.o. bedtime.

10.  Spiriva inhaler daily.

11.  Warfarin 2 mg p.o. q. p.m.

12.  Mucinex 600 mg p.o. b.i.d.

13.  Prednisone taper 40 mg x4 days, 30 mg x5 days, 20 mg x5 days, 10 mg 
indefinitely until followup with Dr. Dacosta.

 

 [id number]/[facility] #: [id number]

MTDD

## 2017-11-24 ENCOUNTER — HOSPITAL ENCOUNTER (EMERGENCY)
Dept: HOSPITAL 25 - ED | Age: 72
Discharge: HOME | End: 2017-11-24
Payer: MEDICARE

## 2017-11-24 VITALS — DIASTOLIC BLOOD PRESSURE: 72 MMHG | SYSTOLIC BLOOD PRESSURE: 128 MMHG

## 2017-11-24 DIAGNOSIS — F17.210: ICD-10-CM

## 2017-11-24 DIAGNOSIS — R06.02: Primary | ICD-10-CM

## 2017-11-24 DIAGNOSIS — Z79.01: ICD-10-CM

## 2017-11-24 DIAGNOSIS — Z85.118: ICD-10-CM

## 2017-11-24 DIAGNOSIS — Z88.1: ICD-10-CM

## 2017-11-24 DIAGNOSIS — Z86.718: ICD-10-CM

## 2017-11-24 DIAGNOSIS — Z88.8: ICD-10-CM

## 2017-11-24 DIAGNOSIS — I10: ICD-10-CM

## 2017-11-24 DIAGNOSIS — Z86.711: ICD-10-CM

## 2017-11-24 LAB
ALBUMIN SERPL BCG-MCNC: 4.4 G/DL (ref 3.2–5.2)
ALP SERPL-CCNC: 69 U/L (ref 34–104)
ALT SERPL W P-5'-P-CCNC: 24 U/L (ref 7–52)
ANION GAP SERPL CALC-SCNC: 10 MMOL/L (ref 2–11)
AST SERPL-CCNC: 28 U/L (ref 13–39)
BUN SERPL-MCNC: 35 MG/DL (ref 6–24)
BUN/CREAT SERPL: 17.3 (ref 8–20)
CALCIUM SERPL-MCNC: 9.3 MG/DL (ref 8.6–10.3)
CHLORIDE SERPL-SCNC: 98 MMOL/L (ref 101–111)
GLOBULIN SER CALC-MCNC: 3.1 G/DL (ref 2–4)
GLUCOSE SERPL-MCNC: 115 MG/DL (ref 70–100)
HCO3 SERPL-SCNC: 28 MMOL/L (ref 22–32)
HCT VFR BLD AUTO: 45 % (ref 35–47)
HGB BLD-MCNC: 14.9 G/DL (ref 12–16)
MCH RBC QN AUTO: 31 PG (ref 27–31)
MCHC RBC AUTO-ENTMCNC: 33 G/DL (ref 31–36)
MCV RBC AUTO: 94 FL (ref 80–97)
POTASSIUM SERPL-SCNC: 4.9 MMOL/L (ref 3.5–5)
PROT SERPL-MCNC: 7.5 G/DL (ref 6.4–8.9)
RBC # BLD AUTO: 4.77 10^6/UL (ref 4–5.4)
SODIUM SERPL-SCNC: 136 MMOL/L (ref 133–145)
TROPONIN I SERPL-MCNC: 0.02 NG/ML (ref ?–0.04)
WBC # BLD AUTO: 13.3 10^3/UL (ref 3.5–10.8)

## 2017-11-24 PROCEDURE — 36600 WITHDRAWAL OF ARTERIAL BLOOD: CPT

## 2017-11-24 PROCEDURE — 84484 ASSAY OF TROPONIN QUANT: CPT

## 2017-11-24 PROCEDURE — 93005 ELECTROCARDIOGRAM TRACING: CPT

## 2017-11-24 PROCEDURE — 99283 EMERGENCY DEPT VISIT LOW MDM: CPT

## 2017-11-24 PROCEDURE — 71010: CPT

## 2017-11-24 PROCEDURE — 83880 ASSAY OF NATRIURETIC PEPTIDE: CPT

## 2017-11-24 PROCEDURE — 80053 COMPREHEN METABOLIC PANEL: CPT

## 2017-11-24 PROCEDURE — 85025 COMPLETE CBC W/AUTO DIFF WBC: CPT

## 2017-11-24 PROCEDURE — 87502 INFLUENZA DNA AMP PROBE: CPT

## 2017-11-24 PROCEDURE — 82803 BLOOD GASES ANY COMBINATION: CPT

## 2017-11-24 PROCEDURE — 36415 COLL VENOUS BLD VENIPUNCTURE: CPT

## 2017-11-24 PROCEDURE — 86140 C-REACTIVE PROTEIN: CPT

## 2017-11-24 NOTE — RAD
Dictation: Shortness of breath.



Single frontal view of the chest performed at 1453 hours was reviewed.



Comparison is made with previous exam dated August 27, 2017.



Hyperinflated lung fields are noted. Increasing size of mass in the right base is noted.

No alveolar consolidation is noted.



Postoperative changes in the right midlung field.



IMPRESSION: ENLARGING MASS IN THE RIGHT LOWER LOBE. RIGHT UPPER LOBE SCARRING AND

ATELECTASIS.

## 2017-11-25 NOTE — ED
Jen JAMA Gabriel, scribed for Harpreet Sol MD on 17 at 1440 .





Respiratory





- HPI Summary


HPI Summary: 





This patient is a 72 year old F BIBA to [facility] accompanied by family with a 

chief complaint of difficulty breathing since an hour ago, which has 

spontaneous resolved. Patient has just finished chemotherapy for her lung 

cancer. Patient reports increased SOB, cough, and sinus draining. Patient 

denies fever, CP, n/v/d and any vision changes. 





- History of Current Complaint


Stated Complaint: DIFF BREATHING


Time Seen by Provider: 17 14:33


Hx Obtained From: Patient, Family/Caretaker


Onset/Duration: Sudden Onset, Resolved


Associated Signs and Symptoms: Negative - fever, CP, n/v/d and any vision 

changes., SOB





- Allergy/Home Medications


Allergies/Adverse Reactions: 


 Allergies











Allergy/AdvReac Type Severity Reaction Status Date / Time


 


Levofloxacin [From Levaquin] Allergy Mild Rash Verified 17 03:20


 


Vancomycin Allergy Mild Wheezing Verified 17 03:20


 


Epinephrine AdvReac Intermediate Incr Verified 17 03:20





   HR/N/V/feels  





   faint  


 


Infliximab [From Remicade] AdvReac Intermediate arm Verified 17 03:20





   swelling/pain/itching  


 


Tetanus Toxoid AdvReac Intermediate arm Verified 17 03:20





   pain/swelling/itching  


 


Doxycycline AdvReac Mild Vomiting Verified 17 03:20


 


Daptomycin AdvReac  See Comment Verified 17 03:20


 


eggplant Allergy Mild Difficulty Uncoded 17 03:20





   Breathing  











Home Medications: 


 Home Medications





predniSONE TAB* [Deltasone TAB*] 7.5 mg PO DAILY 17 [History Confirmed ]











PMH/Surg Hx/FS Hx/Imm Hx


Previously Healthy: No


Endocrine/Hematology History: Reports: Hx Anticoagulant Therapy - coumadin


   Denies: Hx Diabetes


Cardiovascular History: Reports: Hx Deep Vein Thrombosis, Hx Embolism - PE, Hx 

Hypotension, Hx Hypertension, Hx Syncope, Other Cardiovascular Problems/

Disorders - deep mesenteric thrombosis


   Denies: Hx Pacemaker/ICD


Respiratory History: Reports: Hx Asthma - intermittent, Hx Chronic Bronchitis, 

Hx Chronic Obstructive Pulmonary Disease (COPD), Hx Pulmonary Embolism - , 

Hx Sleep Apnea, Other Respiratory Problems/Disorders - O2 at home


GI History: Reports: Hx Crohn's Disease, Hx Ileostomy, Other GI Disorders - 

Ileostomy placement


 History: Reports: Hx Acute Renal Failure, Hx Chronic Renal Failure, Other  

Problems/Disorders - Renal deficit per Dr Schroeder


   Denies: Hx Dialysis, Hx Renal Disease


Musculoskeletal History: Reports: Hx Back Problems, Hx Osteoporosis, Hx 

Scoliosis - lumbar disc problems; scoliosis


Sensory History: Reports: Hx Cataracts, Hx Contacts or Glasses, Hx Hearing 

Problem


   Denies: Hx Hearing Aid


Opthamlomology History: Reports: Hx Cataracts, Hx Contacts or Glasses


Neurological History: Reports: Hx Nerve Disease - neuropathy, Other Neuro 

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


Psychiatric History: Reports: Hx Anxiety, Hx Depression


   Denies: Hx Panic Disorder





- Cancer History


Cancer Type, Location and Year: LUNG CA


Hx Chemotherapy: No


Hx Radiation Therapy: Yes


Hx Palliative Cancer Treatment: Yes





- Surgical History


Surgery Procedure, Year, and Place: COLECTOMY AND ILEOSTOMY 3/3/2011 

APPENDECTOMY, GALLBLADDER REMOVED, CATARACTS REMOVED [place].  SEVERAL BOWEL 

RESECTIONS AND FISTULA, [facility]


Hx Anesthesia Reactions: Yes - LOW BP





- Immunization History


Date of Tetanus Vaccine: PT STATES UNSURE


Date of Influenza Vaccine: PT STATES UNSURE


Infectious Disease History: Reports: Hx of Known/Suspected MRSA


   Denies: Traveled Outside the [place] in Last 30 Days





- Family History


Known Family History: Positive: Unknown - Parents  very young., Other - 

Crohn's Disease





- Social History


Alcohol Use: Rare


Alcohol Amount: 1 Q 2-3 MONTHS


Hx Substance Use: No


Substance Use Type: Reports: Other


Substance Use Comment - Amount & Last Used: prescribed opium


Hx Tobacco Use: Yes


Smoking Status (MU): Light Every Day Tobacco Smoker


Type: Cigarettes


Amount Used/How Often: 1-2 cigarettes per day


Length of Time of Smoking/Using Tobacco: 53 years


Have You Smoked in the Last Year: Yes





Review of Systems


Negative: Fever


Positive: Other - sinus drainage 


Negative: Chest Pain


Positive: Shortness Of Breath - increased from baseline , Cough


Negative: Vomiting, Diarrhea, Nausea


Neurological: Negative - vision changes 


All Other Systems Reviewed And Are Negative: Yes





Physical Exam





- Summary


Physical Exam Summary: 





VITAL SIGNS: Reviewed.


GENERAL: ~Patient is a well-developed and nourished female who is lying 

comfortable in the stretcher. ~Patient is not in any acute respiratory distress.


HEAD AND FACE: No signs of trauma. ~No ecchymosis, hematomas or skull 

depressions. No sinus tenderness.


EYES: PERRLA, EOMI x 2, No injected conjunctiva, no nystagmus.


EARS: Hearing grossly intact. Ear canals and tympanic membranes are within 

normal limits.


MOUTH: Oropharynx within normal limits.


NECK: Supple, trachea is midline, no adenopathy, no JVD, no carotid bruit, no c-

spine tenderness, neck with full ROM.


CHEST: Symmetric, no tenderness at palpation


LUNGS: Clear to auscultation bilaterally. Wheezing in apices of lung . Diffuse 

crackles in both lungs. Patient in on 4 liters of O2 nasal cannula 


CVS: Regular rate and rhythm, S1 and S2 present, no murmurs or gallops 

appreciated.


ABDOMEN: Soft, non-tender. No signs of distention. No rebound no guarding, and 

no masses palpated. Bowel sounds are normal.


EXTREMITIES: FROM in all major joints, no edema, no cyanosis or clubbing.


NEURO: Alert and oriented x 3. No acute neurological deficits. Speech is normal 

and follows commands.


SKIN: Dry and warm


Triage Information Reviewed: Yes


Vital Signs On Initial Exam: 


 Initial Vitals











Pulse Pulse Ox


 


 99   94 


 


 17 14:14  17 14:14











Vital Signs Reviewed: Yes





Diagnostics





- Vital Signs


 Vital Signs











  Temp Pulse Resp BP Pulse Ox


 


 17 17:47  97.8 F  86  17  128/72  97


 


 17 15:05   85  15  137/78  97


 


 17 15:00   85  20   97


 


 17 14:39  98.7 F  85  18  143/81  96


 


 17 14:32     143/81 


 


 17 14:30   91    96


 


 17 14:14   99    94














- Laboratory


Lab Results: 


 Lab Results











  17 Range/Units





  15:06 15:06 15:06 


 


WBC    13.3 H  (3.5-10.8)  10^3/ul


 


RBC    4.77  (4.0-5.4)  10^6/ul


 


Hgb    14.9  (12.0-16.0)  g/dl


 


Hct    45  (35-47)  %


 


MCV    94  (80-97)  fL


 


MCH    31  (27-31)  pg


 


MCHC    33  (31-36)  g/dl


 


RDW    15  (10.5-15)  %


 


Plt Count    268  (150-450)  10^3/ul


 


MPV    8  (7.4-10.4)  um3


 


Neut % (Auto)    77.5  (38-83)  %


 


Lymph % (Auto)    12.0 L  (25-47)  %


 


Mono % (Auto)    6.4  (1-9)  %


 


Eos % (Auto)    3.0  (0-6)  %


 


Baso % (Auto)    1.1  (0-2)  %


 


Absolute Neuts (auto)    10.3 H  (1.5-7.7)  10^3/ul


 


Absolute Lymphs (auto)    1.6  (1.0-4.8)  10^3/ul


 


Absolute Monos (auto)    0.9 H  (0-0.8)  10^3/ul


 


Absolute Eos (auto)    0.4  (0-0.6)  10^3/ul


 


Absolute Basos (auto)    0.1  (0-0.2)  10^3/ul


 


Absolute Nucleated RBC    0  10^3/ul


 


Nucleated RBC %    0  


 


Patient Temperature     


 


ABG pH     (7.35-7.45)  


 


ABG pH (Temp Correct)     


 


ABG pCO2     (35-45)  mmHg


 


ABG pCO2 (Temp Corrct     


 


ABG pO2     ()  mmHg


 


ABG pO2 (Temp Correct     


 


ABG HCO3     (19-31)  mmol/L


 


ABG O2 Saturation     (95-98)  %


 


ABG Base Excess     (-2.0-2.0)  


 


Respiration Rate     


 


O2 Delivery Device     


 


Ventilator Type     


 


Vent Mode     


 


FiO2     


 


Inspiratory Time     


 


PEEP     


 


Pressure Support     


 


Pressure Control     


 


EPAP     


 


IPAP     


 


BiPAP     


 


Sodium   136   (133-145)  mmol/L


 


Potassium   4.9   (3.5-5.0)  mmol/L


 


Chloride   98 L   (101-111)  mmol/L


 


Carbon Dioxide   28   (22-32)  mmol/L


 


Anion Gap   10   (2-11)  mmol/L


 


BUN   35 H   (6-24)  mg/dL


 


Creatinine   2.02 H   (0.51-0.95)  mg/dL


 


Est GFR ( Amer)   31.1   (>60)  


 


Est GFR (Non-Af Amer)   24.1   (>60)  


 


BUN/Creatinine Ratio   17.3   (8-20)  


 


Glucose   115 H   ()  mg/dL


 


Calcium   9.3   (8.6-10.3)  mg/dL


 


Total Bilirubin   0.60   (0.2-1.0)  mg/dL


 


AST   28   (13-39)  U/L


 


ALT   24   (7-52)  U/L


 


Alkaline Phosphatase   69   ()  U/L


 


Troponin I   0.02   (<0.04)  ng/mL


 


C-Reactive Protein   12.12 H   (< 5.00)  mg/L


 


B-Natriuretic Peptide  42   ( - 100) pg/mL


 


Total Protein   7.5   (6.4-8.9)  g/dL


 


Albumin   4.4   (3.2-5.2)  g/dL


 


Globulin   3.1   (2-4)  g/dL


 


Albumin/Globulin Ratio   1.4   (1-3)  


 


Influenza A (Rapid)     (Negative)  


 


Influenza B (Rapid)     (Negative)  














  17 Range/Units





  15:37 16:55 


 


WBC    (3.5-10.8)  10^3/ul


 


RBC    (4.0-5.4)  10^6/ul


 


Hgb    (12.0-16.0)  g/dl


 


Hct    (35-47)  %


 


MCV    (80-97)  fL


 


MCH    (27-31)  pg


 


MCHC    (31-36)  g/dl


 


RDW    (10.5-15)  %


 


Plt Count    (150-450)  10^3/ul


 


MPV    (7.4-10.4)  um3


 


Neut % (Auto)    (38-83)  %


 


Lymph % (Auto)    (25-47)  %


 


Mono % (Auto)    (1-9)  %


 


Eos % (Auto)    (0-6)  %


 


Baso % (Auto)    (0-2)  %


 


Absolute Neuts (auto)    (1.5-7.7)  10^3/ul


 


Absolute Lymphs (auto)    (1.0-4.8)  10^3/ul


 


Absolute Monos (auto)    (0-0.8)  10^3/ul


 


Absolute Eos (auto)    (0-0.6)  10^3/ul


 


Absolute Basos (auto)    (0-0.2)  10^3/ul


 


Absolute Nucleated RBC    10^3/ul


 


Nucleated RBC %    


 


Patient Temperature   Not Reportable  


 


ABG pH   7.38  (7.35-7.45)  


 


ABG pH (Temp Correct)   Not Reportable  


 


ABG pCO2   49 H  (35-45)  mmHg


 


ABG pCO2 (Temp Corrct   Not Reportable  


 


ABG pO2   88  ()  mmHg


 


ABG pO2 (Temp Correct   Not Reportable  


 


ABG HCO3   27.0  (19-31)  mmol/L


 


ABG O2 Saturation   97.7  (95-98)  %


 


ABG Base Excess   2.8 H  (-2.0-2.0)  


 


Respiration Rate   Not Reportable  


 


O2 Delivery Device   4  


 


Ventilator Type   Not Reportable  


 


Vent Mode   Not Reportable  


 


FiO2   Not Reportable  


 


Inspiratory Time   Not Reportable  


 


PEEP   Not Reportable  


 


Pressure Support   Not Reportable  


 


Pressure Control   Not Reportable  


 


EPAP   Not Reportable  


 


IPAP   Not Reportable  


 


BiPAP   Not Reportable  


 


Sodium    (133-145)  mmol/L


 


Potassium    (3.5-5.0)  mmol/L


 


Chloride    (101-111)  mmol/L


 


Carbon Dioxide    (22-32)  mmol/L


 


Anion Gap    (2-11)  mmol/L


 


BUN    (6-24)  mg/dL


 


Creatinine    (0.51-0.95)  mg/dL


 


Est GFR ( Amer)    (>60)  


 


Est GFR (Non-Af Amer)    (>60)  


 


BUN/Creatinine Ratio    (8-20)  


 


Glucose    ()  mg/dL


 


Calcium    (8.6-10.3)  mg/dL


 


Total Bilirubin    (0.2-1.0)  mg/dL


 


AST    (13-39)  U/L


 


ALT    (7-52)  U/L


 


Alkaline Phosphatase    ()  U/L


 


Troponin I    (<0.04)  ng/mL


 


C-Reactive Protein    (< 5.00)  mg/L


 


B-Natriuretic Peptide   ( - 100) pg/mL


 


Total Protein    (6.4-8.9)  g/dL


 


Albumin    (3.2-5.2)  g/dL


 


Globulin    (2-4)  g/dL


 


Albumin/Globulin Ratio    (1-3)  


 


Influenza A (Rapid)  Negative   (Negative)  


 


Influenza B (Rapid)  Negative   (Negative)  











Result Diagrams: 


 17 15:06





 17 15:06


Lab Statement: Any lab studies that have been ordered have been reviewed, and 

results considered in the medical decision making process.





- EKG


  ** 14:54


Cardiac Rate: NL


EKG Rhythm: Sinus Rhythm - 84 BPM


EKG Interpretation: No ST elevation 


EKG Comparison: No Significant Change - In comparison to EKG from 2017





Disposition





- Course


Assessment/Plan: In the ED course an IV access was obtained. Patient was placed 

in a cardiac monitor.  Patient was started with IV fluids.  Labs without any 

significant abnormality except for wbc 13.3,  Chronic renal failure possible 

secondary dehydration.  Troponin #1: 0.02.  EKG shows a NSR at   84 BPM w/o ST 

elevations.  CXR impression: No acute pathology.  After hydration patient is 

feeling better. We ambulated the patient and she did well, she had a good 

steady walk. Since she has no fever SOB resolved, no CP and she is feeling 

better she will be discharged home with F/U of PD.  I discussed all the 

findings and test results with the patient. Patient was instructed to return to 

the emergency room immediately if any of the symptoms return or worsens . Plan 

of care was discussed with the patient and understands and agrees. All 

questions were answered at patient satisfaction.  There were no further 

complaints or concerns.  Lung exam before discharge: CTA B/L. Good air 

exchange. No wheezing or crackles heard. CVS: S1 and S2 present. No murmurs 

appreciated. Patient is alert and oriented x 3. Patient is hemodynamically 

stable. Patient will be discharged home with follow up pediatrician in the next 

2-3 days





- Differential Dx - Cardiopulmonary


Differential Diagnoses - Cardiopulmonary: Asthma, Bronchitis, CHF





- Diagnoses


Provider Diagnoses: 


 SOB (shortness of breath)








Discharge





- Discharge Plan


Condition: Stable


Disposition: HOME


Patient Education Materials:  Dyspnea (ED)


Referrals: 


Kira Haile MD [Primary Care Provider] - 3 Days


Additional Instructions: 


RETURN TO THE EMERGENCY DEPARTMENT FOR CHANGING OR WORSENING SYMPTOMS.





The documentation as recorded by the Jen riwin Gabriel accurately reflects 

the service I personally performed and the decisions made by me, Harpreet Sol MD.

## 2017-11-29 ENCOUNTER — HOSPITAL ENCOUNTER (INPATIENT)
Dept: HOSPITAL 25 - ED | Age: 72
LOS: 5 days | Discharge: HOME | DRG: 191 | End: 2017-12-04
Attending: HOSPITALIST | Admitting: HOSPITALIST
Payer: MEDICARE

## 2017-11-29 DIAGNOSIS — C34.11: ICD-10-CM

## 2017-11-29 DIAGNOSIS — Z66: ICD-10-CM

## 2017-11-29 DIAGNOSIS — N18.3: ICD-10-CM

## 2017-11-29 DIAGNOSIS — F17.210: ICD-10-CM

## 2017-11-29 DIAGNOSIS — Z79.82: ICD-10-CM

## 2017-11-29 DIAGNOSIS — F32.9: ICD-10-CM

## 2017-11-29 DIAGNOSIS — Z79.899: ICD-10-CM

## 2017-11-29 DIAGNOSIS — Z88.1: ICD-10-CM

## 2017-11-29 DIAGNOSIS — Z91.018: ICD-10-CM

## 2017-11-29 DIAGNOSIS — Z86.711: ICD-10-CM

## 2017-11-29 DIAGNOSIS — Z99.81: ICD-10-CM

## 2017-11-29 DIAGNOSIS — Z93.2: ICD-10-CM

## 2017-11-29 DIAGNOSIS — K50.90: ICD-10-CM

## 2017-11-29 DIAGNOSIS — F41.9: ICD-10-CM

## 2017-11-29 DIAGNOSIS — Z88.8: ICD-10-CM

## 2017-11-29 DIAGNOSIS — Z86.718: ICD-10-CM

## 2017-11-29 DIAGNOSIS — Z79.52: ICD-10-CM

## 2017-11-29 DIAGNOSIS — Z88.7: ICD-10-CM

## 2017-11-29 DIAGNOSIS — J44.1: Primary | ICD-10-CM

## 2017-11-29 DIAGNOSIS — G25.81: ICD-10-CM

## 2017-11-29 DIAGNOSIS — C34.31: ICD-10-CM

## 2017-11-29 DIAGNOSIS — Z79.01: ICD-10-CM

## 2017-11-29 PROCEDURE — 36600 WITHDRAWAL OF ARTERIAL BLOOD: CPT

## 2017-11-29 PROCEDURE — 85025 COMPLETE CBC W/AUTO DIFF WBC: CPT

## 2017-11-29 PROCEDURE — 82803 BLOOD GASES ANY COMBINATION: CPT

## 2017-11-29 PROCEDURE — 84484 ASSAY OF TROPONIN QUANT: CPT

## 2017-11-29 PROCEDURE — 93005 ELECTROCARDIOGRAM TRACING: CPT

## 2017-11-29 PROCEDURE — 36415 COLL VENOUS BLD VENIPUNCTURE: CPT

## 2017-11-29 PROCEDURE — 71020: CPT

## 2017-11-29 PROCEDURE — 85610 PROTHROMBIN TIME: CPT

## 2017-11-29 PROCEDURE — 87040 BLOOD CULTURE FOR BACTERIA: CPT

## 2017-11-29 PROCEDURE — 80053 COMPREHEN METABOLIC PANEL: CPT

## 2017-11-29 PROCEDURE — 94760 N-INVAS EAR/PLS OXIMETRY 1: CPT

## 2017-11-29 PROCEDURE — 80048 BASIC METABOLIC PNL TOTAL CA: CPT

## 2017-11-29 PROCEDURE — 94660 CPAP INITIATION&MGMT: CPT

## 2017-11-29 PROCEDURE — 94640 AIRWAY INHALATION TREATMENT: CPT

## 2017-11-29 PROCEDURE — 83605 ASSAY OF LACTIC ACID: CPT

## 2017-11-30 LAB
ALBUMIN SERPL BCG-MCNC: 3.7 G/DL (ref 3.2–5.2)
ALP SERPL-CCNC: 70 U/L (ref 34–104)
ALT SERPL W P-5'-P-CCNC: 38 U/L (ref 7–52)
ANION GAP SERPL CALC-SCNC: 13 MMOL/L (ref 2–11)
AST SERPL-CCNC: (no result) U/L (ref 13–39)
BUN SERPL-MCNC: 38 MG/DL (ref 6–24)
BUN/CREAT SERPL: 21.5 (ref 8–20)
CALCIUM SERPL-MCNC: 9.1 MG/DL (ref 8.6–10.3)
CHLORIDE SERPL-SCNC: 98 MMOL/L (ref 101–111)
GLOBULIN SER CALC-MCNC: 2.9 G/DL (ref 2–4)
GLUCOSE SERPL-MCNC: 170 MG/DL (ref 70–100)
HCO3 SERPL-SCNC: 25 MMOL/L (ref 22–32)
HCT VFR BLD AUTO: 41 % (ref 35–47)
HGB BLD-MCNC: 13.4 G/DL (ref 12–16)
MCH RBC QN AUTO: 31 PG (ref 27–31)
MCHC RBC AUTO-ENTMCNC: 33 G/DL (ref 31–36)
MCV RBC AUTO: 95 FL (ref 80–97)
POTASSIUM SERPL-SCNC: (no result) MMOL/L (ref 3.5–5)
PROT SERPL-MCNC: 6.6 G/DL (ref 6.4–8.9)
RBC # BLD AUTO: 4.28 10^6/UL (ref 4–5.4)
SODIUM SERPL-SCNC: 136 MMOL/L (ref 133–145)
TROPONIN I SERPL-MCNC: 0.03 NG/ML (ref ?–0.04)
WBC # BLD AUTO: 13.2 10^3/UL (ref 3.5–10.8)

## 2017-11-30 RX ADMIN — PREDNISONE SCH MG: 20 TABLET ORAL at 09:55

## 2017-11-30 RX ADMIN — IPRATROPIUM BROMIDE SCH MG: 0.5 SOLUTION RESPIRATORY (INHALATION) at 13:09

## 2017-11-30 RX ADMIN — ALBUTEROL SULFATE PRN MG: 2.5 SOLUTION RESPIRATORY (INHALATION) at 05:23

## 2017-11-30 RX ADMIN — ALBUTEROL SULFATE PRN MG: 2.5 SOLUTION RESPIRATORY (INHALATION) at 11:10

## 2017-11-30 RX ADMIN — ACETAMINOPHEN PRN MG: 325 TABLET ORAL at 23:18

## 2017-11-30 RX ADMIN — ACETAMINOPHEN PRN MG: 325 TABLET ORAL at 06:26

## 2017-11-30 RX ADMIN — CINACALCET HYDROCHLORIDE SCH MG: 30 TABLET, COATED ORAL at 09:55

## 2017-11-30 RX ADMIN — MORPHINE TINCTURE SCH MG: 1 SOLUTION ORAL at 18:14

## 2017-11-30 RX ADMIN — TIOTROPIUM BROMIDE SCH CAP: 18 CAPSULE ORAL; RESPIRATORY (INHALATION) at 08:18

## 2017-11-30 RX ADMIN — LORAZEPAM PRN MG: 0.5 TABLET ORAL at 15:09

## 2017-11-30 RX ADMIN — IPRATROPIUM BROMIDE SCH MG: 0.5 SOLUTION RESPIRATORY (INHALATION) at 20:01

## 2017-11-30 RX ADMIN — ACETAMINOPHEN PRN MG: 325 TABLET ORAL at 18:13

## 2017-11-30 RX ADMIN — MORPHINE TINCTURE SCH MG: 1 SOLUTION ORAL at 14:10

## 2017-11-30 RX ADMIN — LORAZEPAM PRN MG: 0.5 TABLET ORAL at 23:17

## 2017-11-30 RX ADMIN — MORPHINE TINCTURE SCH MG: 1 SOLUTION ORAL at 06:27

## 2017-11-30 RX ADMIN — PAROXETINE SCH MG: 10 TABLET, FILM COATED ORAL at 09:56

## 2017-11-30 RX ADMIN — GUAIFENESIN PRN MG: 600 TABLET, EXTENDED RELEASE ORAL at 23:17

## 2017-11-30 RX ADMIN — ALBUTEROL SULFATE PRN MG: 2.5 SOLUTION RESPIRATORY (INHALATION) at 13:09

## 2017-11-30 RX ADMIN — ACETAMINOPHEN PRN MG: 325 TABLET ORAL at 14:14

## 2017-11-30 RX ADMIN — CALCITRIOL SCH MCG: 0.25 CAPSULE ORAL at 09:55

## 2017-11-30 RX ADMIN — MORPHINE TINCTURE SCH MG: 1 SOLUTION ORAL at 22:56

## 2017-11-30 RX ADMIN — ASPIRIN SCH MG: 81 TABLET, COATED ORAL at 09:56

## 2017-11-30 RX ADMIN — IPRATROPIUM BROMIDE SCH MG: 0.5 SOLUTION RESPIRATORY (INHALATION) at 08:18

## 2017-11-30 RX ADMIN — MORPHINE TINCTURE SCH MG: 1 SOLUTION ORAL at 09:56

## 2017-11-30 NOTE — RAD
Indication: Shortness of breath, COPD.



2 views of the chest including dual energy PA views demonstrate no mediastinal shift. Lung

fields appear hyperinflated. No pleural fluid, pneumonia or pneumothorax is noted. When

compared to previous exam of November 24, 2017 overall appearance appears to be stable.

Nodule is noted in the right base and right upper lobe.



IMPRESSION: COPD. Mass in the right upper lobe and right lower lobe. No change since

previous exam of November 24, 2017.

## 2017-11-30 NOTE — PN
Subjective


Date of Service: 11/30/17


Interval History: 





Some cough, little subj change overnight.  Slept poorly due to frequent 

interruptions, intake process. 





Objective


Active Medications: 








Acetaminophen (Tylenol Tab*)  650 mg PO Q4H PRN


   PRN Reason: FEVER/PAIN


   Last Admin: 11/30/17 06:26 Dose:  650 mg


Al Hydrox/Mg Hydrox/Simethicone (Maalox Plus*)  30 ml PO Q6H PRN


   PRN Reason: INDIGESTION


Albuterol (Ventolin 2.5 Mg/3 Ml Neb.Sol*)  2.5 mg INH Q2H PRN


   PRN Reason: SOB/WHEEZING


   Last Admin: 11/30/17 11:10 Dose:  2.5 mg


Aspirin (Aspirin Ec Low Dose*)  81 mg PO DAILY [facility]


   Last Admin: 11/30/17 09:56 Dose:  81 mg


Calcitriol (Rocaltrol  Cap*)  0.25 mcg PO DAILY [facility]


   Last Admin: 11/30/17 09:55 Dose:  0.25 mcg


Cinacalcet (Sensipar Tab*)  30 mg PO DAILY [facility]


   Last Admin: 11/30/17 09:55 Dose:  30 mg


Device (Tiotropium Inhaler Device*)  1 each .SEE ORDER .USE w/ SPIRIVA CAPS [facility]


Guaifenesin (Mucinex*)  600 mg PO BID PRN


   PRN Reason: COUGH


Sodium Chloride (Ns 0.9% 1000 Ml*)  1,000 mls @ 125 mls/hr IV PER RATE [facility]


   Last Admin: 11/30/17 05:31 Dose:  125 mls/hr


Azithromycin 250 mg/ Dextrose  250 mls @ 250 mls/hr IVPB Q24H [facility]


Ceftriaxone Sodium 1,000 mg/ (Dextrose)  50 mls @ 200 mls/hr IVPB Q24H [facility]


Ipratropium Bromide (Atrovent 0.5 Mg Neb.Sol*)  0.5 mg INH [id number] AWAKE 

[facility]


   Last Admin: 11/30/17 08:18 Dose:  0.5 mg


Lorazepam (Ativan Tab(*))  0.5 mg PO Q6H PRN


   PRN Reason: ANXIETY


   Last Admin: 11/30/17 11:06 Dose:  0.5 mg


Ondansetron HCl (Zofran Inj*)  4 mg IV Q4H PRN


   PRN Reason: NAUSEA/VOMITING


Opium Tincture (Opium Tincture*)  0.6 mg PO Q4HR [facility]


   Last Admin: 11/30/17 09:56 Dose:  0.6 mg


Paroxetine HCl (Paxil Tab*)  30 mg PO DAILY [facility]


   Last Admin: 11/30/17 09:56 Dose:  30 mg


Pharmacy Profile Note (Coumadin Per Pharmacy*)  1 note FOLLOW UP .PER PHARMACY 

PROTOC [facility]


   PRN Reason: Protocol


Prednisone (Deltasone Tab*)  40 mg PO DAILY [facility]


   Last Admin: 11/30/17 09:55 Dose:  40 mg


Tiotropium Bromide (Spiriva Cap.Inh*)  1 cap INH DAILY [facility]


   Last Admin: 11/30/17 08:18 Dose:  1 cap


Warfarin Sodium (Coumadin Tab(*))  2 mg PO DAILY@1700 [facility]


   PRN Reason: Protocol








 Vital Signs











  11/30/17 11/30/17 11/30/17





  04:00 04:30 05:13


 


Temperature   97.9 F


 


Pulse Rate 104 103 99


 


Respiratory   26





Rate   


 


Blood Pressure  140/53 134/66





(mmHg)   


 


O2 Sat by Pulse 82 95 94





Oximetry   














  11/30/17 11/30/17 11/30/17





  05:24 06:27 07:41


 


Temperature   98.0 F


 


Pulse Rate 93  76


 


Respiratory 20 24 20





Rate   


 


Blood Pressure   117/53





(mmHg)   


 


O2 Sat by Pulse 96  98





Oximetry   














  11/30/17 11/30/17 11/30/17





  08:00 08:20 09:56


 


Temperature   


 


Pulse Rate  92 


 


Respiratory 22 20 20





Rate   


 


Blood Pressure   





(mmHg)   


 


O2 Sat by Pulse  97 





Oximetry   














  11/30/17 11/30/17 11/30/17





  09:59 11:06 11:10


 


Temperature   


 


Pulse Rate   125


 


Respiratory 20 32 35





Rate   


 


Blood Pressure   





(mmHg)   


 


O2 Sat by Pulse   98





Oximetry   














  11/30/17





  11:13


 


Temperature 98.5 F


 


Pulse Rate 112


 


Respiratory 22





Rate 


 


Blood Pressure 187/83





(mmHg) 


 


O2 Sat by Pulse 94





Oximetry 











Oxygen Devices in Use Now: Nasal Cannula


Appearance: Alert, partly up in bed.  In fair spirits.  Appears depressed but 

otherwise comfortable.  No cough during my visit.


Eyes: No Scleral Icterus


Neck: NL Appearance and Movements; NL JVP, No Thyroid Enlargement, Masses


Respiratory: Symmetrical Chest Expansion and Respiratory Effort, Clear to 

Percussion, - - mild to mod rhonchi BL, good air movement


Extremities: No Edema, No Clubbing, Cyanosis, -


Skin: No Rash or Ulcers, No Nodules or Sclerosis, -


Neurological: Alert and Oriented x 3, NL Sensation


Result Diagrams: 


 11/30/17 01:30





 11/30/17 03:23


Additional Lab and Data: 


 Lab Results











  11/30/17 11/30/17 11/30/17 Range/Units





  01:30 01:30 01:30 


 


WBC   13.2 H   (3.5-10.8)  10^3/ul


 


RBC   4.28   (4.0-5.4)  10^6/ul


 


Hgb   13.4   (12.0-16.0)  g/dl


 


Hct   41   (35-47)  %


 


MCV   95   (80-97)  fL


 


MCH   31   (27-31)  pg


 


MCHC   33   (31-36)  g/dl


 


RDW   14   (10.5-15)  %


 


Plt Count   261   (150-450)  10^3/ul


 


MPV   8   (7.4-10.4)  um3


 


Neut % (Auto)   65.8   (38-83)  %


 


Lymph % (Auto)   25.5   (25-47)  %


 


Mono % (Auto)   7.0   (1-9)  %


 


Eos % (Auto)   0.6   (0-6)  %


 


Baso % (Auto)   1.1   (0-2)  %


 


Absolute Neuts (auto)   8.7 H   (1.5-7.7)  10^3/ul


 


Absolute Lymphs (auto)   3.4   (1.0-4.8)  10^3/ul


 


Absolute Monos (auto)   0.9 H   (0-0.8)  10^3/ul


 


Absolute Eos (auto)   0.1   (0-0.6)  10^3/ul


 


Absolute Basos (auto)   0.1   (0-0.2)  10^3/ul


 


Absolute Nucleated RBC   0.01   10^3/ul


 


Nucleated RBC %   0.1   


 


Sodium    136  (133-145)  mmol/L


 


Potassium    TNP  


 


Chloride    98 L  (101-111)  mmol/L


 


Carbon Dioxide    25  (22-32)  mmol/L


 


Anion Gap    13 H  (2-11)  mmol/L


 


BUN    38 H  (6-24)  mg/dL


 


Creatinine    1.77 H  (0.51-0.95)  mg/dL


 


Est GFR ( Amer)    36.2  (>60)  


 


Est GFR (Non-Af Amer)    28.1  (>60)  


 


BUN/Creatinine Ratio    21.5 H  (8-20)  


 


Glucose    170 H  ()  mg/dL


 


Lactic Acid  3.9 H*    (0.5-2.0)  mmol/L


 


Calcium    9.1  (8.6-10.3)  mg/dL


 


Total Bilirubin    0.30  (0.2-1.0)  mg/dL


 


AST    TNP  


 


ALT    38  (7-52)  U/L


 


Alkaline Phosphatase    70  ()  U/L


 


Troponin I    0.03  (<0.04)  ng/mL


 


Total Protein    6.6  (6.4-8.9)  g/dL


 


Albumin    3.7  (3.2-5.2)  g/dL


 


Globulin    2.9  (2-4)  g/dL


 


Albumin/Globulin Ratio    1.3  (1-3)  














  11/30/17 Range/Units





  03:23 


 


WBC   (3.5-10.8)  10^3/ul


 


RBC   (4.0-5.4)  10^6/ul


 


Hgb   (12.0-16.0)  g/dl


 


Hct   (35-47)  %


 


MCV   (80-97)  fL


 


MCH   (27-31)  pg


 


MCHC   (31-36)  g/dl


 


RDW   (10.5-15)  %


 


Plt Count   (150-450)  10^3/ul


 


MPV   (7.4-10.4)  um3


 


Neut % (Auto)   (38-83)  %


 


Lymph % (Auto)   (25-47)  %


 


Mono % (Auto)   (1-9)  %


 


Eos % (Auto)   (0-6)  %


 


Baso % (Auto)   (0-2)  %


 


Absolute Neuts (auto)   (1.5-7.7)  10^3/ul


 


Absolute Lymphs (auto)   (1.0-4.8)  10^3/ul


 


Absolute Monos (auto)   (0-0.8)  10^3/ul


 


Absolute Eos (auto)   (0-0.6)  10^3/ul


 


Absolute Basos (auto)   (0-0.2)  10^3/ul


 


Absolute Nucleated RBC   10^3/ul


 


Nucleated RBC %   


 


Sodium   (133-145)  mmol/L


 


Potassium  4.0  


 


Chloride   (101-111)  mmol/L


 


Carbon Dioxide   (22-32)  mmol/L


 


Anion Gap   (2-11)  mmol/L


 


BUN   (6-24)  mg/dL


 


Creatinine   (0.51-0.95)  mg/dL


 


Est GFR ( Amer)   (>60)  


 


Est GFR (Non-Af Amer)   (>60)  


 


BUN/Creatinine Ratio   (8-20)  


 


Glucose   ()  mg/dL


 


Lactic Acid   (0.5-2.0)  mmol/L


 


Calcium   (8.6-10.3)  mg/dL


 


Total Bilirubin   (0.2-1.0)  mg/dL


 


AST  30  


 


ALT   (7-52)  U/L


 


Alkaline Phosphatase   ()  U/L


 


Troponin I   (<0.04)  ng/mL


 


Total Protein   (6.4-8.9)  g/dL


 


Albumin   (3.2-5.2)  g/dL


 


Globulin   (2-4)  g/dL


 


Albumin/Globulin Ratio   (1-3)  














Assess/Plan/Problems-Billing


Assessment: 











- Patient Problems


(1) COPD exacerbation


Current Visit: No   Status: Acute   Priority: High   Code(s): J44.1 - CHRONIC 

OBSTRUCTIVE PULMONARY DISEASE W (ACUTE) EXACERBATION   SNOMED Code(s): 

848097967823182


   Comment: Nurse felt patient was worse and an additional 60 mg IV 

methyprednisolone given 11/30.  ABG's OK 11/30/17.  Continue azith/ceftri.  Pt 

states she last smoked 11/24/17.   





(2) Lung cancer


Current Visit: No   Status: Acute   Code(s): C34.90 - MALIGNANT NEOPLASM OF 

UNSP PART OF UNSP BRONCHUS OR LUNG   SNOMED Code(s): 706744423


   Comment: Patient missed the start of her RT tx, was supposed to go MWF.  

Consider starting 12/4 at the earliest.   





(3) History of pulmonary embolism


Current Visit: No   Status: Chronic   Priority: Medium   Code(s): Z86.711 - 

PERSONAL HISTORY OF PULMONARY EMBOLISM   SNOMED Code(s): 831904532


   Comment: Continue warfarin at home dose.  INR 12/1/17.   





(4) CKD (chronic kidney disease), stage IV


Current Visit: No   Status: Chronic   Code(s): N18.4 - CHRONIC KIDNEY DISEASE, 

STAGE 4 (SEVERE)   SNOMED Code(s): 779413094


   Comment: About at her baseline.    





(5) Crohn's disease


Current Visit: No   Status: Chronic   Code(s): K50.90 - CROHN'S DISEASE, 

UNSPECIFIED, WITHOUT COMPLICATIONS   SNOMED Code(s): 16048748


   Comment: 


S/P ileostomy.





   





(6) Tobacco abuse


Current Visit: No   Status: Acute   Code(s): Z72.0 - TOBACCO USE   SNOMED Code(s

): 026172596


   Comment: Pt advised to quit smoking.

## 2017-11-30 NOTE — HP
CC:  Kira Haile MD *

 

HISTORY AND PHYSICAL:

 

DATE OF ADMISSION:  11/30/17

 

TIME OF EVALUATION:  0400.

 

PRIMARY CARE PHYSICIAN:  Kira Haile MD

 

CHIEF COMPLAINT:  Shortness of breath.

 

HISTORY OF PRESENT ILLNESS:  This is a 72-year-old female with past medical 
history of COPD, on 3 L, with lung cancer, who was set up to get radiation this 
week, followed by Dr. Rothman and Dr. Higgins, who presented to the emergency 
room, was having progressively worsening shortness of breath.  The patient 
states her symptoms began on 11/23/17 with shortness of breath and dry cough.  
She was seen in the emergency room on 11/24/17, had labs and imaging, was sent 
home.  Her symptoms got progressively worse.  She has lot of postnasal drip, 
increased shortness of breath with dry cough.  She came in to the emergency room
, had labs and imaging. She was given 1 L of fluid, 40 mg of Solu-Medrol, 1 g 
of mag, 1 mg of Ativan, ceftriaxone, and was referred to the hospitalist 
service for further evaluation. The patient is also complaining of pleuritic 
pain.  No nausea or vomiting.  She has no appetite for the past several days.  
No dysuria.  She has had some abdominal discomfort.  She is scheduled to have 
radiation treatment on small lung nodule on the right.  She was supposed to 
have it 11/29/17, but was postponed to 12/01/17 due to her illness.  She states 
her weight goes up and down.  Otherwise, remaining review of systems is 
negative.

 

PAST MEDICAL HISTORY:

1.  History of COPD, on 3 L, followed by Dr. Dacosta.  She is on CPAP at night.

2.  Crohn's disease.

3.  Lung cancer, followed by Dr. Rothman and Dr. Higgins.

4.  CKD, stage 3.

5.  History of DVT and PE, on anticoagulation.

6.  Restless legs syndrome.

7.  Anxiety.

8.  Depression.

 

PAST SURGICAL HISTORY:

1.  History of colon resection with ileostomy.

2.  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

3.  She has had multiple abdominal surgeries related to her Crohn's.

 

MEDICATIONS:

1.  Sensipar 30 mg p.o. daily.

2.  Aspirin 81 mg p.o. daily.

3.  Calcitriol 0.25 mcg daily.

4.  Prednisone, was on 5 mg and just went up to 7.5 mg for the past 2 days 
prescribed by Dr. Dacosta.

5.  Lorazepam 0.5 mg b.i.d. as needed for anxiety.

6.  Mucinex 600 mg p.o. b.i.d. as needed for cough.

7.  Spiriva inhaler.

8.  Coumadin 2 mg daily.

9.  Paxil 30 mg daily.

10.  Opium tincture 10 mg/mL 0.6 mg 5 times a day, adjusted as needed to slow 
down diarrhea.

11.  B12 injections every week.

12.  Ipratropium 3 to 4 times a day every 6 to 8 hours.

 

ALLERGIES:  REMICADE, TETANUS, EPINEPHRINE, VANCOMYCIN, DAPTOMYCIN, and EGG 
PLANT.

 

FAMILY HISTORY:  Reviewed, noncontributory.

 

SOCIAL HISTORY:  The patient lives at home with her wife, Sarah, who is her 
healthcare proxy.  She is still smoking 3 to 4 cigarettes a day.  She has been 
smoking for the past 60 years.  Her peak was 3 packs per day.  Occasional 
marijuana use.  No alcohol use.  Her code status is DNR/DNI with trial of BiPAP.

 

REVIEW OF SYSTEMS:  A 14-point review of systems reviewed.  Pertinent positives 
and negatives as mentioned in the HPI, otherwise negative.

 

                               PHYSICAL EXAMINATION

 

GENERAL:  Mildly ill appearing, with wife at the bedside.

 

VITAL SIGNS:  Temp 98.6, pulse rate 102, respiratory rate 18, oxygen saturation 
96% on 4 L, blood pressure 112/76.

 

HEENT:  Head normocephalic.  Pupils are equal and reactive, anicteric.  
Oropharynx: Mucous membranes dry.  Posterior oropharynx erythematous.  No 
exudate.

 

NECK:  Supple.  No lymphadenopathy.

 

RESPIRATORY:  Minimal aeration on the right lung.  Increased work of breathing. 
Prolonged expiratory phase, bilateral wheezes.

 

CARDIAC:  Tachycardia.  Soft systolic murmur heard throughout.

 

ABDOMEN:  Soft, nontender.

 

EXTREMITIES:  No clubbing, cyanosis, or edema.

 

NEUROLOGIC:  Alert and oriented x3.  No focal neurologic deficits.

 

 DIAGNOSTIC STUDIES/LAB DATA:  White count 13.2, hemoglobin 13.4, hematocrit 41
, platelets 261.  Sodium 136, potassium is pending, chloride 98, bicarb 25, BUN 
38, creatinine 1.77, lactate is 3.9.  Troponin 0.03.

 

Chest x-ray:  Hyperinflated lungs.  No significant change from prior chest x-
ray.

 

ASSESSMENT AND PLAN:  This is a 72-year-old female with past medical history of 
chronic obstructive pulmonary disease, on 3 L, lung cancer, who presents to the 
emergency room with worsening shortness of breath and cough.

 

Shortness of breath and cough.  

Assessment:  The patient's history and physical most consistent with chronic 
obstructive pulmonary disease exacerbation. Concerning for an underlying 
bacterial infection such as pneumonia that has not yet blossomed.  She is also 
immunosuppressed.  

Plan: We will continue her on prednisone 40. Continue her inhaler regimen and 
start her on albuterol q.2 hours as needed.  We will give her IV fluids and 
continue her on antibiotics.  We will add azithromycin to the ceftriaxone and 
check urine legionella and pneumococcal, place her on CPAP at night as well.

 

CHRONIC MEDICAL PROBLEMS:

1.  History of DVT/PE.  Assessment and plan:  We will check an INR.  If the 
patient is subtherapeutic and she is not clinically improving, consider 
followup for PE.

2.  Lung cancer:  The patient's wife is going to call Dr. Higgins to let him 
know that she will not be able to make a radiation treatment.  He may want to 
see her in the hospital to evaluate for radiation as an inpatient.

3.  Anxiety:  Continue lorazepam as needed.

4.  Depression:  Continue Paxil as needed.

5.  High stool output via ostomy:  Continue opium.

 

FEN:  We will place the patient on a regular diet with IV fluids.

 

DVT prophylaxis:  The patient scores high risk.  She is on Coumadin.

 

Code status:  The patient is DNR/DNI with trial BiPAP.

 

PATIENT TIME:  Greater than 60 minutes was spent doing the history and physical
, more than half the time spent in direct patient contact.

 

[id number]/CPS #: [id number]

[facility]

## 2017-12-01 LAB
ADD DIFF/SLIDE REVIEW?: (no result)
ANION GAP SERPL CALC-SCNC: 8 MMOL/L (ref 2–11)
BUN SERPL-MCNC: 28 MG/DL (ref 6–24)
BUN/CREAT SERPL: 18.7 (ref 8–20)
CALCIUM SERPL-MCNC: 8.4 MG/DL (ref 8.6–10.3)
CHLORIDE SERPL-SCNC: 103 MMOL/L (ref 101–111)
GLUCOSE SERPL-MCNC: 108 MG/DL (ref 70–100)
HCO3 SERPL-SCNC: 28 MMOL/L (ref 22–32)
HCT VFR BLD AUTO: 36 % (ref 35–47)
HGB BLD-MCNC: 12 G/DL (ref 12–16)
MCH RBC QN AUTO: 32 PG (ref 27–31)
MCHC RBC AUTO-ENTMCNC: 34 G/DL (ref 31–36)
MCV RBC AUTO: 93 FL (ref 80–97)
POTASSIUM SERPL-SCNC: 4 MMOL/L (ref 3.5–5)
RBC # BLD AUTO: 3.81 10^6/UL (ref 4–5.4)
SODIUM SERPL-SCNC: 139 MMOL/L (ref 133–145)
WBC # BLD AUTO: 19.2 10^3/UL (ref 3.5–10.8)

## 2017-12-01 RX ADMIN — LORAZEPAM PRN MG: 0.5 TABLET ORAL at 21:08

## 2017-12-01 RX ADMIN — ACETAMINOPHEN PRN MG: 325 TABLET ORAL at 03:35

## 2017-12-01 RX ADMIN — LORAZEPAM PRN MG: 0.5 TABLET ORAL at 09:35

## 2017-12-01 RX ADMIN — MORPHINE TINCTURE SCH MG: 1 SOLUTION ORAL at 21:00

## 2017-12-01 RX ADMIN — IPRATROPIUM BROMIDE SCH MG: 0.5 SOLUTION RESPIRATORY (INHALATION) at 20:22

## 2017-12-01 RX ADMIN — PREDNISONE SCH MG: 20 TABLET ORAL at 09:35

## 2017-12-01 RX ADMIN — CALCITRIOL SCH MCG: 0.25 CAPSULE ORAL at 09:35

## 2017-12-01 RX ADMIN — TIOTROPIUM BROMIDE SCH CAP: 18 CAPSULE ORAL; RESPIRATORY (INHALATION) at 10:08

## 2017-12-01 RX ADMIN — LORAZEPAM PRN MG: 0.5 TABLET ORAL at 17:30

## 2017-12-01 RX ADMIN — PAROXETINE SCH MG: 10 TABLET, FILM COATED ORAL at 09:35

## 2017-12-01 RX ADMIN — ASPIRIN SCH MG: 81 TABLET, COATED ORAL at 09:35

## 2017-12-01 RX ADMIN — CEFTRIAXONE SODIUM SCH MLS/HR: 1 INJECTION, POWDER, FOR SOLUTION INTRAMUSCULAR; INTRAVENOUS at 05:34

## 2017-12-01 RX ADMIN — MORPHINE SULFATE PRN MG: 2 INJECTION, SOLUTION INTRAMUSCULAR; INTRAVENOUS at 02:05

## 2017-12-01 RX ADMIN — LORAZEPAM PRN MG: 0.5 TABLET ORAL at 13:48

## 2017-12-01 RX ADMIN — IPRATROPIUM BROMIDE SCH MG: 0.5 SOLUTION RESPIRATORY (INHALATION) at 10:02

## 2017-12-01 RX ADMIN — MORPHINE SULFATE PRN MG: 2 INJECTION, SOLUTION INTRAMUSCULAR; INTRAVENOUS at 23:07

## 2017-12-01 RX ADMIN — MORPHINE TINCTURE SCH MG: 1 SOLUTION ORAL at 03:36

## 2017-12-01 RX ADMIN — MORPHINE TINCTURE SCH MG: 1 SOLUTION ORAL at 09:34

## 2017-12-01 RX ADMIN — MORPHINE TINCTURE SCH MG: 1 SOLUTION ORAL at 17:30

## 2017-12-01 RX ADMIN — ACETAMINOPHEN PRN MG: 325 TABLET ORAL at 20:52

## 2017-12-01 RX ADMIN — ACETAMINOPHEN PRN MG: 325 TABLET ORAL at 13:48

## 2017-12-01 RX ADMIN — MORPHINE TINCTURE SCH MG: 1 SOLUTION ORAL at 13:47

## 2017-12-01 RX ADMIN — IPRATROPIUM BROMIDE SCH MG: 0.5 SOLUTION RESPIRATORY (INHALATION) at 14:47

## 2017-12-01 RX ADMIN — CINACALCET HYDROCHLORIDE SCH MG: 30 TABLET, COATED ORAL at 09:35

## 2017-12-01 RX ADMIN — IPRATROPIUM BROMIDE SCH MG: 0.5 SOLUTION RESPIRATORY (INHALATION) at 01:12

## 2017-12-01 RX ADMIN — DEXTROSE MONOHYDRATE, SODIUM CHLORIDE, AND POTASSIUM CHLORIDE SCH MLS/HR: 50; 4.5; 1.49 INJECTION, SOLUTION INTRAVENOUS at 13:47

## 2017-12-01 RX ADMIN — AZITHROMYCIN SCH MLS/HR: 500 INJECTION, POWDER, LYOPHILIZED, FOR SOLUTION INTRAVENOUS at 07:07

## 2017-12-01 RX ADMIN — MORPHINE TINCTURE SCH MG: 1 SOLUTION ORAL at 07:21

## 2017-12-01 RX ADMIN — FLUTICASONE PROPIONATE SCH SPRAY: 50 SPRAY, METERED NASAL at 21:06

## 2017-12-01 RX ADMIN — ACETAMINOPHEN PRN MG: 325 TABLET ORAL at 09:36

## 2017-12-01 NOTE — PN
Subjective


Date of Service: 12/01/17


Interval History: 





Cough and SOB somewhat better but not at her baseline.  Pt concerned that her 

high ilesotomy outpt is causing dehydraiton. 





Objective


Active Medications: 








Acetaminophen (Tylenol Tab*)  650 mg PO Q4H PRN


   PRN Reason: FEVER/PAIN


   Last Admin: 12/01/17 09:36 Dose:  650 mg


Al Hydrox/Mg Hydrox/Simethicone (Maalox Plus*)  30 ml PO Q6H PRN


   PRN Reason: INDIGESTION


Albuterol (Ventolin 2.5 Mg/3 Ml Neb.Sol*)  2.5 mg INH Q2H PRN


   PRN Reason: SOB/WHEEZING


   Last Admin: 11/30/17 13:09 Dose:  2.5 mg


Aspirin (Aspirin Ec Low Dose*)  81 mg PO DAILY [facility]


   Last Admin: 12/01/17 09:35 Dose:  81 mg


Calcitriol (Rocaltrol  Cap*)  0.25 mcg PO DAILY [facility]


   Last Admin: 12/01/17 09:35 Dose:  0.25 mcg


Cinacalcet (Sensipar Tab*)  30 mg PO DAILY [facility]


   Last Admin: 12/01/17 09:35 Dose:  30 mg


Device (Tiotropium Inhaler Device*)  1 each .SEE ORDER .USE w/ SPIRIVA CAPS [facility]


Guaifenesin (Mucinex*)  600 mg PO BID PRN


   PRN Reason: COUGH


   Last Admin: 11/30/17 23:17 Dose:  600 mg


Azithromycin 250 mg/ Dextrose  250 mls @ 250 mls/hr IVPB Q24H [facility]


   Last Admin: 12/01/17 07:07 Dose:  250 mls/hr


Ceftriaxone Sodium 1,000 mg/ (Dextrose)  50 mls @ 200 mls/hr IVPB Q24H [facility]


   Last Admin: 12/01/17 05:34 Dose:  200 mls/hr


Potassium Chloride/Dextrose (D5w 1/2 Ns Kcl 20 Meq 1000 Ml*)  1,000 mls @ 60 mls

/hr IV PER RATE [facility]


Ipratropium Bromide (Atrovent 0.5 Mg Neb.Sol*)  0.5 mg INH [id number] AWAKE 

[facility]


   Last Admin: 12/01/17 10:02 Dose:  0.5 mg


Lorazepam (Ativan Tab(*))  0.5 mg PO Q4H PRN


   PRN Reason: ANXIETY


   Last Admin: 12/01/17 09:35 Dose:  0.5 mg


Morphine Sulfate (Morphine Inj (Syringe)*)  1 mg IV Q4H PRN


   PRN Reason: PAIN


   Last Admin: 12/01/17 02:05 Dose:  1 mg


Ondansetron HCl (Zofran Inj*)  4 mg IV Q4H PRN


   PRN Reason: NAUSEA/VOMITING


Opium Tincture (Opium Tincture*)  0.6 mg PO Q4HR [facility]


   Last Admin: 12/01/17 09:34 Dose:  0.6 mg


Paroxetine HCl (Paxil Tab*)  30 mg PO DAILY [facility]


   Last Admin: 12/01/17 09:35 Dose:  30 mg


Pharmacy Profile Note (Coumadin Per Pharmacy*)  1 note FOLLOW UP .PER PHARMACY 

PROTOC [facility]


   PRN Reason: Protocol


Prednisone (Deltasone Tab*)  30 mg PO DAILY [facility]


Tiotropium Bromide (Spiriva Cap.Inh*)  1 cap INH DAILY [facility]


   Last Admin: 12/01/17 10:08 Dose:  1 cap








 Vital Signs











  11/30/17 11/30/17 11/30/17





  14:10 15:09 15:25


 


Temperature   98.5 F


 


Pulse Rate   68


 


Respiratory 24 16 26





Rate   


 


Blood Pressure   134/77





(mmHg)   


 


O2 Sat by Pulse   95





Oximetry   














  11/30/17 11/30/17 11/30/17





  16:02 16:32 18:14


 


Temperature   


 


Pulse Rate   


 


Respiratory 24 24 24





Rate   


 


Blood Pressure   





(mmHg)   


 


O2 Sat by Pulse   





Oximetry   














  11/30/17 11/30/17 11/30/17





  19:25 20:00 20:03


 


Temperature 99.0 F  


 


Pulse Rate 77  80


 


Respiratory 18 18 16





Rate   


 


Blood Pressure 145/60  





(mmHg)   


 


O2 Sat by Pulse 97  98





Oximetry   














  11/30/17 11/30/17 11/30/17





  21:06 22:56 23:17


 


Temperature   


 


Pulse Rate   


 


Respiratory 18 18 18





Rate   


 


Blood Pressure   





(mmHg)   


 


O2 Sat by Pulse   





Oximetry   














  12/01/17 12/01/17 12/01/17





  00:07 01:15 02:05


 


Temperature 99.3 F  


 


Pulse Rate 96 80 


 


Respiratory 20 20 20





Rate   


 


Blood Pressure 129/98  





(mmHg)   


 


O2 Sat by Pulse 100 98 





Oximetry   














  12/01/17 12/01/17 12/01/17





  02:10 02:11 02:33


 


Temperature   


 


Pulse Rate   


 


Respiratory 20 20 18





Rate   


 


Blood Pressure   





(mmHg)   


 


O2 Sat by Pulse   





Oximetry   














  12/01/17 12/01/17 12/01/17





  03:13 03:36 04:13


 


Temperature 98.5 F  


 


Pulse Rate 83  


 


Respiratory 20 18 20





Rate   


 


Blood Pressure 161/56  





(mmHg)   


 


O2 Sat by Pulse 100  





Oximetry   














  12/01/17 12/01/17 12/01/17





  07:21 07:24 07:38


 


Temperature   98.0 F


 


Pulse Rate   70


 


Respiratory 18 20 18





Rate   


 


Blood Pressure   138/53





(mmHg)   


 


O2 Sat by Pulse   99





Oximetry   














  12/01/17 12/01/17 12/01/17





  08:00 09:34 09:35


 


Temperature   


 


Pulse Rate   


 


Respiratory 18 18 18





Rate   


 


Blood Pressure   





(mmHg)   


 


O2 Sat by Pulse   





Oximetry   














  12/01/17 12/01/17 12/01/17





  10:02 12:18 12:39


 


Temperature  98.2 F 


 


Pulse Rate 80 75 


 


Respiratory  16 18





Rate   


 


Blood Pressure  130/46 





(mmHg)   


 


O2 Sat by Pulse 100 99 





Oximetry   











Oxygen Devices in Use Now: Nasal Cannula


Appearance: Alert, on her side in bed, blanket pulled up to her chin.  In fair 

spirits.  Looks comfortable.


Eyes: No Scleral Icterus


Neck: NL Appearance and Movements; NL JVP, No Thyroid Enlargement, Masses


Respiratory: Symmetrical Chest Expansion and Respiratory Effort, Clear to 

Auscultation, Clear to Percussion


Cardiovascular: NL Sounds; No Murmurs; No JVD, No Edema


Extremities: No Edema, No Clubbing, Cyanosis, -


Skin: No Rash or Ulcers, No Nodules or Sclerosis, -


Neurological: Alert and Oriented x 3, NL Sensation


Result Diagrams: 


 12/01/17 06:58





 12/01/17 06:58


Additional Lab and Data: 


 Lab Results











  11/30/17 11/30/17 11/30/17 Range/Units





  01:30 01:30 01:30 


 


WBC   13.2 H   (3.5-10.8)  10^3/ul


 


RBC   4.28   (4.0-5.4)  10^6/ul


 


Hgb   13.4   (12.0-16.0)  g/dl


 


Hct   41   (35-47)  %


 


MCV   95   (80-97)  fL


 


MCH   31   (27-31)  pg


 


MCHC   33   (31-36)  g/dl


 


RDW   14   (10.5-15)  %


 


Plt Count   261   (150-450)  10^3/ul


 


MPV   8   (7.4-10.4)  um3


 


Neut % (Auto)   65.8   (38-83)  %


 


Lymph % (Auto)   25.5   (25-47)  %


 


Mono % (Auto)   7.0   (1-9)  %


 


Eos % (Auto)   0.6   (0-6)  %


 


Baso % (Auto)   1.1   (0-2)  %


 


Absolute Neuts (auto)   8.7 H   (1.5-7.7)  10^3/ul


 


Absolute Lymphs (auto)   3.4   (1.0-4.8)  10^3/ul


 


Absolute Monos (auto)   0.9 H   (0-0.8)  10^3/ul


 


Absolute Eos (auto)   0.1   (0-0.6)  10^3/ul


 


Absolute Basos (auto)   0.1   (0-0.2)  10^3/ul


 


Absolute Nucleated RBC   0.01   10^3/ul


 


Nucleated RBC %   0.1   


 


Sodium    136  (133-145)  mmol/L


 


Potassium    TNP  


 


Chloride    98 L  (101-111)  mmol/L


 


Carbon Dioxide    25  (22-32)  mmol/L


 


Anion Gap    13 H  (2-11)  mmol/L


 


BUN    38 H  (6-24)  mg/dL


 


Creatinine    1.77 H  (0.51-0.95)  mg/dL


 


Est GFR ( Amer)    36.2  (>60)  


 


Est GFR (Non-Af Amer)    28.1  (>60)  


 


BUN/Creatinine Ratio    21.5 H  (8-20)  


 


Glucose    170 H  ()  mg/dL


 


Lactic Acid  3.9 H*    (0.5-2.0)  mmol/L


 


Calcium    9.1  (8.6-10.3)  mg/dL


 


Total Bilirubin    0.30  (0.2-1.0)  mg/dL


 


AST    TNP  


 


ALT    38  (7-52)  U/L


 


Alkaline Phosphatase    70  ()  U/L


 


Troponin I    0.03  (<0.04)  ng/mL


 


Total Protein    6.6  (6.4-8.9)  g/dL


 


Albumin    3.7  (3.2-5.2)  g/dL


 


Globulin    2.9  (2-4)  g/dL


 


Albumin/Globulin Ratio    1.3  (1-3)  














  11/30/17 Range/Units





  03:23 


 


WBC   (3.5-10.8)  10^3/ul


 


RBC   (4.0-5.4)  10^6/ul


 


Hgb   (12.0-16.0)  g/dl


 


Hct   (35-47)  %


 


MCV   (80-97)  fL


 


MCH   (27-31)  pg


 


MCHC   (31-36)  g/dl


 


RDW   (10.5-15)  %


 


Plt Count   (150-450)  10^3/ul


 


MPV   (7.4-10.4)  um3


 


Neut % (Auto)   (38-83)  %


 


Lymph % (Auto)   (25-47)  %


 


Mono % (Auto)   (1-9)  %


 


Eos % (Auto)   (0-6)  %


 


Baso % (Auto)   (0-2)  %


 


Absolute Neuts (auto)   (1.5-7.7)  10^3/ul


 


Absolute Lymphs (auto)   (1.0-4.8)  10^3/ul


 


Absolute Monos (auto)   (0-0.8)  10^3/ul


 


Absolute Eos (auto)   (0-0.6)  10^3/ul


 


Absolute Basos (auto)   (0-0.2)  10^3/ul


 


Absolute Nucleated RBC   10^3/ul


 


Nucleated RBC %   


 


Sodium   (133-145)  mmol/L


 


Potassium  4.0  


 


Chloride   (101-111)  mmol/L


 


Carbon Dioxide   (22-32)  mmol/L


 


Anion Gap   (2-11)  mmol/L


 


BUN   (6-24)  mg/dL


 


Creatinine   (0.51-0.95)  mg/dL


 


Est GFR ( Amer)   (>60)  


 


Est GFR (Non-Af Amer)   (>60)  


 


BUN/Creatinine Ratio   (8-20)  


 


Glucose   ()  mg/dL


 


Lactic Acid   (0.5-2.0)  mmol/L


 


Calcium   (8.6-10.3)  mg/dL


 


Total Bilirubin   (0.2-1.0)  mg/dL


 


AST  30  


 


ALT   (7-52)  U/L


 


Alkaline Phosphatase   ()  U/L


 


Troponin I   (<0.04)  ng/mL


 


Total Protein   (6.4-8.9)  g/dL


 


Albumin   (3.2-5.2)  g/dL


 


Globulin   (2-4)  g/dL


 


Albumin/Globulin Ratio   (1-3)  














Assess/Plan/Problems-Billing


Assessment: 











- Patient Problems


(1) COPD exacerbation


Current Visit: No   Status: Acute   Priority: High   Code(s): J44.1 - CHRONIC 

OBSTRUCTIVE PULMONARY DISEASE W (ACUTE) EXACERBATION   SNOMED Code(s): 

220452503294226


   Comment: Nurse felt patient was worse and an additional 60 mg IV 

methyprednisolone given 11/30.  ABG's OK 11/30/17.  Continue azith/ceftri.  Pt 

states she last smoked 11/24/17.


Prednisone taper.    





(2) Lung cancer


Current Visit: No   Status: Acute   Code(s): C34.90 - MALIGNANT NEOPLASM OF 

UNSP PART OF UNSP BRONCHUS OR LUNG   SNOMED Code(s): 358932033


   Comment: Patient missed the start of her RT tx, was supposed to go MWF.  

Consider starting 12/4 at the earliest.  Discussed with Dr. Higgins 11/30/17.   





(3) History of pulmonary embolism


Current Visit: No   Status: Chronic   Priority: Medium   Code(s): Z86.711 - 

PERSONAL HISTORY OF PULMONARY EMBOLISM   SNOMED Code(s): 878627348


   Comment: Continue warfarin at home dose.  INR 12/1/17 was 1.96, repeat in a 

few days.   





(4) CKD (chronic kidney disease), stage IV


Current Visit: No   Status: Chronic   Code(s): N18.4 - CHRONIC KIDNEY DISEASE, 

STAGE 4 (SEVERE)   SNOMED Code(s): 855218713


   Comment: About at her baseline.    





(5) Crohn's disease


Current Visit: No   Status: Chronic   Code(s): K50.90 - CROHN'S DISEASE, 

UNSPECIFIED, WITHOUT COMPLICATIONS   SNOMED Code(s): 75597085


   Comment: 


S/P ileostomy.  Pt concerned about high oupt, feels she is dehydrated.  Output 

from ileostomy 11/30-12/1 recorded as 1050 ml.  IV at 60 ml/hr ordered, would 

be more than her ileostomy output.  She should be able to drink enough to stay 

hydrated. 





   





(6) Tobacco abuse


Current Visit: No   Status: Acute   Code(s): Z72.0 - TOBACCO USE   SNOMED Code(s

): 470543990


   Comment: Pt advised to quit smoking.

## 2017-12-02 LAB
ANION GAP SERPL CALC-SCNC: 7 MMOL/L (ref 2–11)
BUN SERPL-MCNC: 27 MG/DL (ref 6–24)
BUN/CREAT SERPL: 18.1 (ref 8–20)
CALCIUM SERPL-MCNC: 8 MG/DL (ref 8.6–10.3)
CHLORIDE SERPL-SCNC: 104 MMOL/L (ref 101–111)
GLUCOSE SERPL-MCNC: 123 MG/DL (ref 70–100)
HCO3 SERPL-SCNC: 28 MMOL/L (ref 22–32)
POTASSIUM SERPL-SCNC: 3.6 MMOL/L (ref 3.5–5)
SODIUM SERPL-SCNC: 139 MMOL/L (ref 133–145)

## 2017-12-02 RX ADMIN — MORPHINE TINCTURE SCH MG: 1 SOLUTION ORAL at 01:14

## 2017-12-02 RX ADMIN — GLYCERIN PRN DROP: .002; .002; .01 SOLUTION/ DROPS OPHTHALMIC at 21:03

## 2017-12-02 RX ADMIN — TIOTROPIUM BROMIDE SCH CAP: 18 CAPSULE ORAL; RESPIRATORY (INHALATION) at 07:46

## 2017-12-02 RX ADMIN — CINACALCET HYDROCHLORIDE SCH MG: 30 TABLET, COATED ORAL at 09:45

## 2017-12-02 RX ADMIN — IPRATROPIUM BROMIDE SCH MG: 0.5 SOLUTION RESPIRATORY (INHALATION) at 12:43

## 2017-12-02 RX ADMIN — IPRATROPIUM BROMIDE SCH MG: 0.5 SOLUTION RESPIRATORY (INHALATION) at 20:46

## 2017-12-02 RX ADMIN — MORPHINE TINCTURE SCH MG: 1 SOLUTION ORAL at 09:44

## 2017-12-02 RX ADMIN — MORPHINE SULFATE PRN MG: 2 INJECTION, SOLUTION INTRAMUSCULAR; INTRAVENOUS at 04:07

## 2017-12-02 RX ADMIN — DEXTROSE MONOHYDRATE, SODIUM CHLORIDE, AND POTASSIUM CHLORIDE SCH MLS/HR: 50; 4.5; 1.49 INJECTION, SOLUTION INTRAVENOUS at 14:25

## 2017-12-02 RX ADMIN — MORPHINE TINCTURE SCH MG: 1 SOLUTION ORAL at 17:16

## 2017-12-02 RX ADMIN — MENTHOL AND BENZOCAINE PRN LOZ: 10; 6 LOZENGE ORAL at 21:03

## 2017-12-02 RX ADMIN — MORPHINE SULFATE PRN MG: 2 INJECTION, SOLUTION INTRAMUSCULAR; INTRAVENOUS at 11:13

## 2017-12-02 RX ADMIN — IPRATROPIUM BROMIDE SCH MG: 0.5 SOLUTION RESPIRATORY (INHALATION) at 07:46

## 2017-12-02 RX ADMIN — SALINE NASAL SPRAY PRN SPRAY: 1.5 SOLUTION NASAL at 21:23

## 2017-12-02 RX ADMIN — ACETAMINOPHEN PRN MG: 325 TABLET ORAL at 01:18

## 2017-12-02 RX ADMIN — LORAZEPAM PRN MG: 0.5 TABLET ORAL at 06:16

## 2017-12-02 RX ADMIN — ASPIRIN SCH MG: 81 TABLET, COATED ORAL at 09:45

## 2017-12-02 RX ADMIN — MORPHINE TINCTURE SCH MG: 1 SOLUTION ORAL at 05:44

## 2017-12-02 RX ADMIN — MORPHINE SULFATE PRN MG: 2 INJECTION, SOLUTION INTRAMUSCULAR; INTRAVENOUS at 21:22

## 2017-12-02 RX ADMIN — LORAZEPAM PRN MG: 0.5 TABLET ORAL at 15:53

## 2017-12-02 RX ADMIN — LORAZEPAM PRN MG: 0.5 TABLET ORAL at 11:13

## 2017-12-02 RX ADMIN — LORAZEPAM PRN MG: 0.5 TABLET ORAL at 01:16

## 2017-12-02 RX ADMIN — GUAIFENESIN PRN MG: 600 TABLET, EXTENDED RELEASE ORAL at 01:51

## 2017-12-02 RX ADMIN — CALCITRIOL SCH MCG: 0.25 CAPSULE ORAL at 09:45

## 2017-12-02 RX ADMIN — MORPHINE TINCTURE SCH MG: 1 SOLUTION ORAL at 14:24

## 2017-12-02 RX ADMIN — ACETAMINOPHEN PRN MG: 325 TABLET ORAL at 06:15

## 2017-12-02 RX ADMIN — CEFTRIAXONE SODIUM SCH MLS/HR: 1 INJECTION, POWDER, FOR SOLUTION INTRAMUSCULAR; INTRAVENOUS at 03:56

## 2017-12-02 RX ADMIN — ACETAMINOPHEN PRN MG: 325 TABLET ORAL at 15:53

## 2017-12-02 RX ADMIN — IPRATROPIUM BROMIDE SCH MG: 0.5 SOLUTION RESPIRATORY (INHALATION) at 01:48

## 2017-12-02 RX ADMIN — LORAZEPAM PRN MG: 0.5 TABLET ORAL at 21:22

## 2017-12-02 RX ADMIN — FLUTICASONE PROPIONATE SCH SPRAY: 50 SPRAY, METERED NASAL at 09:45

## 2017-12-02 RX ADMIN — PAROXETINE SCH MG: 10 TABLET, FILM COATED ORAL at 09:45

## 2017-12-02 RX ADMIN — AZITHROMYCIN SCH MLS/HR: 500 INJECTION, POWDER, LYOPHILIZED, FOR SOLUTION INTRAVENOUS at 05:44

## 2017-12-02 RX ADMIN — MORPHINE TINCTURE SCH MG: 1 SOLUTION ORAL at 21:23

## 2017-12-02 NOTE — PN
Subjective


Date of Service: 12/02/17


Interval History: 





Sometimes feels like her throat is "closing up".  Gets SOB walking a few steps 

in her room.





Objective


Active Medications: 








Acetaminophen (Tylenol Tab*)  650 mg PO Q4H PRN


   PRN Reason: FEVER/PAIN


   Last Admin: 12/02/17 06:15 Dose:  650 mg


Al Hydrox/Mg Hydrox/Simethicone (Maalox Plus*)  30 ml PO Q6H PRN


   PRN Reason: INDIGESTION


Albuterol (Ventolin 2.5 Mg/3 Ml Neb.Sol*)  2.5 mg INH Q2H PRN


   PRN Reason: SOB/WHEEZING


   Last Admin: 11/30/17 13:09 Dose:  2.5 mg


Aspirin (Aspirin Ec Low Dose*)  81 mg PO DAILY [facility]


   Last Admin: 12/02/17 09:45 Dose:  81 mg


Calcitriol (Rocaltrol  Cap*)  0.25 mcg PO DAILY [facility]


   Last Admin: 12/02/17 09:45 Dose:  0.25 mcg


Cinacalcet (Sensipar Tab*)  30 mg PO DAILY [facility]


   Last Admin: 12/02/17 09:45 Dose:  30 mg


Device (Tiotropium Inhaler Device*)  1 each .SEE ORDER .USE w/ SPIRIVA CAPS [facility]


Fluticasone Propionate (Flonase Nasal Spray 50mcg*)  2 spray BOTH NARES DAILY 

[facility]


   Last Admin: 12/02/17 09:45 Dose:  2 spray


Guaifenesin (Mucinex*)  600 mg PO BID PRN


   PRN Reason: COUGH


   Last Admin: 12/02/17 01:51 Dose:  600 mg


Azithromycin 250 mg/ Dextrose  250 mls @ 250 mls/hr IVPB Q24H [facility]


   Last Admin: 12/02/17 05:44 Dose:  250 mls/hr


Ceftriaxone Sodium 1,000 mg/ (Dextrose)  50 mls @ 200 mls/hr IVPB Q24H [facility]


   Last Admin: 12/02/17 03:56 Dose:  200 mls/hr


Potassium Chloride/Dextrose (D5w 1/2 Ns Kcl 20 Meq 1000 Ml*)  1,000 mls @ 60 mls

/hr IV PER RATE [facility]


   Last Admin: 12/01/17 13:47 Dose:  60 mls/hr


Ipratropium Bromide (Atrovent 0.5 Mg Neb.Sol*)  0.5 mg INH [id number] AWAKE 

[facility]


   Last Admin: 12/02/17 12:43 Dose:  0.5 mg


Lorazepam (Ativan Tab(*))  0.5 mg PO Q4H PRN


   PRN Reason: ANXIETY


   Last Admin: 12/02/17 11:13 Dose:  0.5 mg


Morphine Sulfate (Morphine Inj (Syringe)*)  1 mg IV Q4H PRN


   PRN Reason: PAIN


   Last Admin: 12/02/17 11:13 Dose:  1 mg


Ondansetron HCl (Zofran Inj*)  4 mg IV Q4H PRN


   PRN Reason: NAUSEA/VOMITING


Opium Tincture (Opium Tincture*)  0.6 mg PO Q4HR [facility]


   Last Admin: 12/02/17 09:44 Dose:  0.6 mg


Paroxetine HCl (Paxil Tab*)  30 mg PO DAILY [facility]


   Last Admin: 12/02/17 09:45 Dose:  30 mg


Pharmacy Profile Note (Coumadin Per Pharmacy*)  1 note FOLLOW UP .PER PHARMACY 

PROTOC [facility]


   PRN Reason: Protocol


Prednisone (Deltasone Tab*)  30 mg PO DAILY [facility]


   Last Admin: 12/02/17 09:45 Dose:  30 mg


Tiotropium Bromide (Spiriva Cap.Inh*)  1 cap INH DAILY [facility]


   Last Admin: 12/02/17 07:46 Dose:  1 cap








 Vital Signs











  12/01/17 12/01/17 12/01/17





  13:47 13:48 15:31


 


Temperature   98.2 F


 


Pulse Rate   84


 


Respiratory 16 16 16





Rate   


 


Blood Pressure   133/61





(mmHg)   


 


O2 Sat by Pulse   98





Oximetry   














  12/01/17 12/01/17 12/01/17





  17:05 17:06 17:30


 


Temperature   


 


Pulse Rate   


 


Respiratory 16 16 20





Rate   


 


Blood Pressure   





(mmHg)   


 


O2 Sat by Pulse   





Oximetry   














  12/01/17 12/01/17 12/01/17





  19:56 20:00 20:01


 


Temperature 98.4 F  


 


Pulse Rate 83  


 


Respiratory 19 18 19





Rate   


 


Blood Pressure 133/52  





(mmHg)   


 


O2 Sat by Pulse 97  





Oximetry   














  12/01/17 12/01/17 12/01/17





  20:02 20:24 21:00


 


Temperature   


 


Pulse Rate  76 


 


Respiratory 19 18 198





Rate   


 


Blood Pressure   





(mmHg)   


 


O2 Sat by Pulse  98 





Oximetry   














  12/01/17 12/01/17 12/02/17





  21:08 23:07 00:05


 


Temperature   


 


Pulse Rate   


 


Respiratory 19 20 19





Rate   


 


Blood Pressure   





(mmHg)   


 


O2 Sat by Pulse   





Oximetry   














  12/02/17 12/02/17 12/02/17





  00:19 01:14 01:16


 


Temperature 98.1 F  


 


Pulse Rate 92  


 


Respiratory 16 17 17





Rate   


 


Blood Pressure 153/70  





(mmHg)   


 


O2 Sat by Pulse 97  





Oximetry   














  12/02/17 12/02/17 12/02/17





  01:17 01:50 03:42


 


Temperature   


 


Pulse Rate  76 


 


Respiratory 18 18 18





Rate   


 


Blood Pressure   





(mmHg)   


 


O2 Sat by Pulse  99 





Oximetry   














  12/02/17 12/02/17 12/02/17





  03:43 04:05 04:07


 


Temperature  98.2 F 


 


Pulse Rate  66 


 


Respiratory 18 16 19





Rate   


 


Blood Pressure  143/65 





(mmHg)   


 


O2 Sat by Pulse   





Oximetry   














  12/02/17 12/02/17 12/02/17





  04:16 05:20 05:21


 


Temperature 98.1 F  


 


Pulse Rate 66  


 


Respiratory 16 18 18





Rate   


 


Blood Pressure 144/62  





(mmHg)   


 


O2 Sat by Pulse 99  





Oximetry   














  12/02/17 12/02/17 12/02/17





  05:44 06:16 07:24


 


Temperature   97.8 F


 


Pulse Rate   59


 


Respiratory 17 19 16





Rate   


 


Blood Pressure   137/56





(mmHg)   


 


O2 Sat by Pulse   100





Oximetry   














  12/02/17 12/02/17 12/02/17





  07:48 09:44 09:55


 


Temperature   


 


Pulse Rate 55  


 


Respiratory 16 16 18





Rate   


 


Blood Pressure   





(mmHg)   


 


O2 Sat by Pulse 98  





Oximetry   














  12/02/17 12/02/17 12/02/17





  09:56 11:13 12:45


 


Temperature   


 


Pulse Rate   76


 


Respiratory 18 18 16





Rate   


 


Blood Pressure   





(mmHg)   


 


O2 Sat by Pulse   98





Oximetry   











Oxygen Devices in Use Now: Nasal Cannula


Appearance: Alert, on her L side in bed.  Infrequent moist cough.  Resp quiet 

otherwise.   Looks fatigued/depressed.


Eyes: No Scleral Icterus


Ears/Nose/Mouth/Throat: Clear Oropharnyx, Mucous Membranes Moist


Neck: NL Appearance and Movements; NL JVP, No Thyroid Enlargement, Masses


Respiratory: Symmetrical Chest Expansion and Respiratory Effort, Clear to 

Auscultation, Clear to Percussion


Extremities: No Edema, No Clubbing, Cyanosis, -


Skin: No Rash or Ulcers, No Nodules or Sclerosis, -


Neurological: Alert and Oriented x 3, NL Sensation


Result Diagrams: 


 12/01/17 06:58





 12/02/17 09:04


Additional Lab and Data: 


 Lab Results











  11/30/17 11/30/17 11/30/17 Range/Units





  01:30 01:30 01:30 


 


WBC   13.2 H   (3.5-10.8)  10^3/ul


 


RBC   4.28   (4.0-5.4)  10^6/ul


 


Hgb   13.4   (12.0-16.0)  g/dl


 


Hct   41   (35-47)  %


 


MCV   95   (80-97)  fL


 


MCH   31   (27-31)  pg


 


MCHC   33   (31-36)  g/dl


 


RDW   14   (10.5-15)  %


 


Plt Count   261   (150-450)  10^3/ul


 


MPV   8   (7.4-10.4)  um3


 


Neut % (Auto)   65.8   (38-83)  %


 


Lymph % (Auto)   25.5   (25-47)  %


 


Mono % (Auto)   7.0   (1-9)  %


 


Eos % (Auto)   0.6   (0-6)  %


 


Baso % (Auto)   1.1   (0-2)  %


 


Absolute Neuts (auto)   8.7 H   (1.5-7.7)  10^3/ul


 


Absolute Lymphs (auto)   3.4   (1.0-4.8)  10^3/ul


 


Absolute Monos (auto)   0.9 H   (0-0.8)  10^3/ul


 


Absolute Eos (auto)   0.1   (0-0.6)  10^3/ul


 


Absolute Basos (auto)   0.1   (0-0.2)  10^3/ul


 


Absolute Nucleated RBC   0.01   10^3/ul


 


Nucleated RBC %   0.1   


 


Sodium    136  (133-145)  mmol/L


 


Potassium    TNP  


 


Chloride    98 L  (101-111)  mmol/L


 


Carbon Dioxide    25  (22-32)  mmol/L


 


Anion Gap    13 H  (2-11)  mmol/L


 


BUN    38 H  (6-24)  mg/dL


 


Creatinine    1.77 H  (0.51-0.95)  mg/dL


 


Est GFR ( Amer)    36.2  (>60)  


 


Est GFR (Non-Af Amer)    28.1  (>60)  


 


BUN/Creatinine Ratio    21.5 H  (8-20)  


 


Glucose    170 H  ()  mg/dL


 


Lactic Acid  3.9 H*    (0.5-2.0)  mmol/L


 


Calcium    9.1  (8.6-10.3)  mg/dL


 


Total Bilirubin    0.30  (0.2-1.0)  mg/dL


 


AST    TNP  


 


ALT    38  (7-52)  U/L


 


Alkaline Phosphatase    70  ()  U/L


 


Troponin I    0.03  (<0.04)  ng/mL


 


Total Protein    6.6  (6.4-8.9)  g/dL


 


Albumin    3.7  (3.2-5.2)  g/dL


 


Globulin    2.9  (2-4)  g/dL


 


Albumin/Globulin Ratio    1.3  (1-3)  














  11/30/17 Range/Units





  03:23 


 


WBC   (3.5-10.8)  10^3/ul


 


RBC   (4.0-5.4)  10^6/ul


 


Hgb   (12.0-16.0)  g/dl


 


Hct   (35-47)  %


 


MCV   (80-97)  fL


 


MCH   (27-31)  pg


 


MCHC   (31-36)  g/dl


 


RDW   (10.5-15)  %


 


Plt Count   (150-450)  10^3/ul


 


MPV   (7.4-10.4)  um3


 


Neut % (Auto)   (38-83)  %


 


Lymph % (Auto)   (25-47)  %


 


Mono % (Auto)   (1-9)  %


 


Eos % (Auto)   (0-6)  %


 


Baso % (Auto)   (0-2)  %


 


Absolute Neuts (auto)   (1.5-7.7)  10^3/ul


 


Absolute Lymphs (auto)   (1.0-4.8)  10^3/ul


 


Absolute Monos (auto)   (0-0.8)  10^3/ul


 


Absolute Eos (auto)   (0-0.6)  10^3/ul


 


Absolute Basos (auto)   (0-0.2)  10^3/ul


 


Absolute Nucleated RBC   10^3/ul


 


Nucleated RBC %   


 


Sodium   (133-145)  mmol/L


 


Potassium  4.0  


 


Chloride   (101-111)  mmol/L


 


Carbon Dioxide   (22-32)  mmol/L


 


Anion Gap   (2-11)  mmol/L


 


BUN   (6-24)  mg/dL


 


Creatinine   (0.51-0.95)  mg/dL


 


Est GFR ( Amer)   (>60)  


 


Est GFR (Non-Af Amer)   (>60)  


 


BUN/Creatinine Ratio   (8-20)  


 


Glucose   ()  mg/dL


 


Lactic Acid   (0.5-2.0)  mmol/L


 


Calcium   (8.6-10.3)  mg/dL


 


Total Bilirubin   (0.2-1.0)  mg/dL


 


AST  30  


 


ALT   (7-52)  U/L


 


Alkaline Phosphatase   ()  U/L


 


Troponin I   (<0.04)  ng/mL


 


Total Protein   (6.4-8.9)  g/dL


 


Albumin   (3.2-5.2)  g/dL


 


Globulin   (2-4)  g/dL


 


Albumin/Globulin Ratio   (1-3)  














Assess/Plan/Problems-Billing


Assessment: 











- Patient Problems


(1) COPD exacerbation


Current Visit: No   Status: Acute   Priority: High   Code(s): J44.1 - CHRONIC 

OBSTRUCTIVE PULMONARY DISEASE W (ACUTE) EXACERBATION   SNOMED Code(s): 

[id number]


   Comment: Nurse felt patient was worse and an additional 60 mg IV 

methyprednisolone given 11/30.  ABG's OK 11/30/17.  Continue azith/ceftri.  Pt 

states she last smoked 11/24/17.


Continue prednisone taper.    





(2) Lung cancer


Current Visit: No   Status: Acute   Code(s): C34.90 - MALIGNANT NEOPLASM OF 

UNSP PART OF UNSP BRONCHUS OR LUNG   SNOMED Code(s): 229580838


   Comment: Patient missed the start of her RT tx, was supposed to go MWF.  

Consider starting 12/4, discharge home after that.  Discussed with Dr. Higgins 11 /30/17.   





(3) History of pulmonary embolism


Current Visit: No   Status: Chronic   Priority: Medium   Code(s): Z86.711 - 

PERSONAL HISTORY OF PULMONARY EMBOLISM   SNOMED Code(s): 387466115


   Comment: Continue warfarin at home dose.  INR 12/1/17 was 1.96, repeat 12/3.

   





(4) CKD (chronic kidney disease), stage IV


Current Visit: No   Status: Chronic   Code(s): N18.4 - CHRONIC KIDNEY DISEASE, 

STAGE 4 (SEVERE)   SNOMED Code(s): 145082447


   Comment: About at her baseline.    





(5) Crohn's disease


Current Visit: No   Status: Chronic   Code(s): K50.90 - CROHN'S DISEASE, 

UNSPECIFIED, WITHOUT COMPLICATIONS   SNOMED Code(s): 12162352


   Comment: 


S/P ileostomy.  Pt concerned about high oupt, feels she is dehydrated.  Output 

from ileostomy 11/30-12/1 recorded as 1050 ml.  IV at 60 ml/hr ordered, would 

be more than her ileostomy output.  She should be able to drink enough to stay 

hydrated. 





   





(6) Tobacco abuse


Current Visit: No   Status: Acute   Code(s): Z72.0 - TOBACCO USE   SNOMED Code(s

): 132628013


   Comment: Pt advised to quit smoking.

## 2017-12-03 RX ADMIN — IPRATROPIUM BROMIDE SCH MG: 0.5 SOLUTION RESPIRATORY (INHALATION) at 07:56

## 2017-12-03 RX ADMIN — LORAZEPAM PRN MG: 0.5 TABLET ORAL at 14:32

## 2017-12-03 RX ADMIN — IPRATROPIUM BROMIDE SCH MG: 0.5 SOLUTION RESPIRATORY (INHALATION) at 19:02

## 2017-12-03 RX ADMIN — LORAZEPAM PRN MG: 0.5 TABLET ORAL at 09:54

## 2017-12-03 RX ADMIN — SALINE NASAL SPRAY PRN SPRAY: 1.5 SOLUTION NASAL at 22:53

## 2017-12-03 RX ADMIN — IPRATROPIUM BROMIDE SCH MG: 0.5 SOLUTION RESPIRATORY (INHALATION) at 01:19

## 2017-12-03 RX ADMIN — MORPHINE TINCTURE SCH MG: 1 SOLUTION ORAL at 01:26

## 2017-12-03 RX ADMIN — MENTHOL AND BENZOCAINE PRN LOZ: 10; 6 LOZENGE ORAL at 03:43

## 2017-12-03 RX ADMIN — LORAZEPAM PRN MG: 0.5 TABLET ORAL at 05:46

## 2017-12-03 RX ADMIN — TIOTROPIUM BROMIDE SCH CAP: 18 CAPSULE ORAL; RESPIRATORY (INHALATION) at 07:57

## 2017-12-03 RX ADMIN — MORPHINE TINCTURE SCH MG: 1 SOLUTION ORAL at 22:48

## 2017-12-03 RX ADMIN — MENTHOL AND BENZOCAINE PRN LOZ: 10; 6 LOZENGE ORAL at 09:56

## 2017-12-03 RX ADMIN — SALINE NASAL SPRAY PRN SPRAY: 1.5 SOLUTION NASAL at 01:24

## 2017-12-03 RX ADMIN — MORPHINE TINCTURE SCH MG: 1 SOLUTION ORAL at 09:53

## 2017-12-03 RX ADMIN — LORAZEPAM PRN MG: 0.5 TABLET ORAL at 20:00

## 2017-12-03 RX ADMIN — AZITHROMYCIN SCH MLS/HR: 500 INJECTION, POWDER, LYOPHILIZED, FOR SOLUTION INTRAVENOUS at 06:29

## 2017-12-03 RX ADMIN — PAROXETINE SCH MG: 10 TABLET, FILM COATED ORAL at 09:54

## 2017-12-03 RX ADMIN — MORPHINE SULFATE PRN MG: 2 INJECTION, SOLUTION INTRAMUSCULAR; INTRAVENOUS at 01:25

## 2017-12-03 RX ADMIN — CEFTRIAXONE SODIUM SCH MLS/HR: 1 INJECTION, POWDER, FOR SOLUTION INTRAMUSCULAR; INTRAVENOUS at 05:46

## 2017-12-03 RX ADMIN — MORPHINE SULFATE PRN MG: 2 INJECTION, SOLUTION INTRAMUSCULAR; INTRAVENOUS at 09:51

## 2017-12-03 RX ADMIN — MORPHINE SULFATE PRN MG: 2 INJECTION, SOLUTION INTRAMUSCULAR; INTRAVENOUS at 05:45

## 2017-12-03 RX ADMIN — MORPHINE TINCTURE SCH MG: 1 SOLUTION ORAL at 05:46

## 2017-12-03 RX ADMIN — FLUTICASONE PROPIONATE SCH SPRAY: 50 SPRAY, METERED NASAL at 09:56

## 2017-12-03 RX ADMIN — GLYCERIN PRN DROP: .002; .002; .01 SOLUTION/ DROPS OPHTHALMIC at 22:51

## 2017-12-03 RX ADMIN — MORPHINE TINCTURE SCH MG: 1 SOLUTION ORAL at 14:32

## 2017-12-03 RX ADMIN — IPRATROPIUM BROMIDE SCH MG: 0.5 SOLUTION RESPIRATORY (INHALATION) at 12:55

## 2017-12-03 RX ADMIN — CINACALCET HYDROCHLORIDE SCH MG: 30 TABLET, COATED ORAL at 09:53

## 2017-12-03 RX ADMIN — WARFARIN SODIUM SCH MG: 1 TABLET ORAL at 18:43

## 2017-12-03 RX ADMIN — LORAZEPAM PRN MG: 0.5 TABLET ORAL at 01:26

## 2017-12-03 RX ADMIN — ASPIRIN SCH MG: 81 TABLET, COATED ORAL at 09:55

## 2017-12-03 RX ADMIN — ACETAMINOPHEN PRN MG: 325 TABLET ORAL at 20:09

## 2017-12-03 RX ADMIN — MORPHINE TINCTURE SCH MG: 1 SOLUTION ORAL at 18:43

## 2017-12-03 RX ADMIN — CALCITRIOL SCH MCG: 0.25 CAPSULE ORAL at 09:53

## 2017-12-03 RX ADMIN — ACETAMINOPHEN PRN MG: 325 TABLET ORAL at 03:43

## 2017-12-03 NOTE — PN
Subjective


Date of Service: 12/03/17


Interval History: 





Feels better.  Ileostomy output acceptable to her.  Appetite OK.  





Objective


Active Medications: 








Acetaminophen (Tylenol Tab*)  650 mg PO Q4H PRN


   PRN Reason: FEVER/PAIN


   Last Admin: 12/03/17 03:43 Dose:  650 mg


Al Hydrox/Mg Hydrox/Simethicone (Maalox Plus*)  30 ml PO Q6H PRN


   PRN Reason: INDIGESTION


Albuterol (Ventolin 2.5 Mg/3 Ml Neb.Sol*)  2.5 mg INH Q2H PRN


   PRN Reason: SOB/WHEEZING


   Last Admin: 11/30/17 13:09 Dose:  2.5 mg


Aspirin (Aspirin Ec Low Dose*)  81 mg PO DAILY [facility]


   Last Admin: 12/02/17 09:45 Dose:  81 mg


Calcitriol (Rocaltrol  Cap*)  0.25 mcg PO DAILY [facility]


   Last Admin: 12/02/17 09:45 Dose:  0.25 mcg


Cinacalcet (Sensipar Tab*)  30 mg PO DAILY [facility]


   Last Admin: 12/02/17 09:45 Dose:  30 mg


Device (Tiotropium Inhaler Device*)  1 each .SEE ORDER .USE w/ SPIRIVA CAPS [facility]


Fluticasone Propionate (Flonase Nasal Spray 50mcg*)  2 spray BOTH NARES DAILY 

[facility]


   Last Admin: 12/02/17 09:45 Dose:  2 spray


Guaifenesin (Mucinex*)  600 mg PO BID PRN


   PRN Reason: COUGH


   Last Admin: 12/02/17 01:51 Dose:  600 mg


Azithromycin 250 mg/ Dextrose  250 mls @ 250 mls/hr IVPB Q24H [facility]


   Last Admin: 12/03/17 06:29 Dose:  250 mls/hr


Ceftriaxone Sodium 1,000 mg/ (Dextrose)  50 mls @ 200 mls/hr IVPB Q24H [facility]


   Last Admin: 12/03/17 05:46 Dose:  200 mls/hr


Ipratropium Bromide (Atrovent 0.5 Mg Neb.Sol*)  0.5 mg INH [id number] AWAKE 

[facility]


   Last Admin: 12/03/17 07:56 Dose:  0.5 mg


Lorazepam (Ativan Tab(*))  0.5 mg PO Q4H PRN


   PRN Reason: ANXIETY


   Last Admin: 12/03/17 05:46 Dose:  0.5 mg


Morphine Sulfate (Morphine Inj (Syringe)*)  1 mg IV Q4H PRN


   PRN Reason: PAIN


   Last Admin: 12/03/17 05:45 Dose:  1 mg


Ondansetron HCl (Zofran Inj*)  4 mg IV Q4H PRN


   PRN Reason: NAUSEA/VOMITING


Opium Tincture (Opium Tincture*)  6 mg PO Q4HR [facility]


   Last Admin: 12/03/17 05:46 Dose:  6 mg


Paroxetine HCl (Paxil Tab*)  30 mg PO DAILY [facility]


   Last Admin: 12/02/17 09:45 Dose:  30 mg


Pharmacy Profile Note (Coumadin Per Pharmacy*)  1 note FOLLOW UP .PER PHARMACY 

PROTOC [facility]


   PRN Reason: Protocol


Polyvinyl Alcohol (Polyvinyl Alcohol 1.4% Opth*)  1 drop BOTH EYES Q2H PRN


   PRN Reason: DRY EYE


   Last Admin: 12/02/17 21:03 Dose:  1 drop


Sodium Chloride (Sodium Chloride 0.65% Nasal Spray*)  1 spray BOTH NARES Q4H PRN


   PRN Reason: Nasal congestion


   Last Admin: 12/03/17 01:24 Dose:  1 spray


Throat Lozenges (Chloraseptic Radha*)  1 radha PO Q6H PRN


   PRN Reason: SORE THROAT


   Last Admin: 12/03/17 03:43 Dose:  1 radha


Tiotropium Bromide (Spiriva Cap.Inh*)  1 cap INH DAILY [facility]


   Last Admin: 12/03/17 07:57 Dose:  1 cap








 Vital Signs











  12/02/17 12/02/17 12/02/17





  09:44 09:55 09:56


 


Temperature   


 


Pulse Rate   


 


Respiratory 16 18 18





Rate   


 


Blood Pressure   





(mmHg)   


 


O2 Sat by Pulse   





Oximetry   














  12/02/17 12/02/17 12/02/17





  11:13 12:45 13:48


 


Temperature   


 


Pulse Rate  76 


 


Respiratory 18 16 16





Rate   


 


Blood Pressure   





(mmHg)   


 


O2 Sat by Pulse  98 





Oximetry   














  12/02/17 12/02/17 12/02/17





  13:49 13:56 14:24


 


Temperature  97.7 F 


 


Pulse Rate  79 


 


Respiratory 16 20 16





Rate   


 


Blood Pressure  143/71 





(mmHg)   


 


O2 Sat by Pulse  97 





Oximetry   














  12/02/17 12/02/17 12/02/17





  15:29 15:53 17:16


 


Temperature 98.1 F  


 


Pulse Rate 69  


 


Respiratory 20 16 20





Rate   


 


Blood Pressure 134/60  





(mmHg)   


 


O2 Sat by Pulse 98  





Oximetry   














  12/02/17 12/02/17 12/02/17





  17:21 18:05 19:39


 


Temperature   98.6 F


 


Pulse Rate   83


 


Respiratory 20 16 20





Rate   


 


Blood Pressure   125/62





(mmHg)   


 


O2 Sat by Pulse   97





Oximetry   














  12/02/17 12/02/17 12/02/17





  19:58 20:00 20:48


 


Temperature   


 


Pulse Rate   74


 


Respiratory 18 16 18





Rate   


 


Blood Pressure   





(mmHg)   


 


O2 Sat by Pulse   98





Oximetry   














  12/02/17 12/02/17 12/02/17





  21:22 21:23 23:30


 


Temperature   


 


Pulse Rate   


 


Respiratory 20 20 18





Rate   


 


Blood Pressure   





(mmHg)   


 


O2 Sat by Pulse   





Oximetry   














  12/03/17 12/03/17 12/03/17





  01:20 01:25 01:26


 


Temperature   


 


Pulse Rate 74  


 


Respiratory 18 20 20





Rate   


 


Blood Pressure   





(mmHg)   


 


O2 Sat by Pulse 98  





Oximetry   














  12/03/17 12/03/17 12/03/17





  02:34 03:35 03:40


 


Temperature   98.5 F


 


Pulse Rate   81


 


Respiratory 18 16 18





Rate   


 


Blood Pressure   147/78





(mmHg)   


 


O2 Sat by Pulse   97





Oximetry   














  12/03/17 12/03/17 12/03/17





  05:45 05:46 07:24


 


Temperature   


 


Pulse Rate   


 


Respiratory 16 18 18





Rate   


 


Blood Pressure   





(mmHg)   


 


O2 Sat by Pulse   





Oximetry   














  12/03/17





  07:59


 


Temperature 


 


Pulse Rate 78


 


Respiratory 17





Rate 


 


Blood Pressure 





(mmHg) 


 


O2 Sat by Pulse 97





Oximetry 











Oxygen Devices in Use Now: Nasal Cannula


Appearance: Alert, sitting up in bed.  In good spirits.  Looks comfortable.


Neck: NL Appearance and Movements; NL JVP, No Thyroid Enlargement, Masses


Respiratory: Symmetrical Chest Expansion and Respiratory Effort, Clear to 

Auscultation, Clear to Percussion


Cardiovascular: NL Sounds; No Murmurs; No JVD, RRR, No Edema, -


Extremities: No Edema, No Clubbing, Cyanosis, -


Skin: No Rash or Ulcers, No Nodules or Sclerosis, -


Neurological: Alert and Oriented x 3, NL Sensation


Result Diagrams: 


 12/01/17 06:58





 12/02/17 09:04


Additional Lab and Data: 


 Lab Results











  11/30/17 11/30/17 11/30/17 Range/Units





  01:30 01:30 01:30 


 


WBC   13.2 H   (3.5-10.8)  10^3/ul


 


RBC   4.28   (4.0-5.4)  10^6/ul


 


Hgb   13.4   (12.0-16.0)  g/dl


 


Hct   41   (35-47)  %


 


MCV   95   (80-97)  fL


 


MCH   31   (27-31)  pg


 


MCHC   33   (31-36)  g/dl


 


RDW   14   (10.5-15)  %


 


Plt Count   261   (150-450)  10^3/ul


 


MPV   8   (7.4-10.4)  um3


 


Neut % (Auto)   65.8   (38-83)  %


 


Lymph % (Auto)   25.5   (25-47)  %


 


Mono % (Auto)   7.0   (1-9)  %


 


Eos % (Auto)   0.6   (0-6)  %


 


Baso % (Auto)   1.1   (0-2)  %


 


Absolute Neuts (auto)   8.7 H   (1.5-7.7)  10^3/ul


 


Absolute Lymphs (auto)   3.4   (1.0-4.8)  10^3/ul


 


Absolute Monos (auto)   0.9 H   (0-0.8)  10^3/ul


 


Absolute Eos (auto)   0.1   (0-0.6)  10^3/ul


 


Absolute Basos (auto)   0.1   (0-0.2)  10^3/ul


 


Absolute Nucleated RBC   0.01   10^3/ul


 


Nucleated RBC %   0.1   


 


Sodium    136  (133-145)  mmol/L


 


Potassium    TNP  


 


Chloride    98 L  (101-111)  mmol/L


 


Carbon Dioxide    25  (22-32)  mmol/L


 


Anion Gap    13 H  (2-11)  mmol/L


 


BUN    38 H  (6-24)  mg/dL


 


Creatinine    1.77 H  (0.51-0.95)  mg/dL


 


Est GFR ( Amer)    36.2  (>60)  


 


Est GFR (Non-Af Amer)    28.1  (>60)  


 


BUN/Creatinine Ratio    21.5 H  (8-20)  


 


Glucose    170 H  ()  mg/dL


 


Lactic Acid  3.9 H*    (0.5-2.0)  mmol/L


 


Calcium    9.1  (8.6-10.3)  mg/dL


 


Total Bilirubin    0.30  (0.2-1.0)  mg/dL


 


AST    TNP  


 


ALT    38  (7-52)  U/L


 


Alkaline Phosphatase    70  ()  U/L


 


Troponin I    0.03  (<0.04)  ng/mL


 


Total Protein    6.6  (6.4-8.9)  g/dL


 


Albumin    3.7  (3.2-5.2)  g/dL


 


Globulin    2.9  (2-4)  g/dL


 


Albumin/Globulin Ratio    1.3  (1-3)  














  11/30/17 Range/Units





  03:23 


 


WBC   (3.5-10.8)  10^3/ul


 


RBC   (4.0-5.4)  10^6/ul


 


Hgb   (12.0-16.0)  g/dl


 


Hct   (35-47)  %


 


MCV   (80-97)  fL


 


MCH   (27-31)  pg


 


MCHC   (31-36)  g/dl


 


RDW   (10.5-15)  %


 


Plt Count   (150-450)  10^3/ul


 


MPV   (7.4-10.4)  um3


 


Neut % (Auto)   (38-83)  %


 


Lymph % (Auto)   (25-47)  %


 


Mono % (Auto)   (1-9)  %


 


Eos % (Auto)   (0-6)  %


 


Baso % (Auto)   (0-2)  %


 


Absolute Neuts (auto)   (1.5-7.7)  10^3/ul


 


Absolute Lymphs (auto)   (1.0-4.8)  10^3/ul


 


Absolute Monos (auto)   (0-0.8)  10^3/ul


 


Absolute Eos (auto)   (0-0.6)  10^3/ul


 


Absolute Basos (auto)   (0-0.2)  10^3/ul


 


Absolute Nucleated RBC   10^3/ul


 


Nucleated RBC %   


 


Sodium   (133-145)  mmol/L


 


Potassium  4.0  


 


Chloride   (101-111)  mmol/L


 


Carbon Dioxide   (22-32)  mmol/L


 


Anion Gap   (2-11)  mmol/L


 


BUN   (6-24)  mg/dL


 


Creatinine   (0.51-0.95)  mg/dL


 


Est GFR ( Amer)   (>60)  


 


Est GFR (Non-Af Amer)   (>60)  


 


BUN/Creatinine Ratio   (8-20)  


 


Glucose   ()  mg/dL


 


Lactic Acid   (0.5-2.0)  mmol/L


 


Calcium   (8.6-10.3)  mg/dL


 


Total Bilirubin   (0.2-1.0)  mg/dL


 


AST  30  


 


ALT   (7-52)  U/L


 


Alkaline Phosphatase   ()  U/L


 


Troponin I   (<0.04)  ng/mL


 


Total Protein   (6.4-8.9)  g/dL


 


Albumin   (3.2-5.2)  g/dL


 


Globulin   (2-4)  g/dL


 


Albumin/Globulin Ratio   (1-3)  














Assess/Plan/Problems-Billing


Assessment: 











- Patient Problems


(1) COPD exacerbation


Current Visit: No   Status: Acute   Priority: High   Code(s): J44.1 - CHRONIC 

OBSTRUCTIVE PULMONARY DISEASE W (ACUTE) EXACERBATION   SNOMED Code(s): 

889862999865060


   Comment: Nurse felt patient was worse and an additional 60 mg IV 

methyprednisolone given 11/30.  ABG's OK 11/30/17.  Continue azith/ceftri.  Pt 

states she last smoked 11/24/17.


Prednisone 10 mg daily start 12/4.  Pt states she had trouble with 7 mg 

prednisone, needs very slow taper.    





(2) Lung cancer


Current Visit: No   Status: Acute   Code(s): C34.90 - MALIGNANT NEOPLASM OF 

UNSP PART OF UNSP BRONCHUS OR LUNG   SNOMED Code(s): 671515840


   Comment: Patient missed the start of her RT tx, was supposed to have 3 

treatments. Start 12/4, discharge home after that.  Appt for 12/4 confirmed 

with Dr. Higgins 12/3/17.   





(3) History of pulmonary embolism


Current Visit: No   Status: Chronic   Priority: Medium   Code(s): Z86.711 - 

PERSONAL HISTORY OF PULMONARY EMBOLISM   SNOMED Code(s): 320643926


   Comment: Continue warfarin at home dose.  INR 12/1/17 was 1.96, repeat 12/3.

   





(4) CKD (chronic kidney disease), stage IV


Current Visit: No   Status: Chronic   Code(s): N18.4 - CHRONIC KIDNEY DISEASE, 

STAGE 4 (SEVERE)   SNOMED Code(s): 536063905


   Comment: About at her baseline.    





(5) Crohn's disease


Current Visit: No   Status: Chronic   Code(s): K50.90 - CROHN'S DISEASE, 

UNSPECIFIED, WITHOUT COMPLICATIONS   SNOMED Code(s): 77563288


   Comment: 


S/P ileostomy.  Pt concerned about high oupt, feels she is dehydrated.  Output 

from ileostomy 11/30-12/1 recorded as 1050 ml.  IV at 60 ml/hr ordered, would 

be more than her ileostomy output.  She should be able to drink enough to stay 

hydrated. 





   





(6) Tobacco abuse


Current Visit: No   Status: Acute   Code(s): Z72.0 - TOBACCO USE   SNOMED Code(s

): 781343726


   Comment: Pt advised to quit smoking.

## 2017-12-04 VITALS — SYSTOLIC BLOOD PRESSURE: 137 MMHG | DIASTOLIC BLOOD PRESSURE: 59 MMHG

## 2017-12-04 PROCEDURE — DB021ZZ BEAM RADIATION OF LUNG USING PHOTONS 1 - 10 MEV: ICD-10-PCS

## 2017-12-04 RX ADMIN — MORPHINE TINCTURE SCH MG: 1 SOLUTION ORAL at 05:59

## 2017-12-04 RX ADMIN — LORAZEPAM PRN MG: 0.5 TABLET ORAL at 13:49

## 2017-12-04 RX ADMIN — ACETAMINOPHEN PRN MG: 325 TABLET ORAL at 00:30

## 2017-12-04 RX ADMIN — IPRATROPIUM BROMIDE SCH MG: 0.5 SOLUTION RESPIRATORY (INHALATION) at 01:59

## 2017-12-04 RX ADMIN — CEFTRIAXONE SODIUM SCH MLS/HR: 1 INJECTION, POWDER, FOR SOLUTION INTRAMUSCULAR; INTRAVENOUS at 04:38

## 2017-12-04 RX ADMIN — TIOTROPIUM BROMIDE SCH CAP: 18 CAPSULE ORAL; RESPIRATORY (INHALATION) at 07:54

## 2017-12-04 RX ADMIN — CINACALCET HYDROCHLORIDE SCH MG: 30 TABLET, COATED ORAL at 08:23

## 2017-12-04 RX ADMIN — PAROXETINE SCH MG: 10 TABLET, FILM COATED ORAL at 08:23

## 2017-12-04 RX ADMIN — IPRATROPIUM BROMIDE SCH MG: 0.5 SOLUTION RESPIRATORY (INHALATION) at 07:48

## 2017-12-04 RX ADMIN — LORAZEPAM PRN MG: 0.5 TABLET ORAL at 04:37

## 2017-12-04 RX ADMIN — MORPHINE TINCTURE SCH MG: 1 SOLUTION ORAL at 09:36

## 2017-12-04 RX ADMIN — IPRATROPIUM BROMIDE SCH MG: 0.5 SOLUTION RESPIRATORY (INHALATION) at 14:09

## 2017-12-04 RX ADMIN — MORPHINE TINCTURE SCH MG: 1 SOLUTION ORAL at 13:49

## 2017-12-04 RX ADMIN — MORPHINE TINCTURE SCH MG: 1 SOLUTION ORAL at 02:16

## 2017-12-04 RX ADMIN — LORAZEPAM PRN MG: 0.5 TABLET ORAL at 08:22

## 2017-12-04 RX ADMIN — FLUTICASONE PROPIONATE SCH SPRAY: 50 SPRAY, METERED NASAL at 08:23

## 2017-12-04 RX ADMIN — WARFARIN SODIUM SCH MG: 1 TABLET ORAL at 16:45

## 2017-12-04 RX ADMIN — ASPIRIN SCH MG: 81 TABLET, COATED ORAL at 08:22

## 2017-12-04 RX ADMIN — CALCITRIOL SCH MCG: 0.25 CAPSULE ORAL at 08:23

## 2017-12-04 RX ADMIN — LORAZEPAM PRN MG: 0.5 TABLET ORAL at 00:31

## 2017-12-05 NOTE — DS
CC:  Dr. Kira Haile. *

 

DISCHARGE SUMMARY:

 

DATE OF ADMISSION:  11/30/17

 

DATE OF DISCHARGE:  12/04/17

 

PRIMARY CARE PHYSICIAN:  Dr. Kira Haile.

 

ADMISSION DIAGNOSES:

1.  Chronic obstructive pulmonary disease exacerbation.

2.  History of DVT and pulmonary embolism.

3.  Lung cancer.

4.  Anxiety.

5.  Depression.

 

DISCHARGE DIAGNOSES:

1.  Chronic obstructive pulmonary disease exacerbation.

2.  History of DVT and pulmonary embolism.

3.  Lung cancer.

4.  Anxiety.

5.  Depression.

 

HOSPITAL COURSE:  The patient is a 72-year-old woman who presented to [facility] with a chief compliant of shortness of breath.  The patient was 
admitted and placed on prednisone, placed on her inhalers.  She continued CPAP 
at night.  The patient overnight on her first night felt she was somewhat 
worsened, received the IV methylprednisolone.  Patient then slowly improved 
throughout her hospital stay.  On the date of discharge, she was feeling well 
enough to go home. The patient will be discharged on a tapering dose of 
prednisone, inhalers and with close followup with her PCP.

 

PHYSICAL EXAMINATION:  On the date of discharge:  Elderly woman lying in bed in 
no acute distress.  Vital Signs:  Temperature 98.3 degrees, heart rate 82 beats 
per minute, respiratory rate 16 breaths per minute, and pulse ox 98% on 
supplemental oxygen.  Neck is supple.  No JVD, bruits, palpable thyroid, or 
lymphadenopathy. Chest:  She has got some diminished breath sounds, but no 
wheezing.  Cardiovascular Exam:  S1 and S2 appreciated.  Abdominal Exam:  Obese
, positive bowel sounds in all 4 quadrants.  Soft, nontender, and nondistended.
  No hepatosplenomegaly. Extremities:  No cyanosis, clubbing, or edema; +2 
peripheral pulses bilaterally. Neuro:  Alert and oriented x3, moves all 
extremities.  Skin:  No rashes.

 

Chest x-ray done on 11/30/17 shows COPD, mass in the right upper lobe and right 
lower lobe.  No changes to previous exam on 11/24/17.

 

DISCHARGE MEDICATIONS:

1.  Guaifenesin 60 mg twice a day as needed.

2.  Warfarin 2 mg in the evening.

3.  Spiriva 1 puff inhaled daily.

4.  Piroxicam 30 mg at bedtime.

5.  Opium tincture 0.6 mL every 4 hours as needed.

6.  Lorazepam 0.5 mg twice a day as needed.

7.  Ipratropium nebulizer 0.5 mg every 6 hours as needed.

8.  Vitamin B12 1000 mcg IM weekly.

9.  Sensipar 30 mg in the morning.

10.  Calcitriol 0.25 mg in the morning.

11.  Aspirin 81 mg daily.

12.  Prednisone taper 20 mg daily for 3 days, then 10 mg daily for 2 days, and 
5 mg thereafter.

 

DISCHARGE PLAN:  The patient will be discharged home.  The patient will follow 
up with her PCP and oncologist as an outpatient.  Hopefully, she can resume her 
treatment.

 

TIME SPENT:  Over 35 minutes was spent on this discharge, more than 20 minutes 
of which was spent in direct face-to-face contact with the patient evaluation, 
physical exam, counseling, and coordination of care.

 

 [id number]/CPS #: [id number]

LAUREN

## 2018-03-24 ENCOUNTER — HOSPITAL ENCOUNTER (INPATIENT)
Dept: HOSPITAL 25 - ED | Age: 73
LOS: 6 days | Discharge: HOME HEALTH | DRG: 872 | End: 2018-03-30
Attending: INTERNAL MEDICINE | Admitting: INTERNAL MEDICINE
Payer: MEDICARE

## 2018-03-24 DIAGNOSIS — E83.42: ICD-10-CM

## 2018-03-24 DIAGNOSIS — Z99.81: ICD-10-CM
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
## 2018-03-24 DIAGNOSIS — Z98.42: ICD-10-CM

## 2018-03-24 DIAGNOSIS — J96.11: ICD-10-CM

## 2018-03-24 DIAGNOSIS — Z88.7: ICD-10-CM

## 2018-03-24 DIAGNOSIS — H91.90: ICD-10-CM

## 2018-03-24 DIAGNOSIS — F32.9: ICD-10-CM

## 2018-03-24 DIAGNOSIS — G47.30: ICD-10-CM

## 2018-03-24 DIAGNOSIS — Z88.4: ICD-10-CM

## 2018-03-24 DIAGNOSIS — Z83.3: ICD-10-CM

## 2018-03-24 DIAGNOSIS — F41.9: ICD-10-CM

## 2018-03-24 DIAGNOSIS — N17.9: ICD-10-CM

## 2018-03-24 DIAGNOSIS — A41.9: Primary | ICD-10-CM

## 2018-03-24 DIAGNOSIS — Z66: ICD-10-CM

## 2018-03-24 DIAGNOSIS — C34.12: ICD-10-CM

## 2018-03-24 DIAGNOSIS — Z88.1: ICD-10-CM

## 2018-03-24 DIAGNOSIS — M81.0: ICD-10-CM

## 2018-03-24 DIAGNOSIS — M48.54XA: ICD-10-CM

## 2018-03-24 DIAGNOSIS — J44.1: ICD-10-CM

## 2018-03-24 DIAGNOSIS — R65.20: ICD-10-CM

## 2018-03-24 DIAGNOSIS — Z87.891: ICD-10-CM

## 2018-03-24 DIAGNOSIS — K43.5: ICD-10-CM

## 2018-03-24 DIAGNOSIS — T17.990A: ICD-10-CM

## 2018-03-24 DIAGNOSIS — H66.92: ICD-10-CM

## 2018-03-24 DIAGNOSIS — Z93.2: ICD-10-CM

## 2018-03-24 DIAGNOSIS — Z98.41: ICD-10-CM

## 2018-03-24 DIAGNOSIS — Z86.711: ICD-10-CM

## 2018-03-24 DIAGNOSIS — I12.9: ICD-10-CM

## 2018-03-24 DIAGNOSIS — Z90.49: ICD-10-CM

## 2018-03-24 DIAGNOSIS — R11.0: ICD-10-CM

## 2018-03-24 DIAGNOSIS — Z86.14: ICD-10-CM

## 2018-03-24 DIAGNOSIS — Z88.8: ICD-10-CM

## 2018-03-24 DIAGNOSIS — Z86.718: ICD-10-CM

## 2018-03-24 DIAGNOSIS — Z82.49: ICD-10-CM

## 2018-03-24 DIAGNOSIS — J40: ICD-10-CM

## 2018-03-24 DIAGNOSIS — N18.3: ICD-10-CM

## 2018-03-24 DIAGNOSIS — J02.9: ICD-10-CM

## 2018-03-24 DIAGNOSIS — Z79.82: ICD-10-CM

## 2018-03-24 DIAGNOSIS — G62.9: ICD-10-CM

## 2018-03-24 DIAGNOSIS — G25.81: ICD-10-CM

## 2018-03-24 DIAGNOSIS — N83.202: ICD-10-CM

## 2018-03-24 DIAGNOSIS — M41.86: ICD-10-CM

## 2018-03-24 DIAGNOSIS — Z72.89: ICD-10-CM

## 2018-03-24 DIAGNOSIS — G47.00: ICD-10-CM

## 2018-03-24 LAB
BASOPHILS # BLD AUTO: 0.2 10^3/UL (ref 0–0.2)
EOSINOPHIL # BLD AUTO: 0.1 10^3/UL (ref 0–0.6)
HCT VFR BLD AUTO: 43 % (ref 35–47)
HGB BLD-MCNC: 14.2 G/DL (ref 12–16)
INR PPP/BLD: 1.74 (ref 0.77–1.02)
LYMPHOCYTES # BLD AUTO: 1.9 10^3/UL (ref 1–4.8)
MCH RBC QN AUTO: 31 PG (ref 27–31)
MCHC RBC AUTO-ENTMCNC: 33 G/DL (ref 31–36)
MCV RBC AUTO: 94 FL (ref 80–97)
MONOCYTES # BLD AUTO: 2.2 10^3/UL (ref 0–0.8)
NEUTROPHILS # BLD AUTO: 28.1 10^3/UL (ref 1.5–7.7)
NRBC # BLD AUTO: 0 10^3/UL
NRBC BLD QL AUTO: 0
PLATELET # BLD AUTO: 238 10^3/UL (ref 150–450)
RBC # BLD AUTO: 4.58 10^6/UL (ref 4–5.4)
WBC # BLD AUTO: 32.4 10^3/UL (ref 3.5–10.8)

## 2018-03-24 PROCEDURE — 72146 MRI CHEST SPINE W/O DYE: CPT

## 2018-03-24 PROCEDURE — C1751 CATH, INF, PER/CENT/MIDLINE: HCPCS

## 2018-03-24 PROCEDURE — 83605 ASSAY OF LACTIC ACID: CPT

## 2018-03-24 PROCEDURE — 82803 BLOOD GASES ANY COMBINATION: CPT

## 2018-03-24 PROCEDURE — 36600 WITHDRAWAL OF ARTERIAL BLOOD: CPT

## 2018-03-24 PROCEDURE — 85025 COMPLETE CBC W/AUTO DIFF WBC: CPT

## 2018-03-24 PROCEDURE — 80048 BASIC METABOLIC PNL TOTAL CA: CPT

## 2018-03-24 PROCEDURE — 81003 URINALYSIS AUTO W/O SCOPE: CPT

## 2018-03-24 PROCEDURE — 71045 X-RAY EXAM CHEST 1 VIEW: CPT

## 2018-03-24 PROCEDURE — 94640 AIRWAY INHALATION TREATMENT: CPT

## 2018-03-24 PROCEDURE — 85027 COMPLETE CBC AUTOMATED: CPT

## 2018-03-24 PROCEDURE — 84484 ASSAY OF TROPONIN QUANT: CPT

## 2018-03-24 PROCEDURE — 85730 THROMBOPLASTIN TIME PARTIAL: CPT

## 2018-03-24 PROCEDURE — 83735 ASSAY OF MAGNESIUM: CPT

## 2018-03-24 PROCEDURE — 80053 COMPREHEN METABOLIC PANEL: CPT

## 2018-03-24 PROCEDURE — 87502 INFLUENZA DNA AMP PROBE: CPT

## 2018-03-24 PROCEDURE — 99284 EMERGENCY DEPT VISIT MOD MDM: CPT

## 2018-03-24 PROCEDURE — 81015 MICROSCOPIC EXAM OF URINE: CPT

## 2018-03-24 PROCEDURE — 86140 C-REACTIVE PROTEIN: CPT

## 2018-03-24 PROCEDURE — 87086 URINE CULTURE/COLONY COUNT: CPT

## 2018-03-24 PROCEDURE — 74176 CT ABD & PELVIS W/O CONTRAST: CPT

## 2018-03-24 PROCEDURE — 94760 N-INVAS EAR/PLS OXIMETRY 1: CPT

## 2018-03-24 PROCEDURE — 87040 BLOOD CULTURE FOR BACTERIA: CPT

## 2018-03-24 PROCEDURE — 93005 ELECTROCARDIOGRAM TRACING: CPT

## 2018-03-24 PROCEDURE — 36415 COLL VENOUS BLD VENIPUNCTURE: CPT

## 2018-03-24 PROCEDURE — 85610 PROTHROMBIN TIME: CPT

## 2018-03-24 PROCEDURE — 83690 ASSAY OF LIPASE: CPT

## 2018-03-24 RX ADMIN — SODIUM CHLORIDE, SODIUM LACTATE, POTASSIUM CHLORIDE, AND CALCIUM CHLORIDE ONE ML: 600; 310; 30; 20 INJECTION, SOLUTION INTRAVENOUS at 18:20

## 2018-03-24 RX ADMIN — ACETAMINOPHEN PRN MG: 325 TABLET ORAL at 20:26

## 2018-03-24 RX ADMIN — SODIUM CHLORIDE, SODIUM LACTATE, POTASSIUM CHLORIDE, AND CALCIUM CHLORIDE ONE ML: 600; 310; 30; 20 INJECTION, SOLUTION INTRAVENOUS at 16:31

## 2018-03-24 RX ADMIN — SODIUM CHLORIDE SCH MLS/HR: 900 IRRIGANT IRRIGATION at 19:49

## 2018-03-24 RX ADMIN — PAROXETINE SCH MG: 20 TABLET, FILM COATED ORAL at 20:15

## 2018-03-24 RX ADMIN — WARFARIN SODIUM SCH MG: 2 TABLET ORAL at 20:15

## 2018-03-24 RX ADMIN — LORAZEPAM PRN MG: 0.5 TABLET ORAL at 20:34

## 2018-03-24 NOTE — RAD
CLINICAL HISTORY: Diffuse abdominal pain



COMPARISON: November 16, 2017, October 10, 2017



TECHNIQUE: Multiple contiguous axial CT scans were obtained of the abdomen and pelvis,

without intravenous contrast enhancement. Coronal and sagittal multiplanar reformations

are submitted for review.  Oral contrast was not administered.     



FINDINGS: 

The study is limited by the lack of intravenous contrast. This limits evaluation of the

solid organs and vasculature.





LUNG BASES: The lung bases are clear. The right lung mass noted on previous examination is

not included within the field-of-view the submitted images.



LIVER: The liver is normal in shape, size, contour, and attenuation.

BILE DUCTS: There is no intrahepatic or extrahepatic biliary dilatation.

GALLBLADDER: The gallbladder is not visualized. Surgical clips are noted in the

gallbladder fossa.



PANCREAS: The pancreas is normal, without mass or ductal dilatation.

SPLEEN: Normal in size and appearance.



UPPER GI TRACT: Evaluation of the gastrointestinal tract is limited by incomplete gastric

distention. The upper GI tract is unremarkable.

SMALL BOWEL AND MESENTERY: An ileostomy is noted. There is a parastomal hernia containing

small bowel loops. There is no obstruction. This is similar to the previous examination.

COLON: The patient appears to be status post colectomy.



ADRENALS: Normal bilaterally.

KIDNEYS: There is stable left renal cyst. There is no appreciable hydronephrosis or

nephrolithiasis.

BLADDER: The bladder is smooth in contour.



PELVIC ORGANS: There is a left ovarian cyst measuring 3.2 cm in size. This is persistent

from November 16, 2017.



AORTA: There is calcific atherosclerotic disease of the abdominal aorta and its branches,

without aneurysmal dilatation

IVC: Unremarkable



LYMPH NODES: There is no lymphadenopathy by size criteria.



ABDOMINAL WALL: As noted above, there is a right hemiabdomen ileostomy with parastomal

hernia

BONES AND SOFT TISSUES: There are mild diffuse degenerative changes.

OTHER: None



IMPRESSION:

1.  STATUS POST COLECTOMY AND ILEOSTOMY WITH STABLE PARASTOMAL HERNIA.

2.  THERE IS NO OBSTRUCTION.

3.  LEFT OVARIAN CYST, PERSISTENT FROM MULTIPLE PREVIOUS EXAMINATIONS. GIVEN THE PATIENT'S

AGE, RECOMMEND CONSIDERATION OF GYNECOLOGIC CONSULTATION IN THE NONACUTE SETTING.

4.  ATHEROSCLEROSIS.

## 2018-03-24 NOTE — RAD
HISTORY: Rule out epidural abscess, back pain, nausea, elevated white blood cell count



COMPARISONS: CT of the chest dated January 26, 2010



TECHNIQUE: The following sequences were obtained of the thoracic spine: Sagittal T1 and

T2-weighted images, sagittal STIR images, coronal T2-weighted images, and axial

T2-weighted images. .    



FINDINGS:

Evaluation is somewhat limited by patient motion artifact.



Localization is based on counting from C2



SPINAL CORD, CONUS, AND CAUDA EQUINA: The visualized spinal cord, conus, and cauda equina

are normal in caliber, position, and signal intensity. There is no appreciable epidural

fluid collection.

ALIGNMENT: The alignment is normal.

VERTEBRAL BODIES: There is a chronic compression deformity of T6, without bone edema.

There is no osseous retropulsion. There is anterolateral marginal osteophyte formation.

JOINTS: There is mild osteoarthritis of the costovertebral articulations.

MUSCULATURE: Mild fatty infiltration.

INTERVERTEBRAL DISCS: There is diffuse loss of intervertebral disc height and T2 signal

throughout the spine.



AXIAL IMAGES: There is no central canal stenosis or neuroforaminal narrowing.



SOFT TISSUES: There is a small right pleural effusion.



OTHER: None.



IMPRESSION:

1.  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

2.  NO EPIDURAL FLUID COLLECTION TO SUGGEST EPIDURAL ABSCESS. NO INTERVERTEBRAL DISC FLUID

OR BONE EDEMA TO SUGGEST OSTEOMYELITIS DISCITIS.

3.  MILD DEGENERATIVE DISC DISEASE AND OSTEOPOROSIS. THERE IS NO SIGNIFICANT NEURAL

FORAMINAL NARROWING OR CENTRAL CANAL STENOSIS.

## 2018-03-24 NOTE — ED
Ruby JAMA Elizabeth, scribed for Reji Hassan on 18 at 1451 .





GI/ HPI





- HPI Summary


HPI Summary: 


The patient is a 72 year old female complaining of nausea.


The patient additionally complains of emesis. Per triage note, the patient also 

reports back pain.


The patient denies fever. Patient uses oxygen at home. Patient notes that she 

has had an Ileostomy and has a history of Crohns disease, COPD, and lung 

cancer.








- History of Current Complaint


Chief Complaint: EDNauseaVomitDiarrh


Time Seen by Provider: 18 14:05


Stated Complaint: DIFFICULTY BREATHING


Hx Obtained From: Patient


Timing: Intermittent


Current Severity: Mild


Pain Intensity: 6


Location of Pain: Diffuse


Associated Signs and Symptoms: Positive: Nausea, Vomiting.  Negative: Fever


Aggravating Factor(s): Palpation





- Additional Pertinent History


Primary Care Physician: MONO





- Allergy/Home Medications


Allergies/Adverse Reactions: 


 Allergies











Allergy/AdvReac Type Severity Reaction Status Date / Time


 


daptomycin Allergy  See Comment Verified 18 13:46


 


doxycycline Allergy  Vomiting Verified 18 13:46


 


epinephrine Allergy  See Comment Verified 18 13:46


 


infliximab [From Remicade] Allergy  Swelling Verified 18 13:46


 


levofloxacin [From Levaquin] Allergy  Rash Verified 18 13:46


 


tetanus toxoid, adsorbed Allergy  Swelling Verified 18 13:46


 


vancomycin Allergy  Wheezing Verified 18 13:46


 


eggplant Allergy Mild Difficulty Uncoded 17 23:59





   Breathing  











Home Medications: 


 Home Medications





Aspirin EC Low Dose* [Ecotrin EC Low Dose 81 MG*] 81 mg PO DAILY 18 [

History Confirmed 18]


predniSONE TAB* [Deltasone TAB*] 10 mg PO DAILY 18 [History Confirmed ]











PMH/Surg Hx/FS Hx/Imm Hx


Endocrine/Hematology History: Reports: Hx Anticoagulant Therapy - coumadin


   Denies: Hx Diabetes


Cardiovascular History: Reports: Hx Deep Vein Thrombosis, Hx Embolism - PE, Hx 

Hypotension, Hx Hypertension, Hx Syncope, Other Cardiovascular Problems/

Disorders - deep mesenteric thrombosis


   Denies: Hx Pacemaker/ICD


Respiratory History: Reports: Hx Asthma - intermittent, Hx Chronic Bronchitis, 

Hx Chronic Obstructive Pulmonary Disease (COPD), Hx Pulmonary Embolism - , 

Hx Sleep Apnea, Other Respiratory Problems/Disorders - O2 at home


GI History: Reports: Hx Crohn's Disease, Hx Ileostomy, Other GI Disorders - 

Ileostomy placement


 History: Reports: Hx Acute Renal Failure, Hx Chronic Renal Failure, Other  

Problems/Disorders - Renal deficit per Dr Schroeder


   Denies: Hx Dialysis, Hx Renal Disease


Musculoskeletal History: Reports: Hx Back Problems, Hx Osteoporosis, Hx 

Scoliosis - lumbar disc problems; scoliosis


Sensory History: Reports: Hx Cataracts, Hx Contacts or Glasses, Hx Hearing 

Problem


   Denies: Hx Hearing Aid


Opthamlomology History: Reports: Hx Cataracts, Hx Contacts or Glasses


Neurological History: Reports: Hx Nerve Disease - neuropathy, Other Neuro 

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


Psychiatric History: Reports: Hx Anxiety, Hx Depression


   Denies: Hx Panic Disorder





- Cancer History


Cancer Type, Location and Year: LUNG CA


Hx Chemotherapy: No


Hx Radiation Therapy: Yes


Hx Palliative Cancer Treatment: Yes





- Surgical History


Surgery Procedure, Year, and Place: COLECTOMY AND ILEOSTOMY 3/3/2011 

APPENDECTOMY, GALLBLADDER REMOVED, CATARACTS REMOVED [place].  SEVERAL BOWEL 

RESECTIONS AND FISTULA, [facility]


Hx Anesthesia Reactions: Yes - LOW BP





- Immunization History


Date of Tetanus Vaccine: utd


Date of Influenza Vaccine: utd


Infectious Disease History: No


Infectious Disease History: Reports: Hx of Known/Suspected MRSA


   Denies: Traveled Outside the [place] in Last 30 Days





- Family History


Known Family History: Positive: Unknown - Parents  very young., Other - 

Crohn's Disease





- Social History


Alcohol Use: Rare


Alcohol Amount: 1 Q 2-3 MONTHS


Hx Substance Use: No


Substance Use Type: Reports: Other


Substance Use Comment - Amount & Last Used: prescribed opium


Hx Tobacco Use: Yes


Smoking Status (MU): Former Smoker


Type: Cigarettes


Amount Used/How Often: 1-2 cigarettes per day


Length of Time of Smoking/Using Tobacco: 53 years


Have You Smoked in the Last Year: Yes





Review of Systems


Negative: Fever


Positive: Vomiting, Nausea


Positive: Other - back pain


All Other Systems Reviewed And Are Negative: Yes





Physical Exam





- Summary


Physical Exam Summary: 


Appearance: Well appearing, no pain distress


Skin: warm, dry, reflects adequate perfusion


Head/face: normal


Eyes: EOMI, ANURAG


ENT: dry mucous membranes


Neck: supple, non-tender


Respiratory: CTA, breath sounds present


Cardiovascular: RRR, pulses symmetrical 


Abdomen: diffuse tenderness, soft, ileostomy bag


Bowel: present


Musculoskeletal: normal, strength/ROM intact


Neuro: normal, sensory motor intact, A&Ox3





Triage Information Reviewed: Yes


Vital Signs On Initial Exam: 


 Initial Vitals











Temp Pulse Resp BP Pulse Ox


 


 99.3 F   77   24   126/40   94 


 


 18 13:29  18 13:29  18 13:29  18 13:29  18 13:29











Vital Signs Reviewed: Yes





Diagnostics





- Vital Signs


 Vital Signs











  Temp Pulse Resp BP Pulse Ox


 


 18 13:31   103  24   91


 


 18 13:30     126/40 


 


 18 13:29  99.3 F  77  24  126/40  94














- Laboratory


Lab Results: 


 Lab Results











  18 Range/Units





  14:20 14:20 


 


WBC   32.4 H  (3.5-10.8)  10^3/ul


 


RBC   4.58  (4.0-5.4)  10^6/ul


 


Hgb   14.2  (12.0-16.0)  g/dl


 


Hct   43  (35-47)  %


 


MCV   94  (80-97)  fL


 


MCH   31  (27-31)  pg


 


MCHC   33  (31-36)  g/dl


 


RDW   14  (10.5-15)  %


 


Plt Count   238  (150-450)  10^3/ul


 


MPV   7.8  (7.4-10.4)  um3


 


Neut % (Auto)   Pending  


 


Lymph % (Auto)   Pending  


 


Mono % (Auto)   Pending  


 


Eos % (Auto)   Pending  


 


Baso % (Auto)   Pending  


 


Absolute Neuts (auto)   Pending  


 


Absolute Lymphs (auto)   Pending  


 


Absolute Monos (auto)   Pending  


 


Absolute Eos (auto)   Pending  


 


Absolute Basos (auto)   Pending  


 


Absolute Nucleated RBC   Pending  


 


Nucleated RBC %   Pending  


 


INR (Anticoag Therapy)  1.74 H   (0.77-1.02)  


 


APTT  38.3 H   (26.0-36.3)  seconds











Result Diagrams: 


 18 14:20





 18 14:20


Lab Statement: Any lab studies that have been ordered have been reviewed, and 

results considered in the medical decision making process.





- Radiology


  ** CXR


Xray Interpretation: Positive (See Comments) - IMPRESSION: THERE HAS BEEN 

PROGRESSION IN SIZE OF THE RIGHT UPPER MEDIASTINAL MASS NOTED ON THE PREVIOUS 

CT EXAMINATION, CONCERNING FOR PROGRESSION OF NEOPLASM. Dr. Hassan has reviewed 

this report.


Radiology Interpretation Completed By: Radiologist





- CT


  ** CT Abd/Pelvis


CT Interpretation: Positive (See Comments) - IMPRESSION: 1.  STATUS POST 

COLECTOMY AND ILEOSTOMY WITH STABLE PARASTOMAL HERNIA. 2.  THERE IS NO 

OBSTRUCTION. 3.  LEFT OVARIAN CYST, PERSISTENT FROM MULTIPLE PREVIOUS 

EXAMINATIONS. GIVEN THE PATIENT'S AGE, RECOMMEND CONSIDERATION OF GYNECOLOGIC 

CONSULTATION IN THE NONACUTE SETTING. 4.  ATHEROSCLEROSIS. Dr. Hassan has 

reviewed this report.


CT Interpretation Completed By: Radiologist





- EKG


  ** 14:57


Cardiac Rate: NL - 66 BPM


EKG Rhythm: Sinus Rhythm


ST Segment: Non-Specific





GIGU Course/Dx





- Course


Course Of Treatment: The patient presented with nausea, vomiting, and back 

pain.  An EKG reveals sinus rhythm at 66 bpm with non-specific ST changes.  CXR 

reveals, per radiologist, progression in the size of the right upper 

mediastinal mass noted on the previous CT examination, concerning for progress 

of neoplasm. CT Abd/Pelvis reveals, per radiologist, status post colectomy and 

ileostomy with stable parastomal hernia, there is no obstruction, left ovarian 

cyst (persistent from previous examinations), and artherosclerosis. Dr. Hassan 

has reviewed this radiology report. Bloodwork and UA obtained. In the ED course

, the patient was given IV fluids, IV Zofran, Lactated ringers, Vancomycin, and 

Zosyn.  We discussed patient care with Dr. Bray, hospitalist, and they 

recommended admission to [facility] [place]. Patient will be admitted with diagnosis of sepsis

, lung cancer, COPD, Crohns disease, and pneumonia. The patient is agreeable 

with this plan.





- Diagnoses


Differential Diagnoses - Female: Aspiration, Pneumonia, Pyelonephritis, Sepsis, 

Urinary Tract Infection


Provider Diagnoses: 


 COPD (chronic obstructive pulmonary disease), Crohn's disease, Lung cancer, 

Sepsis, Pneumonia








- Physician Notifications


Discussed Care Of Patient With: Angelita Bray


Instructed by Provider To: Admit As Inpatient





- Critical Care Time


Critical Care Time: 30-74 min - 30 min





Discharge





- Sign-Out/Discharge


Documenting (check all that apply): Discharge - admit to [facility] [place]





- Discharge Plan


Condition: Stable


Disposition: ADMITTED TO [facility]





- Billing Disposition and Condition


Condition: STABLE


Disposition: [facility]





The documentation as recorded by the Ruby irwin Elizabeth accurately 

reflects the service I personally performed and the decisions made by me, Reji Hassan.

## 2018-03-24 NOTE — RAD
HISTORY: Weakness



COMPARISONS: Chest CT dated January 26, 2018



VIEWS: 1: frontal portable view of the chest at 2:50 PM



FINDINGS:

LINES AND TUBES: None.

CARDIOMEDIASTINAL SILHOUETTE: Again noted is masslike density of the right upper

paramediastinal region. This is slightly increased when compared to CT January 26, 2018.

PLEURA: The costophrenic angles are sharp. No pleural abnormalities are noted.

LUNG PARENCHYMA: As noted above, there is a masslike density of the right upper

paramediastinal region.

ABDOMEN: The upper abdomen is clear. There is no subphrenic gas.

BONES AND SOFT TISSUES: No bone or soft tissue abnormalities are noted.



IMPRESSION: THERE HAS BEEN PROGRESSION IN SIZE OF THE RIGHT UPPER MEDIASTINAL MASS NOTED

ON THE PREVIOUS CT EXAMINATION, CONCERNING FOR PROGRESSION OF NEOPLASM.

## 2018-03-25 LAB
BASOPHILS # BLD AUTO: 0.1 10^3/UL (ref 0–0.2)
EOSINOPHIL # BLD AUTO: 0.1 10^3/UL (ref 0–0.6)
HCT VFR BLD AUTO: 36 % (ref 35–47)
HGB BLD-MCNC: 12.1 G/DL (ref 12–16)
INR PPP/BLD: 1.81 (ref 0.77–1.02)
LYMPHOCYTES # BLD AUTO: 1.3 10^3/UL (ref 1–4.8)
MCH RBC QN AUTO: 31 PG (ref 27–31)
MCHC RBC AUTO-ENTMCNC: 33 G/DL (ref 31–36)
MCV RBC AUTO: 93 FL (ref 80–97)
MONOCYTES # BLD AUTO: 1.7 10^3/UL (ref 0–0.8)
NEUTROPHILS # BLD AUTO: 23.8 10^3/UL (ref 1.5–7.7)
NRBC # BLD AUTO: 0 10^3/UL
NRBC BLD QL AUTO: 0
PLATELET # BLD AUTO: 197 10^3/UL (ref 150–450)
RBC # BLD AUTO: 3.93 10^6/UL (ref 4–5.4)
WBC # BLD AUTO: 27 10^3/UL (ref 3.5–10.8)

## 2018-03-25 RX ADMIN — WARFARIN SODIUM SCH MG: 2 TABLET ORAL at 17:35

## 2018-03-25 RX ADMIN — MORPHINE SULFATE PRN MG: 2 INJECTION, SOLUTION INTRAMUSCULAR; INTRAVENOUS at 00:48

## 2018-03-25 RX ADMIN — IPRATROPIUM BROMIDE AND ALBUTEROL SULFATE PRN NEB: .5; 3 SOLUTION RESPIRATORY (INHALATION) at 15:17

## 2018-03-25 RX ADMIN — MORPHINE SULFATE PRN MG: 2 INJECTION, SOLUTION INTRAMUSCULAR; INTRAVENOUS at 02:48

## 2018-03-25 RX ADMIN — GUAIFENESIN PRN MG: 600 TABLET, EXTENDED RELEASE ORAL at 08:58

## 2018-03-25 RX ADMIN — GUAIFENESIN PRN MG: 600 TABLET, EXTENDED RELEASE ORAL at 20:07

## 2018-03-25 RX ADMIN — LORAZEPAM PRN MG: 0.5 TABLET ORAL at 08:58

## 2018-03-25 RX ADMIN — PAROXETINE SCH MG: 20 TABLET, FILM COATED ORAL at 17:35

## 2018-03-25 RX ADMIN — ASPIRIN SCH MG: 81 TABLET, COATED ORAL at 08:58

## 2018-03-25 RX ADMIN — PIPERACILLIN SODIUM AND TAZOBACTAM SODIUM SCH MLS/HR: 3; .375 INJECTION, POWDER, LYOPHILIZED, FOR SOLUTION INTRAVENOUS at 17:03

## 2018-03-25 RX ADMIN — MORPHINE TINCTURE PRN MG: 1 SOLUTION ORAL at 19:51

## 2018-03-25 RX ADMIN — TIOTROPIUM BROMIDE SCH CAP: 18 CAPSULE ORAL; RESPIRATORY (INHALATION) at 08:22

## 2018-03-25 RX ADMIN — MORPHINE SULFATE PRN MG: 2 INJECTION, SOLUTION INTRAMUSCULAR; INTRAVENOUS at 09:02

## 2018-03-25 RX ADMIN — SODIUM CHLORIDE SCH MLS/HR: 900 IRRIGANT IRRIGATION at 14:39

## 2018-03-25 RX ADMIN — LORAZEPAM PRN MG: 0.5 TABLET ORAL at 20:35

## 2018-03-25 RX ADMIN — CALCITRIOL SCH MCG: 0.25 CAPSULE ORAL at 08:58

## 2018-03-25 RX ADMIN — IPRATROPIUM BROMIDE AND ALBUTEROL SULFATE PRN NEB: .5; 3 SOLUTION RESPIRATORY (INHALATION) at 11:01

## 2018-03-25 RX ADMIN — PIPERACILLIN SODIUM AND TAZOBACTAM SODIUM SCH MLS/HR: 3; .375 INJECTION, POWDER, LYOPHILIZED, FOR SOLUTION INTRAVENOUS at 20:38

## 2018-03-25 RX ADMIN — PREDNISONE SCH MG: 10 TABLET ORAL at 08:58

## 2018-03-25 RX ADMIN — ACETAMINOPHEN PRN MG: 325 TABLET ORAL at 03:50

## 2018-03-25 RX ADMIN — SODIUM CHLORIDE SCH MLS/HR: 900 IRRIGANT IRRIGATION at 22:35

## 2018-03-25 RX ADMIN — ACETAMINOPHEN PRN MG: 325 TABLET ORAL at 20:00

## 2018-03-25 RX ADMIN — IPRATROPIUM BROMIDE AND ALBUTEROL SULFATE PRN NEB: .5; 3 SOLUTION RESPIRATORY (INHALATION) at 19:57

## 2018-03-25 RX ADMIN — CINACALCET HYDROCHLORIDE SCH MG: 30 TABLET, COATED ORAL at 08:58

## 2018-03-25 RX ADMIN — MORPHINE SULFATE PRN MG: 2 INJECTION, SOLUTION INTRAMUSCULAR; INTRAVENOUS at 11:44

## 2018-03-25 RX ADMIN — SODIUM CHLORIDE SCH MLS/HR: 900 IRRIGANT IRRIGATION at 07:40

## 2018-03-25 RX ADMIN — MORPHINE SULFATE PRN MG: 2 INJECTION, SOLUTION INTRAMUSCULAR; INTRAVENOUS at 14:40

## 2018-03-25 NOTE — HP
CC:  Dr. Haile *

 

HOSPITAL MEDICINE HISTORY AND PHYSICAL:

 

DATE OF ADMISSION:  18

 

PRIMARY CARE PHYSICIAN:  Dr. Haile.

 

ATTENDING PHYSICIAN:  Dr. Angelita Bray *(dictation provided by Sara Kinney NP)
.

 

CHIEF COMPLAINT:  Back pain and nausea.

 

HISTORY OF PRESENT ILLNESS:  Ms. Santiago is a 72-year-old female with a past 
medical history of lung cancer, COPD, history of DVT, PE and Crohn's disease, 
status post colectomy with ileostomy, who presents today to the hospital with 
concern for back pain, nausea, and shortness of breath.  Ms. Santiago is 
feeling quite ill and some of the information is obtained from her friends who 
are at the bedside as well as from her report.  The story at this point is that 
the patient was doing well yesterday; she is normally independent, but she 
needs support in the home.  Her healthcare proxy and partner is out of town and 
therefore friends are taking turns helping to care for her.  The patient's 
friend who is at the bedside was not with her until right prior to admission, 
but the story that she heard from other caretakers is that the patient was 
vomiting last evening. When the friend saw her today, she was complaining of 
severe back pain.  She has not vomited since approximately 7 a.m.  She 
complains of some shortness of breath, but clearly has significant shortness of 
breath at baseline.  The patient states that her back pain is in the similar 
location to her previous history of back pain due to chronic T6 compression 
fracture, but that she feels it is much more severe.  She reports feeling "ill 
all over."  No reported fever at home.  Patient denies abdominal pain or nausea 
at this time.  She confirms the shortness of breath worse than baseline.   She 
denies headache.  She has had some loose stool in her ileostomy.  She denies 
weakness, numbness, or tingling in the upper or lower extremities.

 

In the emergency room, Ms. Santiago was confirmed to have sepsis based on 
elevated respiratory rate and leukocytosis.  Her white blood cell count is 32.4
, her INR is subtherapeutic at 1.74.  She is on Coumadin.  Her creatinine is 
1.83, which is near her baseline.  Her lactic acid is 2.3.  She has received 30 
mL per kg of IV fluids.  Her troponin is 0.04, CRP is 92.26.  In terms of 
source of infection, her urinalysis shows only a 1+ leuk esterase.  No bacteria 
is present. Her blood cultures have been sent.  Her chest x-ray shows worsening 
of left upper lobe mass.  The abdomen and pelvis CT shows no acute abnormality.

 

PAST MEDICAL HISTORY:

1.  Crohn's disease with colectomy and ileostomy.

2.  History of DVT and PE, on Coumadin.

3.  COPD.

4.  Lung cancer, status post radiation.

5.  Anxiety.

5.  Depression.

6.  Restless legs syndrome.

7.  T6 compression fracture.

 

MEDICATIONS:

1.  Opium tincture 0.6 mL p.o. q.4 hours as needed.

2.  Aspirin 81 mg p.o. daily.

3.  Calcitriol 0.25 mg p.o. q.a.m.

4.  Cinacalcet 30 mg p.o. q.a.m.

5.  Cyanocobalamin 1000 mcg IM weekly.

6.  Guaifenesin  mg p.o. b.i.d. p.r.n.

7.  Ipratropium 0.5 mg inhaled q.6 hours p.r.n.

8.  Lorazepam 0.5 mg p.o. b.i.d. p.r.n.

9.  Paroxetine 30 mg p.o. q.p.m.

10.  Prednisone 10 mg p.o. daily.

11.  Tiotropium 1 cap inhaled p.o. q.a.m.

12.  Warfarin 2 mg p.o. q.p.m.

 

ALLERGIES:  DAPTOMYCIN, DOXYCYCLINE, EPINEPHRINE, INFLIXIMAB, LEVOFLOXACIN, 
TETANUS TOXOID, VANCOMYCIN which causes wheezing and EGGPLANT.

 

FAMILY HISTORY:  Her father had heart disease and also reports a paternal 
grandmother had diabetes.

 

SOCIAL HISTORY:  No report of current tobacco abuse.  No report of alcohol drug 
use.  She has a partner, Sarah Alvarado who is the healthcare proxy.

 

REVIEW OF SYSTEMS:  A 14-point review of systems was completed with Ms. Santiago and all those mentioned above were negative.

 

                               PHYSICAL EXAMINATION

 

GENERAL:  Ms. Santiago is lying in the bed.  She is in no acute distress and 
sleeping.  She is initially difficult to arouse, but then is appropriate during 
conversation.

 

VITAL SIGNS:  Temperature 99.3, pulse rate 69, respiratory rate 27, O2 
saturation 98% on 4 L nasal cannula, blood pressure 102/27.

 

LUNGS:  Have rhonchi bilaterally throughout.  There is no wheezing.  There is 
good aeration.  There is no accessory muscle use.

 

HEART:  S1, S2.  No murmur, rub, or gallop and regular.

 

ABDOMEN:  Soft, nontender.  There is an ileostomy place with soft stool.

 

EXTREMITIES:  No cyanosis or edema.

 

NEURO:  She is drowsy, but responds appropriately once awoken.  She is oriented 
x3. She moves all extremities equally.  There is no facial asymmetry or focal 
weakness. Extraocular movements are intact.

 

BACK:  Back was palpated.  Spine was palpated.  The patient complained of pain 
at about somewhere between T4 and T6 with palpation.  She states that this is 
where she has pain usually, but that this is much more severe.

 

SKIN:  Intact.

 

 ASSESSMENT AND PLAN:  Ms. Santiago is a 72-year-old female with a past medical 
history of COPD, lung cancer and Crohn's disease with ileostomy as well as deep 
vein thrombosis, pulmonary embolism on Coumadin, who presents to the hospital 
today with concern for back pain associated with nausea, vomiting, and sepsis.  
Our plans are for inpatient admission as expected length of stay to be greater 
than 2 days for the followin.  Sepsis:  The source of sepsis is not entirely clear.  Her main complaint is 
of back pain and she does have a history of a T6 compression fracture at that 
level; however, this pain is much more severe, is associated with sepsis and 
therefore I think ruling out epidural abscess is essential.  I spoke with 
radiology and Dr. Liu will be calling in the team to assess her 
appropriateness for MRI study. The patient has nausea, but there is no evidence 
of acute abdominal process.  She had a urinalysis, which showed only 1+ leuk 
esterase, but no bacteria or nitrite. Blood cultures have been sent.  Lactic 
acid is 2.3 and will be repeated.  She did receive the appropriate amount of IV 
fluids in the ED at 30 mL per kg.  We will continue with IV fluids as her 
mucous membranes remained dry.  Her vitals are stable.  She is going to 
continue on linezolid (vanomycin allergy) and Zosyn until she stabilizes or 
etiology is determined.  Repeat all labs in a.m.  It is possible that she has 
acute pneumonia as certainly her lungs are congested, but it is unclear how 
much more severe this is from her baseline.  The chest x-ray shows worsening of 
her mass, but no clear new infiltrate.  

2.  Chronic obstructive pulmonary disease.  Plan to continue her home 
prednisone. She is not wheezing at all.  She will continue on DuoNebs as 
needed.  I see no evidence of acute exacerbation.  She will have guaifenesin as 
needed p.r.n. for congestion.

3.  Hx of DVT/PE.  Her INR is subtherapeutic, but with her acute illness and 
need for antibiotics, plan to leave her warfarin dose at the current dose of 2 
mg and recheck in a.m.

4. Nausea and vomiting, patient has reported not had vomiting since early this 
morning.  She will have ondansetron available p.r.n. for depression.

5.  Depression.  Continue Paxil.

6.  Code status is DNR.

 

TIME SPENT:  Approximately 75 minutes were spent on the admission of this 
patient, more than half of the time was spent with the patient at the bedside 
reviewing the events leading up to this hospitalization, performing the 
physical examination, and reviewing the plan of care.

 

 ____________________________________ 

SARA KINNEY, SHRUTHI

 

[id number]/CPS #: [id number]

LAUREN

## 2018-03-26 RX ADMIN — IPRATROPIUM BROMIDE PRN MG: 0.5 SOLUTION RESPIRATORY (INHALATION) at 13:19

## 2018-03-26 RX ADMIN — PAROXETINE SCH MG: 20 TABLET, FILM COATED ORAL at 17:15

## 2018-03-26 RX ADMIN — LORAZEPAM PRN MG: 0.5 TABLET ORAL at 19:57

## 2018-03-26 RX ADMIN — PREDNISONE SCH MG: 10 TABLET ORAL at 10:05

## 2018-03-26 RX ADMIN — ACETAMINOPHEN PRN MG: 325 TABLET ORAL at 20:02

## 2018-03-26 RX ADMIN — MORPHINE TINCTURE PRN MG: 1 SOLUTION ORAL at 10:05

## 2018-03-26 RX ADMIN — PIPERACILLIN SODIUM AND TAZOBACTAM SODIUM SCH MLS/HR: 3; .375 INJECTION, POWDER, LYOPHILIZED, FOR SOLUTION INTRAVENOUS at 10:06

## 2018-03-26 RX ADMIN — WARFARIN SODIUM SCH MG: 2 TABLET ORAL at 17:16

## 2018-03-26 RX ADMIN — GUAIFENESIN PRN MG: 600 TABLET, EXTENDED RELEASE ORAL at 10:04

## 2018-03-26 RX ADMIN — AMOXICILLIN AND CLAVULANATE POTASSIUM SCH MG: 500; 125 TABLET, FILM COATED ORAL at 19:57

## 2018-03-26 RX ADMIN — TIOTROPIUM BROMIDE SCH CAP: 18 CAPSULE ORAL; RESPIRATORY (INHALATION) at 08:35

## 2018-03-26 RX ADMIN — LORAZEPAM PRN MG: 0.5 TABLET ORAL at 04:44

## 2018-03-26 RX ADMIN — CALCITRIOL SCH MCG: 0.25 CAPSULE ORAL at 10:04

## 2018-03-26 RX ADMIN — PIPERACILLIN SODIUM AND TAZOBACTAM SODIUM SCH MLS/HR: 3; .375 INJECTION, POWDER, LYOPHILIZED, FOR SOLUTION INTRAVENOUS at 04:32

## 2018-03-26 RX ADMIN — IPRATROPIUM BROMIDE PRN MG: 0.5 SOLUTION RESPIRATORY (INHALATION) at 08:38

## 2018-03-26 RX ADMIN — ASPIRIN SCH MG: 81 TABLET, COATED ORAL at 10:04

## 2018-03-26 RX ADMIN — SODIUM CHLORIDE SCH MLS/HR: 900 IRRIGANT IRRIGATION at 09:27

## 2018-03-26 RX ADMIN — ACETAMINOPHEN PRN MG: 325 TABLET ORAL at 10:04

## 2018-03-26 RX ADMIN — IPRATROPIUM BROMIDE SCH MG: 0.5 SOLUTION RESPIRATORY (INHALATION) at 19:15

## 2018-03-26 RX ADMIN — CINACALCET HYDROCHLORIDE SCH MG: 30 TABLET, COATED ORAL at 10:04

## 2018-03-26 RX ADMIN — IPRATROPIUM BROMIDE SCH: 0.5 SOLUTION RESPIRATORY (INHALATION) at 14:31

## 2018-03-26 NOTE — PN
Subjective


Date of Service: 03/26/18


Interval History: 


.


Patient still feels unwell.


Saw Dr. Petersen who felt she has OM.


Had conversation with spouse in [place]. 





Points of conversation included


- IV fluids


- diet consistency


- concern for side effect of ABX


- COPD treatment frequencies





General questions asked and answered.





Will check labs in AM; all in agreement.


.





Family History: Unchanged from Admission


Social History: Unchanged from Admission


Past Medical History: Unchanged from Admission





Objective


Active Medications: 


.


Acetaminophen (Tylenol Tab*)  650 mg PO Q6H PRN


   PRN Reason: pain/fever


   Last Admin: 03/26/18 10:04 Dose:  650 mg


Amoxicillin/Clavulanate Potassium (Augmentin Tab*)  500 mg PO BID [facility]


Aspirin (Aspirin Ec Low Dose*)  81 mg PO DAILY [facility]


   Last Admin: 03/26/18 10:04 Dose:  81 mg


Calcitriol (Rocaltrol  Cap*)  0.25 mcg PO QAM [facility]


   Last Admin: 03/26/18 10:04 Dose:  0.25 mcg


Cinacalcet (Sensipar Tab*)  30 mg PO QAM [facility]


   Last Admin: 03/26/18 10:04 Dose:  30 mg


Cyanocobalamin (Vitamin B12 Inj *)  1,000 mcg IM WEEKLY [facility]


Guaifenesin (Mucinex*)  600 mg PO BID PRN


   PRN Reason: CONGESTION


   Last Admin: 03/26/18 10:04 Dose:  600 mg


Sodium Chloride (Ns 0.9% 1000 Ml*)  1,000 mls @ 150 mls/hr IV PER RATE [facility]


   Last Admin: 03/26/18 09:27 Dose:  150 mls/hr


Ipratropium Bromide (Atrovent 0.5 Mg Neb.Sol*)  0.5 mg INH [id number] AWAKE 

[facility]


   Last Admin: 03/26/18 14:31 Dose:  Not Given


Lorazepam (Ativan Tab(*))  0.5 mg PO BID PRN


   PRN Reason: ANXIETY


   Last Admin: 03/26/18 04:44 Dose:  0.5 mg


Morphine Sulfate (Morphine Inj (Syringe)*)  2 mg IV Q2H PRN


   PRN Reason: PAIN


   Last Admin: 03/25/18 14:40 Dose:  2 mg


Ondansetron HCl (Zofran Inj*)  4 mg IV Q6H PRN


   PRN Reason: NAUSEA


Opium Tincture (Opium Tincture*)  6 mg PO Q4H PRN


   PRN Reason: diarrhea


Paroxetine HCl (Paxil Tab*)  30 mg PO QPM [facility]


   Last Admin: 03/25/18 17:35 Dose:  30 mg


Polyvinyl Alcohol (Polyvinyl Alcohol 1.4% Opth*)  1 drop BOTH EYES Q2H PRN


   PRN Reason: DRY EYE


   Last Admin: 03/25/18 19:51 Dose:  1 drop


Prednisone (Deltasone Tab*)  10 mg PO DAILY [facility]


   Last Admin: 03/26/18 10:05 Dose:  10 mg


Throat Lozenges (Chloraseptic Shira*)  1 shira PO Q6H PRN


   PRN Reason: SORE THROAT


Tiotropium Bromide (Spiriva Cap.Inh*)  1 cap INH QAM [facility]


   Last Admin: 03/26/18 08:35 Dose:  1 cap


Warfarin Sodium (Coumadin Tab(*))  2 mg PO QPM [facility]


   PRN Reason: Protocol


   Last Admin: 03/25/18 17:35 Dose:  2 mg


.


 Vital Signs - 8 hr











  03/26/18 03/26/18 03/26/18





  10:05 11:43 13:28


 


Temperature  98.6 F 


 


Pulse Rate  63 70


 


Respiratory 22 15 18





Rate   


 


Blood Pressure  109/46 





(mmHg)   


 


O2 Sat by Pulse  96 93





Oximetry   














  03/26/18 03/26/18





  14:48 15:40


 


Temperature  99.0 F


 


Pulse Rate  70


 


Respiratory 18 20





Rate  


 


Blood Pressure  128/49





(mmHg)  


 


O2 Sat by Pulse  98





Oximetry  











Oxygen Devices in Use Now: Nasal Cannula


Appearance: NAD


Eyes: No Scleral Icterus


Ears/Nose/Mouth/Throat: - - posterior pharyngeal erythema


Neck: Trachea Midline


Respiratory: Symmetrical Chest Expansion and Respiratory Effort


Cardiovascular: NL Sounds; No Murmurs; No JVD


Abdominal: - - ostomy


Lymphatic: No Cervical Adenopathy


Extremities: No Edema


Skin: No Rash or Ulcers


Neurological: Alert and Oriented x 3


Lines/Tubes/Other Access: Clean, Dry and Intact Peripheral IV


Nutrition: Taking PO's


Result Diagrams: 


 03/25/18 05:57





 03/25/18 05:51


Additional Lab and Data: 


.


Microbiology and Other Data: 


 Microbiology











 03/24/18 18:12 Influenza Types A,B Antigen (ARABELLA) - Final





 Nasal    Specimen received for Influenza A/B Molecular testing














Assess/Plan/Problems-Billing


.


Assessment: 


72 year old woman with PMH of Crohn's disease (s/p ileostom), s/p radiation for 

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 

and worsening back pain. 





IN REVIEW OF ALL DATA SO FAR IN THIS ADMISSION, I THINK PATIENT WAS **NOT** 

SEPTIC, ALTHOUGH AT SEVERAL POINTS THERE WAS SUGGESTION OF POSSIBLE OR 

SUSPECTED SEPSIS.





BUT...THAT IS NOT THE CASE AND AT THIS POINT, I DO NOT BELIEVE SHE WAS SEPTIC 

DURING THIS ADMISSION.





.





- Patient Problems


(1) Acute on chronic renal failure


Current Visit: No   Status: Acute   Priority: High   Onset Date: 11/17/14   Code

(s): N17.9 - ACUTE KIDNEY FAILURE, UNSPECIFIED; N18.9 - CHRONIC KIDNEY DISEASE, 

UNSPECIFIED   SNOMED Code(s): 728512608


   Comment: 


- Cr elevated


- Baseline, in Dec 2017, was 1.49.


- check BMP   





(2) Anxiety


Current Visit: No   Status: Acute   Code(s): F41.9 - ANXIETY DISORDER, 

UNSPECIFIED   SNOMED Code(s): 26499473


   Comment: 


Continue 0.5 mg ativan BID with additional breakthrough prn with panic attacks 

after she changes ostomy   





(3) Pharyngitis


Current Visit: Yes   Status: Acute   Priority: High   Code(s): J02.9 - ACUTE 

PHARYNGITIS, UNSPECIFIED   Comment: 


- bacterial causes would be covered with piperacillin ==> changed to Augmentin 

by ID


- Lozenges ordered


- IVF ongoing   





(4) Otitis media


Current Visit: Yes   Status: Acute   Priority: High   Code(s): H66.90 - OTITIS 

MEDIA, UNSPECIFIED, UNSPECIFIED EAR   Comment: 


- likely bacterial


- augmentin ordered

## 2018-03-27 LAB
BASOPHILS # BLD AUTO: 0 10^3/UL (ref 0–0.2)
EOSINOPHIL # BLD AUTO: 0.2 10^3/UL (ref 0–0.6)
HCT VFR BLD AUTO: 33 % (ref 35–47)
HGB BLD-MCNC: 10.8 G/DL (ref 12–16)
LYMPHOCYTES # BLD AUTO: 1.1 10^3/UL (ref 1–4.8)
MCH RBC QN AUTO: 31 PG (ref 27–31)
MCHC RBC AUTO-ENTMCNC: 33 G/DL (ref 31–36)
MCV RBC AUTO: 93 FL (ref 80–97)
MONOCYTES # BLD AUTO: 0.8 10^3/UL (ref 0–0.8)
NEUTROPHILS # BLD AUTO: 14.6 10^3/UL (ref 1.5–7.7)
NRBC # BLD AUTO: 0 10^3/UL
NRBC BLD QL AUTO: 0
PLATELET # BLD AUTO: 234 10^3/UL (ref 150–450)
RBC # BLD AUTO: 3.52 10^6/UL (ref 4–5.4)
WBC # BLD AUTO: 16.7 10^3/UL (ref 3.5–10.8)

## 2018-03-27 RX ADMIN — LORAZEPAM PRN MG: 0.5 TABLET ORAL at 08:03

## 2018-03-27 RX ADMIN — MORPHINE SULFATE PRN MG: 2 INJECTION, SOLUTION INTRAMUSCULAR; INTRAVENOUS at 13:21

## 2018-03-27 RX ADMIN — GUAIFENESIN PRN MG: 600 TABLET, EXTENDED RELEASE ORAL at 16:35

## 2018-03-27 RX ADMIN — MORPHINE SULFATE PRN MG: 2 INJECTION, SOLUTION INTRAMUSCULAR; INTRAVENOUS at 21:38

## 2018-03-27 RX ADMIN — CALCITRIOL SCH MCG: 0.25 CAPSULE ORAL at 09:30

## 2018-03-27 RX ADMIN — ACETAMINOPHEN PRN MG: 325 TABLET ORAL at 03:44

## 2018-03-27 RX ADMIN — SODIUM CHLORIDE SCH MLS/HR: 900 IRRIGANT IRRIGATION at 07:31

## 2018-03-27 RX ADMIN — LORAZEPAM SCH MG: 0.5 TABLET ORAL at 21:38

## 2018-03-27 RX ADMIN — SODIUM CHLORIDE AND POTASSIUM CHLORIDE SCH MLS/HR: 9; 1.49 INJECTION, SOLUTION INTRAVENOUS at 16:09

## 2018-03-27 RX ADMIN — IPRATROPIUM BROMIDE SCH MG: 0.5 SOLUTION RESPIRATORY (INHALATION) at 08:14

## 2018-03-27 RX ADMIN — ACETAMINOPHEN PRN MG: 325 TABLET ORAL at 16:34

## 2018-03-27 RX ADMIN — GUAIFENESIN PRN MG: 600 TABLET, EXTENDED RELEASE ORAL at 08:02

## 2018-03-27 RX ADMIN — TIOTROPIUM BROMIDE SCH CAP: 18 CAPSULE ORAL; RESPIRATORY (INHALATION) at 08:14

## 2018-03-27 RX ADMIN — PREDNISONE SCH MG: 10 TABLET ORAL at 08:02

## 2018-03-27 RX ADMIN — ASPIRIN SCH MG: 81 TABLET, COATED ORAL at 09:29

## 2018-03-27 RX ADMIN — IPRATROPIUM BROMIDE SCH MG: 0.5 SOLUTION RESPIRATORY (INHALATION) at 20:39

## 2018-03-27 RX ADMIN — SODIUM CHLORIDE SCH MLS/HR: 900 IRRIGANT IRRIGATION at 00:33

## 2018-03-27 RX ADMIN — AMOXICILLIN AND CLAVULANATE POTASSIUM SCH: 500; 125 TABLET, FILM COATED ORAL at 16:00

## 2018-03-27 RX ADMIN — CINACALCET HYDROCHLORIDE SCH MG: 30 TABLET, COATED ORAL at 09:30

## 2018-03-27 RX ADMIN — MENTHOL AND BENZOCAINE PRN LOZ: 10; 6 LOZENGE ORAL at 16:34

## 2018-03-27 RX ADMIN — OXYCODONE HYDROCHLORIDE PRN MG: 5 CAPSULE ORAL at 16:34

## 2018-03-27 RX ADMIN — MORPHINE TINCTURE PRN MG: 1 SOLUTION ORAL at 18:12

## 2018-03-27 RX ADMIN — IPRATROPIUM BROMIDE SCH MG: 0.5 SOLUTION RESPIRATORY (INHALATION) at 13:19

## 2018-03-27 RX ADMIN — PAROXETINE SCH MG: 20 TABLET, FILM COATED ORAL at 17:59

## 2018-03-27 RX ADMIN — IPRATROPIUM BROMIDE SCH MG: 0.5 SOLUTION RESPIRATORY (INHALATION) at 01:37

## 2018-03-27 RX ADMIN — SODIUM CHLORIDE SCH MLS/HR: 900 IRRIGANT IRRIGATION at 14:17

## 2018-03-27 RX ADMIN — WARFARIN SODIUM SCH MG: 2 TABLET ORAL at 17:59

## 2018-03-27 NOTE — PN
Progress Note





- Progress Note


Date of Service: 03/27/18


SOAP: 


Subjective:


CC: leukocytosis


HPI: 72 year old woman admitted with low back pain; chronic T spine compression 

fracture on MRI, no spine infection.  Elevated WBC, trending down each day, 

initially on zosyn now augmentin.  No abd pain, fever, rash, or diarrhea. Left 

ear pain a little better, no change in hearing.  Some cough, similar to baseline

, non productive.  








Objective:








 Vital Signs











Temp  36.7 C   03/27/18 07:21


 


Pulse  62   03/27/18 13:20


 


Resp  18   03/27/18 13:21


 


BP  129/42   03/27/18 11:27


 


Pulse Ox  98   03/27/18 13:20








 Intake & Output











 03/26/18 03/27/18 03/27/18





 18:59 06:59 18:59


 


Intake Total 2152 2460 2121


 


Output Total 800 925 795


 


Balance 1352 1535 1326


 


Intake:   


 


  IV Fluids 2032 1440 1831


 


    NS (0.9%) 2032 1440 1831


 


  Oral 120 1020 290


 


Output:   


 


  Pigtail Drain  925 500


 


  Urine 250  295


 


  Ileostomy 550  


 


Other:   


 


  # Bowel Movements  0 1


 


  Estimated Stool Amount   Small








Gen:awake, no distress


HEENT:no thrush


Heart:RRR no murmur


Lungs:CTA BL


Abd:+BS NTND soft; ostomy


Skin: no rash


 Laboratory Results - last 24 hr











  03/27/18 03/27/18





  07:00 07:00


 


WBC  16.7 H 


 


RBC  3.52 L 


 


Hgb  10.8 L 


 


Hct  33 L 


 


MCV  93 


 


MCH  31 


 


MCHC  33 


 


RDW  14 


 


Plt Count  234 


 


MPV  7.8 


 


Neut % (Auto)  87.5 H 


 


Lymph % (Auto)  6.4 L 


 


Mono % (Auto)  4.7 


 


Eos % (Auto)  1.1 


 


Baso % (Auto)  0.3 


 


Absolute Neuts (auto)  14.6 H 


 


Absolute Lymphs (auto)  1.1 


 


Absolute Monos (auto)  0.8 


 


Absolute Eos (auto)  0.2 


 


Absolute Basos (auto)  0 


 


Absolute Nucleated RBC  0 


 


Nucleated RBC %  0 


 


Sodium   141


 


Potassium   3.0 L


 


Chloride   110


 


Carbon Dioxide   23


 


Anion Gap   8


 


BUN   14


 


Creatinine   1.54 H


 


Est GFR ( Amer)   42.5


 


Est GFR (Non-Af Amer)   33.0


 


BUN/Creatinine Ratio   9.1


 


Glucose   98


 


Calcium   7.5 L








 Microbiology











 03/24/18 16:58 Aerobic Blood Culture - Preliminary





 Blood Venous    No Growth Day 2





 Anaerobic Blood Culture - Preliminary





    No Growth Day 2


 


 03/24/18 16:10 Aerobic Blood Culture - Preliminary





 Blood Venous    No Growth Day 2





 Anaerobic Blood Culture - Preliminary





    No Growth Day 2


 


 03/24/18 16:10 Urine Culture - Final





 Urine 











Assessment:


1. leukocytosis, improving, suspect due to otitis media


2. chronic T spine compression fracture


3. Crohn's disease s/p colectomy/ileostomy


4. COPD with chronic hypoxemic respiratory failure


5. Chronic kidney disease





Plan:


1. augmentin for 5 more days (to complete 7 day course)

## 2018-03-27 NOTE — PN
Subjective


Date of Service: 03/27/18


Interval History: 


.


Saw patient at bedside


Feeling quite ill: nauseous, sore throat, cough, shot of breath; anxious, 

headache, etc.





She feels the augmentin is making her nauseous; I agreed to stop it.





I also agreed to:





- Xopenex Q4 prn SOB


- increase and make standing her ativan.


- Stop Augmentin, and add back piperacillin in case there is a bacterial 

component to her presentation.


- Added morphine for pain


- Ambien for insomnia at night


- Compazine for nausea (Q6 prn) in addition to Zofran.








Overall, despite her many symptoms, we discussed how she seems to be improving:








WBC steadily decreasing





 Laboratory Tests











  03/24/18 03/25/18 03/27/18





  14:20 05:57 07:00


 


WBC  32.4 H  27.0 H  16.7 H








Creatinine Normalized:





 Laboratory Tests











  03/25/18 03/27/18





  05:51 07:00


 


Creatinine  2.13 H  1.54 H











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 

her current symptoms. It is likely unrelated to her current presentation, and 

htat will be discussed DURING this admission, but not today.








Family History: Unchanged from Admission


Social History: Unchanged from Admission


Past Medical History: Unchanged from Admission





Objective


Active Medications: 


.


Acetaminophen (Tylenol Tab*)  650 mg PO Q6H PRN


   PRN Reason: pain/fever


   Last Admin: 03/27/18 16:34 Dose:  650 mg


Aspirin (Aspirin Ec Low Dose*)  81 mg PO DAILY [facility]


   Last Admin: 03/27/18 09:29 Dose:  81 mg


Calcitriol (Rocaltrol  Cap*)  0.25 mcg PO QAM [facility]


   Last Admin: 03/27/18 09:30 Dose:  0.25 mcg


Cinacalcet (Sensipar Tab*)  30 mg PO QAM [facility]


   Last Admin: 03/27/18 09:30 Dose:  30 mg


Cyanocobalamin (Vitamin B12 Inj *)  1,000 mcg IM WEEKLY [facility]


Guaifenesin (Mucinex*)  600 mg PO BID PRN


   PRN Reason: CONGESTION


   Last Admin: 03/27/18 16:35 Dose:  600 mg


Potassium Chloride/Sodium Chloride (Ns 0.9% W/ 20 Meq Kcl 1000 Ml*)  1,000 mls 

@ 100 mls/hr IV PER RATE [facility]


   Last Admin: 03/27/18 16:09 Dose:  100 mls/hr


Ipratropium Bromide (Atrovent 0.5 Mg Neb.Sol*)  0.5 mg INH [id number] AWAKE 

[facility]


   Last Admin: 03/27/18 20:39 Dose:  0.5 mg


Levalbuterol HCl (Xopenex 0.63mg/3ml Neb*)  0.63 mg INH Q4H PRN


   PRN Reason: SOB/WHEEZING


Lorazepam (Ativan Tab(*))  1 mg PO BID [facility]


   Last Admin: 03/27/18 21:38 Dose:  1 mg


Morphine Sulfate (Morphine Inj (Syringe)*)  2 mg IV Q2H PRN


   PRN Reason: PAIN


   Last Admin: 03/27/18 21:38 Dose:  2 mg


Ondansetron HCl (Zofran Inj*)  4 mg IV Q6H PRN


   PRN Reason: NAUSEA


   Last Admin: 03/27/18 06:28 Dose:  4 mg


Opium Tincture (Opium Tincture*)  6 mg PO Q4H PRN


   PRN Reason: diarrhea


   Last Admin: 03/27/18 18:12 Dose:  6 mg


Oxycodone HCl (Roxycodone Tab*)  5 mg PO Q4HR PRN


   PRN Reason: DISCOMFORT


   Last Admin: 03/27/18 16:34 Dose:  5 mg


Paroxetine HCl (Paxil Tab*)  30 mg PO QPM [facility]


   Last Admin: 03/27/18 17:59 Dose:  30 mg


Polyvinyl Alcohol (Polyvinyl Alcohol 1.4% Opth*)  1 drop BOTH EYES Q2H PRN


   PRN Reason: DRY EYE


   Last Admin: 03/25/18 19:51 Dose:  1 drop


Prednisone (Deltasone Tab*)  10 mg PO DAILY [facility]


   Last Admin: 03/27/18 08:02 Dose:  10 mg


Prochlorperazine Edisylate (Compazine Inj*)  10 mg IV Q6H PRN


   PRN Reason: NAUSEA


   Last Admin: 03/27/18 14:16 Dose:  10 mg


Throat Lozenges (Chloraseptic Radha*)  1 radha PO Q6H PRN


   PRN Reason: SORE THROAT


   Last Admin: 03/27/18 16:34 Dose:  1 radha


Tiotropium Bromide (Spiriva Cap.Inh*)  1 cap INH QAM [facility]


   Last Admin: 03/27/18 08:14 Dose:  1 cap


Warfarin Sodium (Coumadin Tab(*))  2 mg PO QPM [facility]


   PRN Reason: Protocol


   Last Admin: 03/27/18 17:59 Dose:  2 mg


Zolpidem Tartrate (Ambien Tab*)  5 mg PO BEDTIME PRN


   PRN Reason: INSOMNIA


   Last Admin: 03/27/18 21:38 Dose:  5 mg


.


 Vital Signs - 8 hr











  03/27/18 03/27/18 03/27/18





  14:40 16:01 16:34


 


Temperature  99.2 F 


 


Pulse Rate  65 


 


Respiratory 20 20 22





Rate   


 


Blood Pressure  116/47 





(mmHg)   


 


O2 Sat by Pulse  99 





Oximetry   














  03/27/18 03/27/18 03/27/18





  18:12 20:41 21:38


 


Temperature   


 


Pulse Rate  75 


 


Respiratory 18 16 18





Rate   


 


Blood Pressure   





(mmHg)   


 


O2 Sat by Pulse  99 





Oximetry   











Oxygen Devices in Use Now: Nasal Cannula


Appearance: sleeping during my interview. awakens with cough, but falls asleep.


Eyes: No Scleral Icterus


Ears/Nose/Mouth/Throat: Clear Oropharnyx


Respiratory: Symmetrical Chest Expansion and Respiratory Effort


Cardiovascular: NL Sounds; No Murmurs; No JVD


Abdominal: NL Sounds; No Tenderness; No Distention, - - ostomy


Lymphatic: No Cervical Adenopathy


Extremities: No Edema


Skin: No Rash or Ulcers


Neurological: Alert and Oriented x 3


Lines/Tubes/Other Access: Clean, Dry and Intact Peripheral IV


Nutrition: Taking PO's


Result Diagrams: 


 03/27/18 07:00





 03/27/18 07:00


Additional Lab and Data: 


.


Microbiology and Other Data: 


 Microbiology











 03/24/18 18:12 Influenza Types A,B Antigen (ARABELLA) - Final





 Nasal    Specimen received for Influenza A/B Molecular testing














Assess/Plan/Problems-Billing


.


Assessment: 


72 year old woman with PMH of Crohn's disease (s/p ileostom), s/p radiation for 

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 

and worsening back pain. 





IN REVIEW OF ALL DATA SO FAR IN THIS ADMISSION, I THINK PATIENT WAS **NOT** 

SEPTIC, ALTHOUGH AT SEVERAL POINTS THERE WAS SUGGESTION OF POSSIBLE OR 

SUSPECTED SEPSIS.





DESPITE WHAT WAS WRITTEN AS A POSSIBILITY, I DO NOT BELIEVE SHE WAS SEPTIC 

DURING THIS ADMISSION.





.





- Patient Problems


(1) Acute on chronic renal failure


Current Visit: No   Status: Acute   Priority: High   Onset Date: 11/17/14   Code

(s): N17.9 - ACUTE KIDNEY FAILURE, UNSPECIFIED; N18.9 - CHRONIC KIDNEY DISEASE, 

UNSPECIFIED   SNOMED Code(s): 282307730


   Comment: 


- Cr elevated at presentation.


- Baseline, in Dec 2017, was 1.49.


- 3/27 --> back to baseline at ~1.5.   





(2) Anxiety


Current Visit: No   Status: Acute   Code(s): F41.9 - ANXIETY DISORDER, 

UNSPECIFIED   SNOMED Code(s): 85003356


   Comment: 


- Increase ativan to 1 mg PO BID   





(3) Pharyngitis


Current Visit: Yes   Status: Acute   Priority: High   Code(s): J02.9 - ACUTE 

PHARYNGITIS, UNSPECIFIED   Comment: 


- bacterial causes would be covered with piperacillin ==> changed to Augmentin 

by ID


- patient wants to go back to Piperacillin because she believes Augmentin made 

her nauseous.


- Lozenges ordered / IVF ongoing   





(4) Otitis media


Current Visit: Yes   Status: Acute   Priority: High   Code(s): H66.90 - OTITIS 

MEDIA, UNSPECIFIED, UNSPECIFIED EAR   Comment: 


- likely bacterial


- augmentin ordered ==> piperacillin/tazo will cover as well.   





(5) Tracheitis


Current Visit: Yes   Status: Acute   Priority: High   Code(s): J04.10 - ACUTE 

TRACHEITIS WITHOUT OBSTRUCTION   Comment: 


- perhaps the manifestation of a larger viral infection?   





(6) Bronchitis


Current Visit: Yes   Status: Acute   Priority: High   Code(s): J40 - BRONCHITIS

, NOT SPECIFIED AS ACUTE OR CHRONIC   Comment: 


- perhaps the manifestation of a larger viral infection?

## 2018-03-28 LAB
BASOPHILS # BLD AUTO: 0.1 10^3/UL (ref 0–0.2)
EOSINOPHIL # BLD AUTO: 0.3 10^3/UL (ref 0–0.6)
HCT VFR BLD AUTO: 34 % (ref 35–47)
HGB BLD-MCNC: 11.2 G/DL (ref 12–16)
INR PPP/BLD: 4.25 (ref 0.77–1.02)
LYMPHOCYTES # BLD AUTO: 0.9 10^3/UL (ref 1–4.8)
MCH RBC QN AUTO: 31 PG (ref 27–31)
MCHC RBC AUTO-ENTMCNC: 33 G/DL (ref 31–36)
MCV RBC AUTO: 93 FL (ref 80–97)
MONOCYTES # BLD AUTO: 1 10^3/UL (ref 0–0.8)
NEUTROPHILS # BLD AUTO: 8.4 10^3/UL (ref 1.5–7.7)
NRBC # BLD AUTO: 0 10^3/UL
NRBC BLD QL AUTO: 0.1
PLATELET # BLD AUTO: 249 10^3/UL (ref 150–450)
RBC # BLD AUTO: 3.68 10^6/UL (ref 4–5.4)
WBC # BLD AUTO: 10.6 10^3/UL (ref 3.5–10.8)

## 2018-03-28 RX ADMIN — CALCITRIOL SCH MCG: 0.25 CAPSULE ORAL at 10:41

## 2018-03-28 RX ADMIN — MENTHOL AND BENZOCAINE PRN LOZ: 10; 6 LOZENGE ORAL at 01:27

## 2018-03-28 RX ADMIN — IPRATROPIUM BROMIDE SCH MG: 0.5 SOLUTION RESPIRATORY (INHALATION) at 07:27

## 2018-03-28 RX ADMIN — SODIUM CHLORIDE AND POTASSIUM CHLORIDE SCH MLS/HR: 9; 1.49 INJECTION, SOLUTION INTRAVENOUS at 03:43

## 2018-03-28 RX ADMIN — TIOTROPIUM BROMIDE SCH CAP: 18 CAPSULE ORAL; RESPIRATORY (INHALATION) at 07:27

## 2018-03-28 RX ADMIN — POTASSIUM CHLORIDE SCH MEQ: 750 TABLET, FILM COATED, EXTENDED RELEASE ORAL at 10:41

## 2018-03-28 RX ADMIN — PAROXETINE SCH MG: 20 TABLET, FILM COATED ORAL at 18:43

## 2018-03-28 RX ADMIN — OXYCODONE HYDROCHLORIDE PRN MG: 5 CAPSULE ORAL at 01:26

## 2018-03-28 RX ADMIN — POTASSIUM CHLORIDE SCH MEQ: 750 TABLET, FILM COATED, EXTENDED RELEASE ORAL at 12:18

## 2018-03-28 RX ADMIN — LORAZEPAM SCH MG: 0.5 TABLET ORAL at 10:41

## 2018-03-28 RX ADMIN — MORPHINE SULFATE PRN MG: 2 INJECTION, SOLUTION INTRAMUSCULAR; INTRAVENOUS at 06:16

## 2018-03-28 RX ADMIN — ACETAMINOPHEN PRN MG: 325 TABLET ORAL at 01:27

## 2018-03-28 RX ADMIN — LORAZEPAM SCH MG: 0.5 TABLET ORAL at 20:32

## 2018-03-28 RX ADMIN — CINACALCET HYDROCHLORIDE SCH MG: 30 TABLET, COATED ORAL at 10:41

## 2018-03-28 RX ADMIN — IPRATROPIUM BROMIDE SCH MG: 0.5 SOLUTION RESPIRATORY (INHALATION) at 12:46

## 2018-03-28 RX ADMIN — POTASSIUM CHLORIDE SCH MEQ: 750 TABLET, FILM COATED, EXTENDED RELEASE ORAL at 15:01

## 2018-03-28 RX ADMIN — ACETAMINOPHEN PRN MG: 325 TABLET ORAL at 22:40

## 2018-03-28 RX ADMIN — IPRATROPIUM BROMIDE SCH MG: 0.5 SOLUTION RESPIRATORY (INHALATION) at 19:09

## 2018-03-28 RX ADMIN — ASPIRIN SCH MG: 81 TABLET, COATED ORAL at 10:42

## 2018-03-28 RX ADMIN — MORPHINE SULFATE PRN MG: 2 INJECTION, SOLUTION INTRAMUSCULAR; INTRAVENOUS at 08:17

## 2018-03-28 RX ADMIN — IPRATROPIUM BROMIDE SCH MG: 0.5 SOLUTION RESPIRATORY (INHALATION) at 01:11

## 2018-03-28 RX ADMIN — MORPHINE SULFATE PRN MG: 2 INJECTION, SOLUTION INTRAMUSCULAR; INTRAVENOUS at 22:36

## 2018-03-28 RX ADMIN — POTASSIUM CHLORIDE SCH MEQ: 750 TABLET, FILM COATED, EXTENDED RELEASE ORAL at 17:05

## 2018-03-28 NOTE — PN
Subjective


Date of Service: 03/28/18


Interval History: 





SOB.  No pain, liitle cough


Family History: Unchanged from Admission


Social History: Unchanged from Admission


Past Medical History: Unchanged from Admission





Objective


Active Medications: 








Acetaminophen (Tylenol Tab*)  650 mg PO Q6H PRN


   PRN Reason: pain/fever


   Last Admin: 03/28/18 01:27 Dose:  650 mg


Aspirin (Aspirin Ec Low Dose*)  81 mg PO DAILY [facility]


   Last Admin: 03/27/18 09:29 Dose:  81 mg


Calcitriol (Rocaltrol  Cap*)  0.25 mcg PO QAM [facility]


   Last Admin: 03/27/18 09:30 Dose:  0.25 mcg


Cinacalcet (Sensipar Tab*)  30 mg PO QAM [facility]


   Last Admin: 03/27/18 09:30 Dose:  30 mg


Cyanocobalamin (Vitamin B12 Inj *)  1,000 mcg IM WEEKLY [facility]


Guaifenesin (Mucinex*)  600 mg PO BID PRN


   PRN Reason: CONGESTION


   Last Admin: 03/27/18 16:35 Dose:  600 mg


Piperacillin Sod/Tazobactam (Sod 13.5 gm/ Sodium Chloride)  500 mls @ 20.833 mls

/hr IVPB Q24H [facility]


   Last Admin: 03/28/18 06:16 Dose:  20.833 mls/hr


Ipratropium Bromide (Atrovent 0.5 Mg Neb.Sol*)  0.5 mg INH [id number] AWAKE 

[facility]


   Last Admin: 03/28/18 07:27 Dose:  0.5 mg


Levalbuterol HCl (Xopenex 0.63mg/3ml Neb*)  0.63 mg INH Q4H PRN


   PRN Reason: SOB/WHEEZING


   Last Admin: 03/28/18 08:23 Dose:  0.63 mg


Lorazepam (Ativan Tab(*))  1 mg PO BID [facility]


   Last Admin: 03/27/18 21:38 Dose:  1 mg


Morphine Sulfate (Morphine Inj (Syringe)*)  2 mg IV Q2H PRN


   PRN Reason: PAIN


   Last Admin: 03/28/18 08:17 Dose:  2 mg


Ondansetron HCl (Zofran Inj*)  4 mg IV Q6H PRN


   PRN Reason: NAUSEA


   Last Admin: 03/27/18 06:28 Dose:  4 mg


Opium Tincture (Opium Tincture*)  6 mg PO Q4H PRN


   PRN Reason: diarrhea


   Last Admin: 03/27/18 18:12 Dose:  6 mg


Oxycodone HCl (Roxycodone Tab*)  5 mg PO Q4HR PRN


   PRN Reason: DISCOMFORT


   Last Admin: 03/28/18 01:26 Dose:  5 mg


Paroxetine HCl (Paxil Tab*)  30 mg PO QPM [facility]


   Last Admin: 03/27/18 17:59 Dose:  30 mg


Pharmacy Consult (Zosyn Per Pharmacy*)  1 note FOLLOW UP .ZOSYN PER PHARMACY [facility] of [facility]


Polyvinyl Alcohol (Polyvinyl Alcohol 1.4% Opth*)  1 drop BOTH EYES Q2H PRN


   PRN Reason: DRY EYE


   Last Admin: 03/25/18 19:51 Dose:  1 drop


Potassium Chloride (Klor Con Er Tab*)  20 meq PO Q2H [facility]


   Stop: 03/28/18 16:30


Prednisone (Deltasone Tab*)  60 mg PO DAILY [facility]


Prochlorperazine Edisylate (Compazine Inj*)  10 mg IV Q6H PRN


   PRN Reason: NAUSEA


   Last Admin: 03/27/18 14:16 Dose:  10 mg


Throat Lozenges (Chloraseptic Radha*)  1 radha PO Q6H PRN


   PRN Reason: SORE THROAT


   Last Admin: 03/28/18 01:27 Dose:  1 radha


Tiotropium Bromide (Spiriva Cap.Inh*)  1 cap INH QAM [facility]


   Last Admin: 03/28/18 07:27 Dose:  1 cap


Warfarin Sodium (Coumadin Tab(*))  2 mg PO QPM [facility]


   PRN Reason: Protocol


   Last Admin: 03/27/18 17:59 Dose:  2 mg


Zolpidem Tartrate (Ambien Tab*)  5 mg PO BEDTIME PRN


   PRN Reason: INSOMNIA


   Last Admin: 03/27/18 21:38 Dose:  5 mg








 Vital Signs - 8 hr











  03/28/18 03/28/18 03/28/18





  01:26 04:24 06:15


 


Temperature  98.4 F 


 


Pulse Rate  54 


 


Respiratory 18 16 20





Rate   


 


Blood Pressure  117/47 





(mmHg)   


 


O2 Sat by Pulse  98 





Oximetry   














  03/28/18 03/28/18 03/28/18





  06:16 07:31 08:17


 


Temperature   


 


Pulse Rate  58 


 


Respiratory 20 16 42





Rate   


 


Blood Pressure   





(mmHg)   


 


O2 Sat by Pulse  98 





Oximetry   














  03/28/18 03/28/18





  08:27 08:35


 


Temperature  98.3 F


 


Pulse Rate 102 93


 


Respiratory 40 32





Rate  


 


Blood Pressure  139/64





(mmHg)  


 


O2 Sat by Pulse 89 94





Oximetry  











Oxygen Devices in Use Now: Nasal Cannula


Appearance: Sitting on the edge of her bed, tachypneic.  Alert.  Looks fatigued.


Eyes: No Scleral Icterus


Ears/Nose/Mouth/Throat: Clear Oropharnyx, Mucous Membranes Moist


Neck: NL Appearance and Movements; NL JVP, No Thyroid Enlargement, Masses


Respiratory: Symmetrical Chest Expansion and Respiratory Effort, Clear to 

Percussion, - - mild to mod wheezing R > L, diffuse rhonchi


Extremities: No Edema, No Clubbing, Cyanosis, -


Skin: No Rash or Ulcers, No Nodules or Sclerosis, -


Neurological: Alert and Oriented x 3, NL Sensation


Result Diagrams: 


 03/27/18 07:00





 03/28/18 08:02


Additional Lab and Data: 


.


Microbiology and Other Data: 


 Microbiology











 03/24/18 18:12 Influenza Types A,B Antigen (ARABELLA) - Final





 Nasal    Specimen received for Influenza A/B Molecular testing














Assess/Plan/Problems-Billing


.


Assessment: 


72 year old woman with PMH of Crohn's disease (s/p ileostom), s/p radiation for 

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 

and worsening back pain. 





IN REVIEW OF ALL DATA SO FAR IN THIS ADMISSION, I THINK PATIENT WAS **NOT** 

SEPTIC, ALTHOUGH AT SEVERAL POINTS THERE WAS SUGGESTION OF POSSIBLE OR 

SUSPECTED SEPSIS.





DESPITE WHAT WAS WRITTEN AS A POSSIBILITY, I DO NOT BELIEVE SHE WAS SEPTIC 

DURING THIS ADMISSION.





.





- Patient Problems


(1) Severe sepsis


Current Visit: Yes   Status: Acute   Code(s): A41.9 - SEPSIS, UNSPECIFIED 

ORGANISM; R65.20 - SEVERE SEPSIS WITHOUT SEPTIC SHOCK   SNOMED Code(s): 69014802


   Comment: Acidosis present.  ABG's and lactic acid pending.  Transfer to ICU.

  Discussed with Dr. Ocasio.    





(2) COPD exacerbation


Current Visit: No   Status: Acute   Priority: High   Code(s): J44.1 - CHRONIC 

OBSTRUCTIVE PULMONARY DISEASE W (ACUTE) EXACERBATION   SNOMED Code(s): 

476833688671562


   Comment:  Continue pip/elham.  Pt states she last smoked 11/24/17.


Prednisone 60 mg start 3/28.  Pt states she had trouble with 7 mg prednisone, 

needs very slow taper.    





(3) History of pulmonary embolism


Current Visit: No   Status: Chronic   Priority: Medium   Code(s): Z86.711 - 

PERSONAL HISTORY OF PULMONARY EMBOLISM   SNOMED Code(s): 965205691


   Comment: Continue warfarin.  INR 3/28 pending.   





(4) CKD (chronic kidney disease) stage 3, GFR 30-59 ml/min


Current Visit: No   Status: Chronic   Code(s): N18.3 - CHRONIC KIDNEY DISEASE, 

STAGE 3 (MODERATE)   SNOMED Code(s): 011691947


   Comment: Est GFR 30.3 3/28.  Furosemide 40 mg IV ordered 3/28.  IV fluids 

stopped due to large positive fluid balance and SOB.

## 2018-03-28 NOTE — RAD
Indication: Mild shortness of breath. History of COPD. Sepsis. Question CHF.



Comparison: February 24, 2018 CT abdomen and March 24, 2018 chest radiograph. January 26, 2018 CT.



Technique: Upright AP 1513 hours



Report: RIGHT upper lobe mass containing 2 surgical clips with extension to the suprahilar

region without significant change. Bilateral epicardial fat pads noted. No compelling

pleural effusions. Negative for pneumothorax. Upper normal heart size. Unremarkable

central pulmonary vasculature.



IMPRESSION: 

1. No compelling evidence for CHF.

2. RIGHT upper lobe mass with extension to the hilum/mediastinum without significant

interval change. No new pulmonary opacity evident.

3. No compelling joint effusions. Negative for pneumothorax.

## 2018-03-28 NOTE — PN
Date of Service: 03/28/18


Critical Care Services: 


73 y/o femal with multiple medical problems, admitted on 3/24 for presumed 

sepsis (possible source is pharyngitis/otitis) - sent to ICU today because of 

respiratory distress (presumed due to fluid overload) and metabolic acidosis of 

unknown etiology.  Patient was given IV furosemide just prior to ICU transfer, 

and on admission to ICU was breathing comfortably.





Vital Signs: 











Temp Pulse Resp BP SpO2 FiO2


 


100.0 F 74 16 114/64 97 











Physical Exam: 


Gen:Alert and oriented. Breathing comfortably.


Lungs: No wheezing or crackles. Diminished BS at right base.


Cardiac: Reg rhythm  


Extremities: No cyanosis.  2+edema of both lower extremities.





Fluid Balance (Past 24 Hours): 











 03/28/18





 06:59


 


Intake Total 4106


 


Output Total 2195


 


Balance +1911


 


Intake: 


 


  IV Fluids 3116


 


    NS (0.9%) 2241


 


    NS (0.9%) 20 meq 


 


  IVPB 


 


    ABX - PIPERACILLIN 


 


    NS (0.9%) 


 


  Oral 990


 


Output: 


 


  Pigtail Drain 500


 


  Urine 745


 


  Stark 


 


  Colostomy 100


 


  Ileostomy 850


 


Other: 


 


  Estimated Void Small


 


  # Bowel Movements 0


 


  Estimated Stool Amount Medium


 


  # Voids 0





 








 








Labs: 











  03/28/18 03/28/18 03/28/18





  08:02 09:52 09:52


 


WBC   10.6 


 


RBC   3.68 L 


 


Hgb   11.2 L 


 


Hct   34 L 


 


MCV   93 


 


MCH   31 


 


MCHC   33 


 


RDW   14 


 


Plt Count   249 


 


   


 


   


 


   


 


   


 


   


 


   


 


   


 


   


 


   


 


   


 


   


 


   


 


   


 


INR (Anticoag Therapy)    4.25 


 


Sodium  140  


 


Potassium  TNP  


 


Chloride  112   


 


Carbon Dioxide  14   


 


Anion Gap  14   


 


BUN  12  


 


Creatinine  1.66 H  


 


Est GFR ( Amer)  38.9  


 


Est GFR (Non-Af Amer)  30.3  


 


BUN/Creatinine Ratio  7.2 L  


 


Glucose  98  


 


Lactic Acid   2.0


 


Calcium  6.7   


 


Magnesium  1.4   








Studies: 


CXR: No change from CXR on admission





Nutrition: 


Oral diet





Impression: 


1. Episode of SOB earlier has resolved. No evidence for pulmonary congestion at 

the present time.


2. Metabolic acidosis most likely from saline infusion.


3. Is overanticoagulated with coumadin.





Plan: 


1. Hold coumadin and monitor INR


2. D/C IV fluid. Diurese if necessary.


3. Follow serum HCO3 and anion gap





Critical Care Time: 40 minutes

## 2018-03-29 LAB
HCT VFR BLD AUTO: 35 % (ref 35–47)
HGB BLD-MCNC: 11.9 G/DL (ref 12–16)
MCH RBC QN AUTO: 31 PG (ref 27–31)
MCHC RBC AUTO-ENTMCNC: 34 G/DL (ref 31–36)
MCV RBC AUTO: 93 FL (ref 80–97)
PLATELET # BLD AUTO: 284 10^3/UL (ref 150–450)
RBC # BLD AUTO: 3.81 10^6/UL (ref 4–5.4)
WBC # BLD AUTO: 10.2 10^3/UL (ref 3.5–10.8)

## 2018-03-29 RX ADMIN — IPRATROPIUM BROMIDE SCH MG: 0.5 SOLUTION RESPIRATORY (INHALATION) at 00:52

## 2018-03-29 RX ADMIN — MAGNESIUM OXIDE TAB 400 MG (241.3 MG ELEMENTAL MG) SCH MG: 400 (241.3 MG) TAB at 09:23

## 2018-03-29 RX ADMIN — AMOXICILLIN AND CLAVULANATE POTASSIUM SCH MG: 500; 125 TABLET, FILM COATED ORAL at 20:30

## 2018-03-29 RX ADMIN — MENTHOL AND BENZOCAINE PRN LOZ: 10; 6 LOZENGE ORAL at 22:49

## 2018-03-29 RX ADMIN — ASPIRIN SCH MG: 81 TABLET, COATED ORAL at 09:24

## 2018-03-29 RX ADMIN — Medication SCH: at 21:56

## 2018-03-29 RX ADMIN — MORPHINE TINCTURE PRN MG: 1 SOLUTION ORAL at 15:48

## 2018-03-29 RX ADMIN — MORPHINE SULFATE PRN MG: 2 INJECTION, SOLUTION INTRAMUSCULAR; INTRAVENOUS at 03:50

## 2018-03-29 RX ADMIN — PAROXETINE SCH MG: 20 TABLET, FILM COATED ORAL at 17:45

## 2018-03-29 RX ADMIN — AMOXICILLIN AND CLAVULANATE POTASSIUM SCH MG: 500; 125 TABLET, FILM COATED ORAL at 09:22

## 2018-03-29 RX ADMIN — Medication SCH ML: at 20:57

## 2018-03-29 RX ADMIN — AMOXICILLIN AND CLAVULANATE POTASSIUM SCH MG: 500; 125 TABLET, FILM COATED ORAL at 15:47

## 2018-03-29 RX ADMIN — MORPHINE TINCTURE PRN MG: 1 SOLUTION ORAL at 01:04

## 2018-03-29 RX ADMIN — MORPHINE SULFATE PRN MG: 2 INJECTION, SOLUTION INTRAMUSCULAR; INTRAVENOUS at 20:32

## 2018-03-29 RX ADMIN — CINACALCET HYDROCHLORIDE SCH MG: 30 TABLET, COATED ORAL at 09:22

## 2018-03-29 RX ADMIN — MORPHINE SULFATE PRN MG: 2 INJECTION, SOLUTION INTRAMUSCULAR; INTRAVENOUS at 00:29

## 2018-03-29 RX ADMIN — ACETAMINOPHEN PRN MG: 325 TABLET ORAL at 20:30

## 2018-03-29 RX ADMIN — LORAZEPAM SCH MG: 0.5 TABLET ORAL at 20:31

## 2018-03-29 RX ADMIN — IPRATROPIUM BROMIDE SCH MG: 0.5 SOLUTION RESPIRATORY (INHALATION) at 08:03

## 2018-03-29 RX ADMIN — IPRATROPIUM BROMIDE SCH MG: 0.5 SOLUTION RESPIRATORY (INHALATION) at 19:21

## 2018-03-29 RX ADMIN — TIOTROPIUM BROMIDE SCH CAP: 18 CAPSULE ORAL; RESPIRATORY (INHALATION) at 08:03

## 2018-03-29 RX ADMIN — LORAZEPAM SCH MG: 0.5 TABLET ORAL at 09:23

## 2018-03-29 RX ADMIN — IPRATROPIUM BROMIDE SCH MG: 0.5 SOLUTION RESPIRATORY (INHALATION) at 13:33

## 2018-03-29 RX ADMIN — MORPHINE TINCTURE PRN MG: 1 SOLUTION ORAL at 20:33

## 2018-03-29 RX ADMIN — CALCITRIOL SCH MCG: 0.25 CAPSULE ORAL at 09:22

## 2018-03-29 NOTE — PN
Subjective


Date of Service: 03/29/18


Interval History: 





C/O FOSTER, panics when whe moves and gets SOB.  No cough.  Appetite OK.  No new c/

o.


Family History: Unchanged from Admission


Social History: Unchanged from Admission


Past Medical History: Unchanged from Admission





Objective


Active Medications: 








Acetaminophen (Tylenol Tab*)  650 mg PO Q6H PRN


   PRN Reason: pain/fever


   Last Admin: 03/28/18 22:40 Dose:  650 mg


Amoxicillin/Clavulanate Potassium (Augmentin Tab*)  250 mg PO TID [facility]


   Last Admin: 03/28/18 20:32 Dose:  250 mg


Aspirin (Aspirin Ec Low Dose*)  81 mg PO DAILY [facility]


   Last Admin: 03/28/18 10:42 Dose:  81 mg


Calcitriol (Rocaltrol  Cap*)  0.25 mcg PO QAM [facility]


   Last Admin: 03/28/18 10:41 Dose:  0.25 mcg


Cinacalcet (Sensipar Tab*)  30 mg PO QAM [facility]


   Last Admin: 03/28/18 10:41 Dose:  30 mg


Cyanocobalamin (Vitamin B12 Inj *)  1,000 mcg IM WEEKLY [facility]


Guaifenesin (Mucinex*)  600 mg PO BID PRN


   PRN Reason: CONGESTION


   Last Admin: 03/27/18 16:35 Dose:  600 mg


Ipratropium Bromide (Atrovent 0.5 Mg Neb.Sol*)  0.5 mg INH [id number] AWAKE 

[facility]


   Last Admin: 03/29/18 08:03 Dose:  0.5 mg


Levalbuterol HCl (Xopenex 0.63mg/3ml Neb*)  0.63 mg INH Q4H PRN


   PRN Reason: SOB/WHEEZING


   Last Admin: 03/28/18 08:23 Dose:  0.63 mg


Lorazepam (Ativan Tab(*))  1 mg PO BID [facility]


   Last Admin: 03/28/18 20:32 Dose:  1 mg


Morphine Sulfate (Morphine Inj (Syringe)*)  2 mg IV Q2H PRN


   PRN Reason: PAIN


   Last Admin: 03/29/18 03:50 Dose:  2 mg


Ondansetron HCl (Zofran Inj*)  4 mg IV Q6H PRN


   PRN Reason: NAUSEA


   Last Admin: 03/27/18 06:28 Dose:  4 mg


Opium Tincture (Opium Tincture*)  6 mg PO Q4H PRN


   PRN Reason: diarrhea


   Last Admin: 03/29/18 01:04 Dose:  6 mg


Oxycodone HCl (Roxycodone Tab*)  5 mg PO Q4HR PRN


   PRN Reason: DISCOMFORT


   Last Admin: 03/28/18 01:26 Dose:  5 mg


Paroxetine HCl (Paxil Tab*)  30 mg PO QPM [facility]


   Last Admin: 03/28/18 18:43 Dose:  30 mg


Pharmacy Profile Note (Coumadin Daily Reminder*)  1 note FOLLOW UP 1700 [facility]


   Last Admin: 03/28/18 18:27 Dose:  1 note


Polyvinyl Alcohol (Polyvinyl Alcohol 1.4% Opth*)  1 drop BOTH EYES Q2H PRN


   PRN Reason: DRY EYE


   Last Admin: 03/25/18 19:51 Dose:  1 drop


Prednisone (Deltasone Tab*)  50 mg PO DAILY [facility]


Prochlorperazine Edisylate (Compazine Inj*)  10 mg IV Q6H PRN


   PRN Reason: NAUSEA


   Last Admin: 03/27/18 14:16 Dose:  10 mg


Throat Lozenges (Chloraseptic Radha*)  1 radha PO Q6H PRN


   PRN Reason: SORE THROAT


   Last Admin: 03/28/18 01:27 Dose:  1 radha


Tiotropium Bromide (Spiriva Cap.Inh*)  1 cap INH QAM [facility]


   Last Admin: 03/29/18 08:03 Dose:  1 cap








 Vital Signs - 8 hr











  03/29/18 03/29/18 03/29/18





  00:17 00:29 00:55


 


Temperature 98.7 F  


 


Pulse Rate 73  71


 


Respiratory  18 18





Rate   


 


Blood Pressure   





(mmHg)   


 


O2 Sat by Pulse 97  96





Oximetry   














  03/29/18 03/29/18 03/29/18





  01:04 02:30 03:11


 


Temperature   98.2 F


 


Pulse Rate   76


 


Respiratory 18 18 16





Rate   


 


Blood Pressure   122/58





(mmHg)   


 


O2 Sat by Pulse   100





Oximetry   














  03/29/18 03/29/18 03/29/18





  03:41 03:50 04:55


 


Temperature   


 


Pulse Rate   


 


Respiratory 18 18 18





Rate   


 


Blood Pressure   





(mmHg)   


 


O2 Sat by Pulse   





Oximetry   














  03/29/18 03/29/18





  05:03 07:37


 


Temperature  98.1 F


 


Pulse Rate  64


 


Respiratory 16 





Rate  


 


Blood Pressure  108/52





(mmHg)  


 


O2 Sat by Pulse  100





Oximetry  











Oxygen Devices in Use Now: Nasal Cannula


Appearance: Alert, partly up in bed.  In fair spirits, somewhat anxious, sl 

tachypneic, otherwise looks comfortable.


Eyes: No Scleral Icterus


Ears/Nose/Mouth/Throat: Clear Oropharnyx, Mucous Membranes Moist


Neck: NL Appearance and Movements; NL JVP, No Thyroid Enlargement, Masses


Respiratory: Symmetrical Chest Expansion and Respiratory Effort, Clear to 

Auscultation, Clear to Percussion, - - Much clearer than yesterday.


Cardiovascular: NL Sounds; No Murmurs; No JVD, RRR, No Edema


Extremities: No Edema, No Clubbing, Cyanosis, -


Skin: No Rash or Ulcers, No Nodules or Sclerosis, -


Neurological: Alert and Oriented x 3, NL Sensation


Result Diagrams: 


 03/29/18 06:37





 03/29/18 06:37


Additional Lab and Data: 


.


Microbiology and Other Data: 


 Microbiology











 03/24/18 18:12 Influenza Types A,B Antigen (ARBAELLA) - Final





 Nasal    Specimen received for Influenza A/B Molecular testing














Assess/Plan/Problems-Billing


.


Assessment: 


72 year old woman with PMH of Crohn's disease (s/p ileostom), s/p radiation for 

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 

and worsening back pain. 





IN REVIEW OF ALL DATA SO FAR IN THIS ADMISSION, I THINK PATIENT WAS **NOT** 

SEPTIC, ALTHOUGH AT SEVERAL POINTS THERE WAS SUGGESTION OF POSSIBLE OR 

SUSPECTED SEPSIS.





DESPITE WHAT WAS WRITTEN AS A POSSIBILITY, I DO NOT BELIEVE SHE WAS SEPTIC 

DURING THIS ADMISSION.





.





- Patient Problems


(1) Severe sepsis


Current Visit: Yes   Status: Acute   Code(s): A41.9 - SEPSIS, UNSPECIFIED 

ORGANISM; R65.20 - SEVERE SEPSIS WITHOUT SEPTIC SHOCK   SNOMED Code(s): 10955910


   Comment: No longer has severe sepsis as of 3/29.  C02 up to 22, anion gap 

unchanged at 14, lacatic acid 2.5.  Continue amox/clav as per Dr. Ocasio.   





(2) COPD exacerbation


Current Visit: No   Status: Acute   Priority: High   Code(s): J44.1 - CHRONIC 

OBSTRUCTIVE PULMONARY DISEASE W (ACUTE) EXACERBATION   SNOMED Code(s): 

333548087878968


   Comment:  Continue amox/clav.  Pt states she last smoked 11/24/17.


Prednisone 30 mg 3/29.  Pt states she had trouble with 7 mg prednisone, needs 

very slow taper.    





(3) History of pulmonary embolism


Current Visit: No   Status: Chronic   Priority: Medium   Code(s): Z86.711 - 

PERSONAL HISTORY OF PULMONARY EMBOLISM   SNOMED Code(s): 623325016


   Comment: Continue warfarin.  INR 3/28 pending.   





(4) CKD (chronic kidney disease) stage 3, GFR 30-59 ml/min


Current Visit: No   Status: Chronic   Code(s): N18.3 - CHRONIC KIDNEY DISEASE, 

STAGE 3 (MODERATE)   SNOMED Code(s): 106654032


   Comment: Est GFR 30.3 3/28.  Furosemide 40 mg IV ordered 3/28.  Large 

diuresis 3/28, will give 500 ml NSS 3/29 and repeat BMP 3/30.   





(5) Hypomagnesemia


Current Visit: Yes   Status: Acute   Code(s): E83.42 - HYPOMAGNESEMIA   SNOMED 

Code(s): 172586895


   Comment: Mg++ 1.4 3/28 before IV fuorsemide.  Mag oxide 8000 mg daily 

ordered.

## 2018-03-30 VITALS — DIASTOLIC BLOOD PRESSURE: 60 MMHG | SYSTOLIC BLOOD PRESSURE: 139 MMHG

## 2018-03-30 LAB
BASOPHILS # BLD AUTO: 0 10^3/UL (ref 0–0.2)
EOSINOPHIL # BLD AUTO: 0 10^3/UL (ref 0–0.6)
HCT VFR BLD AUTO: 32 % (ref 35–47)
HGB BLD-MCNC: 11.3 G/DL (ref 12–16)
INR PPP/BLD: 6.32 (ref 0.77–1.02)
LYMPHOCYTES # BLD AUTO: 1.2 10^3/UL (ref 1–4.8)
MCH RBC QN AUTO: 32 PG (ref 27–31)
MCHC RBC AUTO-ENTMCNC: 35 G/DL (ref 31–36)
MCV RBC AUTO: 92 FL (ref 80–97)
MONOCYTES # BLD AUTO: 1 10^3/UL (ref 0–0.8)
NEUTROPHILS # BLD AUTO: 9.7 10^3/UL (ref 1.5–7.7)
NRBC # BLD AUTO: 0 10^3/UL
NRBC BLD QL AUTO: 0
PLATELET # BLD AUTO: 322 10^3/UL (ref 150–450)
RBC # BLD AUTO: 3.54 10^6/UL (ref 4–5.4)
WBC # BLD AUTO: 12 10^3/UL (ref 3.5–10.8)

## 2018-03-30 RX ADMIN — ASPIRIN SCH MG: 81 TABLET, COATED ORAL at 10:46

## 2018-03-30 RX ADMIN — MORPHINE TINCTURE PRN MG: 1 SOLUTION ORAL at 00:31

## 2018-03-30 RX ADMIN — MORPHINE TINCTURE PRN MG: 1 SOLUTION ORAL at 10:48

## 2018-03-30 RX ADMIN — MORPHINE SULFATE PRN MG: 2 INJECTION, SOLUTION INTRAMUSCULAR; INTRAVENOUS at 03:31

## 2018-03-30 RX ADMIN — CINACALCET HYDROCHLORIDE SCH MG: 30 TABLET, COATED ORAL at 10:44

## 2018-03-30 RX ADMIN — IPRATROPIUM BROMIDE SCH MG: 0.5 SOLUTION RESPIRATORY (INHALATION) at 00:43

## 2018-03-30 RX ADMIN — Medication SCH ML: at 06:10

## 2018-03-30 RX ADMIN — OXYCODONE HYDROCHLORIDE PRN MG: 5 CAPSULE ORAL at 00:55

## 2018-03-30 RX ADMIN — IPRATROPIUM BROMIDE SCH MG: 0.5 SOLUTION RESPIRATORY (INHALATION) at 07:08

## 2018-03-30 RX ADMIN — IPRATROPIUM BROMIDE SCH MG: 0.5 SOLUTION RESPIRATORY (INHALATION) at 12:33

## 2018-03-30 RX ADMIN — MAGNESIUM OXIDE TAB 400 MG (241.3 MG ELEMENTAL MG) SCH MG: 400 (241.3 MG) TAB at 10:46

## 2018-03-30 RX ADMIN — AMOXICILLIN AND CLAVULANATE POTASSIUM SCH MG: 500; 125 TABLET, FILM COATED ORAL at 10:44

## 2018-03-30 RX ADMIN — CALCITRIOL SCH MCG: 0.25 CAPSULE ORAL at 10:44

## 2018-03-30 RX ADMIN — TIOTROPIUM BROMIDE SCH CAP: 18 CAPSULE ORAL; RESPIRATORY (INHALATION) at 07:08

## 2018-03-30 RX ADMIN — LORAZEPAM SCH MG: 0.5 TABLET ORAL at 10:46

## 2018-03-30 NOTE — PN
Progress Note





- Progress Note


Date of Service: 03/30/18


Note: 





Time spent on discharge 50 minutes.

## 2018-03-31 NOTE — DS
CC:  Dr. Haile *

 

DISCHARGE SUMMARY:

 

DATE OF ADMISSION:

 

DATE OF DISCHARGE:  03/30/18

 

HISTORY OF PRESENT ILLNESS:  This 72-year-old woman presented complaining of 
back pain and nausea.  She also complained of left ear pain and difficulty 
hearing.  She has a chronic vertebral compression fracture.  Her back pain was 
really not a big issue during this hospital stay.  She did complain of ear pain
, which finally resolved.

 

She was given antibiotics in part for her presumed otitis and in part for COPD. 
She did have 1 episode where she got very short of breath and had a poor lung 
exam, this may have been due to a panic attack or to mucous plugging.  She was 
in the ICU for only a few hours.  She got 40 mg of furosemide IV.  She diuresed 
quite a bit. Her creatinine went up.  I gave her 500 mL of normal saline back.  
Her creatinine came back down to its baseline.  On 03/30/18, her creatinine was 
1.84, pretty much at baseline.  The highest it was, was 2.05.

 

She will not go home on any diuretic.  She will continue on antibiotics for 5 
more days as an outpatient.  She was given steroids and this will be tapered.

 

Her INR went up quite rapidly here.  Her last dose of warfarin was 2 mg on 03/27
/18.  On the day of discharge, her INR was 6.32.  I note that the patient has a 
home INR machine.  She is very familiar with interpreting the values and 
administering her warfarin.  I instructed her that she would likely not need 
any warfarin for several days and that if she wanted, she could wait till 
Monday to check her fingerstick.  She is instructed to not take warfarin until 
her INR is under 3.

 

On the day of discharge, I examined her ear.  It was not red.  There was no 
cerumen.  Tympanic membrane was flat.

 

DISCHARGE DIAGNOSES:

1.  Chronic obstructive pulmonary disease exacerbation.

2.  History of pulmonary embolism.

3.  Left otitis.

4.  Chronic kidney disease.

5.  Hypomagnesemia.

 

DISCHARGE MEDICATIONS:

1.  Acetaminophen 650 mg every 6 hours p.r.n.

2.  Amoxicillin clavulanate 500 mg b.i.d. for 5 days.

3.  Magnesium oxide 400 mg b.i.d.

4.  Tincture of opium 0.6 mL as prescribed.

5.  Cinacalcet 30 mg daily.

6.  Vitamin B12 1000 mcg IM weekly.

7.  Fluoxetine 30 mg h.s.

8.  Guaifenesin 600 mg b.i.d. p.r.n.

9.  Calcitriol 0.25 mg daily.

10.  Ipratropium 0.5 mg by nebulizer every 6 hours p.r.n.

11.  Tiotropium 1 capsule daily.

12.  Lorazepam 0.5 mg b.i.d. p.r.n.

13.  Aspirin 81 mg daily.

14.  Prednisone 10 mg, to taper from 3 to 0 over 3 days.

 

Warfarin will be restarted when her INR is under 3.

 

 [id number]/[facility] #: [id number]

[facility]

## 2018-04-23 ENCOUNTER — HOSPITAL ENCOUNTER (INPATIENT)
Dept: HOSPITAL 25 - ED | Age: 73
LOS: 11 days | Discharge: NURSING, REHAB OR LONG-TERM CARE | DRG: 190 | End: 2018-05-04
Attending: INTERNAL MEDICINE | Admitting: INTERNAL MEDICINE
Payer: MEDICARE

## 2018-04-23 DIAGNOSIS — C34.90: ICD-10-CM

## 2018-04-23 DIAGNOSIS — Z72.89: ICD-10-CM

## 2018-04-23 DIAGNOSIS — Z83.79: ICD-10-CM

## 2018-04-23 DIAGNOSIS — Z90.49: ICD-10-CM

## 2018-04-23 DIAGNOSIS — F17.210: ICD-10-CM

## 2018-04-23 DIAGNOSIS — R74.8: ICD-10-CM

## 2018-04-23 DIAGNOSIS — Z93.2: ICD-10-CM

## 2018-04-23 DIAGNOSIS — Z86.711: ICD-10-CM

## 2018-04-23 DIAGNOSIS — Z85.118: ICD-10-CM

## 2018-04-23 DIAGNOSIS — I48.91: ICD-10-CM

## 2018-04-23 DIAGNOSIS — Z82.49: ICD-10-CM

## 2018-04-23 DIAGNOSIS — Z86.718: ICD-10-CM

## 2018-04-23 DIAGNOSIS — I12.9: ICD-10-CM

## 2018-04-23 DIAGNOSIS — F41.9: ICD-10-CM

## 2018-04-23 DIAGNOSIS — F32.9: ICD-10-CM

## 2018-04-23 DIAGNOSIS — G47.33: ICD-10-CM

## 2018-04-23 DIAGNOSIS — J44.1: Primary | ICD-10-CM

## 2018-04-23 DIAGNOSIS — M81.0: ICD-10-CM

## 2018-04-23 DIAGNOSIS — Z98.42: ICD-10-CM

## 2018-04-23 DIAGNOSIS — Z91.19: ICD-10-CM

## 2018-04-23 DIAGNOSIS — Z83.3: ICD-10-CM

## 2018-04-23 DIAGNOSIS — Z92.3: ICD-10-CM

## 2018-04-23 DIAGNOSIS — G62.9: ICD-10-CM

## 2018-04-23 DIAGNOSIS — J96.21: ICD-10-CM

## 2018-04-23 DIAGNOSIS — Z66: ICD-10-CM

## 2018-04-23 DIAGNOSIS — G25.81: ICD-10-CM

## 2018-04-23 DIAGNOSIS — K50.90: ICD-10-CM

## 2018-04-23 DIAGNOSIS — Z87.01: ICD-10-CM

## 2018-04-23 DIAGNOSIS — Z98.41: ICD-10-CM

## 2018-04-23 DIAGNOSIS — Z88.7: ICD-10-CM

## 2018-04-23 DIAGNOSIS — N17.9: ICD-10-CM

## 2018-04-23 DIAGNOSIS — Z86.14: ICD-10-CM

## 2018-04-23 DIAGNOSIS — Z88.1: ICD-10-CM

## 2018-04-23 DIAGNOSIS — Z99.81: ICD-10-CM

## 2018-04-23 LAB
BASOPHILS # BLD AUTO: 0.2 10^3/UL (ref 0–0.2)
EOSINOPHIL # BLD AUTO: 0.6 10^3/UL (ref 0–0.6)
HCT VFR BLD AUTO: 38 % (ref 35–47)
HGB BLD-MCNC: 12.4 G/DL (ref 12–16)
INR PPP/BLD: 0.96 (ref 0.77–1.02)
LYMPHOCYTES # BLD AUTO: 1.3 10^3/UL (ref 1–4.8)
MCH RBC QN AUTO: 31 PG (ref 27–31)
MCHC RBC AUTO-ENTMCNC: 33 G/DL (ref 31–36)
MCV RBC AUTO: 94 FL (ref 80–97)
MONOCYTES # BLD AUTO: 0.8 10^3/UL (ref 0–0.8)
NEUTROPHILS # BLD AUTO: 7.7 10^3/UL (ref 1.5–7.7)
NRBC # BLD AUTO: 0 10^3/UL
NRBC BLD QL AUTO: 0.1
PLATELET # BLD AUTO: 261 10^3/UL (ref 150–450)
RBC # BLD AUTO: 4 10^6/UL (ref 4–5.4)
WBC # BLD AUTO: 10.5 10^3/UL (ref 3.5–10.8)

## 2018-04-23 PROCEDURE — 84443 ASSAY THYROID STIM HORMONE: CPT

## 2018-04-23 PROCEDURE — 93308 TTE F-UP OR LMTD: CPT

## 2018-04-23 PROCEDURE — 81015 MICROSCOPIC EXAM OF URINE: CPT

## 2018-04-23 PROCEDURE — 94640 AIRWAY INHALATION TREATMENT: CPT

## 2018-04-23 PROCEDURE — 84100 ASSAY OF PHOSPHORUS: CPT

## 2018-04-23 PROCEDURE — 94760 N-INVAS EAR/PLS OXIMETRY 1: CPT

## 2018-04-23 PROCEDURE — 82140 ASSAY OF AMMONIA: CPT

## 2018-04-23 PROCEDURE — 85730 THROMBOPLASTIN TIME PARTIAL: CPT

## 2018-04-23 PROCEDURE — 94660 CPAP INITIATION&MGMT: CPT

## 2018-04-23 PROCEDURE — 80320 DRUG SCREEN QUANTALCOHOLS: CPT

## 2018-04-23 PROCEDURE — 86140 C-REACTIVE PROTEIN: CPT

## 2018-04-23 PROCEDURE — 93970 EXTREMITY STUDY: CPT

## 2018-04-23 PROCEDURE — 83735 ASSAY OF MAGNESIUM: CPT

## 2018-04-23 PROCEDURE — 71045 X-RAY EXAM CHEST 1 VIEW: CPT

## 2018-04-23 PROCEDURE — 80048 BASIC METABOLIC PNL TOTAL CA: CPT

## 2018-04-23 PROCEDURE — 85379 FIBRIN DEGRADATION QUANT: CPT

## 2018-04-23 PROCEDURE — 36600 WITHDRAWAL OF ARTERIAL BLOOD: CPT

## 2018-04-23 PROCEDURE — 87086 URINE CULTURE/COLONY COUNT: CPT

## 2018-04-23 PROCEDURE — 87040 BLOOD CULTURE FOR BACTERIA: CPT

## 2018-04-23 PROCEDURE — 71250 CT THORAX DX C-: CPT

## 2018-04-23 PROCEDURE — 85060 BLOOD SMEAR INTERPRETATION: CPT

## 2018-04-23 PROCEDURE — 82803 BLOOD GASES ANY COMBINATION: CPT

## 2018-04-23 PROCEDURE — 80329 ANALGESICS NON-OPIOID 1 OR 2: CPT

## 2018-04-23 PROCEDURE — 36415 COLL VENOUS BLD VENIPUNCTURE: CPT

## 2018-04-23 PROCEDURE — 85610 PROTHROMBIN TIME: CPT

## 2018-04-23 PROCEDURE — 83690 ASSAY OF LIPASE: CPT

## 2018-04-23 PROCEDURE — 85027 COMPLETE CBC AUTOMATED: CPT

## 2018-04-23 PROCEDURE — G0480 DRUG TEST DEF 1-7 CLASSES: HCPCS

## 2018-04-23 PROCEDURE — 93005 ELECTROCARDIOGRAM TRACING: CPT

## 2018-04-23 PROCEDURE — 84484 ASSAY OF TROPONIN QUANT: CPT

## 2018-04-23 PROCEDURE — 80076 HEPATIC FUNCTION PANEL: CPT

## 2018-04-23 PROCEDURE — 83605 ASSAY OF LACTIC ACID: CPT

## 2018-04-23 PROCEDURE — 80053 COMPREHEN METABOLIC PANEL: CPT

## 2018-04-23 PROCEDURE — 83880 ASSAY OF NATRIURETIC PEPTIDE: CPT

## 2018-04-23 PROCEDURE — 85025 COMPLETE CBC W/AUTO DIFF WBC: CPT

## 2018-04-23 PROCEDURE — 82553 CREATINE MB FRACTION: CPT

## 2018-04-23 PROCEDURE — 81003 URINALYSIS AUTO W/O SCOPE: CPT

## 2018-04-23 PROCEDURE — 87899 AGENT NOS ASSAY W/OPTIC: CPT

## 2018-04-23 PROCEDURE — 99285 EMERGENCY DEPT VISIT HI MDM: CPT

## 2018-04-23 RX ADMIN — PAROXETINE SCH MG: 10 TABLET, FILM COATED ORAL at 19:17

## 2018-04-23 RX ADMIN — METHYLPREDNISOLONE SODIUM SUCCINATE SCH MG: 40 INJECTION, POWDER, FOR SOLUTION INTRAMUSCULAR; INTRAVENOUS at 21:57

## 2018-04-23 RX ADMIN — MORPHINE TINCTURE PRN MG: 1 SOLUTION ORAL at 21:53

## 2018-04-23 RX ADMIN — ENOXAPARIN SODIUM SCH MG: 60 INJECTION SUBCUTANEOUS at 21:56

## 2018-04-23 RX ADMIN — LORAZEPAM SCH MG: 1 TABLET ORAL at 21:52

## 2018-04-23 RX ADMIN — MAGNESIUM OXIDE TAB 400 MG (241.3 MG ELEMENTAL MG) SCH MG: 400 (241.3 MG) TAB at 21:53

## 2018-04-23 RX ADMIN — IPRATROPIUM BROMIDE AND ALBUTEROL SULFATE PRN NEB: .5; 3 SOLUTION RESPIRATORY (INHALATION) at 20:10

## 2018-04-23 RX ADMIN — IPRATROPIUM BROMIDE AND ALBUTEROL SULFATE PRN NEB: .5; 3 SOLUTION RESPIRATORY (INHALATION) at 15:51

## 2018-04-23 RX ADMIN — WARFARIN SODIUM SCH MG: 3 TABLET ORAL at 21:52

## 2018-04-23 NOTE — HP
CC:  Dr. Haile; Dr. Dacosta*

 

HISTORY AND PHYSICAL:

 

DATE OF ADMISSION:  04/23/18

 

PROVIDER:  Shasta Easton NP

 

ATTENDING PHYSICIAN:  Dr. Bray* (report dictated by Shasta Easton NP).

 

PRIMARY CARE DOCTOR:  Dr. Haile.

 

PULMONOLOGIST:  Dr. Dacosta.

 

NEPHROLOGIST:  Dr. Schroeder.

 

CHIEF COMPLAINT:  Shortness of breath.

 

HISTORY OF PRESENT ILLNESS:  Ms. Santiago is a 72-year-old female with a past 
medical history of lung cancer, status post radiation; COPD, current tobacco 
abuse, continuous oxygen of 3 to 3.5 L; history of PE, on Coumadin; Crohn's 
disease, anxiety, status post colectomy with ileostomy, who presents to the 
emergency department today with worsening shortness of breath.  She reports 
approximately 2 weeks ago she came down with a "regular cold" with reporting a 
runny nose, sore throat, headache, nasal congestion and cough.  She reports 
that she saw her primary care provider, Dr. Haile on 04/13/18 where she was 
diagnosed with sinus infection and which she was reports that she was treated 
with antibiotics, but does not remember the name.  She also reports when she 
saw her at that time she had some increasing shortness of breath and was 
currently on a prednisone taper for a prior COPD exacerbation and at that time 
Dr. Haile increased her taper from 20 mg back up to 40 and started her back on 
the taper.  She reports she initially felt better; however, over the course of 
the week, she has had worsening shortness of breath, cough, wheezing.  She 
reports that she has been nebulizing herself 4 times a day with some relief, 
but it does not last long.  She reports nonproductive cough, but states that 
she has a "harsh cough."  She denies any fevers or chills.  She reports a poor 
appetite.  No nausea or vomiting.  She has diarrhea at her baseline through her 
ileostomy and states that the output has been normal.  Denies any urinary 
symptoms.  She continues to smoke tobacco daily, smoking approximately 2 
cigarettes a day. In the emergency department, she had a chest x-ray, which 
shows:

 

1.  Small left basilar infiltrate.

2.  Mass-like density in the right upper lobe, smaller than on prior study.

 

She has no leukocytosis and no lactic acidosis.  She does have a mildly 
elevated troponin of 0.05.

 

On assessment in the emergency department, she continues to complain of 
wheezing and shortness of breath.  In the emergency department, she was started 
on azithromycin and ceftriaxone.

 

PAST MEDICAL HISTORY:

1.  Crohn's disease with colectomy and ileostomy.

2.  History of DVT and PE, on Coumadin.

3.  COPD, on continuous oxygen 3 to 3.5 L.

4.  Current tobacco abuse.

5.  History of lung cancer, status post radiation.

6.  Anxiety.

7.  Depression.

8.  Restless legs syndrome.

9.  T6 compression fracture.

10.  She was put on clotrimazole by her PCP for oral thrush and which she 
states was a 5-day course and she reports her oral symptoms have improved.

11.  In regards to the patient's INR, it is noted to be 0.96.  The patient 
states that she does take Coumadin and she monitors her INR at home on Mondays.
  Last time she reports she checked it was a week ago and it was 1.5.  She 
speaks with  at Dr. Haile's office to help adjust her Coumadin.  She 
reports over the course of the past week, she has taken Coumadin 3 mg, 3 mg, 2 
mg, 2 mg, 1 mg, 1 mg and so forth.  She is not able to tell me why she was on 
this regimen of Coumadin and today her INR is normal at 0.96.

12.  Chronic kidney disease, stage 3.

 

PAST SURGICAL HISTORY:

1.  History of colon resection with ileostomy secondary to Crohn's.

2.  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

3.  She has had multiple abdominal surgeries related to her Crohn's.

 

HOME MEDICATIONS:

1.  Mucinex 600 mg p.o. b.i.d.

2.  Albuterol HFA inhaler 2 puffs INH q.4 hours p.r.n.

3.  Aspirin 81 mg p.o. daily.

4.  Acetaminophen 650 mg p.o. q.6 hours p.r.n.

5.  Ativan 0.5 mg p.o. t.i.d.

6.  Sensipar 30 mg p.o. q.a.m.

7.  Spiriva 1 cap INH daily.

8.  Paxil 30 mg p.o. q.p.m.

9.  Opium tincture 0.6 mL up to 5 times daily, 3 mL max daily.

10.  Clotrimazole guillaume.  Per the patient, she should have finished this 
course.

11.  Atrovent 0.5 mg neb q.6 hours p.r.n.

12.  Vitamin B12 injection 1000 mcg IM weekly.

13.  Magnesium oxide 400 mg p.o. b.i.d.

14.  Calcitriol 0.25 mcg p.o. q.a.m.

15.  Coumadin 2 mg p.o. daily.

 

ALLERGIES:  EGGPLANT, VANCOMYCIN, TETANUS, LEVOFLOXACIN, REMICADE, EPINEPHRINE, 
DOXYCYCLINE, and DAPTOMYCIN.

 

FAMILY HISTORY:  Father has a history of heart disease.  Paternal grandmother 
has diabetes.

 

SOCIAL HISTORY:  The patient is a long time smoker, smoking for the past 60 
years, reporting she smokes 2 cigarettes a day.  Denies alcohol use.  Her 
partner, Sarah Alvarado, is her healthcare proxy.

 

REVIEW OF SYSTEMS:  A 14-point review of systems was performed.  All the 
pertinent positives and negatives are mentioned in the history of present 
illness.  Otherwise are negative.

 

                               PHYSICAL EXAMINATION

 

GENERAL APPEARANCE:  Chronically ill female, lying in bed with mild dyspnea, 
alert and oriented x3.

 

VITAL SIGNS:  Temperature 98.2, heart rate 72, respirations 17, O2 sat 96% on 5 
L nasal cannula, blood pressure is 120/68.

 

HEENT:  Head is normocephalic, atraumatic.  Pupils are equal and reactive to 
light. Oropharynx is clear.  No thrush noted.  Moist mucous membranes.

 

RESPIRATORY:  Lungs have rhonchi throughout with some noted expiratory and 
inspiratory wheezes, mild dyspnea.  No accessory muscle use.

 

CARDIAC:  S1, S2.  Regular rate and rhythm.  No murmur, rub, or gallop 
appreciated. No lower extremity edema noted.

 

ABDOMEN:  Obese, soft, nontender.  Ileostomy in place with draining brown soft 
stool.

 

EXTREMITIES:  No clubbing, cyanosis, or edema.

 

NEURO:  Alert and oriented x3.  Moves all extremities equally.  Strength is 5/5 
throughout.

 

SKIN:  Warm, pink, dry.

 

 ASSESSMENT AND PLAN:  Ms. Santiago is a 72-year-old female with a past medical 
history of chronic obstructive pulmonary disease, on 3 L nasal cannula; current 
tobacco abuse; history of lung cancer, status post radiation; history of 
pulmonary embolism/deep venous thrombosis, on Coumadin; depression and anxiety, 
who presents to the emergency department today with report of 2 weeks of upper 
respiratory illness with worsening shortness of breath, wheezing and cough.

 

1.  Chronic obstructive pulmonary disease exacerbation.  The patient will be 
admitted to the telemetry unit with a diagnosis of chronic obstructive 
pulmonary disease exacerbation and she is noted to have a possible pneumonia 
with a left basilar infiltrate.  She is wheezing on exam.  At this point, she 
has not had any steroids in the emergency department.  Plan to give her Solu-
Medrol 125 mg IV x1 now and start her on 40 mg IV q.8 hours.  She was started 
azithromycin and ceftriaxone in the emergency department.  These will continue 
q.24 hours.  Continue DuoNeb.  Continue home inhalers Spiriva.  I have asked 
Dr. Dacosta, who follows the patient as an outpatient, to see the patient in 
consultation.  In regards to her history of lung cancer, on the chest x-ray it 
states that the mass-like density in the right upper lobe is smaller than on 
prior study.  Plan will be to get a CT of the chest.  Obtain sputum culture, S 
pneumoniae and Legionella urinary antigens. Continue Mucinex 600 mg p.o. b.i.d.

2.  Elevated troponin.  She has a mildly elevated troponin of 0.05.  She has no 
chest pain.  EKG showing normal sinus rhythm with a rate of 65.  In comparison 
to prior EKG, there appears to be no acute ST changes.  I suspect this is 
secondary to demand ischemia.  We will monitor on tele and trend another 
troponin.

3.  History of deep venous thrombosis and pulmonary embolism.  Again, the 
patient states that she is on Coumadin.  I question if she is taking her 
medications appropriately as her INR is 0.96 and her reported dosage of 
Coumadin was abnormal. Plan to start the patient on Lovenox at 1 mg/kg q.24 
hours due to her creatinine clearance.  This will give her full coverage and 
restart her on her Coumadin.  We will try to obtain records from Dr. Haile's 
office as she states that she has followup with Marilyn at Dr. Haile's office 
for her Coumadin doses.  She does have a noted elevated D-dimer.  She cannot 
undergo a CTA due to her renal function.  At this point, the plan will just to 
be to anticoagulate the patient and bridge her with lovenox and coumadin

4.  History of lung cancer, status post radiation.  Per the chest x-ray, the 
mass appears to be smaller than previous imaging.  Again, the patient will 
undergo a CT of the chest.

5.  Anxiety/depression.  Continue Ativan 0.5 mg p.o. t.i.d.  Continue Paxil 30 
mg p.o. q.a.m.

6.  Crohn's disease, status post ileostomy.  Okay for the patient to continue 
on her opium tincture, which she uses at home.

7.  Chronic kidney disease.  The patient appears to be around her baseline 
renal function.  Renally dosed medications.

8.  DVT prophylaxis:  Lovenox bridge to Coumadin.

9.  Code status:  The patient states that she is a DNR.  The patient's MOLST is 
on the chart.

10.  Hospital status:  Inpatient with chronic obstructive pulmonary disease 
exacerbation with possible pneumonia.

 

TIME SPENT:  Approximately 75 minutes was spent on this admission.

 

 ____________________________________ 

SHASTA EASTON NP

 

[id number]/[facility] #: [id number]

LAUREN

## 2018-04-23 NOTE — ED
Tristian JAMA Thomas, scribed for Iron Longoria MD on 18 at 0919 .





Shortness of Breath





- HPI Summary


HPI Summary: 





The patient is a 72 year old female brought in by ambulance complaining of 

shortness of breath that began one week ago and worsened in the last couple of 

days. She called the ambulance this morning because she woke up and was short 

of breath. The patient also complains of nasal congestion and postnasal drip. 

The patient denies chest pain, abdominal pain, fevers, and cough. Past medical 

history includes COPD, lung cancer, pneumonia, and radiation therapy. Her last 

radiation was three months ago. 





- History of Current Complaint


Chief Complaint: EDShortnessOfBreath


Time Seen by Provider: 18 09:05


Hx Obtained From: Patient


Onset/Duration: Lasting Weeks - 1, Still Present, Worse Since - last few days


Timing: Constant


Current Severity: Moderate


Dyspnea At: Rest


Aggrevating Factors: Nothing


Associated Signs & Symptoms: Nasal Congestion





- Allergy/Home Medications


Allergies/Adverse Reactions: 


 Allergies











Allergy/AdvReac Type Severity Reaction Status Date / Time


 


daptomycin Allergy  See Comment Verified 18 13:46


 


doxycycline Allergy  Vomiting Verified 18 13:46


 


epinephrine Allergy  See Comment Verified 18 13:46


 


infliximab [From Remicade] Allergy  Swelling Verified 18 13:46


 


levofloxacin [From Levaquin] Allergy  Rash Verified 18 13:46


 


tetanus toxoid, adsorbed Allergy  Swelling Verified 18 13:46


 


vancomycin Allergy  Wheezing Verified 18 13:46


 


eggplant Allergy Mild Difficulty Uncoded 17 23:59





   Breathing  














PMH/Surg Hx/FS Hx/Imm Hx


Endocrine/Hematology History: Reports: Hx Anticoagulant Therapy - coumadin


   Denies: Hx Diabetes


Cardiovascular History: Reports: Hx Angina, Hx Deep Vein Thrombosis, Hx 

Embolism - PE, Hx Hypotension, Hx Hypertension, Hx Syncope, Other 

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


   Denies: Hx Pacemaker/ICD


Respiratory History: Reports: Hx Asthma - intermittent, Hx Chronic Bronchitis, 

Hx Chronic Obstructive Pulmonary Disease (COPD), Hx Lung Cancer, Hx Pulmonary 

Embolism - , Hx Sleep Apnea, Other Respiratory Problems/Disorders - O2 at 

home. RUL CA.


GI History: Reports: Hx Crohn's Disease, Hx Ileostomy, Other GI Disorders - 

Ileostomy placement


 History: Reports: Hx Acute Renal Failure, Hx Chronic Renal Failure, Other  

Problems/Disorders - Renal deficit per Dr Schroeder


   Denies: Hx Dialysis, Hx Renal Disease


Musculoskeletal History: Reports: Hx Back Problems, Hx Osteoporosis, Hx 

Scoliosis - lumbar disc problems; scoliosis


Sensory History: Reports: Hx Cataracts, Hx Contacts or Glasses, Hx Hearing 

Problem


   Denies: Hx Hearing Aid


Opthamlomology History: Reports: Hx Cataracts, Hx Contacts or Glasses


Neurological History: Reports: Hx Headaches, Hx Nerve Disease - neuropathy, 

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


Psychiatric History: Reports: Hx Anxiety, Hx Depression


   Denies: Hx Panic Disorder





- Cancer History


Cancer Type, Location and Year: LUNG CA


Hx Chemotherapy: No


Hx Radiation Therapy: Yes


Hx Palliative Cancer Treatment: Yes





- Surgical History


Surgery Procedure, Year, and Place: COLECTOMY AND ILEOSTOMY 3/3/2011 

APPENDECTOMY, GALLBLADDER REMOVED, CATARACTS REMOVED [place].  SEVERAL BOWEL 

RESECTIONS AND FISTULA, [facility]


Hx Anesthesia Reactions: Yes - LOW BP





- Immunization History


Date of Tetanus Vaccine: utd


Date of Influenza Vaccine: utd


Infectious Disease History: No


Infectious Disease History: Reports: Hx of Known/Suspected MRSA


   Denies: Traveled Outside the [place] in Last 30 Days





- Family History


Known Family History: Positive: Unknown - Parents  very young., Other - 

Crohn's Disease





- Social History


Alcohol Use: Rare


Alcohol Amount: 1 Q 2-3 MONTHS


Hx Substance Use: No


Substance Use Type: Reports: Other


Substance Use Comment - Amount & Last Used: prescribed opium


Hx Tobacco Use: Yes


Smoking Status (MU): Former Smoker


Type: Cigarettes


Amount Used/How Often: 1-2 cigarettes per day


Length of Time of Smoking/Using Tobacco: 53 years


Have You Smoked in the Last Year: Yes





Review of Systems


Negative: Fever


Positive: Nasal Discharge, Other - Postnasal drip


Negative: Chest Pain


Positive: Shortness Of Breath.  Negative: Cough


Negative: Abdominal Pain


All Other Systems Reviewed And Are Negative: Yes





Physical Exam





- Summary


Physical Exam Summary: 





General: mildly ill-appearing, no pain distress


Skin: warm, color reflects adequate perfusion, dry


Head: normal


Eyes: EOMI, ANURAG


ENT: normal


Neck: supple, nontender


Respiratory: There are wheezes and rhonchi bilaterally. 


Cardiovascular: RRR


Abdomen: soft, nontender


Bowel: present


Musculoskeletal: strength/ROM intact, trace pedal edema


Neurological: normal, sensory/motor intact, A&O x3


Psychological: affect/mood appropriate


Triage Information Reviewed: Yes


Vital Signs On Initial Exam: 


 Initial Vitals











Temp Pulse Resp BP Pulse Ox


 


 98.2 F   70   19   168/86   100 


 


 18 09:13  18 09:13  18 09:13  18 09:13  18 09:13











Vital Signs Reviewed: Yes





Diagnostics





- Vital Signs


 Vital Signs











  Temp Pulse Resp BP Pulse Ox


 


 18 09:13  98.2 F  70  19  168/86  100














- Laboratory


Lab Results: 


 Lab Results











  18 Range/Units





  09:46 09:46 09:46 


 


WBC     (3.5-10.8)  10^3/ul


 


RBC     (4.0-5.4)  10^6/ul


 


Hgb     (12.0-16.0)  g/dl


 


Hct     (35-47)  %


 


MCV     (80-97)  fL


 


MCH     (27-31)  pg


 


MCHC     (31-36)  g/dl


 


RDW     (10.5-15)  %


 


Plt Count     (150-450)  10^3/ul


 


MPV     (7.4-10.4)  um3


 


Neut % (Auto)     


 


Lymph % (Auto)     


 


Mono % (Auto)     


 


Eos % (Auto)     


 


Baso % (Auto)     


 


Absolute Neuts (auto)     (1.5-7.7)  10^3/ul


 


Absolute Lymphs (auto)     (1.0-4.8)  10^3/ul


 


Absolute Monos (auto)     (0-0.8)  10^3/ul


 


Absolute Eos (auto)     (0-0.6)  10^3/ul


 


Absolute Basos (auto)     (0-0.2)  10^3/ul


 


Absolute Nucleated RBC     10^3/ul


 


Neutrophils %     (38-83)  %


 


Lymphocytes %     (25-47)  %


 


Reactive Lymphs %     (0-6)  %


 


Monocytes %     (0-7)  %


 


Eosinophils %     (0-6)  %


 


Basophils %     (0-2)  %


 


Nucleated RBC %     


 


Abs Neuts (Manual)     (1.5-7.7)  10^3/ul


 


Abs Lymphs (Manual)     (1.0-4.8)  10^3/ul


 


Abs Monocytes (Manual)     (0-0.8)  10^3/ul


 


Absolute Eos (Manual)     (0-0.6)  10^3/ul


 


Abs Basophils (Manual)     (0-0.2)  10^3/ul


 


Normal RBC Morphology     (Normal)  


 


Hem Pathologist Commnt     


 


INR (Anticoag Therapy)   0.96   (0.77-1.02)  


 


APTT   29.3   (26.0-36.3)  seconds


 


D-Dimer, Quantitative   240 H   (Less Than 230)  ng/mL


 


Sodium  142    (139-145)  mmol/L


 


Potassium  4.0    (3.5-5.0)  mmol/L


 


Chloride  102    (101-111)  mmol/L


 


Carbon Dioxide  33 H    (22-32)  mmol/L


 


Anion Gap  7    (2-11)  mmol/L


 


BUN  31 H    (6-24)  mg/dL


 


Creatinine  1.93 H    (0.51-0.95)  mg/dL


 


Est GFR ( Amer)  32.7    (>60)  


 


Est GFR (Non-Af Amer)  25.4    (>60)  


 


BUN/Creatinine Ratio  16.1    (8-20)  


 


Glucose  95    ()  mg/dL


 


Lactic Acid     (0.5-2.0)  mmol/L


 


Calcium  8.7    (8.6-10.3)  mg/dL


 


Magnesium  2.2    (1.9-2.7)  mg/dL


 


Total Bilirubin  0.40    (0.2-1.0)  mg/dL


 


AST  23    (13-39)  U/L


 


ALT  23    (7-52)  U/L


 


Alkaline Phosphatase  65    ()  U/L


 


Ammonia    38  (16-53)  mcmol/L


 


CK-MB (CK-2)  5.7    (0.6-6.3)  ng/mL


 


Troponin I  0.05 H*    (<0.04)  ng/mL


 


C-Reactive Protein  4.77    (< 5.00)  mg/L


 


B-Natriuretic Peptide    113 H ( - 100) pg/mL


 


Total Protein  6.6    (6.4-8.9)  g/dL


 


Albumin  3.8    (3.2-5.2)  g/dL


 


Globulin  2.8    (2-4)  g/dL


 


Albumin/Globulin Ratio  1.4    (1-3)  


 


Lipase  29    (11.0-82.0)  U/L


 


TSH  1.30    (0.34-5.60)  mcIU/mL


 


Acetaminophen  < 15    mcg/mL


 


Serum Alcohol  < 10    (<10)  mg/dL














  18 Range/Units





  09:46 10:40 


 


WBC  10.5   (3.5-10.8)  10^3/ul


 


RBC  4.00   (4.0-5.4)  10^6/ul


 


Hgb  12.4   (12.0-16.0)  g/dl


 


Hct  38   (35-47)  %


 


MCV  94   (80-97)  fL


 


MCH  31   (27-31)  pg


 


MCHC  33   (31-36)  g/dl


 


RDW  15   (10.5-15)  %


 


Plt Count  261   (150-450)  10^3/ul


 


MPV  7.8   (7.4-10.4)  um3


 


Neut % (Auto)  Not Reportable   


 


Lymph % (Auto)  Not Reportable   


 


Mono % (Auto)  Not Reportable   


 


Eos % (Auto)  Not Reportable   


 


Baso % (Auto)  Not Reportable   


 


Absolute Neuts (auto)  7.7   (1.5-7.7)  10^3/ul


 


Absolute Lymphs (auto)  1.3   (1.0-4.8)  10^3/ul


 


Absolute Monos (auto)  0.8   (0-0.8)  10^3/ul


 


Absolute Eos (auto)  0.6   (0-0.6)  10^3/ul


 


Absolute Basos (auto)  0.2   (0-0.2)  10^3/ul


 


Absolute Nucleated RBC  0   10^3/ul


 


Neutrophils %  73   (38-83)  %


 


Lymphocytes %  14 L   (25-47)  %


 


Reactive Lymphs %  3   (0-6)  %


 


Monocytes %  7   (0-7)  %


 


Eosinophils %  3   (0-6)  %


 


Basophils %  0   (0-2)  %


 


Nucleated RBC %  0.1   


 


Abs Neuts (Manual)  7.7   (1.5-7.7)  10^3/ul


 


Abs Lymphs (Manual)  1.5   (1.0-4.8)  10^3/ul


 


Abs Monocytes (Manual)  0.7   (0-0.8)  10^3/ul


 


Absolute Eos (Manual)  0.3   (0-0.6)  10^3/ul


 


Abs Basophils (Manual)  0   (0-0.2)  10^3/ul


 


Normal RBC Morphology  Normal   (Normal)  


 


Hem Pathologist Commnt  Pending   


 


INR (Anticoag Therapy)    (0.77-1.02)  


 


APTT    (26.0-36.3)  seconds


 


D-Dimer, Quantitative    (Less Than 230)  ng/mL


 


Sodium    (139-145)  mmol/L


 


Potassium    (3.5-5.0)  mmol/L


 


Chloride    (101-111)  mmol/L


 


Carbon Dioxide    (22-32)  mmol/L


 


Anion Gap    (2-11)  mmol/L


 


BUN    (6-24)  mg/dL


 


Creatinine    (0.51-0.95)  mg/dL


 


Est GFR ( Amer)    (>60)  


 


Est GFR (Non-Af Amer)    (>60)  


 


BUN/Creatinine Ratio    (8-20)  


 


Glucose    ()  mg/dL


 


Lactic Acid   1.7  (0.5-2.0)  mmol/L


 


Calcium    (8.6-10.3)  mg/dL


 


Magnesium    (1.9-2.7)  mg/dL


 


Total Bilirubin    (0.2-1.0)  mg/dL


 


AST    (13-39)  U/L


 


ALT    (7-52)  U/L


 


Alkaline Phosphatase    ()  U/L


 


Ammonia    (16-53)  mcmol/L


 


CK-MB (CK-2)    (0.6-6.3)  ng/mL


 


Troponin I    (<0.04)  ng/mL


 


C-Reactive Protein    (< 5.00)  mg/L


 


B-Natriuretic Peptide   ( - 100) pg/mL


 


Total Protein    (6.4-8.9)  g/dL


 


Albumin    (3.2-5.2)  g/dL


 


Globulin    (2-4)  g/dL


 


Albumin/Globulin Ratio    (1-3)  


 


Lipase    (11.0-82.0)  U/L


 


TSH    (0.34-5.60)  mcIU/mL


 


Acetaminophen    mcg/mL


 


Serum Alcohol    (<10)  mg/dL











Result Diagrams: 


 18 09:46





 18 09:46


Lab Statement: Any lab studies that have been ordered have been reviewed, and 

results considered in the medical decision making process.





- Radiology


  ** CXR


Xray Interpretation: No Acute Changes - IMPRESSION:   1. SMALL LEFT BASILAR 

INFILTRATE. 2. MASSLIKE DENSITY IN THE RIGHT UPPER LOBE SMALLER THAN ON THE 

PRIOR STUDY. Dr. Longoria has reviewed this report.


Radiology Interpretation Completed By: Radiologist





- EKG


  ** 09:25


Cardiac Rate: NL


EKG Rhythm: Sinus Rhythm - at 65 BPM


ST Segment: Normal


Ectopy: None





Course/Dx





- Course


Course Of Treatment: ADMIT HOSPITALIST.  CRITICAL CARE TIME LESS THAN 30 MINUTES


Assessment/Plan: Medications reviewed. Allergies noted. BP noted and patient 

urged to follow up with primary care.





- Diagnoses


Provider Diagnoses: 


 HTN (hypertension), Shortness of breath, COPD (chronic obstructive pulmonary 

disease), Elevated troponin








- Physician Notifications


Discussed Care of Patient With: Angelita Bray


Time Discussed With Above Provider: 11:38


Instructed by Provider To: Admit As Inpatient





Discharge





- Sign-Out/Discharge


Documenting (check all that apply): Discharge/Admit/Transfer - Patient will be 

admitted





- Discharge Plan


Condition: Stable


Disposition: ADMITTED TO [facility]


Referrals: 


Kira Haile MD [Primary Care Provider] - 





- Billing Disposition and Condition


Condition: STABLE


Disposition: [facility]





The documentation as recorded by the Tristian irwin Thomas accurately reflects 

the service I personally performed and the decisions made by me, Iron Longoria MD.

## 2018-04-23 NOTE — RAD
INDICATION:  Shortness of breath.



COMPARISON:  Comparison is made with a prior CT of the chest from January 26, 2018 and a

prior chest x-ray study from March 28, 2018.



TECHNIQUE: A portable view of the chest was obtained.



FINDINGS: The heart is within normal limits in size.



There is a masslike density which projects over the right upper lobe which appears smaller

in size than on the prior study. The lungs are underinflated. There is a small infiltrate

at the left lung base. No pleural effusion is seen.



IMPRESSION:  

1. SMALL LEFT BASILAR INFILTRATE.

2. MASSLIKE DENSITY IN THE RIGHT UPPER LOBE SMALLER THAN ON THE PRIOR STUDY.

## 2018-04-23 NOTE — RAD
Indication: COPD exacerbation.



CT of the chest performed without IV contrast. Coronal and sagittal reconstructed images

were obtained. Comparison is made with previous exam dated January 20, 2016.



Again noted is an ovoid right upper lobe mass measuring 5.8 x 2.9 cm which is likely

increased in size when compared to previous exam. There is multiple radiopaque marker is

noted. There is encasement of several bronchi noted in this area. There is suggestion of

right hilar adenopathy noted. There is a precarinal lymph node measuring up to 6 mm which

is unchanged from previous exam. The heart demonstrates no pericardial effusion. No

pleural fluid is identified. The left lung field demonstrates COPD. No focal nodules are

noted. No alveolar consolidation or pneumothorax is noted.



The abdominal organs likely represents a cortical cyst in the upper pole of left kidney.

The remainder of the dominant organs are unremarkable.



IMPRESSION: OVOID RIGHT UPPER LOBE MASS APPEARS TO BE PROGRESSIVE IN SIZE NOW MEASURING

4.8 CM IN WIDTH X 2.9 CM AP WHEN COMPARED TO PREVIOUS EXAM.



EMPHYSEMATOUS CHANGES ARE NOTED.

## 2018-04-24 LAB
BASOPHILS # BLD AUTO: 0 10^3/UL (ref 0–0.2)
EOSINOPHIL # BLD AUTO: 0 10^3/UL (ref 0–0.6)
HCT VFR BLD AUTO: 30 % (ref 35–47)
HGB BLD-MCNC: 10.1 G/DL (ref 12–16)
INR PPP/BLD: 1.1 (ref 0.77–1.02)
LYMPHOCYTES # BLD AUTO: 0.8 10^3/UL (ref 1–4.8)
MCH RBC QN AUTO: 31 PG (ref 27–31)
MCHC RBC AUTO-ENTMCNC: 33 G/DL (ref 31–36)
MCV RBC AUTO: 94 FL (ref 80–97)
MONOCYTES # BLD AUTO: 0.4 10^3/UL (ref 0–0.8)
NEUTROPHILS # BLD AUTO: 8 10^3/UL (ref 1.5–7.7)
NRBC # BLD AUTO: 0 10^3/UL
NRBC BLD QL AUTO: 0
PLATELET # BLD AUTO: 224 10^3/UL (ref 150–450)
RBC # BLD AUTO: 3.22 10^6/UL (ref 4–5.4)
WBC # BLD AUTO: 9.2 10^3/UL (ref 3.5–10.8)

## 2018-04-24 PROCEDURE — 5A09357 ASSISTANCE WITH RESPIRATORY VENTILATION, LESS THAN 24 CONSECUTIVE HOURS, CONTINUOUS POSITIVE AIRWAY PRESSURE: ICD-10-PCS | Performed by: INTERNAL MEDICINE

## 2018-04-24 RX ADMIN — IPRATROPIUM BROMIDE AND ALBUTEROL SULFATE SCH: .5; 3 SOLUTION RESPIRATORY (INHALATION) at 18:44

## 2018-04-24 RX ADMIN — METHYLPREDNISOLONE SODIUM SUCCINATE SCH MG: 40 INJECTION, POWDER, FOR SOLUTION INTRAMUSCULAR; INTRAVENOUS at 21:25

## 2018-04-24 RX ADMIN — CEFTRIAXONE SODIUM SCH MLS/HR: 1 INJECTION, POWDER, FOR SOLUTION INTRAVENOUS at 12:00

## 2018-04-24 RX ADMIN — IPRATROPIUM BROMIDE AND ALBUTEROL SULFATE SCH: .5; 3 SOLUTION RESPIRATORY (INHALATION) at 19:37

## 2018-04-24 RX ADMIN — AZITHROMYCIN SCH MLS/HR: 500 INJECTION, POWDER, LYOPHILIZED, FOR SOLUTION INTRAVENOUS at 09:18

## 2018-04-24 RX ADMIN — WARFARIN SODIUM SCH MG: 3 TABLET ORAL at 18:11

## 2018-04-24 RX ADMIN — MORPHINE TINCTURE PRN MG: 1 SOLUTION ORAL at 21:24

## 2018-04-24 RX ADMIN — MORPHINE TINCTURE PRN MG: 1 SOLUTION ORAL at 18:12

## 2018-04-24 RX ADMIN — MAGNESIUM OXIDE TAB 400 MG (241.3 MG ELEMENTAL MG) SCH MG: 400 (241.3 MG) TAB at 09:06

## 2018-04-24 RX ADMIN — GUAIFENESIN PRN MG: 600 TABLET, EXTENDED RELEASE ORAL at 09:06

## 2018-04-24 RX ADMIN — CALCITRIOL SCH MCG: 0.25 CAPSULE ORAL at 09:06

## 2018-04-24 RX ADMIN — MORPHINE TINCTURE PRN MG: 1 SOLUTION ORAL at 06:35

## 2018-04-24 RX ADMIN — GUAIFENESIN PRN MG: 600 TABLET, EXTENDED RELEASE ORAL at 21:30

## 2018-04-24 RX ADMIN — ENOXAPARIN SODIUM SCH MG: 60 INJECTION SUBCUTANEOUS at 15:17

## 2018-04-24 RX ADMIN — SODIUM CHLORIDE SCH MLS/HR: 900 IRRIGANT IRRIGATION at 22:20

## 2018-04-24 RX ADMIN — TIOTROPIUM BROMIDE SCH: 18 CAPSULE ORAL; RESPIRATORY (INHALATION) at 08:02

## 2018-04-24 RX ADMIN — IPRATROPIUM BROMIDE SCH MG: 0.5 SOLUTION RESPIRATORY (INHALATION) at 23:57

## 2018-04-24 RX ADMIN — IPRATROPIUM BROMIDE AND ALBUTEROL SULFATE PRN NEB: .5; 3 SOLUTION RESPIRATORY (INHALATION) at 15:51

## 2018-04-24 RX ADMIN — ASPIRIN SCH MG: 81 TABLET, COATED ORAL at 09:06

## 2018-04-24 RX ADMIN — CINACALCET HYDROCHLORIDE SCH MG: 30 TABLET, COATED ORAL at 09:06

## 2018-04-24 RX ADMIN — MORPHINE TINCTURE PRN MG: 1 SOLUTION ORAL at 12:00

## 2018-04-24 RX ADMIN — SODIUM CHLORIDE SCH MLS/HR: 900 IRRIGANT IRRIGATION at 12:53

## 2018-04-24 RX ADMIN — PAROXETINE SCH MG: 10 TABLET, FILM COATED ORAL at 18:12

## 2018-04-24 RX ADMIN — LORAZEPAM SCH MG: 1 TABLET ORAL at 21:23

## 2018-04-24 RX ADMIN — LORAZEPAM SCH MG: 1 TABLET ORAL at 15:17

## 2018-04-24 RX ADMIN — IPRATROPIUM BROMIDE AND ALBUTEROL SULFATE PRN NEB: .5; 3 SOLUTION RESPIRATORY (INHALATION) at 09:54

## 2018-04-24 RX ADMIN — SODIUM CHLORIDE SCH MLS/HR: 900 IRRIGANT IRRIGATION at 03:04

## 2018-04-24 RX ADMIN — METHYLPREDNISOLONE SODIUM SUCCINATE SCH MG: 40 INJECTION, POWDER, FOR SOLUTION INTRAMUSCULAR; INTRAVENOUS at 15:17

## 2018-04-24 RX ADMIN — METHYLPREDNISOLONE SODIUM SUCCINATE SCH MG: 40 INJECTION, POWDER, FOR SOLUTION INTRAMUSCULAR; INTRAVENOUS at 06:36

## 2018-04-24 RX ADMIN — LORAZEPAM SCH MG: 1 TABLET ORAL at 09:07

## 2018-04-24 RX ADMIN — MORPHINE TINCTURE PRN MG: 1 SOLUTION ORAL at 15:17

## 2018-04-24 RX ADMIN — IPRATROPIUM BROMIDE SCH: 0.5 SOLUTION RESPIRATORY (INHALATION) at 22:29

## 2018-04-24 RX ADMIN — MAGNESIUM OXIDE TAB 400 MG (241.3 MG ELEMENTAL MG) SCH MG: 400 (241.3 MG) TAB at 21:23

## 2018-04-24 NOTE — ECHO
Patient:      PAULINA WADDELL

[facility] Rec#:     [id number]            :          1945          

Date:         2018            Age:          72y                 

Account#:     [id number]          Height:       147.32 cm / 58.0 in

Accession#:   [id number]           Weight:       70.31 kg / 155.0 lbs

Sex:          F                     BSA:          1.63

Room#:        413                   

Admit Date#:  2018          

Type:         Inpatient

 

Referring:    Angelita Bray MD

Reading:      Brendan Craig MD

Sonographer:  Kylah Warner RD,RDMS

______________________________________________________________________

 

Transthoracic Echocardiogram

 

Indication:

SOB

BP:           120/42

HR:           65

Rhythm:       NSR

 

Findings     

History:

COPD, lung cancer, radiation, smoker, O2 dependent 

 

Technical Comments:

The study is technically limited due to the patient's history of COPD. 

 

 

Left Ventricle:

The left ventricular chamber size is normal. Mild to moderate concentric

left ventricular hypertrophy is observed. The left ventricle appears

hyperdynamic. The estimated ejection fraction is greater than 65%. 

There is an E to A reversal in the mitral valve flow pattern suggestive

of diastolic dysfunction. 

 

Left Atrium:

The left atrial chamber size is normal. 

 

Right Ventricle:

The right ventricular chamber size and systolic function are within

normal limits. 

 

Right Atrium:

The right atrial cavity size is normal. 

 

Aortic Valve:

The aortic valve leaflets are mildly thickened. Systolic excursion of

the aortic valve is normal. There is no evidence of aortic

regurgitation. There is no evidence of aortic stenosis. 

 

Mitral Valve:

The mitral valve leaflets appear normal. There is no evidence of mitral

regurgitation. There is no evidence of mitral stenosis. 

 

Tricuspid Valve:

The tricuspid valve leaflets are normal.  There is trace tricuspid

regurgitation.  Unable to estimate the right ventricular systolic

pressure.   

 

Pulmonic Valve:

The pulmonic valve structure is not well visualized. There is no

evidence of pulmonic regurgitation. 

 

Pericardium:

There is no significant pericardial effusion. 

 

Aorta:

The aortic root appears normal. There is no dilatation of the aortic

arch. 

 

Pulmonary Artery:

The main pulmonary artery is not well visualized. 

 

Venous:

The inferior vena cava appears normal in size. There is less than 50%

respiratory change in the inferior vena cava dimension. 

 

Conclusions

The study is technically limited due to the patient's history of COPD.

Mild to moderate concentric left ventricular hypertrophy is observed.

The left ventricle appears hyperdynamic.

The estimated ejection fraction is greater than 65%. 

There is trace tricuspid regurgitation. 

 

Measurements     

Name                    Value         Normal Range            

RVIDd (AP) 2D           2.6 cm        (0.9 - 2.6)             

RVDdMajor (2D)          2.8 cm        (2.2 - 4.4)             

RAd ISD 4CH             3.7 cm        (3.4 - 4.9)             

RA (A4C)W               3 cm          (2.9 - 4.6)             

IVSd (2D)               1.4 cm        (0.6 - 1)               

LVPWd (2D)              1.3 cm        (0.6 - 1)               

LVIDd (2D)              3.7 cm        (3.6 - 5.4)             

LVIDs (2D)              3 cm          -                        

LV FS (2D)              21 %          (25 - 45)               

Aortic Annulus          2 cm          (1.4 - 2.6)             

Ao root diameter (2D)   2.6 cm        (2.1 - 3.5)             

Ascending Ao            2.5 cm        (2.1 - 3.4)             

Aortic arch             3.1 cm        (1.8 - 3.4)             

LAd ISD 4CH             5.1 cm        (2.9 - 5.3)             

LA ISD 4CH W            3.1 cm        (2.5 - 4.5)             

 

Name                    Value         Normal Range            

LA ESV SP 4CH (A/L)     49.37 ml      -                        

LA ESV SP 4CH (MOD)     43.3 ml       -                        

 

Name                    Value         Normal Range            

MV E-wave Vmax          0.7 m/sec     -                        

MV deceleration time    208 msec      -                        

MV A-wave Vmax          0.8 m/sec     -                        

MV E:A ratio            0.9 ratio     -                        

P. vein S-wave Vmax     0.6 m/sec     -                        

P. vein D-wave Vmax     0.4 m/sec     -                        

P. vein S:D Vmax ratio  1.3 ratio     -                        

P. vein A-wave duration 96.1 msec     -                        

LV septal e' Vmax       0.07 m/sec    -                        

LV lateral e' Vmax      0.08 m/sec    -                        

LV E:e' septal ratio    10 ratio      -                        

LV E:e' lateral ratio   9 ratio       -                        

 

Name                    Value         Normal Range            

AV Vmax                 1.5 m/sec     -                        

AV VTI                  33.1 cm       -                        

AV peak gradient        9 mmHg        -                        

AV mean gradient        5.3 mmHg      -                        

LVOT Vmax               1.6 m/sec     -                        

LVOT VTI                38.6 cm       -                        

LVOT peak gradient      10 mmHg       -                        

LVOT mean gradient      5.2 mmHg      -                        

ALVARO Vmax                0.6 m/sec     -                        

 

Name                    Value         Normal Range            

RAP                     8 mmHg        -                        

IVC diameter            2.1 cm        -                        

 

Name                    Value         Normal Range            

PV Vmax                 1 m/sec       -                        

PV peak gradient        4 mmHg        -                        

 

Electronically signed by: Brendan Craig MD on 2018 13:51:35

## 2018-04-24 NOTE — CONS
PULMONARY CONSULTATION REPORT:

 

DATE OF CONSULT:  04/24/18

 

REQUESTING PHYSICIAN:  Moira Gold NP

 

REASON FOR CONSULT:  Evaluation of shortness of breath.

 

HISTORY OF PRESENT ILLNESS:  The patient is a 72-year-old female with history 
of severe COPD, history of lung cancer status post radiation with recurrence, 
chronic disease, chronic anxiety, history of A.fib, on Coumadin, significant 
hypoxemia, recurrent admissions in the past for acute COPD exacerbation and 
recurrent bronchitis, who presents for reevaluation of worsening shortness of 
breath.  The patient's shortness of breath has been gradually worsening over 
the past 2 weeks. She had runny nose, sore throat, headache, and nasal 
congestion prior to 2 weeks. Her symptoms have been worsening since then.  She 
was seen by her primary care physician on 04/30/18 and was diagnosed with sinus 
infection, was started on antibiotics.  Her shortness of breath continued to 
worsen, did not improve in spite of being on prednisone taper recently.  The 
patient was noted to be significantly dyspneic on arrival in the emergency 
room.  She also has been having significant cough, mostly nonproductive in 
nature.  The patient did not have fevers or chills, appetite is poor, has 
diarrhea at baseline through her ileostomy with no change in output.  She 
continues to smoke 2 cigarettes per day.  Chest x-ray in the emergency room was 
personally reviewed by me, evidence of small basilar infiltrate and opacity in 
the right upper lobe consistent with prior CT scan finding of cancer. She did 
not have white count or elevated lactic acid.  Troponin was slightly elevated 
at 0.05.  She was admitted for management of acute COPD exacerbation.  She was 
started on bronchodilators, steroids, and was started on empiric therapy for 
community acquired pneumonia with Rocephin and Zithromax.

 

PAST MEDICAL HISTORY:

1.  Crohn's disease with colectomy and ileostomy.

2.   Atrial fibrillationon Coumadin.

3.  COPD, on 3 to 3.5 liters.

4.  Tobacco abuse.

5.  Lung cancer, status post radiation.

6.  Anxiety.

7.  Depression.

8.  Restless leg syndrome.

9.  T6 compression fracture.

10.  Chronic kidney disease, stage 3.

 

PAST SURGICAL HISTORY:

1.  Resection and ileostomy.

2.  AV fistula.

3.  Multiple abdominal surgeries.

 

MEDICATIONS:

1.  Mucinex.

2.  Albuterol.

3.  Aspirin.

4.  Acetaminophen.

5.  Ativan.

6.  Sensipar.

7.  Spiriva.

8.  Paxil.

9.  Opium tincture.

10.  Clotrimazole.

11.  Atrovent.

12.  Vitamin B12.

13.  Magnesium.

14.  Calcitriol.

15.  Coumadin.

 

ALLERGIES:  EGGPLANT, VANCOMYCIN, TETANUS, LEVOFLOXACIN, REMICADE, EPINEPHRINE, 
DOXYCYCLINE, and DAPTOMYCIN.

 

FAMILY HISTORY:  Father with heart disease, paternal grandmother has diabetes.

 

SOCIAL HISTORY:  Long-time smoker, quit for a little bit, started smoking again
, smokes about 2 cigarettes per day.  Denies alcohol or drug abuse.

 

REVIEW OF SYSTEMS:  A 14-point review of systems was as above.

 

PHYSICAL EXAM:  The patient is in bed, in no apparent distress.  She appears to 
be slightly drowsy and sleepy.  Temperature 98, heart rate 61 beats per minute, 
respiratory rate 16 per minute, O2 sat 100% on 3 liters, blood pressure 120/42.

 

HEENT:  Pupils are equal, round, and reactive to light.

 

Lungs:  Diminished air entry bilaterally, scattered wheezing present on 
auscultation.

 

Cardiovascular:  S1 and S2 present, regular.

 

Abdomen:  Soft, nontender, and nondistended.  Bowel sounds present.

 

Skin:  No rash or bruises.

 

Neuro:  No focal deficits.

 

DIAGNOSTIC STUDIES/LAB DATA:  WBC 9.2, hemoglobin 10.1, hematocrit 30, platelet 
count was 224.  Sodium 141, potassium 4.6, chloride 106, bicarb 28, BUN 29, 
creatinine 1.61, troponin slightly elevated at 0.05, trending down to 0.04, BNP 
elevated at 113, lactic acid within normal limits.

 

Chest x-ray as described above in HPI.

 

IMPRESSION AND RECOMMENDATIONS:  72-year-old female with history of recurrent 
bronchitis, severe chronic obstructive pulmonary disease, current smoking status
, on chronic prednisone therapy with worsening shortness of breath after recent 
upper respiratory infection symptoms and sinusitis, being treated for acute 
chronic obstructive pulmonary disease exacerbation.

 

The patient is improving slowly, still kind of sleepy and fatigued.  She also 
has high-output ileostomy, which also results in disruptive sleep and daytime 
fatigue.

 

She has a history of obstructive sleep apnea, was on CPAP, which she has not 
been compliant recently, which might also be worsening her symptoms.

 

Continue with current management for acute chronic obstructive pulmonary 
disease exacerbation, will do slow taper on her prednisone.

 

Continue with bronchodilators.

 

Thank you for allowing me to participate in the care of your patient.  Will 
follow up with you.

 

 [id number]/CPS #: [id number]

LAUREN

## 2018-04-24 NOTE — PN
Hospitalist Progress Note


Date of Service: 04/24/18





Assess/Plan/Problems-Billing


Assessment: 











- Patient Problems


(1) COPD exacerbation


Current Visit: No   Status: Acute   Priority: High   Code(s): J44.1 - CHRONIC 

OBSTRUCTIVE PULMONARY DISEASE W (ACUTE) EXACERBATION   SNOMED Code(s): 

250978787361207


   Comment: --Changed Duonebs to scheduled dose and place pt on PRN Levalbuterol


--Discussed possibility of hospice with pt given her previous experience with it

, however, pt is not yet interested in being placed back in hospice but is open 

to that if she doesn't improve


--Increase Solumedrol to 60 mg dose


--Continue antibiotics at this time   





(2) Mass of upper lobe of right lung


Current Visit: Yes   Comment: --To rediscuss increasing mass in terms of 

hospice if pt still remains unimprove in AM


--Continue watchful waiting   





(3) Elevated troponin


Current Visit: Yes   Status: Acute   Code(s): R74.8 - ABNORMAL LEVELS OF OTHER 

SERUM ENZYMES   SNOMED Code(s): 633249990


   Comment: --Likely due to demand ischemia given SOB and/or due to the mass 

identified in CT


--Will obtain 2D echo to R/O wall motion abnormalities   





(4) History of DVT (deep vein thrombosis)


Current Visit: Yes   Status: Acute   Code(s): Z86.718 - PERSONAL HISTORY OF 

OTHER VENOUS THROMBOSIS AND EMBOLISM   SNOMED Code(s): 262042614


   Comment: --Continue Lovenox


--Continue watchful waiting   


Status and Disposition: 





To rediscuss possible hospice again in context of mildly increasing RUL mass 

especially if pt is unimproved

## 2018-04-25 LAB
HCT VFR BLD AUTO: 30 % (ref 35–47)
HGB BLD-MCNC: 9.5 G/DL (ref 12–16)
MCH RBC QN AUTO: 31 PG (ref 27–31)
MCHC RBC AUTO-ENTMCNC: 32 G/DL (ref 31–36)
MCV RBC AUTO: 96 FL (ref 80–97)
PLATELET # BLD AUTO: 246 10^3/UL (ref 150–450)
RBC # BLD AUTO: 3.09 10^6/UL (ref 4–5.4)
WBC # BLD AUTO: 11.5 10^3/UL (ref 3.5–10.8)

## 2018-04-25 RX ADMIN — MORPHINE TINCTURE PRN MG: 1 SOLUTION ORAL at 05:04

## 2018-04-25 RX ADMIN — IPRATROPIUM BROMIDE SCH MG: 0.5 SOLUTION RESPIRATORY (INHALATION) at 13:13

## 2018-04-25 RX ADMIN — AZITHROMYCIN SCH MLS/HR: 500 INJECTION, POWDER, LYOPHILIZED, FOR SOLUTION INTRAVENOUS at 08:57

## 2018-04-25 RX ADMIN — PAROXETINE SCH MG: 10 TABLET, FILM COATED ORAL at 17:29

## 2018-04-25 RX ADMIN — METHYLPREDNISOLONE SODIUM SUCCINATE SCH MG: 40 INJECTION, POWDER, FOR SOLUTION INTRAMUSCULAR; INTRAVENOUS at 05:04

## 2018-04-25 RX ADMIN — MAGNESIUM OXIDE TAB 400 MG (241.3 MG ELEMENTAL MG) SCH MG: 400 (241.3 MG) TAB at 08:58

## 2018-04-25 RX ADMIN — IPRATROPIUM BROMIDE SCH MG: 0.5 SOLUTION RESPIRATORY (INHALATION) at 19:27

## 2018-04-25 RX ADMIN — ENOXAPARIN SODIUM SCH MG: 60 INJECTION SUBCUTANEOUS at 15:21

## 2018-04-25 RX ADMIN — MAGNESIUM OXIDE TAB 400 MG (241.3 MG ELEMENTAL MG) SCH MG: 400 (241.3 MG) TAB at 21:17

## 2018-04-25 RX ADMIN — MORPHINE TINCTURE PRN MG: 1 SOLUTION ORAL at 08:57

## 2018-04-25 RX ADMIN — CINACALCET HYDROCHLORIDE SCH MG: 30 TABLET, COATED ORAL at 08:57

## 2018-04-25 RX ADMIN — SODIUM CHLORIDE SCH MLS/HR: 900 IRRIGANT IRRIGATION at 09:03

## 2018-04-25 RX ADMIN — ASPIRIN SCH MG: 81 TABLET, COATED ORAL at 08:58

## 2018-04-25 RX ADMIN — MORPHINE TINCTURE PRN MG: 1 SOLUTION ORAL at 15:25

## 2018-04-25 RX ADMIN — CALCITRIOL SCH MCG: 0.25 CAPSULE ORAL at 08:57

## 2018-04-25 RX ADMIN — GUAIFENESIN PRN MG: 600 TABLET, EXTENDED RELEASE ORAL at 08:58

## 2018-04-25 RX ADMIN — LORAZEPAM SCH MG: 1 TABLET ORAL at 08:58

## 2018-04-25 RX ADMIN — IPRATROPIUM BROMIDE SCH MG: 0.5 SOLUTION RESPIRATORY (INHALATION) at 07:26

## 2018-04-25 RX ADMIN — CEFTRIAXONE SODIUM SCH MLS/HR: 1 INJECTION, POWDER, FOR SOLUTION INTRAVENOUS at 11:41

## 2018-04-25 RX ADMIN — MORPHINE TINCTURE PRN MG: 1 SOLUTION ORAL at 17:29

## 2018-04-25 RX ADMIN — WARFARIN SODIUM SCH MG: 3 TABLET ORAL at 16:31

## 2018-04-25 RX ADMIN — TIOTROPIUM BROMIDE SCH CAP: 18 CAPSULE ORAL; RESPIRATORY (INHALATION) at 07:26

## 2018-04-25 RX ADMIN — LORAZEPAM SCH MG: 1 TABLET ORAL at 21:18

## 2018-04-25 RX ADMIN — LORAZEPAM SCH MG: 1 TABLET ORAL at 14:13

## 2018-04-25 NOTE — PN
Subjective


Date of Service: 04/25/18


Interval History: 





Feels somewhat better than yesterday, less SOB, but not well enough to go home.





Objective


Active Medications: 








Acetaminophen (Tylenol Tab*)  650 mg PO Q6H PRN


   PRN Reason: pain/fever


Aspirin (Aspirin Ec Tab*)  81 mg PO DAILY [facility]


   Last Admin: 04/25/18 08:58 Dose:  81 mg


Calcitriol (Rocaltrol  Cap*)  0.25 mcg PO QAM [facility]


   Last Admin: 04/25/18 08:57 Dose:  0.25 mcg


Cinacalcet (Sensipar Tab*)  30 mg PO QAM [facility]


   Last Admin: 04/25/18 08:57 Dose:  30 mg


Enoxaparin Sodium (Lovenox(*))  55 mg SUBCUT Q24H [facility]


   Last Admin: 04/24/18 15:17 Dose:  55 mg


Guaifenesin (Mucinex*)  600 mg PO BID PRN


   PRN Reason: CONGESTION


   Last Admin: 04/25/18 08:58 Dose:  600 mg


Ceftriaxone Sodium 1 gm/ (Sodium Chloride)  50 mls @ 200 mls/hr IVPB Q24H [facility]


   Last Admin: 04/24/18 12:00 Dose:  200 mls/hr


Azithromycin 500 mg/ Sodium (Chloride)  250 mls @ 250 mls/hr IVPB Q24H [facility]


   Last Admin: 04/25/18 08:57 Dose:  250 mls/hr


Sodium Chloride (Ns 0.9% 1000 Ml*)  1,000 mls @ 125 mls/hr IV PER RATE [facility]


   Last Admin: 04/25/18 09:03 Dose:  125 mls/hr


Ipratropium Bromide (Atrovent 0.5 Mg Neb.Sol*)  0.5 mg INH [id number] AWAKE 

[facility]


   Last Admin: 04/25/18 07:26 Dose:  0.5 mg


Ipratropium Bromide (Atrovent 0.5 Mg Neb.Sol*)  0.5 mg INH Q4H PRN


   PRN Reason: SOB/WHEEZING


Lorazepam (Ativan Tab(*))  0.5 mg PO TID [facility]


   Last Admin: 04/25/18 08:58 Dose:  0.5 mg


Magnesium Oxide (Magox 400 Tab*)  400 mg PO BID [facility]


   Last Admin: 04/25/18 08:58 Dose:  400 mg


Opium Tincture (Opium Tincture*)  6 mg PO FIVE TIMES DAILY PRN


   PRN Reason: SEVERE DIARRHEA


   Last Admin: 04/25/18 08:57 Dose:  6 mg


Paroxetine HCl (Paxil Tab*)  30 mg PO QPM [facility]


   Last Admin: 04/24/18 18:12 Dose:  30 mg


Prednisone (Deltasone Tab*)  60 mg PO DAILY [facility]


Sodium Chloride (Sodium Chloride 0.65% Nasal Spray*)  1 spray BOTH NARES Q4H PRN


   PRN Reason: Dry nasal passages


Tiotropium Bromide (Spiriva Cap.Inh*)  1 cap INH DAILY [facility]


   Last Admin: 04/25/18 07:26 Dose:  1 cap


Warfarin Sodium (Coumadin Tab(*))  3 mg PO DAILY@1700 [facility]


   PRN Reason: Protocol


   Last Admin: 04/24/18 18:11 Dose:  3 mg








 Vital Signs - 8 hr











  04/25/18 04/25/18 04/25/18





  03:15 05:04 07:28


 


Temperature 98.1 F  


 


Pulse Rate 77  66


 


Respiratory 16 18 18





Rate   


 


Blood Pressure 144/53  





(mmHg)   


 


O2 Sat by Pulse 100  99





Oximetry   














  04/25/18 04/25/18 04/25/18





  08:26 08:57 08:58


 


Temperature   


 


Pulse Rate   


 


Respiratory 16 22 22





Rate   


 


Blood Pressure   





(mmHg)   


 


O2 Sat by Pulse   





Oximetry   











Oxygen Devices in Use Now: Nasal Cannula


Appearance: Alert, sitting up in bed.  In fair spirits.  Looks comfortable.


Respiratory: Symmetrical Chest Expansion and Respiratory Effort, Clear to 

Percussion, - - mild to mod rhonchi BL


Extremities: No Edema, No Clubbing, Cyanosis, -


Skin: No Rash or Ulcers, No Nodules or Sclerosis, -


Neurological: Alert and Oriented x 3, NL Sensation


Result Diagrams: 


 04/25/18 04:55





 04/25/18 04:55


Additional Lab and Data: 


 Lab Results











  04/23/18 04/23/18 04/23/18 Range/Units





  09:46 09:46 09:46 


 


WBC     (3.5-10.8)  10^3/ul


 


RBC     (4.0-5.4)  10^6/ul


 


Hgb     (12.0-16.0)  g/dl


 


Hct     (35-47)  %


 


MCV     (80-97)  fL


 


MCH     (27-31)  pg


 


MCHC     (31-36)  g/dl


 


RDW     (10.5-15)  %


 


Plt Count     (150-450)  10^3/ul


 


MPV     (7.4-10.4)  um3


 


Neut % (Auto)     


 


Lymph % (Auto)     


 


Mono % (Auto)     


 


Eos % (Auto)     


 


Baso % (Auto)     


 


Absolute Neuts (auto)     (1.5-7.7)  10^3/ul


 


Absolute Lymphs (auto)     (1.0-4.8)  10^3/ul


 


Absolute Monos (auto)     (0-0.8)  10^3/ul


 


Absolute Eos (auto)     (0-0.6)  10^3/ul


 


Absolute Basos (auto)     (0-0.2)  10^3/ul


 


Absolute Nucleated RBC     10^3/ul


 


Neutrophils %     (38-83)  %


 


Lymphocytes %     (25-47)  %


 


Reactive Lymphs %     (0-6)  %


 


Monocytes %     (0-7)  %


 


Eosinophils %     (0-6)  %


 


Basophils %     (0-2)  %


 


Nucleated RBC %     


 


Abs Neuts (Manual)     (1.5-7.7)  10^3/ul


 


Abs Lymphs (Manual)     (1.0-4.8)  10^3/ul


 


Abs Monocytes (Manual)     (0-0.8)  10^3/ul


 


Absolute Eos (Manual)     (0-0.6)  10^3/ul


 


Abs Basophils (Manual)     (0-0.2)  10^3/ul


 


Normal RBC Morphology     (Normal)  


 


Hem Pathologist Commnt     


 


INR (Anticoag Therapy)   0.96   (0.77-1.02)  


 


APTT   29.3   (26.0-36.3)  seconds


 


D-Dimer, Quantitative   240 H   (Less Than 230)  ng/mL


 


Sodium  142    (139-145)  mmol/L


 


Potassium  4.0    (3.5-5.0)  mmol/L


 


Chloride  102    (101-111)  mmol/L


 


Carbon Dioxide  33 H    (22-32)  mmol/L


 


Anion Gap  7    (2-11)  mmol/L


 


BUN  31 H    (6-24)  mg/dL


 


Creatinine  1.93 H    (0.51-0.95)  mg/dL


 


Est GFR ( Amer)  32.7    (>60)  


 


Est GFR (Non-Af Amer)  25.4    (>60)  


 


BUN/Creatinine Ratio  16.1    (8-20)  


 


Glucose  95    ()  mg/dL


 


Lactic Acid     (0.5-2.0)  mmol/L


 


Calcium  8.7    (8.6-10.3)  mg/dL


 


Magnesium  2.2    (1.9-2.7)  mg/dL


 


Total Bilirubin  0.40    (0.2-1.0)  mg/dL


 


AST  23    (13-39)  U/L


 


ALT  23    (7-52)  U/L


 


Alkaline Phosphatase  65    ()  U/L


 


Ammonia    38  (16-53)  mcmol/L


 


CK-MB (CK-2)  5.7    (0.6-6.3)  ng/mL


 


Troponin I  0.05 H*    (<0.04)  ng/mL


 


C-Reactive Protein  4.77    (< 5.00)  mg/L


 


B-Natriuretic Peptide    113 H ( - 100) pg/mL


 


Total Protein  6.6    (6.4-8.9)  g/dL


 


Albumin  3.8    (3.2-5.2)  g/dL


 


Globulin  2.8    (2-4)  g/dL


 


Albumin/Globulin Ratio  1.4    (1-3)  


 


Lipase  29    (11.0-82.0)  U/L


 


TSH  1.30    (0.34-5.60)  mcIU/mL


 


Acetaminophen  < 15    mcg/mL


 


Serum Alcohol  < 10    (<10)  mg/dL














  04/23/18 04/23/18 Range/Units





  09:46 10:40 


 


WBC  10.5   (3.5-10.8)  10^3/ul


 


RBC  4.00   (4.0-5.4)  10^6/ul


 


Hgb  12.4   (12.0-16.0)  g/dl


 


Hct  38   (35-47)  %


 


MCV  94   (80-97)  fL


 


MCH  31   (27-31)  pg


 


MCHC  33   (31-36)  g/dl


 


RDW  15   (10.5-15)  %


 


Plt Count  261   (150-450)  10^3/ul


 


MPV  7.8   (7.4-10.4)  um3


 


Neut % (Auto)  Not Reportable   


 


Lymph % (Auto)  Not Reportable   


 


Mono % (Auto)  Not Reportable   


 


Eos % (Auto)  Not Reportable   


 


Baso % (Auto)  Not Reportable   


 


Absolute Neuts (auto)  7.7   (1.5-7.7)  10^3/ul


 


Absolute Lymphs (auto)  1.3   (1.0-4.8)  10^3/ul


 


Absolute Monos (auto)  0.8   (0-0.8)  10^3/ul


 


Absolute Eos (auto)  0.6   (0-0.6)  10^3/ul


 


Absolute Basos (auto)  0.2   (0-0.2)  10^3/ul


 


Absolute Nucleated RBC  0   10^3/ul


 


Neutrophils %  73   (38-83)  %


 


Lymphocytes %  14 L   (25-47)  %


 


Reactive Lymphs %  3   (0-6)  %


 


Monocytes %  7   (0-7)  %


 


Eosinophils %  3   (0-6)  %


 


Basophils %  0   (0-2)  %


 


Nucleated RBC %  0.1   


 


Abs Neuts (Manual)  7.7   (1.5-7.7)  10^3/ul


 


Abs Lymphs (Manual)  1.5   (1.0-4.8)  10^3/ul


 


Abs Monocytes (Manual)  0.7   (0-0.8)  10^3/ul


 


Absolute Eos (Manual)  0.3   (0-0.6)  10^3/ul


 


Abs Basophils (Manual)  0   (0-0.2)  10^3/ul


 


Normal RBC Morphology  Normal   (Normal)  


 


Hem Pathologist Commnt  Pending   


 


INR (Anticoag Therapy)    (0.77-1.02)  


 


APTT    (26.0-36.3)  seconds


 


D-Dimer, Quantitative    (Less Than 230)  ng/mL


 


Sodium    (139-145)  mmol/L


 


Potassium    (3.5-5.0)  mmol/L


 


Chloride    (101-111)  mmol/L


 


Carbon Dioxide    (22-32)  mmol/L


 


Anion Gap    (2-11)  mmol/L


 


BUN    (6-24)  mg/dL


 


Creatinine    (0.51-0.95)  mg/dL


 


Est GFR ( Amer)    (>60)  


 


Est GFR (Non-Af Amer)    (>60)  


 


BUN/Creatinine Ratio    (8-20)  


 


Glucose    ()  mg/dL


 


Lactic Acid   1.7  (0.5-2.0)  mmol/L


 


Calcium    (8.6-10.3)  mg/dL


 


Magnesium    (1.9-2.7)  mg/dL


 


Total Bilirubin    (0.2-1.0)  mg/dL


 


AST    (13-39)  U/L


 


ALT    (7-52)  U/L


 


Alkaline Phosphatase    ()  U/L


 


Ammonia    (16-53)  mcmol/L


 


CK-MB (CK-2)    (0.6-6.3)  ng/mL


 


Troponin I    (<0.04)  ng/mL


 


C-Reactive Protein    (< 5.00)  mg/L


 


B-Natriuretic Peptide   ( - 100) pg/mL


 


Total Protein    (6.4-8.9)  g/dL


 


Albumin    (3.2-5.2)  g/dL


 


Globulin    (2-4)  g/dL


 


Albumin/Globulin Ratio    (1-3)  


 


Lipase    (11.0-82.0)  U/L


 


TSH    (0.34-5.60)  mcIU/mL


 


Acetaminophen    mcg/mL


 


Serum Alcohol    (<10)  mg/dL











Microbiology and Other Data: 


 Microbiology











 04/24/18 09:22 Legionella Urinary Antigen - Final





 Urine    Negative Legionella Antigen





 Streptococcus pneumoniae Ag Screen - Final





    Negative S. pneumo Antigen














Assess/Plan/Problems-Billing


Assessment: 











- Patient Problems


(1) COPD exacerbation


Current Visit: No   Status: Acute   Priority: High   Code(s): J44.1 - CHRONIC 

OBSTRUCTIVE PULMONARY DISEASE W (ACUTE) EXACERBATION   SNOMED Code(s): 

100268828902155


   Comment: Spiriva daily,  PRN Levalbuterol


Prednisone taper, start 60 mg 4/26.


--Continue azith, ceftri.   





(2) Lung cancer


Current Visit: No   Status: Acute   Code(s): C34.90 - MALIGNANT NEOPLASM OF 

UNSP PART OF UNSP BRONCHUS OR LUNG   SNOMED Code(s): 151523814


   Comment: Fup with Dr. Higgins as outpt.   





(3) History of DVT (deep vein thrombosis)


Current Visit: Yes   Status: Acute   Code(s): Z86.718 - PERSONAL HISTORY OF 

OTHER VENOUS THROMBOSIS AND EMBOLISM   SNOMED Code(s): 922193971


   Comment: Warfarin increased to 3 mg daily.  Repeat INR 4/27. Continue 

enoxaparin until INR 2.   





(4) Tobacco abuse


Current Visit: No   Status: Acute   Code(s): Z72.0 - TOBACCO USE   SNOMED Code(s

): 505665728


   Comment: Pt advised to quit smoking and avoid second hand smoke.    


Status and Disposition: 





To rediscuss possible hospice again in context of mildly increasing RUL mass 

especially if pt is unimproved

## 2018-04-26 LAB — INR PPP/BLD: 2.29 (ref 0.77–1.02)

## 2018-04-26 RX ADMIN — MAGNESIUM OXIDE TAB 400 MG (241.3 MG ELEMENTAL MG) SCH MG: 400 (241.3 MG) TAB at 08:20

## 2018-04-26 RX ADMIN — MORPHINE SULFATE ONE MG: 4 INJECTION INTRAVENOUS at 16:38

## 2018-04-26 RX ADMIN — MORPHINE TINCTURE PRN MG: 1 SOLUTION ORAL at 00:13

## 2018-04-26 RX ADMIN — IPRATROPIUM BROMIDE SCH MG: 0.5 SOLUTION RESPIRATORY (INHALATION) at 00:14

## 2018-04-26 RX ADMIN — AZITHROMYCIN SCH MLS/HR: 500 INJECTION, POWDER, LYOPHILIZED, FOR SOLUTION INTRAVENOUS at 08:43

## 2018-04-26 RX ADMIN — TIOTROPIUM BROMIDE SCH CAP: 18 CAPSULE ORAL; RESPIRATORY (INHALATION) at 08:20

## 2018-04-26 RX ADMIN — CINACALCET HYDROCHLORIDE SCH MG: 30 TABLET, COATED ORAL at 08:19

## 2018-04-26 RX ADMIN — ENOXAPARIN SODIUM SCH MG: 60 INJECTION SUBCUTANEOUS at 17:00

## 2018-04-26 RX ADMIN — ACETAMINOPHEN PRN MG: 325 TABLET ORAL at 19:40

## 2018-04-26 RX ADMIN — MAGNESIUM OXIDE TAB 400 MG (241.3 MG ELEMENTAL MG) SCH MG: 400 (241.3 MG) TAB at 21:04

## 2018-04-26 RX ADMIN — LORAZEPAM SCH MG: 1 TABLET ORAL at 21:04

## 2018-04-26 RX ADMIN — LORAZEPAM SCH: 1 TABLET ORAL at 16:23

## 2018-04-26 RX ADMIN — LORAZEPAM SCH MG: 1 TABLET ORAL at 08:19

## 2018-04-26 RX ADMIN — ASPIRIN SCH MG: 81 TABLET, COATED ORAL at 08:19

## 2018-04-26 RX ADMIN — PAROXETINE SCH MG: 10 TABLET, FILM COATED ORAL at 17:01

## 2018-04-26 RX ADMIN — MORPHINE TINCTURE PRN MG: 1 SOLUTION ORAL at 08:21

## 2018-04-26 RX ADMIN — IPRATROPIUM BROMIDE SCH MG: 0.5 SOLUTION RESPIRATORY (INHALATION) at 19:49

## 2018-04-26 RX ADMIN — IPRATROPIUM BROMIDE SCH MG: 0.5 SOLUTION RESPIRATORY (INHALATION) at 12:42

## 2018-04-26 RX ADMIN — CALCITRIOL SCH MCG: 0.25 CAPSULE ORAL at 08:19

## 2018-04-26 RX ADMIN — IPRATROPIUM BROMIDE SCH MG: 0.5 SOLUTION RESPIRATORY (INHALATION) at 08:19

## 2018-04-26 RX ADMIN — WARFARIN SODIUM SCH MG: 3 TABLET ORAL at 17:01

## 2018-04-26 RX ADMIN — CEFTRIAXONE SODIUM SCH MLS/HR: 1 INJECTION, POWDER, FOR SOLUTION INTRAVENOUS at 10:38

## 2018-04-26 RX ADMIN — GUAIFENESIN PRN MG: 600 TABLET, EXTENDED RELEASE ORAL at 08:20

## 2018-04-26 NOTE — PN
Progress Note





- Progress Note


Date of Service: 04/26/18 - Pulm f/u note


Note: 





Pt seen and examined at bedside. Pt more alert today after Ativan dose was 

decreased. Pt reports worsening SOB, she was tearful during interview, 

expressed concern with worsening COPD. Denied much cough or sputum production


She had c/o worsening SOB and developed possible bronchospasm. Bedside RN was 

summoned, Duoneb was administered. 2+2 mg of Morphine was also administered 

with slight improvement.


RT was called to bedside for BiPAP. She was initiated on BiPAP and will be 

transferred to ICU for close monitoring





 Active Medications











Generic Name Dose Route Start Last Admin





  Trade Name Freq  PRN Reason Stop Dose Admin


 


Acetaminophen  650 mg  04/23/18 14:18  





  Tylenol Tab*  PO   





  Q6H PRN   





  pain/fever   


 


Aspirin  81 mg  04/24/18 09:00  04/26/18 08:19





  Aspirin Ec Tab*  PO   81 mg





  DAILY BERTHA   Administration


 


Calcitriol  0.25 mcg  04/24/18 09:00  04/26/18 08:19





  Rocaltrol  Cap*  PO   0.25 mcg





  QAM BERTHA   Administration


 


Cinacalcet  30 mg  04/24/18 09:00  04/26/18 08:19





  Sensipar Tab*  PO   30 mg





  QAM BERTHA   Administration


 


Enoxaparin Sodium  55 mg  04/23/18 15:00  04/25/18 15:21





  Lovenox(*)  SUBCUT   55 mg





  Q24H BERTHA   Administration


 


Guaifenesin  600 mg  04/23/18 14:18  04/26/18 08:20





  Mucinex*  PO   600 mg





  BID PRN   Administration





  CONGESTION   


 


Ceftriaxone Sodium 1 gm/  50 mls @ 200 mls/hr  04/24/18 12:00  04/26/18 10:38





  Sodium Chloride  IVPB   200 mls/hr





  Q24H BERTHA   Administration


 


Azithromycin 500 mg/ Sodium  250 mls @ 250 mls/hr  04/24/18 10:00  04/26/18 08:

43





  Chloride  IVPB   250 mls/hr





  Q24H BERTHA   Administration


 


Ipratropium Bromide  0.5 mg  04/25/18 13:00  04/26/18 12:42





  Atrovent 0.5 Mg Neb.Sol*  INH   0.5 mg





  [id number] AWAKE BERTHA   Administration


 


Lorazepam  0.5 mg  04/23/18 21:00  04/26/18 16:23





  Ativan Tab(*)  PO   Not Given





  TID BERTHA   


 


Magnesium Oxide  400 mg  04/23/18 21:00  04/26/18 08:20





  Magox 400 Tab*  PO   400 mg





  BID BERTHA   Administration


 


Morphine Sulfate  2 mg  04/26/18 16:50  





  Morphine Inj (Syringe)*  IV  04/26/18 16:51  





  ED ONCE ONE   


 


Opium Tincture  6 mg  04/23/18 22:00  04/26/18 08:21





  Opium Tincture*  PO   6 mg





  FIVE TIMES DAILY PRN   Administration





  SEVERE DIARRHEA   


 


Paroxetine HCl  30 mg  04/23/18 18:00  04/25/18 17:29





  Paxil Tab*  PO   30 mg





  QPM BERTHA   Administration


 


Prednisone  50 mg  04/26/18 12:38  





  Deltasone Tab*  PO   





  DAILY BERTHA   


 


Sodium Chloride  1 spray  04/24/18 16:53  





  Sodium Chloride 0.65% Nasal Spray*  BOTH NARES   





  Q4H PRN   





  Dry nasal passages   


 


Tiotropium Bromide  1 cap  04/24/18 09:00  04/26/18 08:20





  Spiriva Cap.Inh*  INH   1 cap





  DAILY BERTHA   Administration


 


Warfarin Sodium  3 mg  04/23/18 17:00  04/25/18 16:31





  Coumadin Tab(*)  PO   3 mg





  DAILY@1700 BERTHA   Administration





  Protocol   











 Vital Signs











Temp Pulse Resp BP Pulse Ox


 


 98.1 F   75   32   149/75   97 


 


 04/26/18 14:30  04/26/18 14:30  04/26/18 16:38  04/26/18 14:30  04/26/18 14:30








O/E: Pt in resp distress, using accessory muscles of respiration


HEENT: PERRLA, mucus membranes moist


Lungs: Expiratory wheeze and rhonchi + b/l


CVS: S1, S2+, tachycardic


Abd: Soft, BS+


Ext: No edema


Neuro: Alert, awake, no focal defecits


Psych: Depressed, tearful





 Laboratory Results - last 24 hr











  04/26/18





  06:18


 


INR (Anticoag Therapy)  2.29 H








I/R: 72 y o f with significant smoking history, still continuing to smoke few 

cigs, with lung cancer s/p XRT with recurrence, severe COPD, recurrent 

admissions, progressively worsening course recently, REYNA on CPAP a/w worsening 

SOB for acute COPD exacerbation





Pt has been drowsy likely sec to medications, Ativan dose was decreased with 

improvement in mental status


Pt with no imrpovement in breathing inspite of max therapy of COPD


Pt with acute episode of bronchospasm, recognized immediately, Duoneb and 

Morphine were administered


Pt with slight improvement, still in resp distress, using accessory muscles of 

resp


Pt was initiated on NIPPV and was transferred to ICU for close monitoring


Pt is full code currently, her condition was appropriate for hospice however 

she didnot consent for DNR status


Her significant other Sarah is her HCP, she is currently in [place], will be 

returning this weekend


Recurrent lung cancer s/p XRT


Will manage as full code for now, BiPAP for rescue, nebs q 2hrs, while awake


She is not hypoxic on 2L O2





Overall prognosis is guarded





D/w RN, RT, Dr Lee aware of plan for transfer to ICU and change in status

## 2018-04-26 NOTE — ADMNOTE
Subjective


Date of Service: 04/26/18


Interval History: 





Slowly improving but only has walked in her room so far.  No new c/o.





Review of Systems





- Measurements


Intake and Output: 


Intake and Output Last 24 Hours











 04/24/18 04/25/18 04/26/18 04/27/18





 06:59 06:59 06:59 06:59


 


Intake Total 1547 3532 1340 400


 


Output Total 375 2100 3400 350


 


Balance 1172 1432 -2060 50


 


Weight 125 lb   


 


Intake:    


 


  IV Fluids 1547 2792 0 


 


    NS (0.9%) 947 2792 0 


 


  Oral 0 740 1340 400


 


Output:    


 


  Urine  350  


 


  Stark  900 2200 


 


  Colostomy 375  800 


 


  Ileostomy  850 400 350


 


Other:    


 


  Estimated Void Medium  Large 


 


  # Bowel Movements  0 0 


 


  Estimated Stool Amount  Medium Large 


 


  # Voids 1  2 














Objective


Active Medications: 








Acetaminophen (Tylenol Tab*)  650 mg PO Q6H PRN


   PRN Reason: pain/fever


Aspirin (Aspirin Ec Tab*)  81 mg PO DAILY [facility]


   Last Admin: 04/26/18 08:19 Dose:  81 mg


Calcitriol (Rocaltrol  Cap*)  0.25 mcg PO QAM [facility]


   Last Admin: 04/26/18 08:19 Dose:  0.25 mcg


Cinacalcet (Sensipar Tab*)  30 mg PO QAM [facility]


   Last Admin: 04/26/18 08:19 Dose:  30 mg


Enoxaparin Sodium (Lovenox(*))  55 mg SUBCUT Q24H [facility]


   Last Admin: 04/25/18 15:21 Dose:  55 mg


Guaifenesin (Mucinex*)  600 mg PO BID PRN


   PRN Reason: CONGESTION


   Last Admin: 04/26/18 08:20 Dose:  600 mg


Ceftriaxone Sodium 1 gm/ (Sodium Chloride)  50 mls @ 200 mls/hr IVPB Q24H [facility]


   Last Admin: 04/26/18 10:38 Dose:  200 mls/hr


Azithromycin 500 mg/ Sodium (Chloride)  250 mls @ 250 mls/hr IVPB Q24H [facility]


   Last Admin: 04/26/18 08:43 Dose:  250 mls/hr


Ipratropium Bromide (Atrovent 0.5 Mg Neb.Sol*)  0.5 mg INH [id number] AWAKE 

[facility]


   Last Admin: 04/26/18 12:42 Dose:  0.5 mg


Lorazepam (Ativan Tab(*))  0.5 mg PO TID [facility]


   Last Admin: 04/26/18 08:19 Dose:  0.5 mg


Magnesium Oxide (Magox 400 Tab*)  400 mg PO BID [facility]


   Last Admin: 04/26/18 08:20 Dose:  400 mg


Opium Tincture (Opium Tincture*)  6 mg PO FIVE TIMES DAILY PRN


   PRN Reason: SEVERE DIARRHEA


   Last Admin: 04/26/18 08:21 Dose:  6 mg


Paroxetine HCl (Paxil Tab*)  30 mg PO QPM [facility]


   Last Admin: 04/25/18 17:29 Dose:  30 mg


Prednisone (Deltasone Tab*)  50 mg PO DAILY [facility]


Sodium Chloride (Sodium Chloride 0.65% Nasal Spray*)  1 spray BOTH NARES Q4H PRN


   PRN Reason: Dry nasal passages


Tiotropium Bromide (Spiriva Cap.Inh*)  1 cap INH DAILY [facility]


   Last Admin: 04/26/18 08:20 Dose:  1 cap


Warfarin Sodium (Coumadin Tab(*))  3 mg PO DAILY@1700 [facility]


   PRN Reason: Protocol


   Last Admin: 04/25/18 16:31 Dose:  3 mg








 Vital Signs - 8 hr











  04/26/18 04/26/18 04/26/18





  05:04 07:08 08:00


 


Temperature  97.9 F 


 


Pulse Rate  73 


 


Respiratory  18 22





Rate   


 


Blood Pressure  138/56 





(mmHg)   


 


O2 Sat by Pulse 100 97 





Oximetry   














  04/26/18 04/26/18 04/26/18





  08:19 08:21 10:37


 


Temperature   


 


Pulse Rate   


 


Respiratory 22 22 20





Rate   


 


Blood Pressure   





(mmHg)   


 


O2 Sat by Pulse   





Oximetry   














  04/26/18





  12:40


 


Temperature 


 


Pulse Rate 84


 


Respiratory 





Rate 


 


Blood Pressure 





(mmHg) 


 


O2 Sat by Pulse 





Oximetry 











Oxygen Devices in Use Now: Nasal Cannula


Appearance: Alert, partly up in bed.  In fair spirits.  Looks comfortable.


Eyes: No Scleral Icterus


Neck: NL Appearance and Movements; NL JVP, No Thyroid Enlargement, Masses


Respiratory: Symmetrical Chest Expansion and Respiratory Effort, Clear to 

Percussion, - - mild rhonchi BL


Cardiovascular: NL Sounds; No Murmurs; No JVD, RRR, No Edema, -


Extremities: No Edema, No Clubbing, Cyanosis, -


Skin: No Rash or Ulcers, No Nodules or Sclerosis, -


Neurological: Alert and Oriented x 3, NL Sensation


Result Diagrams: 


 04/25/18 04:55





 04/25/18 04:55


Additional Lab and Data: 


 Lab Results











  04/23/18 04/23/18 04/23/18 Range/Units





  09:46 09:46 09:46 


 


WBC     (3.5-10.8)  10^3/ul


 


RBC     (4.0-5.4)  10^6/ul


 


Hgb     (12.0-16.0)  g/dl


 


Hct     (35-47)  %


 


MCV     (80-97)  fL


 


MCH     (27-31)  pg


 


MCHC     (31-36)  g/dl


 


RDW     (10.5-15)  %


 


Plt Count     (150-450)  10^3/ul


 


MPV     (7.4-10.4)  um3


 


Neut % (Auto)     


 


Lymph % (Auto)     


 


Mono % (Auto)     


 


Eos % (Auto)     


 


Baso % (Auto)     


 


Absolute Neuts (auto)     (1.5-7.7)  10^3/ul


 


Absolute Lymphs (auto)     (1.0-4.8)  10^3/ul


 


Absolute Monos (auto)     (0-0.8)  10^3/ul


 


Absolute Eos (auto)     (0-0.6)  10^3/ul


 


Absolute Basos (auto)     (0-0.2)  10^3/ul


 


Absolute Nucleated RBC     10^3/ul


 


Neutrophils %     (38-83)  %


 


Lymphocytes %     (25-47)  %


 


Reactive Lymphs %     (0-6)  %


 


Monocytes %     (0-7)  %


 


Eosinophils %     (0-6)  %


 


Basophils %     (0-2)  %


 


Nucleated RBC %     


 


Abs Neuts (Manual)     (1.5-7.7)  10^3/ul


 


Abs Lymphs (Manual)     (1.0-4.8)  10^3/ul


 


Abs Monocytes (Manual)     (0-0.8)  10^3/ul


 


Absolute Eos (Manual)     (0-0.6)  10^3/ul


 


Abs Basophils (Manual)     (0-0.2)  10^3/ul


 


Normal RBC Morphology     (Normal)  


 


Hem Pathologist Commnt     


 


INR (Anticoag Therapy)   0.96   (0.77-1.02)  


 


APTT   29.3   (26.0-36.3)  seconds


 


D-Dimer, Quantitative   240 H   (Less Than 230)  ng/mL


 


Sodium  142    (139-145)  mmol/L


 


Potassium  4.0    (3.5-5.0)  mmol/L


 


Chloride  102    (101-111)  mmol/L


 


Carbon Dioxide  33 H    (22-32)  mmol/L


 


Anion Gap  7    (2-11)  mmol/L


 


BUN  31 H    (6-24)  mg/dL


 


Creatinine  1.93 H    (0.51-0.95)  mg/dL


 


Est GFR ( Amer)  32.7    (>60)  


 


Est GFR (Non-Af Amer)  25.4    (>60)  


 


BUN/Creatinine Ratio  16.1    (8-20)  


 


Glucose  95    ()  mg/dL


 


Lactic Acid     (0.5-2.0)  mmol/L


 


Calcium  8.7    (8.6-10.3)  mg/dL


 


Magnesium  2.2    (1.9-2.7)  mg/dL


 


Total Bilirubin  0.40    (0.2-1.0)  mg/dL


 


AST  23    (13-39)  U/L


 


ALT  23    (7-52)  U/L


 


Alkaline Phosphatase  65    ()  U/L


 


Ammonia    38  (16-53)  mcmol/L


 


CK-MB (CK-2)  5.7    (0.6-6.3)  ng/mL


 


Troponin I  0.05 H*    (<0.04)  ng/mL


 


C-Reactive Protein  4.77    (< 5.00)  mg/L


 


B-Natriuretic Peptide    113 H ( - 100) pg/mL


 


Total Protein  6.6    (6.4-8.9)  g/dL


 


Albumin  3.8    (3.2-5.2)  g/dL


 


Globulin  2.8    (2-4)  g/dL


 


Albumin/Globulin Ratio  1.4    (1-3)  


 


Lipase  29    (11.0-82.0)  U/L


 


TSH  1.30    (0.34-5.60)  mcIU/mL


 


Acetaminophen  < 15    mcg/mL


 


Serum Alcohol  < 10    (<10)  mg/dL














  04/23/18 04/23/18 Range/Units





  09:46 10:40 


 


WBC  10.5   (3.5-10.8)  10^3/ul


 


RBC  4.00   (4.0-5.4)  10^6/ul


 


Hgb  12.4   (12.0-16.0)  g/dl


 


Hct  38   (35-47)  %


 


MCV  94   (80-97)  fL


 


MCH  31   (27-31)  pg


 


MCHC  33   (31-36)  g/dl


 


RDW  15   (10.5-15)  %


 


Plt Count  261   (150-450)  10^3/ul


 


MPV  7.8   (7.4-10.4)  um3


 


Neut % (Auto)  Not Reportable   


 


Lymph % (Auto)  Not Reportable   


 


Mono % (Auto)  Not Reportable   


 


Eos % (Auto)  Not Reportable   


 


Baso % (Auto)  Not Reportable   


 


Absolute Neuts (auto)  7.7   (1.5-7.7)  10^3/ul


 


Absolute Lymphs (auto)  1.3   (1.0-4.8)  10^3/ul


 


Absolute Monos (auto)  0.8   (0-0.8)  10^3/ul


 


Absolute Eos (auto)  0.6   (0-0.6)  10^3/ul


 


Absolute Basos (auto)  0.2   (0-0.2)  10^3/ul


 


Absolute Nucleated RBC  0   10^3/ul


 


Neutrophils %  73   (38-83)  %


 


Lymphocytes %  14 L   (25-47)  %


 


Reactive Lymphs %  3   (0-6)  %


 


Monocytes %  7   (0-7)  %


 


Eosinophils %  3   (0-6)  %


 


Basophils %  0   (0-2)  %


 


Nucleated RBC %  0.1   


 


Abs Neuts (Manual)  7.7   (1.5-7.7)  10^3/ul


 


Abs Lymphs (Manual)  1.5   (1.0-4.8)  10^3/ul


 


Abs Monocytes (Manual)  0.7   (0-0.8)  10^3/ul


 


Absolute Eos (Manual)  0.3   (0-0.6)  10^3/ul


 


Abs Basophils (Manual)  0   (0-0.2)  10^3/ul


 


Normal RBC Morphology  Normal   (Normal)  


 


Hem Pathologist Commnt  Pending   


 


INR (Anticoag Therapy)    (0.77-1.02)  


 


APTT    (26.0-36.3)  seconds


 


D-Dimer, Quantitative    (Less Than 230)  ng/mL


 


Sodium    (139-145)  mmol/L


 


Potassium    (3.5-5.0)  mmol/L


 


Chloride    (101-111)  mmol/L


 


Carbon Dioxide    (22-32)  mmol/L


 


Anion Gap    (2-11)  mmol/L


 


BUN    (6-24)  mg/dL


 


Creatinine    (0.51-0.95)  mg/dL


 


Est GFR ( Amer)    (>60)  


 


Est GFR (Non-Af Amer)    (>60)  


 


BUN/Creatinine Ratio    (8-20)  


 


Glucose    ()  mg/dL


 


Lactic Acid   1.7  (0.5-2.0)  mmol/L


 


Calcium    (8.6-10.3)  mg/dL


 


Magnesium    (1.9-2.7)  mg/dL


 


Total Bilirubin    (0.2-1.0)  mg/dL


 


AST    (13-39)  U/L


 


ALT    (7-52)  U/L


 


Alkaline Phosphatase    ()  U/L


 


Ammonia    (16-53)  mcmol/L


 


CK-MB (CK-2)    (0.6-6.3)  ng/mL


 


Troponin I    (<0.04)  ng/mL


 


C-Reactive Protein    (< 5.00)  mg/L


 


B-Natriuretic Peptide   ( - 100) pg/mL


 


Total Protein    (6.4-8.9)  g/dL


 


Albumin    (3.2-5.2)  g/dL


 


Globulin    (2-4)  g/dL


 


Albumin/Globulin Ratio    (1-3)  


 


Lipase    (11.0-82.0)  U/L


 


TSH    (0.34-5.60)  mcIU/mL


 


Acetaminophen    mcg/mL


 


Serum Alcohol    (<10)  mg/dL











Microbiology and Other Data: 


 Microbiology











 04/24/18 09:22 Legionella Urinary Antigen - Final





 Urine    Negative Legionella Antigen





 Streptococcus pneumoniae Ag Screen - Final





    Negative S. pneumo Antigen














Assess/Plan/Problems-Billing


Assessment: 











- Patient Problems


(1) COPD exacerbation


Current Visit: No   Status: Acute   Priority: High   Code(s): J44.1 - CHRONIC 

OBSTRUCTIVE PULMONARY DISEASE W (ACUTE) EXACERBATION   SNOMED Code(s): 

974279303635742


   Comment: SLowly improving.  Neewd to walk more.  


Spiriva daily,  PRN Levalbuterol


Prednisone taper, 50 mg 4/27.


--Continue azith, ceftri.   





(2) Lung cancer


Current Visit: No   Status: Acute   Code(s): C34.90 - MALIGNANT NEOPLASM OF 

UNSP PART OF UNSP BRONCHUS OR LUNG   SNOMED Code(s): 921048486


   Comment: Fup with Dr. Higgins as outpt.   





(3) History of DVT (deep vein thrombosis)


Current Visit: Yes   Status: Acute   Code(s): Z86.718 - PERSONAL HISTORY OF 

OTHER VENOUS THROMBOSIS AND EMBOLISM   SNOMED Code(s): 921967200


   Comment: Warfarin increased to 3 mg daily.  Repeat INR 4/27. Continue 

enoxaparin until INR 2.   





(4) Tobacco abuse


Current Visit: No   Status: Acute   Code(s): Z72.0 - TOBACCO USE   SNOMED Code(s

): 185655577


   Comment: Pt advised to quit smoking and avoid second hand smoke.    


Status and Disposition: 





To rediscuss possible hospice again in context of mildly increasing RUL mass 

especially if pt is unimproved

## 2018-04-27 LAB — INR PPP/BLD: 2.06 (ref 0.77–1.02)

## 2018-04-27 RX ADMIN — ACETAMINOPHEN PRN MG: 325 TABLET ORAL at 18:53

## 2018-04-27 RX ADMIN — IPRATROPIUM BROMIDE SCH MG: 0.5 SOLUTION RESPIRATORY (INHALATION) at 12:48

## 2018-04-27 RX ADMIN — MAGNESIUM OXIDE TAB 400 MG (241.3 MG ELEMENTAL MG) SCH MG: 400 (241.3 MG) TAB at 09:34

## 2018-04-27 RX ADMIN — CINACALCET HYDROCHLORIDE SCH MG: 30 TABLET, COATED ORAL at 09:34

## 2018-04-27 RX ADMIN — PAROXETINE SCH MG: 10 TABLET, FILM COATED ORAL at 17:43

## 2018-04-27 RX ADMIN — LORAZEPAM SCH MG: 1 TABLET ORAL at 14:08

## 2018-04-27 RX ADMIN — PREDNISONE SCH MG: 50 TABLET ORAL at 10:01

## 2018-04-27 RX ADMIN — MORPHINE SULFATE PRN MG: 4 INJECTION INTRAVENOUS at 22:49

## 2018-04-27 RX ADMIN — CALCITRIOL SCH MCG: 0.25 CAPSULE ORAL at 09:34

## 2018-04-27 RX ADMIN — CEFTRIAXONE SODIUM SCH MLS/HR: 1 INJECTION, POWDER, FOR SOLUTION INTRAVENOUS at 12:13

## 2018-04-27 RX ADMIN — ACETAMINOPHEN PRN MG: 325 TABLET ORAL at 06:17

## 2018-04-27 RX ADMIN — LORAZEPAM SCH MG: 1 TABLET ORAL at 21:29

## 2018-04-27 RX ADMIN — WARFARIN SODIUM SCH MG: 3 TABLET ORAL at 17:43

## 2018-04-27 RX ADMIN — MORPHINE TINCTURE SCH MG: 1 SOLUTION ORAL at 18:09

## 2018-04-27 RX ADMIN — MORPHINE SULFATE PRN MG: 4 INJECTION INTRAVENOUS at 18:48

## 2018-04-27 RX ADMIN — TIOTROPIUM BROMIDE SCH CAP: 18 CAPSULE ORAL; RESPIRATORY (INHALATION) at 09:38

## 2018-04-27 RX ADMIN — IPRATROPIUM BROMIDE SCH MG: 0.5 SOLUTION RESPIRATORY (INHALATION) at 07:44

## 2018-04-27 RX ADMIN — MAGNESIUM OXIDE TAB 400 MG (241.3 MG ELEMENTAL MG) SCH MG: 400 (241.3 MG) TAB at 21:29

## 2018-04-27 RX ADMIN — ENOXAPARIN SODIUM SCH: 100 INJECTION SUBCUTANEOUS at 15:08

## 2018-04-27 RX ADMIN — MORPHINE SULFATE PRN MG: 4 INJECTION INTRAVENOUS at 14:03

## 2018-04-27 RX ADMIN — MORPHINE SULFATE PRN MG: 4 INJECTION INTRAVENOUS at 16:37

## 2018-04-27 RX ADMIN — IPRATROPIUM BROMIDE SCH MG: 0.5 SOLUTION RESPIRATORY (INHALATION) at 19:53

## 2018-04-27 RX ADMIN — AZITHROMYCIN SCH MLS/HR: 500 INJECTION, POWDER, LYOPHILIZED, FOR SOLUTION INTRAVENOUS at 10:29

## 2018-04-27 RX ADMIN — LORAZEPAM SCH MG: 1 TABLET ORAL at 08:22

## 2018-04-27 RX ADMIN — MORPHINE TINCTURE SCH MG: 1 SOLUTION ORAL at 21:30

## 2018-04-27 RX ADMIN — IPRATROPIUM BROMIDE SCH MG: 0.5 SOLUTION RESPIRATORY (INHALATION) at 01:40

## 2018-04-27 RX ADMIN — MORPHINE SULFATE ONE MG: 4 INJECTION INTRAVENOUS at 11:06

## 2018-04-27 RX ADMIN — ENOXAPARIN SODIUM SCH MG: 60 INJECTION SUBCUTANEOUS at 14:04

## 2018-04-27 RX ADMIN — ASPIRIN SCH MG: 81 TABLET, COATED ORAL at 09:34

## 2018-04-27 NOTE — PN
Progress Note





- Progress Note


Date of Service: 04/27/18


Note: 


Patient had an uneventful evening after ICU admission, but this AM (at about 11:

00 AM) developed acute-onset SOB - placed on BiPAP but continued to have 

respiratory distress, which eventually subsided with morphine (10 mg IV). Lung 

exam revealed scattered rhonchi and crackles, but no wheezes. BP initially up 

to 230/120, but decreased to 170/80 after the morphine. 





Etiology of this acute episode is unclear - this could be the product of mucus 

plugging or aspiration while asleep, but I cannot r/o pulmonary embolus (

although it seems unlikely that she would survive and acute embolus). Will 

follow this with a venous ultrasound of the leg veins (at the bedside) in 

search of thromboembolism.





Dr. Whitlock is aware of this episode, and how it was managed, and is in 

agreement with the management.  








NOTE: This AM, patient agreed to DNR/DNI order while breathing comfortably, and 

reaffirmed this when she was in respiratory distress.

## 2018-04-27 NOTE — RAD
HISTORY: Rule out DVT, history of DVT, shortness of breath



COMPARISONS: December 13, 2011



TECHNIQUE: Multiple transverse and longitudinal ultrasound images were obtained of the

bilateral lower extremities  from the level of the common femoral vein inferiorly through

to the infrapopliteal veins  using grayscale, color Doppler, and spectral Doppler imaging

with and without compression and with augmentation.    



FINDINGS:

VEINS: There is thrombus noted within the superior portion of the left femoral vein which

becomes occlusive. The femoral vein appears small which suggests chronic occlusion. There

is also occlusion of the peroneal veins in the calf on the left. The remainder of the

venous system of the bilateral lower extremities  is compressible throughout its course,

with normal flow on color Doppler imaging and normal response to augmentation on spectral

Doppler imaging.



SOFT TISSUES: Unremarkable.



OTHER FINDINGS: None.



IMPRESSION: 

1.  NO RIGHT LOWER EXTREMITY DEEP VEIN THROMBOSIS.

2.  THERE IS OCCLUSIVE THROMBUS OF THE SUPERIOR EXTENT OF THE LEFT FEMORAL VEIN AND WITHIN

THE LEFT PERONEAL VEIN IN THE CALF. THE VEINS APPEAR SMALL WHICH MAY INDICATE CHRONIC

OCCLUSION.

## 2018-04-27 NOTE — PN
Progress Note





- Progress Note


Date of Service: 04/27/18 - Pulm f/u note


Note: 





Pt seen and examined at bedside. Pt is more alert, has been on BiPAP since 

yesterday. pt reports slight improvement in breathing. 


 Active Medications











Generic Name Dose Route Start Last Admin





  Trade Name Freq  PRN Reason Stop Dose Admin


 


Acetaminophen  650 mg  04/23/18 14:18  04/27/18 06:17





  Tylenol Tab*  PO   650 mg





  Q6H PRN   Administration





  pain/fever   


 


Aspirin  81 mg  04/24/18 09:00  04/27/18 09:34





  Aspirin Ec Tab*  PO   81 mg





  DAILY BERTHA   Administration


 


Calcitriol  0.25 mcg  04/24/18 09:00  04/27/18 09:34





  Rocaltrol  Cap*  PO   0.25 mcg





  QAM BERTHA   Administration


 


Cinacalcet  30 mg  04/24/18 09:00  04/27/18 09:34





  Sensipar Tab*  PO   30 mg





  QAM BERTHA   Administration


 


Enoxaparin Sodium  70 mg  04/27/18 15:00  04/27/18 15:08





  Lovenox(*)  SUBCUT   Not Given





  Q24H BERTHA   


 


Guaifenesin  600 mg  04/23/18 14:18  04/26/18 08:20





  Mucinex*  PO   600 mg





  BID PRN   Administration





  CONGESTION   


 


Ceftriaxone Sodium 1 gm/  50 mls @ 200 mls/hr  04/24/18 12:00  04/27/18 12:13





  Sodium Chloride  IVPB   200 mls/hr





  Q24H BERTHA   Administration


 


Azithromycin 500 mg/ Sodium  250 mls @ 250 mls/hr  04/24/18 10:00  04/27/18 10:

29





  Chloride  IVPB   250 mls/hr





  Q24H BERTHA   Administration


 


Ipratropium Bromide  0.5 mg  04/25/18 13:00  04/27/18 12:48





  Atrovent 0.5 Mg Neb.Sol*  INH   0.5 mg





  RT.[id number]-CNEPT AWAKE BERTHA   Administration


 


Lorazepam  0.5 mg  04/23/18 21:00  04/27/18 14:08





  Ativan Tab(*)  PO   0.5 mg





  TID BERTHA   Administration


 


Magnesium Oxide  400 mg  04/23/18 21:00  04/27/18 09:34





  Magox 400 Tab*  PO   400 mg





  BID BERTHA   Administration


 


Morphine Sulfate  4 mg  04/27/18 11:22  04/27/18 14:03





  Morphine Vial*  IV   4 mg





  Q2H PRN   Administration





  DISCOMFORT   


 


Opium Tincture  6 mg  04/23/18 22:00  04/26/18 08:21





  Opium Tincture*  PO   6 mg





  FIVE TIMES DAILY PRN   Administration





  SEVERE DIARRHEA   


 


Paroxetine HCl  30 mg  04/23/18 18:00  04/26/18 17:01





  Paxil Tab*  PO   30 mg





  QPM BERTHA   Administration


 


Pharmacy Profile Note  0 note  04/27/18 17:00  





  Coumadin Daily Reminder*  FOLLOW UP   





  1700 Pending sale to [facility]   


 


Prednisone  50 mg  04/26/18 12:38  04/27/18 10:01





  Deltasone Tab*  PO   50 mg





  DAILY BERTHA   Administration


 


Sodium Chloride  1 spray  04/24/18 16:53  





  Sodium Chloride 0.65% Nasal Spray*  BOTH NARES   





  Q4H PRN   





  Dry nasal passages   


 


Tiotropium Bromide  1 cap  04/24/18 09:00  04/27/18 09:38





  Spiriva Cap.Inh*  INH   1 cap





  DAILY BERTHA   Administration


 


Warfarin Sodium  3 mg  04/23/18 17:00  04/26/18 17:01





  Coumadin Tab(*)  PO   3 mg





  DAILY@1700 BERTHA   Administration





  Protocol   








 Vital Signs











Temp Pulse Resp BP Pulse Ox


 


 98.8 F   102   14   138/80   100 


 


 04/27/18 15:38  04/27/18 15:00  04/27/18 15:25  04/27/18 15:00  04/27/18 15:00








O/E: Pt in NAD


HEENT: PERRLA, mucus membranes moist


Lungs: Expiratory wheeze and rhonchi + b/l


CVS: S1, S2+, tachycardic


Abd: Soft, BS+


Ext: No edema


Neuro: Alert, awake, no focal defecits


Psych: Depressed, tearful





 Laboratory Results - last 24 hr











  04/26/18 04/27/18





  17:50 06:14


 


INR (Anticoag Therapy)   2.06 H


 


Patient Temperature  Not Reportable 


 


ABG pH  7.41 


 


ABG pH (Temp Correct)  Not Reportable 


 


ABG pCO2  57 H 


 


ABG pCO2 (Temp Corrct  Not Reportable 


 


ABG pO2  157 H 


 


ABG pO2 (Temp Correct  Not Reportable 


 


ABG HCO3  32.6 H 


 


ABG O2 Saturation  99.5 H 


 


ABG Base Excess  9.8 H 


 


Respiration Rate  Not Reportable 


 


Ventilator Type  Not Reportable 


 


Vent Mode  Not Reportable 


 


FiO2  40 


 


Inspiratory Time  Not Reportable 


 


PEEP  Not Reportable 


 


Pressure Support  Not Reportable 


 


Pressure Control  Not Reportable 


 


EPAP  Not Reportable 


 


IPAP  Not Reportable 


 


BiPAP  Not Reportable 

















I/R: 72 y o f with significant smoking history, still continuing to smoke few 

cigs, with lung cancer s/p XRT with recurrence, severe COPD, recurrent 

admissions, progressively worsening course recently, REYNA on CPAP a/w worsening 

SOB for acute COPD exacerbation








Pt reports benefit from Morphine


Will get her off BiPAP during daytime and c/w BiPAP at night


ABG reviewed, compensated resp acidosis





Will have pt in ICU today for close monitoring


Pt consented for DNR status


Her significant other Sarah is her HCP, she is currently in [facility], will be 

returning this weekend


Recurrent lung cancer s/p XRT





She is not hypoxic on 2-3L O2





Overall prognosis is guarded





D/w Dr Ocasio

## 2018-04-28 LAB — INR PPP/BLD: 2.59 (ref 0.77–1.02)

## 2018-04-28 RX ADMIN — MORPHINE TINCTURE SCH MG: 1 SOLUTION ORAL at 09:43

## 2018-04-28 RX ADMIN — MORPHINE SULFATE PRN MG: 4 INJECTION INTRAVENOUS at 09:44

## 2018-04-28 RX ADMIN — MAGNESIUM OXIDE TAB 400 MG (241.3 MG ELEMENTAL MG) SCH MG: 400 (241.3 MG) TAB at 09:44

## 2018-04-28 RX ADMIN — TIOTROPIUM BROMIDE SCH CAP: 18 CAPSULE ORAL; RESPIRATORY (INHALATION) at 07:12

## 2018-04-28 RX ADMIN — LORAZEPAM SCH MG: 1 TABLET ORAL at 13:08

## 2018-04-28 RX ADMIN — LORAZEPAM SCH: 1 TABLET ORAL at 09:45

## 2018-04-28 RX ADMIN — CINACALCET HYDROCHLORIDE SCH MG: 30 TABLET, COATED ORAL at 09:44

## 2018-04-28 RX ADMIN — MORPHINE TINCTURE SCH MG: 1 SOLUTION ORAL at 13:08

## 2018-04-28 RX ADMIN — MORPHINE TINCTURE SCH MG: 1 SOLUTION ORAL at 17:27

## 2018-04-28 RX ADMIN — MORPHINE SULFATE SCH MG: 15 TABLET, EXTENDED RELEASE ORAL at 17:26

## 2018-04-28 RX ADMIN — AZITHROMYCIN SCH MLS/HR: 500 INJECTION, POWDER, LYOPHILIZED, FOR SOLUTION INTRAVENOUS at 09:44

## 2018-04-28 RX ADMIN — ACETAMINOPHEN PRN MG: 325 TABLET ORAL at 19:12

## 2018-04-28 RX ADMIN — LORAZEPAM SCH MG: 1 TABLET ORAL at 20:32

## 2018-04-28 RX ADMIN — PREDNISONE SCH MG: 50 TABLET ORAL at 09:44

## 2018-04-28 RX ADMIN — MORPHINE TINCTURE SCH MG: 1 SOLUTION ORAL at 20:33

## 2018-04-28 RX ADMIN — ASPIRIN SCH MG: 81 TABLET, COATED ORAL at 09:44

## 2018-04-28 RX ADMIN — MORPHINE SULFATE PRN MG: 4 INJECTION INTRAVENOUS at 15:20

## 2018-04-28 RX ADMIN — IPRATROPIUM BROMIDE SCH MG: 0.5 SOLUTION RESPIRATORY (INHALATION) at 07:12

## 2018-04-28 RX ADMIN — IPRATROPIUM BROMIDE SCH MG: 0.5 SOLUTION RESPIRATORY (INHALATION) at 12:09

## 2018-04-28 RX ADMIN — MORPHINE TINCTURE SCH: 1 SOLUTION ORAL at 10:10

## 2018-04-28 RX ADMIN — MORPHINE SULFATE SCH MG: 15 TABLET, EXTENDED RELEASE ORAL at 09:44

## 2018-04-28 RX ADMIN — MORPHINE SULFATE PRN MG: 4 INJECTION INTRAVENOUS at 13:22

## 2018-04-28 RX ADMIN — MAGNESIUM OXIDE TAB 400 MG (241.3 MG ELEMENTAL MG) SCH MG: 400 (241.3 MG) TAB at 20:32

## 2018-04-28 RX ADMIN — MORPHINE SULFATE PRN MG: 4 INJECTION INTRAVENOUS at 01:46

## 2018-04-28 RX ADMIN — IPRATROPIUM BROMIDE SCH MG: 0.5 SOLUTION RESPIRATORY (INHALATION) at 01:12

## 2018-04-28 RX ADMIN — IPRATROPIUM BROMIDE SCH MG: 0.5 SOLUTION RESPIRATORY (INHALATION) at 19:33

## 2018-04-28 RX ADMIN — PAROXETINE SCH MG: 10 TABLET, FILM COATED ORAL at 17:26

## 2018-04-28 RX ADMIN — CEFTRIAXONE SODIUM SCH MLS/HR: 1 INJECTION, POWDER, FOR SOLUTION INTRAVENOUS at 13:08

## 2018-04-28 RX ADMIN — WARFARIN SODIUM SCH MG: 3 TABLET ORAL at 17:27

## 2018-04-28 RX ADMIN — ENOXAPARIN SODIUM SCH: 100 INJECTION SUBCUTANEOUS at 16:42

## 2018-04-28 RX ADMIN — MORPHINE SULFATE PRN MG: 4 INJECTION INTRAVENOUS at 07:34

## 2018-04-28 RX ADMIN — LORAZEPAM SCH MG: 1 TABLET ORAL at 06:47

## 2018-04-28 RX ADMIN — CALCITRIOL SCH MCG: 0.25 CAPSULE ORAL at 09:44

## 2018-04-28 NOTE — PN
Subjective


Date of Service: 04/27/18


Interval History: 


.


I saw Ms. Santiago earlier in the morning of 4/27/18 -- before the event 

described by Dr. Ocasio.


After that, pt on biPAP and stable.


pt nodded when I later asked if she is ok.


previous notes reviewed and discussed case with Dr. Ocasio.


.





Family History: Unchanged from Admission


Social History: Unchanged from Admission


Past Medical History: Unchanged from Admission





Objective


Active Medications: 


.


Acetaminophen (Tylenol Tab*)  650 mg PO Q6H PRN


   PRN Reason: pain/fever


   Last Admin: 04/27/18 18:53 Dose:  650 mg


Aspirin (Aspirin Ec Tab*)  81 mg PO DAILY [facility]


   Last Admin: 04/28/18 09:44 Dose:  81 mg


Calcitriol (Rocaltrol  Cap*)  0.25 mcg PO QAM [facility]


   Last Admin: 04/28/18 09:44 Dose:  0.25 mcg


Cinacalcet (Sensipar Tab*)  30 mg PO QAM [facility]


   Last Admin: 04/28/18 09:44 Dose:  30 mg


Enoxaparin Sodium (Lovenox(*))  70 mg SUBCUT Q24H [facility]


   Last Admin: 04/27/18 15:08 Dose:  Not Given


Guaifenesin (Mucinex*)  600 mg PO BID PRN


   PRN Reason: CONGESTION


   Last Admin: 04/26/18 08:20 Dose:  600 mg


Ceftriaxone Sodium 1 gm/ (Sodium Chloride)  50 mls @ 200 mls/hr IVPB Q24H [facility]


   Last Admin: 04/28/18 13:08 Dose:  200 mls/hr


Azithromycin 500 mg/ Sodium (Chloride)  250 mls @ 250 mls/hr IVPB Q24H [facility]


   Last Admin: 04/28/18 09:44 Dose:  250 mls/hr


Ipratropium Bromide (Atrovent 0.5 Mg Neb.Sol*)  0.5 mg INH [id number] AWAKE 

[facility]


   Last Admin: 04/28/18 12:09 Dose:  0.5 mg


Lorazepam (Ativan Tab(*))  0.5 mg PO TID [facility]


   Last Admin: 04/28/18 13:08 Dose:  0.5 mg


Magnesium Oxide (Magox 400 Tab*)  400 mg PO BID [facility]


   Last Admin: 04/28/18 09:44 Dose:  400 mg


Morphine Sulfate (Morphine Vial*)  4 mg IV Q2H PRN


   PRN Reason: DISCOMFORT


   Last Admin: 04/28/18 13:22 Dose:  4 mg


Morphine Sulfate (Ms Contin(*))  15 mg PO Q8H [facility]


   Last Admin: 04/28/18 09:44 Dose:  15 mg


Opium Tincture (Opium Tincture*)  6 mg PO FIVE TIMES DAILY [facility]


   Last Admin: 04/28/18 13:08 Dose:  6 mg


Paroxetine HCl (Paxil Tab*)  30 mg PO QPM [facility]


   Last Admin: 04/27/18 17:43 Dose:  30 mg


Pharmacy Profile Note (Coumadin Daily Reminder*)  0 note FOLLOW UP 1700 [facility]


   Last Admin: 04/27/18 16:38 Dose:  1 note


Prednisone (Deltasone Tab*)  50 mg PO DAILY [facility]


   Last Admin: 04/28/18 09:44 Dose:  50 mg


Sodium Chloride (Sodium Chloride 0.65% Nasal Spray*)  1 spray BOTH NARES Q4H PRN


   PRN Reason: Dry nasal passages


Tiotropium Bromide (Spiriva Cap.Inh*)  1 cap INH DAILY [facility]


   Last Admin: 04/28/18 07:12 Dose:  1 cap


Warfarin Sodium (Coumadin Tab(*))  3 mg PO DAILY@1700 [facility]


   PRN Reason: Protocol


   Last Admin: 04/27/18 17:43 Dose:  3 mg


.


 Vital Signs - 8 hr











  04/28/18 04/28/18 04/28/18





  06:31 06:47 07:00


 


Temperature   


 


Pulse Rate 76  


 


Respiratory 18 16 18





Rate   


 


Blood Pressure 161/74  





(mmHg)   


 


O2 Sat by Pulse 99  





Oximetry   














  04/28/18 04/28/18 04/28/18





  07:13 07:34 07:49


 


Temperature   


 


Pulse Rate 66  73


 


Respiratory 16 12 18





Rate   


 


Blood Pressure   145/89





(mmHg)   


 


O2 Sat by Pulse 100  91





Oximetry   








Oxygen Devices in Use Now: BiPAP


Appearance: sleeping earlier in day 4/27; later on BiPAP


Ears/Nose/Mouth/Throat: NL Teeth, Lips, Gums


Neck: NL Appearance and Movements; NL JVP


Respiratory: Symmetrical Chest Expansion and Respiratory Effort, - - rhonchorous

, tachypneic, distant sounds; on BiPAP.


Cardiovascular: NL Sounds; No Murmurs; No JVD


Abdominal: NL Sounds; No Tenderness; No Distention, - - + ostomy


Lymphatic: No Cervical Adenopathy


Extremities: No Edema


Skin: No Rash or Ulcers


Neurological: Alert and Oriented x 3


Lines/Tubes/Other Access: Clean, Dry and Intact Peripheral IV


Nutrition: - - on BiPAP


Result Diagrams: 


 04/25/18 04:55





 04/25/18 04:55


Additional Lab and Data: 


.


Microbiology and Other Data: 


 Microbiology











 04/24/18 09:22 Legionella Urinary Antigen - Final





 Urine    Negative Legionella Antigen





 Streptococcus pneumoniae Ag Screen - Final





    Negative S. pneumo Antigen














Assess/Plan/Problems-Billing


.


Assessment: 





73 yo female with h/o severe COPD, lung cancer (and recurrence), s/p radiation, 

now with orsening SOB for acute COPD exacerbation  





Medically complex patient with Crohn's s/p ileostomy with high output, on Opium 

as antimotility agent





REYNA - on BiPAP





DNR status





- Patient Problems


(1) COPD exacerbation


Current Visit: No   Status: Acute   Priority: High   Code(s): J44.1 - CHRONIC 

OBSTRUCTIVE PULMONARY DISEASE W (ACUTE) EXACERBATION   SNOMED Code(s): 

597090581694067


   Comment: 


- Regressed with need for rescue BiPAP 4/26 and 4/27


- Certainly anxiety component, but maybe also mucus plug, etc (see Dr. Ocasio's 

note)


- BiPAP available as patient needs


- Morphine seems to help greatly


- Spiriva daily,  PRN Levalbuterol


- Prednisone taper, 50 mg 4/27.


- Continue azith, ceftri.   





(2) Chronic respiratory failure with hypoxia


Current Visit: No   Status: Acute   Comment: 


- End-stage COPD with severe emphysema and hx of lung cancer with suggestion of 

enlarging nodule. 


- Now requiring O2 at all times. Using 2-4L NC. 


- Continue Bevaspi


- prednisone taper


- Had been considered for hospice before.


- Continue Oral morphine and atvian prn   





(3) Anxiety


Current Visit: No   Status: Acute   Priority: High   Code(s): F41.9 - ANXIETY 

DISORDER, UNSPECIFIED   Comment: 


- Certainly an anxiety component to this presentation. 


- Add morphine SR 15 mg PO Q8


- Consider increasing ativan -- currently at 0.5 mg PO TID...she has taken 1 mg 

PO BID in past.


   





(4) History of DVT (deep vein thrombosis)


Current Visit: Yes   Status: Chronic   Priority: High   Code(s): Z86.718 - 

PERSONAL HISTORY OF OTHER VENOUS THROMBOSIS AND EMBOLISM   Comment: 


- Warfarin increased to 3 mg daily.  


- INR therapeutic   





(5) Mass of upper lobe of right lung


Current Visit: Yes   Status: Acute   Priority: High   Code(s): R91.8 - OTHER 

NONSPECIFIC ABNORMAL FINDING OF LUNG FIELD   Comment: 


- To rediscuss increasing mass in terms of hospice


- palliative care consult.


   





(6) Acute on chronic renal failure


Current Visit: No   Status: Acute   Priority: High   Onset Date: 11/17/14   Code

(s): N17.9 - ACUTE KIDNEY FAILURE, UNSPECIFIED; N18.9 - CHRONIC KIDNEY DISEASE, 

UNSPECIFIED   SNOMED Code(s): 750912459


   Comment: 


- Cr at admission 1.9


- Now at baseline at ~1.5.   


Status and Disposition: 


.


Palliative care consult to discuss possible hospice again in context of mildly 

increasing RUL mass, especially if pt continues to be clinically tenuous.

## 2018-04-28 NOTE — PN
Progress Note





- Progress Note


Date of Service: 04/28/18 - Pulm f/u note


Note: 





Pt seen and examined at bedside. Interim events noted. Pt was on BiPAP at time 

of exam, was transitioned to nasal cannula. Pt reported that she didnot sleep 

well last night and is feeling sleepy this morning. Overall feeling better today





 Active Medications











Generic Name Dose Route Start Last Admin





  Trade Name Freq  PRN Reason Stop Dose Admin


 


Acetaminophen  650 mg  04/23/18 14:18  04/27/18 18:53





  Tylenol Tab*  PO   650 mg





  Q6H PRN   Administration





  pain/fever   


 


Aspirin  81 mg  04/24/18 09:00  04/28/18 09:44





  Aspirin Ec Tab*  PO   81 mg





  DAILY BERTHA   Administration


 


Calcitriol  0.25 mcg  04/24/18 09:00  04/28/18 09:44





  Rocaltrol  Cap*  PO   0.25 mcg





  QAM BERTHA   Administration


 


Cinacalcet  30 mg  04/24/18 09:00  04/28/18 09:44





  Sensipar Tab*  PO   30 mg





  QAM BERTHA   Administration


 


Enoxaparin Sodium  70 mg  04/27/18 15:00  04/27/18 15:08





  Lovenox(*)  SUBCUT   Not Given





  Q24H BERTHA   


 


Guaifenesin  600 mg  04/23/18 14:18  04/26/18 08:20





  Mucinex*  PO   600 mg





  BID PRN   Administration





  CONGESTION   


 


Ceftriaxone Sodium 1 gm/  50 mls @ 200 mls/hr  04/24/18 12:00  04/28/18 13:08





  Sodium Chloride  IVPB   200 mls/hr





  Q24H BERTHA   Administration


 


Azithromycin 500 mg/ Sodium  250 mls @ 250 mls/hr  04/24/18 10:00  04/28/18 09:

44





  Chloride  IVPB   250 mls/hr





  Q24H BERTHA   Administration


 


Ipratropium Bromide  0.5 mg  04/25/18 13:00  04/28/18 12:09





  Atrovent 0.5 Mg Neb.Sol*  INH   0.5 mg





  [id number] AWAKE BERTHA   Administration


 


Lorazepam  0.5 mg  04/23/18 21:00  04/28/18 13:08





  Ativan Tab(*)  PO   0.5 mg





  TID BERTHA   Administration


 


Magnesium Oxide  400 mg  04/23/18 21:00  04/28/18 09:44





  Magox 400 Tab*  PO   400 mg





  BID BERTHA   Administration


 


Morphine Sulfate  4 mg  04/27/18 11:22  04/28/18 09:44





  Morphine Vial*  IV   4 mg





  Q2H PRN   Administration





  DISCOMFORT   


 


Morphine Sulfate  15 mg  04/28/18 10:00  04/28/18 09:44





  Ms Contin(*)  PO   15 mg





  Q8H BERTHA   Administration


 


Opium Tincture  6 mg  04/27/18 18:00  04/28/18 13:08





  Opium Tincture*  PO   6 mg





  FIVE TIMES DAILY BERTHA   Administration


 


Paroxetine HCl  30 mg  04/23/18 18:00  04/27/18 17:43





  Paxil Tab*  PO   30 mg





  QPM BERTHA   Administration


 


Pharmacy Profile Note  0 note  04/27/18 17:00  04/27/18 16:38





  Coumadin Daily Reminder*  FOLLOW UP   1 note





  1700 BERTHA   Administration


 


Prednisone  50 mg  04/26/18 12:38  04/28/18 09:44





  Deltasone Tab*  PO   50 mg





  DAILY BERTHA   Administration


 


Sodium Chloride  1 spray  04/24/18 16:53  





  Sodium Chloride 0.65% Nasal Spray*  BOTH NARES   





  Q4H PRN   





  Dry nasal passages   


 


Tiotropium Bromide  1 cap  04/24/18 09:00  04/28/18 07:12





  Spiriva Cap.Inh*  INH   1 cap





  DAILY BERTHA   Administration


 


Warfarin Sodium  3 mg  04/23/18 17:00  04/27/18 17:43





  Coumadin Tab(*)  PO   3 mg





  DAILY@1700 BERTHA   Administration





  Protocol   








O/E: Pt in NAD, appears tired


HEENT: PERRLA, No JVD


Lungs: Diminished air entry b/l, scaterred wheeze +


CVS: S1, S2+


Abd: Soft, BS+


Ext:Trace edema +, no tenderness to palpation


Skin: Ecchymosis + in UE


Neuro: Drowsy, awakes to verbal stimuli, is oriented x3








 Laboratory Results - last 24 hr











  04/28/18





  07:44


 


INR (Anticoag Therapy)  2.59 H








I/R: 72 y o f with h/o severe COPD, lung cancer with recurrence, underwent XRT, 

Chrons s/p ileostomy with high out put, REYNA on CPAP a/w worsening SOB for acute 

COPD exacerbation








Pt reports benefit from Morphine, has po and IV ordered


She is also on Opium for high out put ileostomy


Will get her off BiPAP during daytime and c/w BiPAP at night


Will have pt in ICU today for close monitoring, can transfer to floor tomorrow 

if stable


Pt consented for DNR status


Her significant other Sarah is her HCP, she is currently in [place], will be 

returning this weekend


Recurrent lung cancer s/p XRT, progressing as per imaging findings


Taper prednisone to 40mg tomorrow


She is not hypoxic on 2-3L O2





Overall prognosis is guarded





D/w Dr Ocasio

## 2018-04-28 NOTE — PN
Subjective


Date of Service: 04/28/18


Interval History: 


.


saw patient mid morning --> Dr. Ocasio present.


patient feeling much improved.


saturation is ok on a few liters Oxygen.





denies pain / anxiety at this time but wants assurances she will rapidly get 

morphine if she experiences distress.


I suggested and she accepted standing morphine (oral) and htat will be trialled 

in the hospital; start with Morphine Sr 15 mg PO Q8...


reassess later today or tomorrow AM


RN aware of plan


may be able to transfer to medical floor later today or tomorrow AM.


BiPAP if needed.


.


Family History: Unchanged from Admission


Social History: Unchanged from Admission


Past Medical History: Unchanged from Admission





Objective


Active Medications: 


.


Acetaminophen (Tylenol Tab*)  650 mg PO Q6H PRN


   PRN Reason: pain/fever


   Last Admin: 04/27/18 18:53 Dose:  650 mg


Aspirin (Aspirin Ec Tab*)  81 mg PO DAILY [facility]


   Last Admin: 04/28/18 09:44 Dose:  81 mg


Calcitriol (Rocaltrol  Cap*)  0.25 mcg PO QAM [facility]


   Last Admin: 04/28/18 09:44 Dose:  0.25 mcg


Cinacalcet (Sensipar Tab*)  30 mg PO QAM [facility]


   Last Admin: 04/28/18 09:44 Dose:  30 mg


Enoxaparin Sodium (Lovenox(*))  70 mg SUBCUT Q24H [facility] (now off)


   Last Admin: 04/27/18 15:08 Dose:  Not Given


Guaifenesin (Mucinex*)  600 mg PO BID PRN


   PRN Reason: CONGESTION


   Last Admin: 04/26/18 08:20 Dose:  600 mg


Ceftriaxone Sodium 1 gm/ (Sodium Chloride)  50 mls @ 200 mls/hr IVPB Q24H [facility]


   Last Admin: 04/28/18 13:08 Dose:  200 mls/hr


Azithromycin 500 mg/ Sodium (Chloride)  250 mls @ 250 mls/hr IVPB Q24H [facility]


   Last Admin: 04/28/18 09:44 Dose:  250 mls/hr


Ipratropium Bromide (Atrovent 0.5 Mg Neb.Sol*)  0.5 mg INH [id number] AWAKE 

[facility]


   Last Admin: 04/28/18 12:09 Dose:  0.5 mg


Lorazepam (Ativan Tab(*))  0.5 mg PO TID [facility]


   Last Admin: 04/28/18 13:08 Dose:  0.5 mg


Magnesium Oxide (Magox 400 Tab*)  400 mg PO BID [facility]


   Last Admin: 04/28/18 09:44 Dose:  400 mg


Morphine Sulfate (Morphine Vial*)  4 mg IV Q2H PRN


   PRN Reason: DISCOMFORT


   Last Admin: 04/28/18 13:22 Dose:  4 mg


Morphine Sulfate (Ms Contin(*))  15 mg PO Q8H [facility]


   Last Admin: 04/28/18 09:44 Dose:  15 mg


Opium Tincture (Opium Tincture*)  6 mg PO FIVE TIMES DAILY [facility]


   Last Admin: 04/28/18 13:08 Dose:  6 mg


Paroxetine HCl (Paxil Tab*)  30 mg PO QPM [facility]


   Last Admin: 04/27/18 17:43 Dose:  30 mg


Pharmacy Profile Note (Coumadin Daily Reminder*)  0 note FOLLOW UP 1700 [facility]


   Last Admin: 04/27/18 16:38 Dose:  1 note


Prednisone (Deltasone Tab*)  50 mg PO DAILY [facility]


   Last Admin: 04/28/18 09:44 Dose:  50 mg


Sodium Chloride (Sodium Chloride 0.65% Nasal Spray*)  1 spray BOTH NARES Q4H PRN


   PRN Reason: Dry nasal passages


Tiotropium Bromide (Spiriva Cap.Inh*)  1 cap INH DAILY [facility]


   Last Admin: 04/28/18 07:12 Dose:  1 cap


Warfarin Sodium (Coumadin Tab(*))  3 mg PO DAILY@1700 [facility]


   PRN Reason: Protocol


   Last Admin: 04/27/18 17:43 Dose:  3 mg








 Vital Signs - 8 hr











  04/28/18 04/28/18 04/28/18





  07:13 07:34 07:49


 


Temperature   


 


Pulse Rate 66  73


 


Respiratory 16 12 18





Rate   


 


Blood Pressure   145/89





(mmHg)   


 


O2 Sat by Pulse 100  91





Oximetry   














  04/28/18 04/28/18 04/28/18





  08:00 09:00 09:44


 


Temperature 98.2 F  


 


Pulse Rate 73 78 


 


Respiratory 15 17 20





Rate   


 


Blood Pressure 154/82  





(mmHg)   


 


O2 Sat by Pulse 93 98 





Oximetry   














  04/28/18 04/28/18 04/28/18





  10:00 11:00 12:00


 


Temperature   


 


Pulse Rate 65 71 63


 


Respiratory 13 15 14





Rate   


 


Blood Pressure   





(mmHg)   


 


O2 Sat by Pulse 97 95 96





Oximetry   














  04/28/18 04/28/18 04/28/18





  12:11 13:00 13:08


 


Temperature   


 


Pulse Rate 65 66 


 


Respiratory 14 15 17





Rate   


 


Blood Pressure   





(mmHg)   


 


O2 Sat by Pulse 96 94 





Oximetry   














  04/28/18 04/28/18 04/28/18





  13:22 14:00 14:18


 


Temperature   


 


Pulse Rate  66 64


 


Respiratory 23 14 12





Rate   


 


Blood Pressure   140/62





(mmHg)   


 


O2 Sat by Pulse  96 96





Oximetry   











Oxygen Devices in Use Now: BiPAP


Appearance: elderly, frail.


Ears/Nose/Mouth/Throat: NL Teeth, Lips, Gums


Neck: Trachea Midline, No Thyroid Enlargement, Masses


Respiratory: Symmetrical Chest Expansion and Respiratory Effort, - - no 

expremis today


Cardiovascular: NL Sounds; No Murmurs; No JVD


Abdominal: NL Sounds; No Tenderness; No Distention


Lymphatic: No Cervical Adenopathy


Extremities: No Edema


Skin: No Rash or Ulcers


Neurological: Alert and Oriented x 3


Lines/Tubes/Other Access: Clean, Dry and Intact Peripheral IV


Nutrition: Taking PO's


Result Diagrams: 


 04/25/18 04:55





 04/25/18 04:55


Additional Lab and Data: 


.


Microbiology and Other Data: 


 Microbiology











 04/24/18 09:22 Legionella Urinary Antigen - Final





 Urine    Negative Legionella Antigen





 Streptococcus pneumoniae Ag Screen - Final





    Negative S. pneumo Antigen














Assess/Plan/Problems-Billing


.


Assessment: 





73 yo female with h/o severe COPD, lung cancer (and recurrence), s/p radiation, 

now with worsening SOB for acute COPD exacerbation and hypoxic respiratory 

failure





Medically complex patient with Crohn's s/p ileostomy with high output, on Opium 

as antimotility agent





REYNA - on BiPAP





DNR status





- Patient Problems


(1) COPD exacerbation


Current Visit: No   Status: Acute   Priority: High   Code(s): J44.1 - CHRONIC 

OBSTRUCTIVE PULMONARY DISEASE W (ACUTE) EXACERBATION   SNOMED Code(s): 

334508758503684


   Comment: 


- Regressed with need for rescue BiPAP 4/26 and 4/27


- Certainly anxiety component, but maybe also mucus plug, etc (see Dr. Ocasio's 

note)


- BiPAP available as patient needs


- Morphine seems to help greatly


- Spiriva daily,  PRN Levalbuterol


- Prednisone taper, 50 mg 4/27.


- Continue azith, ceftri.   





(2) Acute and chronic respiratory failure with hypoxia


Current Visit: Yes   Status: Acute   Priority: High   Code(s): J96.21 - ACUTE 

AND CHRONIC RESPIRATORY FAILURE WITH HYPOXIA   Comment: 


- demonstrated elevation in A-a gradient.


- BiPAP often needed


- certainly acute and chronic HYPOXIC respiratory failure...   





(3) Chronic respiratory failure with hypoxia


Current Visit: No   Status: Acute   Comment: 


- End-stage COPD with severe emphysema and hx of lung cancer with suggestion of 

enlarging nodule. 


- Now requiring O2 at all times. Using 2-4L NC. 


- Continue Bevaspi


- prednisone taper


- Had been considered for hospice before.


- Continue Oral morphine and atvian prn   





(4) Anxiety


Current Visit: No   Status: Acute   Priority: High   Code(s): F41.9 - ANXIETY 

DISORDER, UNSPECIFIED   Comment: 


- Certainly an anxiety component to this presentation. 


- Add morphine SR 15 mg PO Q8


- Consider increasing ativan -- currently at 0.5 mg PO TID...she has taken 1 mg 

PO BID in past.


   





(5) History of DVT (deep vein thrombosis)


Current Visit: Yes   Status: Chronic   Priority: High   Code(s): Z86.718 - 

PERSONAL HISTORY OF OTHER VENOUS THROMBOSIS AND EMBOLISM   Comment: 


- Warfarin increased to 3 mg daily.  


- INR therapeutic   





(6) Mass of upper lobe of right lung


Current Visit: Yes   Status: Acute   Priority: High   Code(s): R91.8 - OTHER 

NONSPECIFIC ABNORMAL FINDING OF LUNG FIELD   Comment: 


- To rediscuss increasing mass in terms of hospice


- palliative care consult.


- Lung cancer.


   





(7) Acute on chronic renal failure


Current Visit: No   Status: Acute   Priority: High   Onset Date: 11/17/14   Code

(s): N17.9 - ACUTE KIDNEY FAILURE, UNSPECIFIED; N18.9 - CHRONIC KIDNEY DISEASE, 

UNSPECIFIED   SNOMED Code(s): 023801832


   Comment: 


- Cr at admission 1.9


- Now at baseline at ~1.5.   


Status and Disposition: 


.


Palliative care consult to discuss possible hospice again in context of mildly 

increasing RUL mass, especially if pt continues to be clinically tenuous.

## 2018-04-29 LAB
BASOPHILS # BLD AUTO: 0.1 10^3/UL (ref 0–0.2)
EOSINOPHIL # BLD AUTO: 0 10^3/UL (ref 0–0.6)
HCT VFR BLD AUTO: 32 % (ref 35–47)
HGB BLD-MCNC: 10.6 G/DL (ref 12–16)
LYMPHOCYTES # BLD AUTO: 1.2 10^3/UL (ref 1–4.8)
MCH RBC QN AUTO: 31 PG (ref 27–31)
MCHC RBC AUTO-ENTMCNC: 33 G/DL (ref 31–36)
MCV RBC AUTO: 94 FL (ref 80–97)
MONOCYTES # BLD AUTO: 0.8 10^3/UL (ref 0–0.8)
NEUTROPHILS # BLD AUTO: 8.2 10^3/UL (ref 1.5–7.7)
NRBC # BLD AUTO: 0 10^3/UL
NRBC BLD QL AUTO: 0.1
PLATELET # BLD AUTO: 279 10^3/UL (ref 150–450)
RBC # BLD AUTO: 3.43 10^6/UL (ref 4–5.4)
WBC # BLD AUTO: 10.6 10^3/UL (ref 3.5–10.8)

## 2018-04-29 RX ADMIN — LORAZEPAM SCH MG: 1 TABLET ORAL at 13:34

## 2018-04-29 RX ADMIN — MORPHINE SULFATE PRN MG: 4 INJECTION INTRAVENOUS at 08:03

## 2018-04-29 RX ADMIN — MORPHINE SULFATE SCH MG: 15 TABLET, EXTENDED RELEASE ORAL at 02:07

## 2018-04-29 RX ADMIN — MORPHINE SULFATE PRN MG: 4 INJECTION INTRAVENOUS at 05:10

## 2018-04-29 RX ADMIN — IPRATROPIUM BROMIDE SCH MG: 0.5 SOLUTION RESPIRATORY (INHALATION) at 07:11

## 2018-04-29 RX ADMIN — IPRATROPIUM BROMIDE SCH MG: 0.5 SOLUTION RESPIRATORY (INHALATION) at 12:19

## 2018-04-29 RX ADMIN — MORPHINE TINCTURE SCH MG: 1 SOLUTION ORAL at 09:18

## 2018-04-29 RX ADMIN — MORPHINE SULFATE SCH MG: 15 TABLET, EXTENDED RELEASE ORAL at 17:18

## 2018-04-29 RX ADMIN — MORPHINE TINCTURE SCH MG: 1 SOLUTION ORAL at 17:18

## 2018-04-29 RX ADMIN — ASPIRIN SCH MG: 81 TABLET, COATED ORAL at 07:23

## 2018-04-29 RX ADMIN — MAGNESIUM OXIDE TAB 400 MG (241.3 MG ELEMENTAL MG) SCH MG: 400 (241.3 MG) TAB at 07:23

## 2018-04-29 RX ADMIN — PREDNISONE SCH MG: 50 TABLET ORAL at 07:23

## 2018-04-29 RX ADMIN — CALCITRIOL SCH MCG: 0.25 CAPSULE ORAL at 07:23

## 2018-04-29 RX ADMIN — AZITHROMYCIN SCH MLS/HR: 500 INJECTION, POWDER, LYOPHILIZED, FOR SOLUTION INTRAVENOUS at 10:33

## 2018-04-29 RX ADMIN — LORAZEPAM SCH MG: 1 TABLET ORAL at 22:14

## 2018-04-29 RX ADMIN — CEFTRIAXONE SODIUM SCH MLS/HR: 1 INJECTION, POWDER, FOR SOLUTION INTRAVENOUS at 12:07

## 2018-04-29 RX ADMIN — MORPHINE SULFATE PRN MG: 4 INJECTION INTRAVENOUS at 16:33

## 2018-04-29 RX ADMIN — PAROXETINE SCH MG: 10 TABLET, FILM COATED ORAL at 17:18

## 2018-04-29 RX ADMIN — WARFARIN SODIUM SCH MG: 3 TABLET ORAL at 17:18

## 2018-04-29 RX ADMIN — MAGNESIUM OXIDE TAB 400 MG (241.3 MG ELEMENTAL MG) SCH MG: 400 (241.3 MG) TAB at 22:14

## 2018-04-29 RX ADMIN — MORPHINE TINCTURE SCH MG: 1 SOLUTION ORAL at 06:43

## 2018-04-29 RX ADMIN — IPRATROPIUM BROMIDE SCH MG: 0.5 SOLUTION RESPIRATORY (INHALATION) at 01:11

## 2018-04-29 RX ADMIN — CINACALCET HYDROCHLORIDE SCH MG: 30 TABLET, COATED ORAL at 07:23

## 2018-04-29 RX ADMIN — MORPHINE TINCTURE SCH MG: 1 SOLUTION ORAL at 22:14

## 2018-04-29 RX ADMIN — LORAZEPAM SCH MG: 1 TABLET ORAL at 07:23

## 2018-04-29 RX ADMIN — MORPHINE SULFATE SCH MG: 15 TABLET, EXTENDED RELEASE ORAL at 09:17

## 2018-04-29 RX ADMIN — MORPHINE TINCTURE SCH MG: 1 SOLUTION ORAL at 13:34

## 2018-04-29 RX ADMIN — MORPHINE SULFATE PRN MG: 4 INJECTION INTRAVENOUS at 12:36

## 2018-04-29 RX ADMIN — TIOTROPIUM BROMIDE SCH CAP: 18 CAPSULE ORAL; RESPIRATORY (INHALATION) at 07:11

## 2018-04-29 RX ADMIN — IPRATROPIUM BROMIDE SCH MG: 0.5 SOLUTION RESPIRATORY (INHALATION) at 19:57

## 2018-04-29 NOTE — PN
Subjective


Date of Service: 04/29/18


Interval History: 


.


did well overnight.


transferred to  mid morning.


morphine SR 15 TID helping greatly as per patient.


titrating prednisone down...


PT evaluation





agreed to lab holiday on 4/30/18





Family History: Unchanged from Admission


Social History: Unchanged from Admission


Past Medical History: Unchanged from Admission





Objective


Active Medications: 


.


Acetaminophen (Tylenol Tab*)  650 mg PO Q6H PRN


   PRN Reason: pain/fever


   Last Admin: 04/28/18 19:12 Dose:  650 mg


Aspirin (Aspirin Ec Tab*)  81 mg PO DAILY [facility]


   Last Admin: 04/29/18 07:23 Dose:  81 mg


Calcitriol (Rocaltrol  Cap*)  0.25 mcg PO QAM [facility]


   Last Admin: 04/29/18 07:23 Dose:  0.25 mcg


Cinacalcet (Sensipar Tab*)  30 mg PO QAM [facility]


   Last Admin: 04/29/18 07:23 Dose:  30 mg


Guaifenesin (Mucinex*)  600 mg PO BID PRN


   PRN Reason: CONGESTION


   Last Admin: 04/26/18 08:20 Dose:  600 mg


Ceftriaxone Sodium 1 gm/ (Sodium Chloride)  50 mls @ 200 mls/hr IVPB Q24H [facility]


   Last Admin: 04/29/18 12:07 Dose:  200 mls/hr


Azithromycin 500 mg/ Sodium (Chloride)  250 mls @ 250 mls/hr IVPB Q24H [facility]


   Last Admin: 04/29/18 10:33 Dose:  250 mls/hr


Ipratropium Bromide (Atrovent 0.5 Mg Neb.Sol*)  0.5 mg INH [id number] AWAKE 

[facility]


   Last Admin: 04/29/18 12:19 Dose:  0.5 mg


Lorazepam (Ativan Tab(*))  0.5 mg PO TID [facility]


   Last Admin: 04/29/18 13:34 Dose:  0.5 mg


Magnesium Oxide (Magox 400 Tab*)  400 mg PO BID [facility]


   Last Admin: 04/29/18 07:23 Dose:  400 mg


Morphine Sulfate (Morphine Vial*)  4 mg IV Q2H PRN


   PRN Reason: DISCOMFORT


   Last Admin: 04/29/18 16:33 Dose:  4 mg


Morphine Sulfate (Ms Contin(*))  15 mg PO Q8H [facility]


   Last Admin: 04/29/18 17:18 Dose:  15 mg


Opium Tincture (Opium Tincture*)  6 mg PO FIVE TIMES DAILY [facility]


   Last Admin: 04/29/18 17:18 Dose:  6 mg


Paroxetine HCl (Paxil Tab*)  30 mg PO QPM [facility]


   Last Admin: 04/29/18 17:18 Dose:  30 mg


Pharmacy Profile Note (Coumadin Daily Reminder*)  0 note FOLLOW UP 1700 [facility]


   Last Admin: 04/29/18 17:18 Dose:  1 note


Prednisone (Deltasone Tab*)  40 mg PO DAILY [facility]


   Last Admin: 04/29/18 07:23 Dose:  50 mg


Sodium Chloride (Sodium Chloride 0.65% Nasal Spray*)  1 spray BOTH NARES Q4H PRN


   PRN Reason: Dry nasal passages


Tiotropium Bromide (Spiriva Cap.Inh*)  1 cap INH DAILY [facility]


   Last Admin: 04/29/18 07:11 Dose:  1 cap


Warfarin Sodium (Coumadin Tab(*))  3 mg PO DAILY@1700 [facility]


   PRN Reason: Protocol


   Last Admin: 04/29/18 17:18 Dose:  3 mg


.


 Vital Signs - 8 hr











  04/29/18 04/29/18 04/29/18





  12:24 12:36 13:34


 


Temperature   


 


Pulse Rate 68  


 


Respiratory 18 22 20





Rate   


 


Blood Pressure   





(mmHg)   


 


O2 Sat by Pulse 98  





Oximetry   














  04/29/18 04/29/18 04/29/18





  13:40 14:55 15:38


 


Temperature   


 


Pulse Rate   


 


Respiratory 20 20 22





Rate   


 


Blood Pressure   





(mmHg)   


 


O2 Sat by Pulse   





Oximetry   








Oxygen Devices in Use Now: High Flow Nasal Cannula


Appearance: NAD


Eyes: No Scleral Icterus


Ears/Nose/Mouth/Throat: NL Teeth, Lips, Gums, Clear Oropharnyx


Neck: NL Appearance and Movements; NL JVP, Trachea Midline


Respiratory: Symmetrical Chest Expansion and Respiratory Effort


Cardiovascular: NL Sounds; No Murmurs; No JVD


Abdominal: NL Sounds; No Tenderness; No Distention, - - ostomy unchanged


Lymphatic: No Cervical Adenopathy


Extremities: No Edema


Skin: No Rash or Ulcers


Neurological: Alert and Oriented x 3


Lines/Tubes/Other Access: Clean, Dry and Intact Peripheral IV


Nutrition: Taking PO's


Result Diagrams: 


 04/29/18 05:21





 04/29/18 05:21


Additional Lab and Data: 


.


Microbiology and Other Data: 


 Microbiology











 04/24/18 09:22 Legionella Urinary Antigen - Final





 Urine    Negative Legionella Antigen





 Streptococcus pneumoniae Ag Screen - Final





    Negative S. pneumo Antigen














Assess/Plan/Problems-Billing


.


Assessment: 





71 yo female with h/o severe COPD, lung cancer (and recurrence), s/p radiation, 

now with worsening SOB for acute COPD exacerbation and hypoxic respiratory 

failure





Medically complex patient with Crohn's s/p ileostomy with high output, on Opium 

as antimotility agent





REYNA - on BiPAP





DNR status





- Patient Problems


(1) COPD exacerbation


Current Visit: No   Status: Acute   Priority: High   Code(s): J44.1 - CHRONIC 

OBSTRUCTIVE PULMONARY DISEASE W (ACUTE) EXACERBATION   SNOMED Code(s): 

324043917763555


   Comment: 


- Regressed with need for rescue BiPAP 4/26 and 4/27


- Certainly anxiety component, but maybe also mucus plug, etc (see Dr. Ocasio's 

note)


- BiPAP available as patient needs


- Morphine seems to help greatly


- Spiriva daily,  PRN Levalbuterol


- Prednisone taper, 40 mg 4/29.


- Continue azith, ceftri.   





(2) Acute and chronic respiratory failure with hypoxia


Current Visit: Yes   Status: Acute   Priority: High   Code(s): J96.21 - ACUTE 

AND CHRONIC RESPIRATORY FAILURE WITH HYPOXIA   Comment: 


- demonstrated elevation in A-a gradient.


- BiPAP often needed


- certainly acute and chronic HYPOXIC respiratory failure...   





(3) Chronic respiratory failure with hypoxia


Current Visit: No   Status: Acute   Comment: 


- End-stage COPD with severe emphysema and hx of lung cancer with suggestion of 

enlarging nodule. 


- Now requiring O2 at all times. Using 2-4L NC. 


- Continue Bevaspi


- prednisone taper


- Had been considered for hospice before.


- Continue Oral morphine and atvian prn   





(4) Anxiety


Current Visit: No   Status: Acute   Priority: High   Code(s): F41.9 - ANXIETY 

DISORDER, UNSPECIFIED   Comment: 


- Certainly an anxiety component to this presentation. 


- Add morphine SR 15 mg PO Q8


- Consider increasing ativan -- currently at 0.5 mg PO TID...she has taken 1 mg 

PO BID in past.


   





(5) History of DVT (deep vein thrombosis)


Current Visit: Yes   Status: Chronic   Priority: High   Code(s): Z86.718 - 

PERSONAL HISTORY OF OTHER VENOUS THROMBOSIS AND EMBOLISM   Comment: 


- Warfarin increased to 3 mg daily.  


- INR therapeutic   





(6) Mass of upper lobe of right lung


Current Visit: Yes   Status: Acute   Priority: High   Code(s): R91.8 - OTHER 

NONSPECIFIC ABNORMAL FINDING OF LUNG FIELD   Comment: 


- To rediscuss increasing mass in terms of hospice


- palliative care consult.


- Lung cancer.


   





(7) Acute on chronic renal failure


Current Visit: No   Status: Acute   Priority: High   Onset Date: 11/17/14   Code

(s): N17.9 - ACUTE KIDNEY FAILURE, UNSPECIFIED; N18.9 - CHRONIC KIDNEY DISEASE, 

UNSPECIFIED   SNOMED Code(s): 244905271


   Comment: 


- Cr at admission 1.9


- Now at baseline at ~1.5.   


Status and Disposition: 


.


Palliative care consult to discuss possible hospice again in context of mildly 

increasing RUL mass, especially if pt continues to be clinically tenuous.

## 2018-04-30 RX ADMIN — TIOTROPIUM BROMIDE SCH: 18 CAPSULE ORAL; RESPIRATORY (INHALATION) at 08:24

## 2018-04-30 RX ADMIN — PAROXETINE SCH MG: 10 TABLET, FILM COATED ORAL at 17:50

## 2018-04-30 RX ADMIN — MORPHINE SULFATE PRN MG: 4 INJECTION INTRAVENOUS at 06:26

## 2018-04-30 RX ADMIN — LORAZEPAM SCH MG: 1 TABLET ORAL at 21:20

## 2018-04-30 RX ADMIN — CALCITRIOL SCH MCG: 0.25 CAPSULE ORAL at 09:56

## 2018-04-30 RX ADMIN — IPRATROPIUM BROMIDE SCH MG: 0.5 SOLUTION RESPIRATORY (INHALATION) at 08:16

## 2018-04-30 RX ADMIN — MORPHINE TINCTURE SCH MG: 1 SOLUTION ORAL at 06:26

## 2018-04-30 RX ADMIN — MORPHINE SULFATE SCH MG: 15 TABLET, EXTENDED RELEASE ORAL at 09:53

## 2018-04-30 RX ADMIN — LORAZEPAM SCH MG: 1 TABLET ORAL at 14:51

## 2018-04-30 RX ADMIN — MORPHINE TINCTURE SCH MG: 1 SOLUTION ORAL at 21:20

## 2018-04-30 RX ADMIN — LORAZEPAM SCH MG: 1 TABLET ORAL at 09:51

## 2018-04-30 RX ADMIN — MAGNESIUM OXIDE TAB 400 MG (241.3 MG ELEMENTAL MG) SCH MG: 400 (241.3 MG) TAB at 21:20

## 2018-04-30 RX ADMIN — MORPHINE TINCTURE SCH MG: 1 SOLUTION ORAL at 09:56

## 2018-04-30 RX ADMIN — IPRATROPIUM BROMIDE SCH MG: 0.5 SOLUTION RESPIRATORY (INHALATION) at 13:38

## 2018-04-30 RX ADMIN — ACETAMINOPHEN PRN MG: 325 TABLET ORAL at 01:45

## 2018-04-30 RX ADMIN — ASPIRIN SCH MG: 81 TABLET, COATED ORAL at 09:53

## 2018-04-30 RX ADMIN — IPRATROPIUM BROMIDE SCH MG: 0.5 SOLUTION RESPIRATORY (INHALATION) at 19:40

## 2018-04-30 RX ADMIN — MORPHINE TINCTURE SCH MG: 1 SOLUTION ORAL at 17:51

## 2018-04-30 RX ADMIN — CINACALCET HYDROCHLORIDE SCH MG: 30 TABLET, COATED ORAL at 09:56

## 2018-04-30 RX ADMIN — MORPHINE SULFATE SCH MG: 15 TABLET, EXTENDED RELEASE ORAL at 01:43

## 2018-04-30 RX ADMIN — IPRATROPIUM BROMIDE SCH: 0.5 SOLUTION RESPIRATORY (INHALATION) at 01:29

## 2018-04-30 RX ADMIN — WARFARIN SODIUM SCH MG: 3 TABLET ORAL at 17:51

## 2018-04-30 RX ADMIN — MORPHINE SULFATE SCH MG: 15 TABLET, EXTENDED RELEASE ORAL at 17:50

## 2018-04-30 RX ADMIN — MORPHINE TINCTURE SCH MG: 1 SOLUTION ORAL at 14:51

## 2018-04-30 RX ADMIN — MAGNESIUM OXIDE TAB 400 MG (241.3 MG ELEMENTAL MG) SCH MG: 400 (241.3 MG) TAB at 09:56

## 2018-04-30 NOTE — PN
Subjective


Date of Service: 04/30/18


Interval History: 








Patient sleeping all morning on Bipap refused to get up this morning per nurse 

and RT, which has she has been refusing to get OOB on a daily basis. On exam pt 

reports she usually sleeps between 1a & 1pm and "sleeps in" everyday and wants 

PT to come in the afternoon.  Patient states today " I will get OOB in a couple 

days" as well as "Dont send me to rehab". I discussed at length with the 

patient the risk of not getting OOB and encouraged her to work with PT, which 

at this point she agrees to get OOB today and sit in a chair. She denies SOB/

CP. She reports good appetite. No fevers or chills. Continues to have a cough, 

not really bringing up much sputum. 





She is not sure which direction she wants to go in regards to her lung mass as 

she would like to follow up with her ONC Dr. Rothman and Dr. Higgins (she was 

supposed to see last week but did not make the appointment). At this point she 

wants to put Hospice on hold. 








Family History: Unchanged from Admission


Social History: Unchanged from Admission


Past Medical History: Unchanged from Admission





Objective


Active Medications: 








Acetaminophen (Tylenol Tab*)  650 mg PO Q6H PRN


   PRN Reason: pain/fever


   Last Admin: 04/30/18 01:45 Dose:  325 mg


Aspirin (Aspirin Ec Tab*)  81 mg PO DAILY [facility]


   Last Admin: 04/29/18 07:23 Dose:  81 mg


Calcitriol ([facility]oc[facility]rol  Cap*)  [facility]


   Last Admin: 04/29/18 07:23 Dose:  0.25 mcg


Cinacalcet (Sensipar Tab*)  30 mg PO QAM [facility]


   Last Admin: 04/29/18 07:23 Dose:  30 mg


Guaifenesin (Mucinex*)  600 mg PO BID PRN


   PRN Reason: CONGESTION


   Last Admin: 04/26/18 08:20 Dose:  600 mg


Ceftriaxone Sodium 1 gm/ (Sodium Chloride)  50 mls @ 200 mls/hr IVPB Q24H [facility]


   Last Admin: 04/29/18 12:07 Dose:  200 mls/hr


Azithromycin 500 mg/ Sodium (Chloride)  250 mls @ 250 mls/hr IVPB Q24H [facility]


   Last Admin: 04/29/18 10:33 Dose:  250 mls/hr


Ipratropium Bromide (Atrovent 0.5 Mg Neb.Sol*)  0.5 mg INH [id number] AWAKE 

[facility]


   Last Admin: 04/30/18 08:16 Dose:  0.5 mg


Lorazepam (Ativan Tab(*))  0.5 mg PO TID [facility]


   Last Admin: 04/29/18 22:14 Dose:  0.5 mg


Magnesium Oxide (Magox 400 Tab*)  400 mg PO BID [facility]


   Last Admin: 04/29/18 22:14 Dose:  400 mg


Morphine Sulfate (Morphine Vial*)  4 mg IV Q2H PRN


   PRN Reason: DISCOMFORT


   Last Admin: 04/30/18 06:26 Dose:  4 mg


Morphine Sulfate (Ms Contin(*))  15 mg PO Q8H [facility]


   Last Admin: 04/30/18 01:43 Dose:  15 mg


Opium Tincture (Opium Tincture*)  6 mg PO FIVE TIMES DAILY [facility]


   Last Admin: 04/30/18 06:26 Dose:  6 mg


Paroxetine HCl (Paxil Tab*)  30 mg PO QPM [facility]


   Last Admin: 04/29/18 17:18 Dose:  30 mg


Pharmacy Profile Note (Coumadin Daily Reminder*)  0 note FOLLOW UP 1700 [facility]


   Last Admin: 04/29/18 17:18 Dose:  1 note


Prednisone (Deltasone Tab*)  40 mg PO 0900 [facility]


Sodium Chloride (Sodium Chloride 0.65% Nasal Spray*)  1 spray BOTH NARES Q4H PRN


   PRN Reason: Dry nasal passages


Tiotropium Bromide (Spiriva Cap.Inh*)  1 cap INH DAILY [facility]


   Last Admin: 04/30/18 08:24 Dose:  Not Given


Warfarin Sodium (Coumadin Tab(*))  3 mg PO DAILY@1700 [facility]


   PRN Reason: Protocol


   Last Admin: 04/29/18 17:18 Dose:  3 mg








 Vital Signs - 8 hr











  04/30/18 04/30/18 04/30/18





  01:43 04:07 06:26


 


Temperature  97.6 F 


 


Pulse Rate  72 


 


Respiratory 18 16 16





Rate   


 


Blood Pressure  157/65 





(mmHg)   


 


O2 Sat by Pulse  98 





Oximetry   














  04/30/18 04/30/18





  07:23 08:20


 


Temperature 97.9 F 


 


Pulse Rate 59 59


 


Respiratory 15 16





Rate  


 


Blood Pressure 148/62 





(mmHg)  


 


O2 Sat by Pulse 100 98





Oximetry  











Oxygen Devices in Use Now: Nasal Cannula


Appearance: chronically ill female laying in bed in NAD. A+O x3


Eyes: No Scleral Icterus, PERRLA


Ears/Nose/Mouth/Throat: NL Teeth, Lips, Gums, Mucous Membranes Moist


Neck: NL Appearance and Movements; NL JVP


Respiratory: Symmetrical Chest Expansion and Respiratory Effort, - - diminished 

b/l


Cardiovascular: NL Sounds; No Murmurs; No JVD, RRR, No Edema


Abdominal: NL Sounds; No Tenderness; No Distention, - - obese. Ileostomy 

draining brown thin stool. 


Extremities: No Edema, No Clubbing, Cyanosis


Skin: No Rash or Ulcers, No Nodules or Sclerosis


Neurological: Alert and Oriented x 3, NL Sensation, NL Muscle Strength and Tone


Lines/Tubes/Other Access: Clean, Dry and Intact Peripheral IV


Nutrition: Taking PO's


Result Diagrams: 


 04/29/18 05:21





 04/29/18 05:21


Additional Lab and Data: 


.


Microbiology and Other Data: 


 Microbiology











 04/24/18 09:22 Legionella Urinary Antigen - Final





 Urine    Negative Legionella Antigen





 Streptococcus pneumoniae Ag Screen - Final





    Negative S. pneumo Antigen














Assess/Plan/Problems-Billing


.


Assessment: 





73 yo female with h/o severe COPD, lung cancer (and recurrence), s/p radiation, 

now with worsening SOB for acute COPD exacerbation and hypoxic respiratory 

failure





Medically complex patient with Crohn's s/p ileostomy with high output, on Opium 

as antimotility agent











- Patient Problems


(1) Acute and chronic respiratory failure with hypoxia


Comment: 


- End-stage COPD with severe emphysema and hx of lung cancer with suggestion of 

enlarging nodule. 


- demonstrated elevation in A-a gradient.


- BiPAP often needed


- certainly acute and chronic HYPOXIC respiratory failure - improving on Bipap


- Praneeth following    





(2) COPD exacerbation


Comment: 


- Regressed with need for rescue BiPAP 4/26 and 4/27


- Certainly anxiety component, but maybe also mucus plug, etc (see Dr. Ocasio's 

note)


- BiPAP available as patient needs


- Morphine seems to help greatly


- Spiriva daily,  PRN Levalbuterol


- Prednisone taper, 40 mg 4/30.


- DC azith, ceftri - 6 day course.   





(3) Mass of upper lobe of right lung


Comment: 


- Lung cancer - increasing mass on CT. follows with Dr. Higgins/Dr. Rothman


- Pt wants to f/u with Onc/Onc Radiology to discuss further options prior to 

deciding hospice


   





(4) Crohn's disease


Comment: 


S/P ileostomy with hx of high oupt - Continue home dose tincture of opium 





   





(5) History of DVT (deep vein thrombosis)


Comment: 


- Warfarin increased to 3 mg daily.  


- INR therapeutic   





(6) Acute on chronic renal failure


Comment: 


- Cr at admission 1.9


- Now at baseline at ~1.5.   





(7) Anxiety


Comment: 


- Certainly an anxiety component to this presentation. 


- Add morphine SR 15 mg PO Q8


- Continue Ativan 0.5 mg PO TID


   





(8) DNR (do not resuscitate)








(9) DVT prophylaxis


Comment: 


- Warfarin   


Status and Disposition: 


.


Palliative care consult to discuss possible hospice again in context of mildly 

increasing RUL mass, especially if pt continues to be clinically tenuous.

## 2018-04-30 NOTE — CONSULT
Palliative / Hospice Consult


Ordering Provider: Yuri Whitlock





- Subjective


Code Status: DNR


Advance Directives Location: In Chart


MOLST Part A Completed: Yes - DNR


MOLST Part E Completed:: Yes - DNI


HCP Completed: Yes - Partner Robin Hernandez





- History or Present Illness


History or Present Illness: 





This 72 year old woman with longstanding COPD was on hospice services 5 years 

ago when she was found to have lung cancer with presumed bone metastases, but 

was then found to have no metastatic disease. SHe underwent readiation therapy 

to the RUL mass at that time. She was reevaluated for hospice again one year 

ago after a hospitalization for COPD exacerbation. At the time she was stable 

on 3.5 L O2, with no evidence of right heart failure or cor pulmonale. Her 

right lung was found to have a recurrent nodule (0.8 cm) but the patient 

refused biopsy, and an oncologist in [place] told her she had nothing to 

worry about and should return for a recheck in 2 years. Meanwhile, her 

pulmonary function has deteriorated further, with 3 hospitalizations for COPD 

and sepsis in the past year. She also has REYNA on CPAP at home with which she is 

not compliant, and she continues to smoke. She has additional PMH of Crohn's 

disease with ileostomy productive of copious liquid stool for which she uses 

opium. She has been using MSO4 for dyspnea with good effect.





At this point, when I saw her today in the presence of her wife, the patient 

stated she was thinking of reversing her DNR/DNI status, and wanted to see Dr. Higgins for consideration of radiation therapy to the right lung mass, which has 

enlarged since last CT in 2016. We discussed goals of care and advance 

directives. Her last PFTs in May 2017 showed an FVC of 91% predicted, and FEV1 

of 56% predicted. Her echocardiogram on this admission did not demonstrate 

pulmonary hypertension, and her EF was 56%. At home, she is chronically on 3.5 

L O2.


Lab Values: 


 Laboratory Last Values











WBC  10.6 10^3/ul (3.5-10.8)   04/29/18  05:21    


 


RBC  3.43 10^6/ul (4.0-5.4)  L  04/29/18  05:21    


 


Hgb  10.6 g/dl (12.0-16.0)  L  04/29/18  05:21    


 


Hct  32 % (35-47)  L  04/29/18  05:21    


 


MCV  94 fL (80-97)   04/29/18  05:21    


 


MCH  31 pg (27-31)   04/29/18  05:21    


 


MCHC  33 g/dl (31-36)   04/29/18  05:21    


 


RDW  15 % (10.5-15)   04/29/18  05:21    


 


Plt Count  279 10^3/ul (150-450)   04/29/18  05:21    


 


MPV  7.3 um3 (7.4-10.4)  L  04/29/18  05:21    


 


Neut % (Auto)  79.0 % (38-83)   04/29/18  05:21    


 


Lymph % (Auto)  11.9 % (25-47)  L  04/29/18  05:21    


 


Mono % (Auto)  8.1 % (0-7)  H  04/29/18  05:21    


 


Eos % (Auto)  0.1 % (0-6)   04/29/18  05:21    


 


Baso % (Auto)  0.9 % (0-2)   04/29/18  05:21    


 


Absolute Neuts (auto)  8.2 10^3/ul (1.5-7.7)  H  04/29/18  05:21    


 


Absolute Lymphs (auto)  1.2 10^3/ul (1.0-4.8)   04/29/18  05:21    


 


Absolute Monos (auto)  0.8 10^3/ul (0-0.8)   04/29/18  05:21    


 


Absolute Eos (auto)  0 10^3/ul (0-0.6)   04/29/18  05:21    


 


Absolute Basos (auto)  0.1 10^3/ul (0-0.2)   04/29/18  05:21    


 


Absolute Nucleated RBC  0 10^3/ul  04/29/18  05:21    


 


Neutrophils %  73 % (38-83)   04/23/18  09:46    


 


Lymphocytes %  14 % (25-47)  L  04/23/18  09:46    


 


Reactive Lymphs %  3 % (0-6)   04/23/18  09:46    


 


Monocytes %  7 % (0-7)   04/23/18  09:46    


 


Eosinophils %  3 % (0-6)   04/23/18  09:46    


 


Basophils %  0 % (0-2)   04/23/18  09:46    


 


Nucleated RBC %  0.1   04/29/18  05:21    


 


Abs Neuts (Manual)  7.7 10^3/ul (1.5-7.7)   04/23/18  09:46    


 


Abs Lymphs (Manual)  1.5 10^3/ul (1.0-4.8)   04/23/18  09:46    


 


Abs Monocytes (Manual)  0.7 10^3/ul (0-0.8)   04/23/18  09:46    


 


Absolute Eos (Manual)  0.3 10^3/ul (0-0.6)   04/23/18  09:46    


 


Abs Basophils (Manual)  0 10^3/ul (0-0.2)   04/23/18  09:46    


 


Normal RBC Morphology  Normal  (Normal)   04/23/18  09:46    


 


Hem Pathologist Commnt     04/23/18  09:46    


 


INR (Anticoag Therapy)  2.59  (0.77-1.02)  H  04/28/18  07:44    


 


APTT  29.3 seconds (26.0-36.3)   04/23/18  09:46    


 


D-Dimer, Quantitative  240 ng/mL (Less Than 230)  H  04/23/18  09:46    


 


Patient Temperature  Not Reportable   04/26/18  17:50    


 


ABG pH  7.41  (7.35-7.45)   04/26/18  17:50    


 


ABG pH (Temp Correct)  Not Reportable   04/26/18  17:50    


 


ABG pCO2  57 mmHg (35-45)  H  04/26/18  17:50    


 


ABG pCO2 (Temp Corrct  Not Reportable   04/26/18  17:50    


 


ABG pO2  157 mmHg ()  H  04/26/18  17:50    


 


ABG pO2 (Temp Correct  Not Reportable   04/26/18  17:50    


 


ABG HCO3  32.6 mmol/L (19-31)  H  04/26/18  17:50    


 


ABG O2 Saturation  99.5 % (95-98)  H  04/26/18  17:50    


 


ABG Base Excess  9.8  (-2.0-2.0)  H  04/26/18  17:50    


 


Respiration Rate  Not Reportable   04/26/18  17:50    


 


O2 Delivery Device  n/c   04/24/18  17:40    


 


Ventilator Type  Not Reportable   04/26/18  17:50    


 


Vent Mode  Not Reportable   04/26/18  17:50    


 


FiO2  40   04/26/18  17:50    


 


Inspiratory Time  Not Reportable   04/26/18  17:50    


 


PEEP  Not Reportable   04/26/18  17:50    


 


Pressure Support  Not Reportable   04/26/18  17:50    


 


Pressure Control  Not Reportable   04/26/18  17:50    


 


EPAP  Not Reportable   04/26/18  17:50    


 


IPAP  Not Reportable   04/26/18  17:50    


 


BiPAP  Not Reportable   04/26/18  17:50    


 


Sodium  139 mmol/L (139-145)   04/29/18  05:21    


 


Potassium  4.5 mmol/L (3.5-5.0)   04/29/18  05:21    


 


Chloride  98 mmol/L (101-111)  L  04/29/18  05:21    


 


Carbon Dioxide  36 mmol/L (22-32)  H  04/29/18  05:21    


 


Anion Gap  5 mmol/L (2-11)   04/29/18  05:21    


 


BUN  34 mg/dL (6-24)  H  04/29/18  05:21    


 


Creatinine  1.47 mg/dL (0.51-0.95)  H  04/29/18  05:21    


 


Est GFR ( Amer)  44.8  (>60)   04/29/18  05:21    


 


Est GFR (Non-Af Amer)  34.8  (>60)   04/29/18  05:21    


 


BUN/Creatinine Ratio  23.1  (8-20)  H  04/29/18  05:21    


 


Glucose  139 mg/dL ()  H  04/29/18  05:21    


 


Lactic Acid  1.7 mmol/L (0.5-2.0)   04/23/18  10:40    


 


Calcium  8.1 mg/dL (8.6-10.3)  L  04/29/18  05:21    


 


Phosphorus  3.2 mg/dL (2.5-5.0)   04/29/18  05:21    


 


Magnesium  1.9 mg/dL (1.9-2.7)   04/29/18  05:21    


 


Total Bilirubin  0.30 mg/dL (0.2-1.0)   04/29/18  05:21    


 


Direct Bilirubin  0.10 mg/dL (0.03-0.18)   04/29/18  05:21    


 


Indirect Bilirubin  0.2 mg/dL (0.3-1.0)  L  04/29/18  05:21    


 


AST  25 U/L (13-39)   04/29/18  05:21    


 


ALT  46 U/L (7-52)   04/29/18  05:21    


 


Alkaline Phosphatase  51 U/L ()   04/29/18  05:21    


 


Ammonia  38 mcmol/L (16-53)   04/23/18  09:46    


 


CK-MB (CK-2)  5.7 ng/mL (0.6-6.3)   04/23/18  09:46    


 


Troponin I  0.05 ng/mL (<0.04)  H*  04/25/18  04:55    


 


C-Reactive Protein  4.77 mg/L (< 5.00)   04/23/18  09:46    


 


B-Natriuretic Peptide  113 pg/mL (-100) H  04/23/18  09:46    


 


Total Protein  5.7 g/dL (6.4-8.9)  L  04/29/18  05:21    


 


Albumin  3.2 g/dL (3.2-5.2)   04/29/18  05:21    


 


Globulin  2.5 g/dL (2-4)   04/29/18  05:21    


 


Albumin/Globulin Ratio  1.3  (1-3)   04/29/18  05:21    


 


Lipase  29 U/L (11.0-82.0)   04/23/18  09:46    


 


TSH  1.30 mcIU/mL (0.34-5.60)   04/23/18  09:46    


 


Urine Color  Yellow   04/24/18  09:22    


 


Urine Appearance  Clear   04/24/18  09:22    


 


Urine pH  6.0  (5-9)   04/24/18  09:22    


 


Ur Specific Gravity  1.018  (1.010-1.030)   04/24/18  09:22    


 


Urine Protein  Negative  (Negative)   04/24/18  09:22    


 


Urine Ketones  Negative  (Negative)   04/24/18  09:22    


 


Urine Blood  Negative  (Negative)   04/24/18  09:22    


 


Urine Nitrate  Negative  (Negative)   04/24/18  09:22    


 


Urine Bilirubin  Negative  (Negative)   04/24/18  09:22    


 


Urine Urobilinogen  Negative  (Negative)   04/24/18  09:22    


 


Ur Leukocyte Esterase  Trace  (Negative)  A  04/24/18  09:22    


 


Urine WBC (Auto)  Trace(0-5/hpf)  (Absent)   04/24/18  09:22    


 


Urine RBC (Auto)  1+(3-5/hpf)  (Absent)  A  04/24/18  09:22    


 


Ur Squamous Epith Cells  Present  (Absent)  A  04/24/18  09:22    


 


Urine Bacteria  Absent  (Absent)   04/24/18  09:22    


 


Urine Glucose  Negative  (Negative)   04/24/18  09:22    


 


Urine Ascorbic Acid  *  (Negative)  A  04/24/18  09:22    


 


Acetaminophen  < 15 mcg/mL  04/23/18  09:46    


 


Serum Alcohol  < 10 mg/dL (<10)   04/23/18  09:46    














- Objective


Active Medications: 








Acetaminophen (Tylenol Tab*)  650 mg PO Q6H PRN


   PRN Reason: pain/fever


   Last Admin: 04/30/18 01:45 Dose:  325 mg


Aspirin (Aspirin Ec Tab*)  81 mg PO DAILY [facility]


   Last Admin: 04/30/18 09:53 Dose:  81 mg


Calcitriol (Rocaltrol  Cap*)  0.25 mcg PO QAM [facility]


   Last Admin: 04/30/18 09:56 Dose:  0.25 mcg


Cinacalcet (Sensipar Tab*)  30 mg PO QAM [facility]


   Last Admin: 04/30/18 09:56 Dose:  30 mg


Fluticasone Propionate (Flonase Nasal Spray 50mcg*)  2 spray BOTH NARES DAILY 

[facility]


Guaifenesin (Mucinex*)  600 mg PO BID PRN


   PRN Reason: CONGESTION


   Last Admin: 04/26/18 08:20 Dose:  600 mg


Ipratropium Bromide (Atrovent 0.5 Mg Neb.Sol*)  0.5 mg INH [id number] AWAKE 

[facility]


   Last Admin: 04/30/18 19:40 Dose:  0.5 mg


Lorazepam (Ativan Tab(*))  0.5 mg PO TID [facility]


   Last Admin: 04/30/18 14:51 Dose:  0.5 mg


Magnesium Oxide (Magox 400 Tab*)  400 mg PO BID [facility]


   Last Admin: 04/30/18 09:56 Dose:  400 mg


Morphine Sulfate (Ms Contin(*))  15 mg PO Q8H [facility]


   Last Admin: 04/30/18 17:50 Dose:  15 mg


Opium Tincture (Opium Tincture*)  6 mg PO FIVE TIMES DAILY [facility]


   Last Admin: 04/30/18 17:51 Dose:  6 mg


Paroxetine HCl (Paxil Tab*)  30 mg PO QPM [facility]


   Last Admin: 04/30/18 17:50 Dose:  30 mg


Pharmacy Profile Note (Coumadin Daily Reminder*)  0 note FOLLOW UP 1700 [facility]


   Last Admin: 04/30/18 17:51 Dose:  1 note


Prednisone (Deltasone Tab*)  40 mg PO 0900 [facility]


Sodium Chloride (Sodium Chloride 0.65% Nasal Spray*)  1 spray BOTH NARES Q4H PRN


   PRN Reason: Dry nasal passages


Tiotropium Bromide (Spiriva Cap.Inh*)  1 cap INH DAILY [facility]


   Last Admin: 04/30/18 08:24 Dose:  Not Given


Warfarin Sodium (Coumadin Tab(*))  3 mg PO DAILY@1700 [facility]


   PRN Reason: Protocol


   Last Admin: 04/30/18 17:51 Dose:  3 mg








Vital Signs: 


Vital Signs:











Temp Pulse Resp BP Pulse Ox


 


 97.9 F   72   18   139/72   99 


 


 04/30/18 15:11  04/30/18 15:11  04/30/18 17:51  04/30/18 15:11  04/30/18 15:11











Patient Weight: 


 





Weight                           158 lb 6.4 oz








Intake and Output: 


 Intake & Output











 04/28/18 04/29/18 04/30/18 05/01/18





 06:59 06:59 06:59 06:59


 


Intake Total 1120 1654 1823 720


 


Output Total 2600 2170 3200 1000


 


Balance -1480 -516 -1377 -280


 


Weight 165 lb 9.074 oz 159 lb 13.362 oz 158 lb 6.4 oz 


 


Intake:    


 


  IV Fluids 70 114 850 


 


    NS (0.9%) 70 114 850 


 


  IVPB 300 320 323 


 


    ABX - AZITHROMYCIN 250 265 265 


 


    ABX - CEFTRIAXONE 50 55 58 


 


  Oral 750 1220 650 720


 


Output:    


 


  Urine  500


 


  Stark 1400 1450 350 500


 


  Liquid Stool  200  


 


  Ileostomy 5428 677 0212 


 


Other:    


 


  Estimated Void    Small


 


  Date of Last Bowel   4/29/18 





  Movement    


 


  # Bowel Movements   2 1


 


  Estimated Stool Amount   Medium 





 





ADLs: Meal  Record                                         Start:  04/23/18 18:

10


Freq:   DAILY@0900,1400,1800                               Status: Complete    

  


Protocol:                                                                      

  


 Created      04/23/18 18:10  System  (Rec: 04/23/18 18:10  System  MED-M02)


 Document     04/24/18 09:00  [id number]  (Rec: 04/24/18 09:44  [id number]  MED-C04)


 Document     04/24/18 14:00  [id number]  (Rec: 04/24/18 15:14  [id number]  MED-C04)


 Document     04/24/18 18:00  [id number]  (Rec: 04/24/18 19:30  [id number]  MED-C09)


 Document     04/25/18 09:00  [id number]  (Rec: 04/25/18 10:54  [id number]  MED-C11)


 Document     04/25/18 14:00  [id number]  (Rec: 04/25/18 15:23  HIA3676  MED-C11)


 Document     04/25/18 18:00  ROX3738  (Rec: 04/25/18 22:58  [id number]  MED-C09)


 Document     04/26/18 09:00  FJT9154  (Rec: 04/26/18 09:08  [id number]  MED-C11)


 Document     04/26/18 14:00  KNL3915  (Rec: 04/26/18 15:11  [id number]  MED-C14)


ADLs: Meal  Record                                         Start:  04/26/18 18:

51


Freq:   09,13,18                                           Status: Complete    

  


Protocol:                                                                      

  


 Created      04/26/18 18:51  [id number]  (Rec: 04/26/18 18:51  [id number]  ICU-M04)


 Document     04/27/18 13:00  YCR5578  (Rec: 04/27/18 13:52  [id number]  ICU-C16)


 Document     04/27/18 19:13  GEU7904  (Rec: 04/27/18 19:13  [id number]  ICU-C15)


 Document     04/28/18 09:00  CEW3239  (Rec: 04/28/18 17:20  PUH5530  ICU-C10)


 Document     04/28/18 13:00  IJJ2647  (Rec: 04/28/18 17:32  FYQ2717  ICU-C10)


 Document     04/28/18 18:00  LAG1095  (Rec: 04/28/18 21:46  [id number]  ICU-C15)


ADLs: Meal  Record                                         Start:  04/28/18 23:

14


Freq:   DAILY@0900,1400,1800                               Status: Active      

  


Protocol:                                                                      

  


 Created      04/28/18 23:14  [id number]  (Rec: 04/28/18 23:14  [id number]  ICU-C12)


 Document     04/29/18 14:00  MQR3687  (Rec: 04/29/18 14:51  [id number]  TELE-C09)


 Document     04/29/18 18:00  MMO0488  (Rec: 04/29/18 18:22  SNK8639  TELE-C09)


 Document     04/30/18 09:00  NGR5039  (Rec: 04/30/18 10:01  WZQ2249  TELE-C05)


 Document     04/30/18 14:00  OIZ1207  (Rec: 04/30/18 14:45  ZWP2305  TELE-C01)


 Document     04/30/18 18:00  NFZ0155  (Rec: 04/30/18 18:28  MIA4615  TELE-C05)


Intake and Output                                          Start:  04/23/18 09:

05


Freq:                                                      Status: Cancelled   

  


Protocol:                                                                      

  


 Created      04/23/18 09:05  System  (Rec: 04/23/18 09:05  System  ED-C05)


Intake and Output                                          Start:  04/23/18 14:

17


Freq:   06,14,2200                                         Status: Inactive    

  


Protocol:                                                                      

  


 Created      04/23/18 14:18  [id number]  (Rec: 04/23/18 14:18  BKG  LINDY-BG12)


Intake and Output                                          Start:  04/23/18 14:

57


Freq:                                                      Status: Active      

  


Protocol:                                                                      

  


 Created      04/23/18 14:57  [id number]  (Rec: 04/23/18 14:57  BKG  LINDY-BG12)


Intake and Output                                          Start:  04/23/18 18:

10


Freq:   DAILY@0600,1400,2200                               Status: Complete    

  


Protocol:                                                                      

  


 Created      04/23/18 18:10  System  (Rec: 04/23/18 18:10  System  MED-M02)


 Document     04/23/18 22:00  PSX1722  (Rec: 04/23/18 23:35  HCB0985  MED-C14)


 Document     04/24/18 05:55  GRL2221  (Rec: 04/24/18 05:55  WEC7801  MED-C11)


 Document     04/24/18 14:00  ZNF4625  (Rec: 04/24/18 15:14  IMB5719  MED-C04)


 Document     04/24/18 21:21  EMS9984  (Rec: 04/24/18 21:22  JOH7193  MED-C09)


 Document     04/25/18 04:32  JSZ9349  (Rec: 04/25/18 04:34  ZMS0119  MED-C09)


 Document     04/25/18 14:00  RIZ7516  (Rec: 04/25/18 15:23  RCF8412  MED-C11)


 Document     04/25/18 22:00  OMG5411  (Rec: 04/25/18 22:59  QOR3080  MED-C09)


 Document     04/26/18 06:00  IJK6763  (Rec: 04/26/18 06:20  FPZ4548  MED-C09)


 Document     04/26/18 14:00  UFO5601  (Rec: 04/26/18 15:11  UQA5949  MED-C14)


Intake and Output                                          Start:  04/26/18 18:

51


Freq:   Q1HR                                               Status: Complete    

  


Protocol:                                                                      

  


 Created      04/26/18 18:51  [id number]  (Rec: 04/26/18 18:51  QII9423  ICU-M04)


 Document     04/26/18 19:00  WKG2924  (Rec: 04/26/18 19:38  DJY9361  ICU-M04)


 Document     04/26/18 20:00  GLH7554  (Rec: 04/26/18 20:11  OIU6529  ICU-C15)


 Document     04/26/18 21:00  HCR4676  (Rec: 04/26/18 22:18  YIU9514  ICU-M04)


 Document     04/26/18 22:00  IKF6618  (Rec: 04/26/18 22:18  TKP6325  ICU-M04)


 Document     04/26/18 22:18  MTJ4448  (Rec: 04/26/18 22:18  TBK1384  ICU-M04)


 Document     04/26/18 23:00  HBG1235  (Rec: 04/27/18 00:33  HAK4642  ICU-C15)


 Document     04/27/18 00:00  SSN5415  (Rec: 04/27/18 00:33  ZZM6216  ICU-C15)


 Document     04/27/18 01:00  EEP0347  (Rec: 04/27/18 02:51  YSR1382  ICU-C15)


 Document     04/27/18 02:00  XWQ5088  (Rec: 04/27/18 02:51  NAG2120  ICU-C15)


 Document     04/27/18 03:00  ICX7440  (Rec: 04/27/18 04:27  AKT3078  ICU-C15)


 Document     04/27/18 04:00  HJV3062  (Rec: 04/27/18 04:27  QJA4118  ICU-C15)


 Document     04/27/18 05:00  RUR2939  (Rec: 04/27/18 06:21  LOV2882  ICU-M04)


 Document     04/27/18 06:00  YUD9456  (Rec: 04/27/18 06:21  LJB9196  ICU-M04)


 Document     04/27/18 07:00  NMB3956  (Rec: 04/27/18 07:21  ZJT6052  ICU-C15)


 Document     04/27/18 07:45  TFE2283  (Rec: 04/27/18 07:53  ILQ1189  ICU-C15)


 Document     04/27/18 09:00  CYG5765  (Rec: 04/27/18 10:04  YWR2885  ICU-C15)


 Document     04/27/18 10:00  NBA9352  (Rec: 04/27/18 10:35  KYU6937  ICU-C15)


 Document     04/27/18 12:00  OOR2766  (Rec: 04/27/18 12:23  IBC3401  ICU-M04)


 Document     04/27/18 13:00  TFK4808  (Rec: 04/27/18 13:53  IUJ2494  ICU-C16)


 Document     04/27/18 14:00  AIA6992  (Rec: 04/27/18 15:13  XPG1737  ICU-M04)


 Document     04/27/18 15:00  ABD1885  (Rec: 04/27/18 15:13  TJF3257  ICU-M04)


 Document     04/27/18 15:13  LSS4160  (Rec: 04/27/18 15:13  HIP6506  ICU-M04)


 Document     04/27/18 15:23  SOY9335  (Rec: 04/27/18 15:23  NKR9669  ICU-C16)


 Document     04/27/18 17:00  IHV2651  (Rec: 04/27/18 18:37  FLD5018  ICU-C16)


 Document     04/27/18 18:00  QBK2125  (Rec: 04/27/18 18:37  UTV1399  ICU-C16)


 Document     04/27/18 19:00  DVU0543  (Rec: 04/27/18 19:24  PQJ8249  ICU-C15)


 Document     04/27/18 22:00  BYJ7872  (Rec: 04/27/18 22:06  NQE0063  ICU-C18)


 Document     04/27/18 23:00  RML1297  (Rec: 04/27/18 23:46  MKJ7297  ICU-C15)


 Document     04/27/18 23:46  IFL8022  (Rec: 04/27/18 23:46  MNQ1943  ICU-C15)


 Document     04/28/18 06:00  KPR3464  (Rec: 04/28/18 07:01  OOV4047  ICU-C12)


 Document     04/28/18 14:28  AZJ6025  (Rec: 04/28/18 14:28  RHC9027  ICU-C10)


 Document     04/28/18 15:00  WPB6409  (Rec: 04/28/18 17:33  NOU6090  ICU-C10)


 Document     04/28/18 16:00  VVB8123  (Rec: 04/28/18 17:37  LFO0902  ICU-C10)


 Document     04/28/18 17:00  OTA1317  (Rec: 04/28/18 17:38  NJO9617  ICU-C10)


 Document     04/28/18 20:00  SPM0406  (Rec: 04/28/18 20:59  BQG2327  ICU-C20)


 Document     04/28/18 21:00  DON6115  (Rec: 04/28/18 22:06  GSR2576  ICU-C15)


 Document     04/28/18 22:00  VGK2878  (Rec: 04/28/18 22:06  WUI3888  ICU-C15)


 Document     04/28/18 23:00  TOX2183  (Rec: 04/28/18 23:14  UOX3442  ICU-C15)


Intake and Output                                          Start:  04/28/18 23:

14


Freq:   DAILY@0600,1400,2200                               Status: Active      

  


Protocol:                                                                      

  


 Created      04/28/18 23:14  [id number]  (Rec: 04/28/18 23:14  [id number]  ICU-C12)


 Document     04/29/18 06:00  [id number]  (Rec: 04/29/18 06:26  [id number]  ICU-C15)


 Document     04/29/18 08:15  [id number]  (Rec: 04/29/18 09:09  [id number]  ICU-C15)


 Document     04/29/18 14:00  [id number]  (Rec: 04/29/18 15:15  [id number]  TELE-C09)


 Document     04/29/18 21:40  [id number]  (Rec: 04/29/18 21:41  [id number]  TELE-C01)


 Document     04/30/18 06:00  [id number]  (Rec: 04/30/18 07:22  [id number]  TELE-C35)


 Document     04/30/18 14:00  [id number]  (Rec: 04/30/18 14:45  [id number]  TELE-C01)








Head: Symmetrical


Eyes: No Scleral Icterus, PERRLA


Ears/Nose/Mouth/Throat: NL Teeth, Lips, Gums, Mucous Membranes Moist


Neck: NL Appearance and Movements; NL JVP


Cardiovascular: NL Sounds; No Murmurs; No JVD, RRR, No Edema


Respiratory: Symmetrical Chest Expansion and Respiratory Effort, - - coarse 

rales throughout both lungs


Abdominal: NL Sounds; No Tenderness; No Distention, - - obese. Ileostomy 

draining brown thin stool. 


Extremities: No Edema, No Clubbing, Cyanosis


Neurological: NL Sensation, NL Muscle Strength and Tone, - - Somewhat drowsy 

and confused, having recently received Ativan dose





- Assessment


Assessment: 





This patient is ambivalent about her advanced directives, and wants to pursue 

treatment (radiation) for her lung cancer, as she is not a candidate for 

surgery or chemotherapy. She still does not meet criteria for hospice care for 

COPD,as her FEV1 is >40% predicted, and she is also not currently willing to 

enrol in hospice, but I think she would be an excellent candidate for our COPD 

 palliative program, to try to reduce ER visits and hospital readmissions. 

I suggested this to her and she is in agreement, along with her wife. She can 

pursue radiation therapy while on this program and will have an extra layer of 

support at home to try to prevent the need for hospitalizations. Thanks for 

asking me to see her. I would continue her excellent hospitalist care for the 

time being.





- Plan


Consult Plan (MU): Palliative





- Time On Unit


Date of Evaluation: 04/30/18


Hospice Consult Time in: 16:00


Hospice Consult Time Out: 17:15


Hospice Consult Time Total: 75


> 50% of Time Spend In Counseling or Coordinating Care: Yes

## 2018-04-30 NOTE — PN
Progress Note





- Progress Note


Date of Service: 04/30/18 - Pulm f/u note


Note: 





Pt seen and examined at bedside. Pt is sitting up in chair eating lunch. 

Reports improvement in breathing. Cough is productive of thick phleghm. 

Morphine is very helpful. Reports dry nasal passages


 Active Medications











Generic Name Dose Route Start Last Admin





  Trade Name Freq  PRN Reason Stop Dose Admin


 


Acetaminophen  650 mg  04/23/18 14:18  04/30/18 01:45





  Tylenol Tab*  PO   325 mg





  Q6H PRN   Administration





  pain/fever   


 


Aspirin  81 mg  04/24/18 09:00  04/30/18 09:53





  Aspirin Ec Tab*  PO   81 mg





  DAILY BERTHA   Administration


 


Calcitriol  0.25 mcg  04/24/18 09:00  04/30/18 09:56





  Rocaltrol  Cap*  PO   0.25 mcg





  QAM BERTHA   Administration


 


Cinacalcet  30 mg  04/24/18 09:00  04/30/18 09:56





  Sensipar Tab*  PO   30 mg





  QAM BERTHA   Administration


 


Fluticasone Propionate  2 spray  05/01/18 09:00  





  Flonase Nasal Spray 50mcg*  BOTH NARES   





  DAILY BERTHA   


 


Guaifenesin  600 mg  04/23/18 14:18  04/26/18 08:20





  Mucinex*  PO   600 mg





  BID PRN   Administration





  CONGESTION   


 


Ipratropium Bromide  0.5 mg  04/25/18 13:00  04/30/18 13:38





  Atrovent 0.5 Mg Neb.Sol*  INH   0.5 mg





  [id number] AWAKE BERTHA   Administration


 


Lorazepam  0.5 mg  04/23/18 21:00  04/30/18 14:51





  Ativan Tab(*)  PO   0.5 mg





  TID BERTHA   Administration


 


Magnesium Oxide  400 mg  04/23/18 21:00  04/30/18 09:56





  Magox 400 Tab*  PO   400 mg





  BID BERTHA   Administration


 


Morphine Sulfate  15 mg  04/28/18 10:00  04/30/18 09:53





  Ms Contin(*)  PO   15 mg





  Q8H BERTHA   Administration


 


Opium Tincture  6 mg  04/27/18 18:00  04/30/18 14:51





  Opium Tincture*  PO   6 mg





  FIVE TIMES DAILY BERTHA   Administration


 


Paroxetine HCl  30 mg  04/23/18 18:00  04/29/18 17:18





  Paxil Tab*  PO   30 mg





  QPM BERTHA   Administration


 


Pharmacy Profile Note  0 note  04/27/18 17:00  04/29/18 17:18





  Coumadin Daily Reminder*  FOLLOW UP   1 note





  1700 BERTHA   Administration


 


Prednisone  40 mg  04/30/18 09:41  





  Deltasone Tab*  PO   





  0900 BERTHA   


 


Sodium Chloride  1 spray  04/24/18 16:53  





  Sodium Chloride 0.65% Nasal Spray*  BOTH NARES   





  Q4H PRN   





  Dry nasal passages   


 


Tiotropium Bromide  1 cap  04/24/18 09:00  04/30/18 08:24





  Spiriva Cap.Inh*  INH   Not Given





  DAILY BERTHA   


 


Warfarin Sodium  3 mg  04/23/18 17:00  04/29/18 17:18





  Coumadin Tab(*)  PO   3 mg





  DAILY@1700 BER[facility]   Administration





  Protocol   








 Vital Signs











Temp Pulse Resp BP Pulse Ox


 


 97.9 F   72   16   139/72   99 


 


 04/30/18 15:11  04/30/18 15:11  04/30/18 15:11  04/30/18 15:11  04/30/18 15:11








O/E: Pt in NAD, in good spirits today


HEENT: PERRLA, No JVD


Lungs: Diminished air entry b/l, scattered wheeze +


CVS: S1, S2+


Abd: Soft, BS+


Ext:Trace edema +, no tenderness to palpation


Skin: Ecchymosis + in UE


Neuro: Drowsy, awakes to verbal stimuli, is oriented x3





Labs: No new labs








I/R: 72 y o f with h/o severe COPD, lung cancer with recurrence, underwent XRT, 

Chrons s/p ileostomy with high out put, REYNA on CPAP a/w worsening SOB for acute 

COPD exacerbation








Improved since admission


Is not sleepy anymore


Morphine working better for her breathing


c/w nebs


Reports dry nose from O2 usage


Saline nasal spray not helpful


Will order Flonase


Having thick phleghm, using flutter device


 c/w BiPAP at night


Will c/w prednisone taper





She is also on Opium for high out put ileostomy





Pt consented for DNR status


Her significant other Sarah is her HCP, she is currently in [place], will be 

returning tomorrow





Recurrent lung cancer s/p XRT, progressing as per imaging findings


Pt to f/u with Dr Higgins


She is not hypoxic on 2-3L O2





Overall prognosis is guarded





D/w Nancie Gold NP

## 2018-05-01 LAB
BASOPHILS # BLD AUTO: 0.2 10^3/UL (ref 0–0.2)
EOSINOPHIL # BLD AUTO: 0.1 10^3/UL (ref 0–0.6)
HCT VFR BLD AUTO: 31 % (ref 35–47)
HGB BLD-MCNC: 10.1 G/DL (ref 12–16)
INR PPP/BLD: 3.39 (ref 0.77–1.02)
LYMPHOCYTES # BLD AUTO: 1.4 10^3/UL (ref 1–4.8)
MCH RBC QN AUTO: 31 PG (ref 27–31)
MCHC RBC AUTO-ENTMCNC: 33 G/DL (ref 31–36)
MCV RBC AUTO: 94 FL (ref 80–97)
MONOCYTES # BLD AUTO: 1 10^3/UL (ref 0–0.8)
NEUTROPHILS # BLD AUTO: 8 10^3/UL (ref 1.5–7.7)
PLATELET # BLD AUTO: 234 10^3/UL (ref 150–450)
RBC # BLD AUTO: 3.26 10^6/UL (ref 4–5.4)
WBC # BLD AUTO: 10.7 10^3/UL (ref 3.5–10.8)

## 2018-05-01 RX ADMIN — TIOTROPIUM BROMIDE SCH CAP: 18 CAPSULE ORAL; RESPIRATORY (INHALATION) at 09:01

## 2018-05-01 RX ADMIN — IPRATROPIUM BROMIDE SCH MG: 0.5 SOLUTION RESPIRATORY (INHALATION) at 06:15

## 2018-05-01 RX ADMIN — IPRATROPIUM BROMIDE SCH MG: 0.5 SOLUTION RESPIRATORY (INHALATION) at 13:12

## 2018-05-01 RX ADMIN — CALCITRIOL SCH MCG: 0.25 CAPSULE ORAL at 09:45

## 2018-05-01 RX ADMIN — ASPIRIN SCH MG: 81 TABLET, COATED ORAL at 09:43

## 2018-05-01 RX ADMIN — LORAZEPAM SCH MG: 1 TABLET ORAL at 09:43

## 2018-05-01 RX ADMIN — MAGNESIUM OXIDE TAB 400 MG (241.3 MG ELEMENTAL MG) SCH MG: 400 (241.3 MG) TAB at 21:08

## 2018-05-01 RX ADMIN — FLUTICASONE PROPIONATE SCH SPRAY: 50 SPRAY, METERED NASAL at 09:43

## 2018-05-01 RX ADMIN — MORPHINE SULFATE SCH MG: 15 TABLET, EXTENDED RELEASE ORAL at 02:31

## 2018-05-01 RX ADMIN — MORPHINE TINCTURE SCH MG: 1 SOLUTION ORAL at 21:09

## 2018-05-01 RX ADMIN — MORPHINE SULFATE SCH MG: 15 TABLET, EXTENDED RELEASE ORAL at 17:38

## 2018-05-01 RX ADMIN — CINACALCET HYDROCHLORIDE SCH MG: 30 TABLET, COATED ORAL at 09:45

## 2018-05-01 RX ADMIN — PAROXETINE SCH MG: 10 TABLET, FILM COATED ORAL at 17:42

## 2018-05-01 RX ADMIN — MORPHINE TINCTURE SCH MG: 1 SOLUTION ORAL at 06:06

## 2018-05-01 RX ADMIN — LORAZEPAM SCH MG: 1 TABLET ORAL at 14:37

## 2018-05-01 RX ADMIN — MORPHINE TINCTURE SCH MG: 1 SOLUTION ORAL at 17:39

## 2018-05-01 RX ADMIN — MORPHINE SULFATE SCH MG: 15 TABLET, EXTENDED RELEASE ORAL at 09:42

## 2018-05-01 RX ADMIN — IPRATROPIUM BROMIDE SCH MG: 0.5 SOLUTION RESPIRATORY (INHALATION) at 01:03

## 2018-05-01 RX ADMIN — MORPHINE TINCTURE SCH MG: 1 SOLUTION ORAL at 14:38

## 2018-05-01 RX ADMIN — LORAZEPAM SCH MG: 1 TABLET ORAL at 21:08

## 2018-05-01 RX ADMIN — MAGNESIUM OXIDE TAB 400 MG (241.3 MG ELEMENTAL MG) SCH MG: 400 (241.3 MG) TAB at 09:42

## 2018-05-01 RX ADMIN — IPRATROPIUM BROMIDE SCH MG: 0.5 SOLUTION RESPIRATORY (INHALATION) at 19:04

## 2018-05-01 RX ADMIN — MORPHINE TINCTURE SCH MG: 1 SOLUTION ORAL at 09:41

## 2018-05-01 NOTE — PN
Progress Note





- Progress Note


Date of Service: 05/01/18 - Pulm f/u note


Note: 





Pt seen and examined at bedside. Pt reports feeling better, has been on BiPAP 

during the day while sleeping. No new complaints








 Active Medications











Generic Name Dose Route Start Last Admin





  Trade Name Freq  PRN Reason Stop Dose Admin


 


Acetaminophen  650 mg  04/23/18 14:18  04/30/18 01:45





  Tylenol Tab*  PO   325 mg





  Q6H PRN   Administration





  pain/fever   


 


Aspirin  81 mg  04/24/18 09:00  05/01/18 09:43





  Aspirin Ec Tab*  PO   81 mg





  DAILY BERTHA   Administration


 


Calcitriol  0.25 mcg  04/24/18 09:00  05/01/18 09:45





  Rocaltrol  Cap*  PO   0.25 mcg





  QAM BERTHA   Administration


 


Cinacalcet  30 mg  04/24/18 09:00  05/01/18 09:45





  Sensipar Tab*  PO   30 mg





  QAM BERTHA   Administration


 


Fluticasone Propionate  2 spray  05/01/18 09:00  05/01/18 09:43





  Flonase Nasal Spray 50mcg*  BOTH NARES   2 spray





  DAILY BERTHA   Administration


 


Guaifenesin  600 mg  04/23/18 14:18  04/26/18 08:20





  Mucinex*  PO   600 mg





  BID PRN   Administration





  CONGESTION   


 


Ipratropium Bromide  0.5 mg  04/25/18 13:00  05/01/18 13:12





  Atrovent 0.5 Mg Neb.Sol*  INH   0.5 mg





  [id number] AWAKE BERTHA   Administration


 


Lorazepam  0.5 mg  04/23/18 21:00  05/01/18 14:37





  Ativan Tab(*)  PO   0.5 mg





  TID BERTHA   Administration


 


Magnesium Oxide  400 mg  04/23/18 21:00  05/01/18 09:42





  Magox 400 Tab*  PO   400 mg





  BID BERTHA   Administration


 


Morphine Sulfate  15 mg  04/28/18 10:00  05/01/18 09:42





  Ms Contin(*)  PO   15 mg





  Q8H BERTHA   Administration


 


Opium Tincture  6 mg  04/27/18 18:00  05/01/18 14:38





  Opium Tincture*  PO   6 mg





  FIVE TIMES DAILY BERTHA   Administration


 


Paroxetine HCl  30 mg  04/23/18 18:00  04/30/18 17:50





  Paxil Tab*  PO   30 mg





  QPM BERTHA   Administration


 


Pharmacy Profile Note  0 note  04/27/18 17:00  04/30/18 17:51





  Coumadin Daily Reminder*  FOLLOW UP   1 note





  1700 [facility]   Administration


 


Prednisone  35 mg  05/02/18 09:00  





  Deltasone Tab*  PO   





  0900 [facility]   


 


Sodium Chloride  1 spray  04/24/18 16:53  





  Sodium Chloride 0.65% Nasal Spray*  BOTH NARES   





  Q4H PRN   





  Dry nasal passages   


 


Tiotropium Bromide  1 cap  04/24/18 09:00  05/01/18 09:01





  Spiriva Cap.Inh*  INH   1 cap





  DAILY BERTHA   Administration


 


Warfarin Sodium  2 mg  05/02/18 17:00  





  Coumadin Tab(*)  PO   





  DAILY@1700 [facility]   





  Protocol   











 Vital Signs











Temp Pulse Resp BP Pulse Ox


 


 97.9 F   68   18   149/55   98 


 


 05/01/18 11:29  05/01/18 13:12  05/01/18 14:38  05/01/18 11:29  05/01/18 13:12








O/E: Pt in NAD, lying in bed


HEENT: PERRLA, No JVD


Lungs: Diminished air entry b/l, prolonged exhalation


CVS: S1, S2+, regular


Abd: Soft, BS+


Ext:Trace edema +, no tenderness to palpation


Skin: Ecchymosis + in UE


Neuro: Oriented x3, no focal deficits





Labs: 


 Laboratory Results - last 24 hr











  05/01/18





  06:02


 


WBC  10.7


 


RBC  3.26 L


 


Hgb  10.1 L


 


Hct  31 L


 


MCV  94


 


MCH  31


 


MCHC  33


 


RDW  15


 


Plt Count  234


 


MPV  7.5


 


Neut % (Auto)  Not Reportable


 


Lymph % (Auto)  Not Reportable


 


Mono % (Auto)  Not Reportable


 


Eos % (Auto)  Not Reportable


 


Baso % (Auto)  Not Reportable


 


Absolute Neuts (auto)  8.0 H


 


Absolute Lymphs (auto)  1.4


 


Absolute Monos (auto)  1.0 H


 


Absolute Eos (auto)  0.1


 


Absolute Basos (auto)  0.2


 


Absolute Nucleated RBC  Not Reportable


 


Immature Gran %  5


 


Neutrophils %  75


 


Band Neutrophils %  1


 


Lymphocytes %  11 L


 


Monocytes %  9 H


 


Eosinophils %  0


 


Basophils %  0


 


Myelocytes %  4 H


 


Nucleated RBC %  Not Reportable


 


Abs Neuts (Manual)  8.0 H


 


Abs Lymphs (Manual)  1.2


 


Abs Monocytes (Manual)  1.0 H


 


Absolute Eos (Manual)  0


 


Abs Basophils (Manual)  0


 


Normal RBC Morphology  Normal


 


Hem Pathologist Commnt  














I/R: 72 y o f with h/o severe COPD, lung cancer with recurrence, underwent XRT, 

Chrons s/p ileostomy with high out put, REYNA on CPAP a/w worsening SOB for acute 

COPD exacerbation








Overall improved since admission


Morphine working better for her breathing


Less sleepy since Ativan was d/ovidio


c/w nebs


c/w prednisone taper


c/w Flonase


Having thick phleghm, using flutter device


 c/w BiPAP at night








She is also on Opium for high out put ileostomy





Pt is DNR 


Her significant other Sarah is her HCP, will discuss with her 





Recurrent lung cancer s/p XRT, progressing as per imaging findings


Pt to f/u with Dr Higgins as out pt


She is not hypoxic on 2-3L O2

## 2018-05-01 NOTE — PN
Subjective


Date of Service: 05/01/18


Interval History: 





patient is very teary on exam. We discussed going home tomorrow. She states it 

is very hard for her to leave her house for doctors visits due to "it takes a 

lot of energy and is exhausting" and wants to have full "cancer workup" in 

hospital. We discussed optimizing her lung status first, getting her home and 

the possible enlarge lung nodule could be work up as an outpt if she wanted to 

seek further treatment. Again, she was teary as well as confrontational on 

exam. Continued support was given. 





She reports overall she feels better today with improved breathing and less 

SOB.  








Family History: Unchanged from Admission


Social History: Unchanged from Admission


Past Medical History: Unchanged from Admission





Objective


Active Medications: 








Acetaminophen (Tylenol Tab*)  650 mg PO Q6H PRN


   PRN Reason: pain/fever


   Last Admin: 04/30/18 01:45 Dose:  325 mg


Aspirin (Aspirin Ec Tab*)  81 mg PO DAILY [facility]


   Last Admin: 05/01/18 09:43 Dose:  81 mg


Calcitriol (Rocaltrol  Cap*)  0.25 mcg PO QAM [facility]


   Last Admin: 05/01/18 09:45 Dose:  0.25 mcg


Cinacalcet (Sensipar Tab*)  30 mg PO QAM [facility]


   Last Admin: 05/01/18 09:45 Dose:  30 mg


Fluticasone Propionate (Flonase Nasal Spray 50mcg*)  2 spray BOTH NARES DAILY 

[facility]


   Last Admin: 05/01/18 09:43 Dose:  2 spray


Guaifenesin (Mucinex*)  600 mg PO BID PRN


   PRN Reason: CONGESTION


   Last Admin: 04/26/18 08:20 Dose:  600 mg


Ipratropium Bromide (Atrovent 0.5 Mg Neb.Sol*)  0.5 mg INH [id number] AWAKE 

[facility]


   Last Admin: 05/01/18 13:12 Dose:  0.5 mg


Lorazepam (Ativan Tab(*))  0.5 mg PO TID [facility]


   Last Admin: 05/01/18 09:43 Dose:  0.5 mg


Magnesium Oxide (Magox 400 Tab*)  400 mg PO BID [facility]


   Last Admin: 05/01/18 09:42 Dose:  400 mg


Morphine Sulfate (Ms Contin(*))  15 mg PO Q8H [facility]


   Last Admin: 05/01/18 09:42 Dose:  15 mg


Opium Tincture (Opium Tincture*)  6 mg PO FIVE TIMES DAILY [facility]


   Last Admin: 05/01/18 09:41 Dose:  6 mg


Paroxetine HCl (Paxil Tab*)  30 mg PO QPM [facility]


   Last Admin: 04/30/18 17:50 Dose:  30 mg


Pharmacy Profile Note (Coumadin Daily Reminder*)  0 note FOLLOW UP 1700 [facility]


   Last Admin: 04/30/18 17:51 Dose:  1 note


Prednisone (Deltasone Tab*)  40 mg PO 0900 [facility]


   Last Admin: 05/01/18 09:42 Dose:  40 mg


Sodium Chloride (Sodium Chloride 0.65% Nasal Spray*)  1 spray BOTH NARES Q4H PRN


   PRN Reason: Dry nasal passages


Tiotropium Bromide (Spiriva Cap.Inh*)  1 cap INH DAILY [facility]


   Last Admin: 05/01/18 09:01 Dose:  1 cap


Warfarin Sodium (Coumadin Tab(*))  3 mg PO DAILY@1700 [facility]


   PRN Reason: Protocol


   Last Admin: 04/30/18 17:51 Dose:  3 mg








 Vital Signs - 8 hr











  05/01/18 05/01/18 05/01/18





  07:45 08:00 09:01


 


Temperature 97.3 F  


 


Pulse Rate 58  68


 


Respiratory 20 20 18





Rate   


 


Blood Pressure 154/57  





(mmHg)   


 


O2 Sat by Pulse 99  98





Oximetry   














  05/01/18 05/01/18 05/01/18





  09:41 09:42 09:43


 


Temperature   


 


Pulse Rate   


 


Respiratory 20 20 20





Rate   


 


Blood Pressure   





(mmHg)   


 


O2 Sat by Pulse   





Oximetry   














  05/01/18 05/01/18 05/01/18





  11:29 12:57 13:12


 


Temperature 97.9 F  


 


Pulse Rate 64  68


 


Respiratory 20 16 16





Rate   


 


Blood Pressure 149/55  





(mmHg)   


 


O2 Sat by Pulse 100  98





Oximetry   











Oxygen Devices in Use Now: Nasal Cannula


Appearance: chronically ill female sitting up in bed in NAD, A+O x3


Eyes: No Scleral Icterus, PERRLA


Ears/Nose/Mouth/Throat: NL Teeth, Lips, Gums, Mucous Membranes Moist


Neck: NL Appearance and Movements; NL JVP


Respiratory: Symmetrical Chest Expansion and Respiratory Effort, - - diminished 

b/l


Cardiovascular: NL Sounds; No Murmurs; No JVD, RRR, No Edema


Abdominal: NL Sounds; No Tenderness; No Distention


Extremities: No Edema, No Clubbing, Cyanosis


Neurological: Alert and Oriented x 3, NL Sensation, NL Muscle Strength and Tone


Lines/Tubes/Other Access: Clean, Dry and Intact Peripheral IV


Nutrition: Taking PO's


Result Diagrams: 


 05/01/18 06:02





 05/01/18 06:02


Additional Lab and Data: 


.


Microbiology and Other Data: 


 Microbiology











 04/24/18 09:22 Legionella Urinary Antigen - Final





 Urine    Negative Legionella Antigen





 Streptococcus pneumoniae Ag Screen - Final





    Negative S. pneumo Antigen














Assess/Plan/Problems-Billing


.


Assessment: 





73 yo female with h/o severe COPD, lung cancer (and recurrence), s/p radiation, 

now with worsening SOB for acute COPD exacerbation and hypoxic respiratory 

failure





Medically complex patient with Crohn's s/p ileostomy with high output, on Opium 

as antimotility agent











- Patient Problems


(1) Acute and chronic respiratory failure with hypoxia


Comment: 


- improving


- End-stage COPD with severe emphysema and hx of lung cancer with suggestion of 

possible enlarging nodule. 


- demonstrated elevation in A-a gradient.


- BiPAP often needed


- certainly acute and chronic HYPOXIC respiratory failure - improving 


- Praneeth following  - discussed over the phone - moving forward slow prednsione 

taper down to 10 mg (home dose), encourage home cpap (she has been non-compliant

) and smoking cessation   





(2) COPD exacerbation


Comment: 


- Regressed with need for rescue BiPAP 4/26 and 4/27 - now doing much better 


- Certainly anxiety component, but maybe also mucus plug, etc (see Dr. Ocasio's 

note)


- BiPAP available as patient needs


- Morphine seems to help greatly


- Spiriva daily,  PRN Levalbuterol


- Prednisone taper, 35 mg 5/2


- azith and ceftri - 6 day course.   





(3) Mass of upper lobe of right lung


Comment: 


- Lung cancer - possible? mass on CT. follows with Dr. Higgins/Dr. Rothman


-  f/u with Onc/Onc Radiology to discuss further options prior to deciding 

hospice - Discussed with Dr. Higgins 5/1 who will stop by and see the patient. 


   





(4) Crohn's disease


Comment: 


S/P ileostomy with hx of high oupt - Continue home dose tincture of opium 





   





(5) History of DVT (deep vein thrombosis)


Comment: 


- Warfarin   





(6) Acute on chronic renal failure


Comment: 


- Cr at admission 1.9


- Now at baseline at ~1.5.   





(7) Anxiety


Comment: 


- Certainly an anxiety component


- Continue morphine SR 15 mg PO Q8 (started this admission)


- Continue Ativan 0.5 mg PO TID


   





(8) DNR (do not resuscitate)








(9) DVT prophylaxis


Comment: 


- Warfarin, INR 3.39 today, hold todays dose and reduce to 2 mg daily    


Status and Disposition: 


.


Inpatient. Most likely home when medically stable.

## 2018-05-02 LAB — INR PPP/BLD: 2.33 (ref 0.77–1.02)

## 2018-05-02 RX ADMIN — LORAZEPAM SCH MG: 1 TABLET ORAL at 09:42

## 2018-05-02 RX ADMIN — LORAZEPAM SCH MG: 1 TABLET ORAL at 17:45

## 2018-05-02 RX ADMIN — IPRATROPIUM BROMIDE SCH MG: 0.5 SOLUTION RESPIRATORY (INHALATION) at 01:23

## 2018-05-02 RX ADMIN — TIOTROPIUM BROMIDE SCH CAP: 18 CAPSULE ORAL; RESPIRATORY (INHALATION) at 07:37

## 2018-05-02 RX ADMIN — MAGNESIUM OXIDE TAB 400 MG (241.3 MG ELEMENTAL MG) SCH MG: 400 (241.3 MG) TAB at 09:43

## 2018-05-02 RX ADMIN — MORPHINE SULFATE SCH MG: 15 TABLET, EXTENDED RELEASE ORAL at 20:59

## 2018-05-02 RX ADMIN — MORPHINE TINCTURE SCH MG: 1 SOLUTION ORAL at 20:59

## 2018-05-02 RX ADMIN — AMLODIPINE BESYLATE SCH MG: 5 TABLET ORAL at 09:42

## 2018-05-02 RX ADMIN — LORAZEPAM SCH MG: 1 TABLET ORAL at 20:58

## 2018-05-02 RX ADMIN — MAGNESIUM OXIDE TAB 400 MG (241.3 MG ELEMENTAL MG) SCH MG: 400 (241.3 MG) TAB at 20:59

## 2018-05-02 RX ADMIN — IPRATROPIUM BROMIDE SCH MG: 0.5 SOLUTION RESPIRATORY (INHALATION) at 20:01

## 2018-05-02 RX ADMIN — PAROXETINE SCH MG: 10 TABLET, FILM COATED ORAL at 17:44

## 2018-05-02 RX ADMIN — ASPIRIN SCH MG: 81 TABLET, COATED ORAL at 09:43

## 2018-05-02 RX ADMIN — CALCITRIOL SCH MCG: 0.25 CAPSULE ORAL at 09:40

## 2018-05-02 RX ADMIN — MORPHINE TINCTURE SCH MG: 1 SOLUTION ORAL at 09:39

## 2018-05-02 RX ADMIN — MORPHINE TINCTURE SCH MG: 1 SOLUTION ORAL at 15:06

## 2018-05-02 RX ADMIN — FLUTICASONE PROPIONATE SCH SPRAY: 50 SPRAY, METERED NASAL at 09:39

## 2018-05-02 RX ADMIN — IPRATROPIUM BROMIDE SCH MG: 0.5 SOLUTION RESPIRATORY (INHALATION) at 06:01

## 2018-05-02 RX ADMIN — IPRATROPIUM BROMIDE SCH MG: 0.5 SOLUTION RESPIRATORY (INHALATION) at 13:32

## 2018-05-02 RX ADMIN — MORPHINE SULFATE SCH MG: 15 TABLET, EXTENDED RELEASE ORAL at 01:11

## 2018-05-02 RX ADMIN — MORPHINE TINCTURE SCH MG: 1 SOLUTION ORAL at 05:30

## 2018-05-02 RX ADMIN — MORPHINE SULFATE SCH MG: 15 TABLET, EXTENDED RELEASE ORAL at 09:41

## 2018-05-02 RX ADMIN — MORPHINE TINCTURE SCH MG: 1 SOLUTION ORAL at 17:44

## 2018-05-02 RX ADMIN — CINACALCET HYDROCHLORIDE SCH MG: 30 TABLET, COATED ORAL at 09:40

## 2018-05-02 RX ADMIN — LORAZEPAM SCH: 1 TABLET ORAL at 16:12

## 2018-05-02 RX ADMIN — ACETAMINOPHEN PRN MG: 325 TABLET ORAL at 01:14

## 2018-05-02 RX ADMIN — WARFARIN SODIUM SCH MG: 2 TABLET ORAL at 17:45

## 2018-05-02 NOTE — PN
Subjective


Date of Service: 05/02/18


Interval History: 








patient reports "I dont want to go home today I feel too weak still", pt 

reports she is gaining strength daily but continues to feel very SOB with 

exertion of more than a few steps. Spoke with her partner Robin who is also 

worried about her respiratory status with exertion. Little sputum production. 

She does feel that she is improving but worried about going home and being 

independent. 




















Family History: Unchanged from Admission


Social History: Unchanged from Admission


Past Medical History: Unchanged from Admission





Objective


Active Medications: 








Acetaminophen (Tylenol Tab*)  650 mg PO Q6H PRN


   PRN Reason: pain/fever


   Last Admin: 05/02/18 01:14 Dose:  650 mg


Amlodipine Besylate (Norvasc Tab*)  5 mg PO DAILY [facility]


   Last Admin: 05/02/18 09:42 Dose:  5 mg


Aspirin (Aspirin Ec Tab*)  81 mg PO DAILY [facility]


   Last Admin: 05/02/18 09:43 Dose:  81 mg


Calcitriol (Rocaltrol  Cap*)  0.25 mcg PO QAM [facility]


   Last Admin: 05/02/18 09:40 Dose:  0.25 mcg


Cinacalcet (Sensipar Tab*)  30 mg PO QAM [facility]


   Last Admin: 05/02/18 09:40 Dose:  30 mg


Fluticasone Propionate (Flonase Nasal Spray 50mcg*)  2 spray BOTH NARES DAILY 

[facility]


   Last Admin: 05/02/18 09:39 Dose:  2 spray


Guaifenesin (Mucinex*)  600 mg PO BID PRN


   PRN Reason: CONGESTION


   Last Admin: 04/26/18 08:20 Dose:  600 mg


Ipratropium Bromide (Atrovent 0.5 Mg Neb.Sol*)  0.5 mg INH [id number] AWAKE 

[facility]


   Last Admin: 05/02/18 06:01 Dose:  0.5 mg


Lorazepam (Ativan Tab(*))  0.5 mg PO TID [facility]


   Last Admin: 05/02/18 09:42 Dose:  0.5 mg


Magnesium Oxide (Magox 400 Tab*)  400 mg PO BID [facility]


   Last Admin: 05/02/18 09:43 Dose:  400 mg


Morphine Sulfate (Ms Contin(*))  15 mg PO Q8H [facility]


   Last Admin: 05/02/18 09:41 Dose:  15 mg


Opium Tincture (Opium Tincture*)  6 mg PO FIVE TIMES DAILY [facility]


   Last Admin: 05/02/18 09:39 Dose:  6 mg


Paroxetine HCl (Paxil Tab*)  30 mg PO QPM [facility]


   Last Admin: 05/01/18 17:42 Dose:  30 mg


Pharmacy Profile Note (Coumadin Daily Reminder*)  0 note FOLLOW UP 1700 [facility]


   Last Admin: 05/01/18 17:43 Dose:  1 note


Prednisone (Deltasone Tab*)  35 mg PO 0900 [facility]


   Last Admin: 05/02/18 09:40 Dose:  35 mg


Sodium Chloride (Sodium Chloride 0.65% Nasal Spray*)  1 spray BOTH NARES Q4H PRN


   PRN Reason: Dry nasal passages


Tiotropium Bromide (Spiriva Cap.Inh*)  1 cap INH DAILY [facility]


   Last Admin: 05/02/18 07:37 Dose:  1 cap


Warfarin Sodium (Coumadin Tab(*))  2 mg PO DAILY@1700 [facility]


   PRN Reason: Protocol








 Vital Signs - 8 hr











  05/02/18 05/02/18 05/02/18





  05:30 06:01 09:19


 


Pulse Rate  66 66


 


Respiratory 20 20 





Rate   


 


Blood Pressure   132/49





(mmHg)   


 


O2 Sat by Pulse  99 99





Oximetry   














  05/02/18 05/02/18 05/02/18





  09:21 09:39 09:41


 


Pulse Rate   


 


Respiratory 16 20 20





Rate   


 


Blood Pressure   





(mmHg)   


 


O2 Sat by Pulse   





Oximetry   














  05/02/18 05/02/18





  09:42 11:23


 


Pulse Rate  68


 


Respiratory 20 18





Rate  


 


Blood Pressure  145/56





(mmHg)  


 


O2 Sat by Pulse  98





Oximetry  











Oxygen Devices in Use Now: Nasal Cannula


Appearance: chronically ill appearing female A+O x3 in NAD


Eyes: No Scleral Icterus, PERRLA


Ears/Nose/Mouth/Throat: NL Teeth, Lips, Gums, Mucous Membranes Moist


Neck: NL Appearance and Movements; NL JVP


Respiratory: Symmetrical Chest Expansion and Respiratory Effort, - - diminished 

b/l


Cardiovascular: NL Sounds; No Murmurs; No JVD, RRR, - - 1+ LE edema


Abdominal: NL Sounds; No Tenderness; No Distention


Extremities: No Clubbing, Cyanosis


Skin: No Rash or Ulcers, No Nodules or Sclerosis


Neurological: Alert and Oriented x 3, NL Sensation, NL Muscle Strength and Tone


Lines/Tubes/Other Access: Clean, Dry and Intact Peripheral IV


Nutrition: Taking PO's


Result Diagrams: 


 05/01/18 06:02





 05/01/18 06:02


Additional Lab and Data: 


.


Microbiology and Other Data: 


 Microbiology











 04/24/18 09:22 Legionella Urinary Antigen - Final





 Urine    Negative Legionella Antigen





 Streptococcus pneumoniae Ag Screen - Final





    Negative S. pneumo Antigen














Assess/Plan/Problems-Billing


.


Assessment: 





73 yo female with h/o severe COPD, lung cancer (and recurrence), s/p radiation, 

now with worsening SOB for acute COPD exacerbation and hypoxic respiratory 

failure





Medically complex patient with Crohn's s/p ileostomy with high output, on Opium 

as antimotility agent











- Patient Problems


(1) Acute and chronic respiratory failure with hypoxia


Comment: 


- improving


- End-stage COPD with severe emphysema and hx of lung cancer with suggestion of 

possible enlarging nodule. 


- demonstrated elevation in A-a gradient.


- BiPAP often needed


- certainly acute and chronic HYPOXIC respiratory failure - improving 


- Praneeth following  - discussed over the phone - moving forward slow prednsione 

taper down to 10 mg (home dose), encourage home cpap (she has been non-compliant

) and smoking cessation   





(2) COPD exacerbation


Comment: 


- Regressed with need for rescue BiPAP 4/26 and 4/27 - now doing much better 


- Certainly anxiety component, but maybe also mucus plug, etc (see Dr. Ocasio's 

note)


- BiPAP available as patient needs


- Morphine seems to help greatly


- Spiriva daily,  PRN Levalbuterol


- Prednisone taper, 35 mg 5/2


- finished azith and ceftri - 6 day course.   





(3) Mass of upper lobe of right lung


Comment: 


- Lung cancer - possible? mass on CT. follows with Dr. Higgins/Dr. Rothman


-  f/u with Onc/Onc Radiology to discuss further options prior to deciding 

hospice - 


   





(4) Crohn's disease


Comment: 


S/P ileostomy with hx of high oupt - Continue home dose tincture of opium 





   





(5) History of DVT (deep vein thrombosis)


Comment: 


- Warfarin   





(6) Acute on chronic renal failure


Comment: 


- Cr at admission 1.9


- Now at baseline at ~1.5.   





(7) Anxiety


Comment: 


- Certainly an anxiety component


- Continue morphine SR but change from 15 mg PO Q8 to Q12 today (started this 

admission)


- Continue Ativan 0.5 mg PO TID


   





(8) DNR (do not resuscitate)








(9) DVT prophylaxis


Comment: 


- Warfarin, INR 2.33 today, continue 2 mg daily    


Status and Disposition: 


.


Inpatient. Most likely home when medically stable.

## 2018-05-03 LAB
BASOPHILS # BLD AUTO: 0 10^3/UL (ref 0–0.2)
EOSINOPHIL # BLD AUTO: 0 10^3/UL (ref 0–0.6)
HCT VFR BLD AUTO: 34 % (ref 35–47)
HGB BLD-MCNC: 11.1 G/DL (ref 12–16)
INR PPP/BLD: 1.7 (ref 0.77–1.02)
LYMPHOCYTES # BLD AUTO: 1.4 10^3/UL (ref 1–4.8)
MCH RBC QN AUTO: 30 PG (ref 27–31)
MCHC RBC AUTO-ENTMCNC: 33 G/DL (ref 31–36)
MCV RBC AUTO: 93 FL (ref 80–97)
MONOCYTES # BLD AUTO: 0.9 10^3/UL (ref 0–0.8)
NEUTROPHILS # BLD AUTO: 12.4 10^3/UL (ref 1.5–7.7)
NRBC # BLD AUTO: 0 10^3/UL
PLATELET # BLD AUTO: 231 10^3/UL (ref 150–450)
RBC # BLD AUTO: 3.66 10^6/UL (ref 4–5.4)
WBC # BLD AUTO: 14.8 10^3/UL (ref 3.5–10.8)

## 2018-05-03 RX ADMIN — CINACALCET HYDROCHLORIDE SCH MG: 30 TABLET, COATED ORAL at 08:30

## 2018-05-03 RX ADMIN — ASPIRIN SCH MG: 81 TABLET, COATED ORAL at 08:31

## 2018-05-03 RX ADMIN — MORPHINE TINCTURE SCH MG: 1 SOLUTION ORAL at 08:29

## 2018-05-03 RX ADMIN — WARFARIN SODIUM SCH MG: 2 TABLET ORAL at 16:43

## 2018-05-03 RX ADMIN — MORPHINE TINCTURE SCH MG: 1 SOLUTION ORAL at 06:09

## 2018-05-03 RX ADMIN — IPRATROPIUM BROMIDE SCH MG: 0.5 SOLUTION RESPIRATORY (INHALATION) at 13:07

## 2018-05-03 RX ADMIN — FLUTICASONE PROPIONATE SCH: 50 SPRAY, METERED NASAL at 08:44

## 2018-05-03 RX ADMIN — IPRATROPIUM BROMIDE SCH MG: 0.5 SOLUTION RESPIRATORY (INHALATION) at 07:55

## 2018-05-03 RX ADMIN — TIOTROPIUM BROMIDE SCH CAP: 18 CAPSULE ORAL; RESPIRATORY (INHALATION) at 07:57

## 2018-05-03 RX ADMIN — MAGNESIUM OXIDE TAB 400 MG (241.3 MG ELEMENTAL MG) SCH MG: 400 (241.3 MG) TAB at 08:30

## 2018-05-03 RX ADMIN — PREDNISONE SCH MG: 10 TABLET ORAL at 08:30

## 2018-05-03 RX ADMIN — LORAZEPAM SCH MG: 1 TABLET ORAL at 15:16

## 2018-05-03 RX ADMIN — IPRATROPIUM BROMIDE SCH MG: 0.5 SOLUTION RESPIRATORY (INHALATION) at 00:15

## 2018-05-03 RX ADMIN — MORPHINE TINCTURE SCH MG: 1 SOLUTION ORAL at 20:15

## 2018-05-03 RX ADMIN — LORAZEPAM SCH MG: 1 TABLET ORAL at 20:15

## 2018-05-03 RX ADMIN — MORPHINE SULFATE SCH MG: 15 TABLET, EXTENDED RELEASE ORAL at 20:15

## 2018-05-03 RX ADMIN — AMLODIPINE BESYLATE SCH MG: 5 TABLET ORAL at 08:30

## 2018-05-03 RX ADMIN — MORPHINE TINCTURE SCH MG: 1 SOLUTION ORAL at 16:41

## 2018-05-03 RX ADMIN — MORPHINE SULFATE SCH MG: 15 TABLET, EXTENDED RELEASE ORAL at 08:31

## 2018-05-03 RX ADMIN — PAROXETINE SCH MG: 10 TABLET, FILM COATED ORAL at 16:42

## 2018-05-03 RX ADMIN — LORAZEPAM SCH MG: 1 TABLET ORAL at 08:29

## 2018-05-03 RX ADMIN — MORPHINE TINCTURE SCH MG: 1 SOLUTION ORAL at 15:14

## 2018-05-03 RX ADMIN — CALCITRIOL SCH MCG: 0.25 CAPSULE ORAL at 08:30

## 2018-05-03 RX ADMIN — MAGNESIUM OXIDE TAB 400 MG (241.3 MG ELEMENTAL MG) SCH MG: 400 (241.3 MG) TAB at 20:14

## 2018-05-03 RX ADMIN — GUAIFENESIN PRN MG: 600 TABLET, EXTENDED RELEASE ORAL at 20:15

## 2018-05-03 RX ADMIN — TIOTROPIUM BROMIDE SCH CAP: 18 CAPSULE ORAL; RESPIRATORY (INHALATION) at 11:07

## 2018-05-03 RX ADMIN — IPRATROPIUM BROMIDE SCH MG: 0.5 SOLUTION RESPIRATORY (INHALATION) at 20:24

## 2018-05-03 NOTE — PN
Progress Note





- Progress Note


Date of Service: 05/03/18 - Pulm f/u note


Note: 





Pt seen and examined at bedside. pt reports feeling better. Has not tried to 

get out of bed today. Denies significant cough.


 Active Medications











Generic Name Dose Route Start Last Admin





  Trade Name Freq  PRN Reason Stop Dose Admin


 


Acetaminophen  650 mg  04/23/18 14:18  05/02/18 01:14





  Tylenol Tab*  PO   650 mg





  Q6H PRN   Administration





  pain/fever   


 


Amlodipine Besylate  5 mg  05/02/18 09:00  05/03/18 08:30





  Norvasc Tab*  PO   5 mg





  DAILY BERTHA   Administration


 


Aspirin  81 mg  04/24/18 09:00  05/03/18 08:31





  Aspirin Ec Tab*  PO   81 mg





  DAILY BERTHA   Administration


 


Calcitriol  0.25 mcg  04/24/18 09:00  05/03/18 08:30





  Rocaltrol  Cap*  PO   0.25 mcg





  QAM BERTHA   Administration


 


Cinacalcet  30 mg  04/24/18 09:00  05/03/18 08:30





  Sensipar Tab*  PO   30 mg





  QAM BERTHA   Administration


 


Fluticasone Propionate  2 spray  05/01/18 09:00  05/03/18 08:44





  Flonase Nasal Spray 50mcg*  BOTH NARES   Not Given





  DAILY BERTHA   


 


Guaifenesin  600 mg  04/23/18 14:18  04/26/18 08:20





  Mucinex*  PO   600 mg





  BID PRN   Administration





  CONGESTION   


 


Ipratropium Bromide  0.5 mg  04/25/18 13:00  05/03/18 13:07





  Atrovent 0.5 Mg Neb.Sol*  INH   0.5 mg





  [id number] AWAKE BERTHA   Administration


 


Lorazepam  0.5 mg  04/23/18 21:00  05/03/18 15:16





  Ativan Tab(*)  PO   0.5 mg





  TID BERTHA   Administration


 


Magnesium Oxide  400 mg  04/23/18 21:00  05/03/18 08:30





  Magox 400 Tab*  PO   400 mg





  BID BERTHA   Administration


 


Morphine Sulfate  15 mg  05/02/18 22:00  05/03/18 08:31





  Ms Contin(*)  PO   15 mg





  Q12H BERTHA   Administration


 


Opium Tincture  6 mg  04/27/18 18:00  05/03/18 16:41





  Opium Tincture*  PO   6 mg





  FIVE TIMES DAILY BERTHA   Administration


 


Paroxetine HCl  30 mg  04/23/18 18:00  05/03/18 16:42





  Paxil Tab*  PO   30 mg





  QPM BERTHA   Administration


 


Pharmacy Profile Note  0 note  04/27/18 17:00  05/03/18 16:42





  Coumadin Daily Reminder*  FOLLOW UP   1 note





  1700 BERTHA   Administration


 


Prednisone  30 mg  05/03/18 09:00  05/03/18 08:30





  Deltasone Tab*  PO   30 mg





  0900 BERTHA   Administration


 


Sodium Chloride  1 spray  04/24/18 16:53  





  Sodium Chloride 0.65% Nasal Spray*  BOTH NARES   





  Q4H PRN   





  Dry nasal passages   


 


Tiotropium Bromide  1 cap  04/24/18 09:00  05/03/18 07:57





  Spiriva Cap.Inh*  INH   1 cap





  DAILY BERTHA   Administration


 


Warfarin Sodium  2 mg  05/02/18 17:00  05/03/18 16:43





  Coumadin Tab(*)  PO   2 mg





  DAILY@1700 BERTHA   Administration





  Protocol   








 Vital Signs











Temp Pulse Resp BP Pulse Ox


 


 97.9 F   68   18   147/74   98 


 


 05/03/18 15:00  05/03/18 15:00  05/03/18 18:18  05/03/18 15:00  05/03/18 15:00








O/E: Pt in NAD, lying in bed


HEENT: PERRLA, No JVD, mucus membranes moist


Lungs: Diminished air entry b/l, prolonged exhalation


CVS: S1, S2+, regular


Abd: Soft, BS+, NT


Ext:Trace edema +, no tenderness to palpation


Skin: Ecchymosis + in UE


Neuro: Oriented x3, no focal deficits


 Laboratory Results - last 24 hr











  05/03/18 05/03/18 05/03/18





  11:06 11:06 11:06


 


WBC  14.8 H  


 


RBC  3.66 L  


 


Hgb  11.1 L  


 


Hct  34 L  


 


MCV  93  


 


MCH  30  


 


MCHC  33  


 


RDW  15  


 


Plt Count  231  


 


MPV  7.4  


 


Neut % (Auto)  Not Reportable  


 


Lymph % (Auto)  Not Reportable  


 


Mono % (Auto)  Not Reportable  


 


Eos % (Auto)  Not Reportable  


 


Baso % (Auto)  Not Reportable  


 


Absolute Neuts (auto)  12.4 H  


 


Absolute Lymphs (auto)  1.4  


 


Absolute Monos (auto)  0.9 H  


 


Absolute Eos (auto)  0  


 


Absolute Basos (auto)  0  


 


Absolute Nucleated RBC  0  


 


Immature Gran %  3  


 


Neutrophils %  88 H  


 


Band Neutrophils %  1  


 


Lymphocytes %  5 L  


 


Monocytes %  4  


 


Eosinophils %  0  


 


Basophils %  0  


 


Metamyelocytes %  1  


 


Myelocytes %  1  


 


Nucleated RBC %  Not Reportable  


 


Abs Neuts (Manual)  13.0 H  


 


Abs Lymphs (Manual)  0.7 L  


 


Abs Monocytes (Manual)  0.6  


 


Absolute Eos (Manual)  0  


 


Abs Basophils (Manual)  0  


 


Normal RBC Morphology  Normal  


 


INR (Anticoag Therapy)    1.70 H


 


Sodium   141 


 


Potassium   4.1 


 


Chloride   95 L 


 


Carbon Dioxide   38 H 


 


Anion Gap   8 


 


BUN   38 H 


 


Creatinine   1.55 H 


 


Est GFR ( Amer)   42.2 


 


Est GFR (Non-Af Amer)   32.8 


 


BUN/Creatinine Ratio   24.5 H 


 


Glucose   212 H 


 


Calcium   8.5 L 























I/R: 72 y o f with h/o severe COPD, lung cancer with recurrence, underwent XRT, 

Chrons s/p ileostomy with high out put, REYNA on CPAP a/w worsening SOB for acute 

COPD exacerbation








Overall improved since admission


Has poor functional status and quality of life at baseline, sleeping in bed for 

most part


Morphine working better for her breathing


c/w nebs


c/w prednisone taper to baseline of 10mg


c/w Flonase


Having thick phleghm, using flutter device


 c/w BiPAP at night


She is not hypoxic on 2-3L O2


Recurrent lung cancer s/p XRT, progressing as per imaging findings


Was discussed at tumor board, lesion not accessible for easy biopsy given poor 

functional status and pulmonary status


Slowly growing malignancy versus inflammation sec to radiation being considered


Pt is DNR 


Her significant other Sarah is her HCP, will discuss arrangements needed for d/

c

## 2018-05-03 NOTE — PN
Subjective


Date of Service: 05/03/18


Interval History: 








per nursing staff patient is refusing to get OOB today. On exam she reports she 

was "very tired today". She is noted to A+O x3 sitting up visiting with the 

nursing student laughing and appearing to have a nice conversation. She denies 

depression but states she feels very anxious about going home and not being 

strong enough. She does not want to go to subacute rehab. She is also not sure 

if she wants to go back on Hospice. She is very short with me and state s:I 

just dont want to talk about it right now". 





She reports her breathing continues to feel better. No fevers or chills. 

Appetite improving. 





Discussed with the  who has set up a meeting with her wife Robin 

tomorrow at 230pm . 











Family History: Unchanged from Admission


Social History: Unchanged from Admission


Past Medical History: Unchanged from Admission





Objective


Active Medications: 








Acetaminophen (Tylenol Tab*)  650 mg PO Q6H PRN


   PRN Reason: pain/fever


   Last Admin: 05/02/18 01:14 Dose:  650 mg


Amlodipine Besylate (Norvasc Tab*)  5 mg PO DAILY [facility]


   Last Admin: 05/03/18 08:30 Dose:  5 mg


Aspirin (Aspirin Ec Tab*)  81 mg PO DAILY [facility]


   Last Admin: 05/03/18 08:31 Dose:  81 mg


Calcitriol (Rocaltrol  Cap*)  0.25 mcg PO QAM [facility]


   Last Admin: 05/03/18 08:30 Dose:  0.25 mcg


Cinacalcet (Sensipar Tab*)  30 mg PO QAM [facility]


   Last Admin: 05/03/18 08:30 Dose:  30 mg


Fluticasone Propionate (Flonase Nasal Spray 50mcg*)  2 spray BOTH NARES DAILY 

[facility]


   Last Admin: 05/03/18 08:44 Dose:  Not Given


Guaifenesin (Mucinex*)  600 mg PO BID PRN


   PRN Reason: CONGESTION


   Last Admin: 04/26/18 08:20 Dose:  600 mg


Ipratropium Bromide (Atrovent 0.5 Mg Neb.Sol*)  0.5 mg INH [id number] AWAKE 

[facility]


   Last Admin: 05/03/18 13:07 Dose:  0.5 mg


Lorazepam (Ativan Tab(*))  0.5 mg PO TID [facility]


   Last Admin: 05/03/18 15:16 Dose:  0.5 mg


Magnesium Oxide (Magox 400 Tab*)  400 mg PO BID [facility]


   Last Admin: 05/03/18 08:30 Dose:  400 mg


Morphine Sulfate (Ms Contin(*))  15 mg PO Q12H [facility]


   Last Admin: 05/03/18 08:31 Dose:  15 mg


Opium Tincture (Opium Tincture*)  6 mg PO FIVE TIMES DAILY [facility]


   Last Admin: 05/03/18 16:41 Dose:  6 mg


Paroxetine HCl (Paxil Tab*)  30 mg PO QPM [facility]


   Last Admin: 05/03/18 16:42 Dose:  30 mg


Pharmacy Profile Note (Coumadin Daily Reminder*)  0 note FOLLOW UP 1700 [facility]


   Last Admin: 05/03/18 16:42 Dose:  1 note


Prednisone (Deltasone Tab*)  30 mg PO 0900 [facility]


   Last Admin: 05/03/18 08:30 Dose:  30 mg


Sodium Chloride (Sodium Chloride 0.65% Nasal Spray*)  1 spray BOTH NARES Q4H PRN


   PRN Reason: Dry nasal passages


Tiotropium Bromide (Spiriva Cap.Inh*)  1 cap INH DAILY [facility]


   Last Admin: 05/03/18 07:57 Dose:  1 cap


Warfarin Sodium (Coumadin Tab(*))  2 mg PO DAILY@1700 [facility]


   PRN Reason: Protocol


   Last Admin: 05/03/18 16:43 Dose:  2 mg








 Vital Signs - 8 hr











  05/03/18 05/03/18 05/03/18





  10:17 10:18 11:19


 


Temperature   97.8 F


 


Pulse Rate   76


 


Respiratory 16 16 20





Rate   


 


Blood Pressure   136/66





(mmHg)   


 


O2 Sat by Pulse   99





Oximetry   














  05/03/18 05/03/18 05/03/18





  13:08 15:00 15:14


 


Temperature  97.9 F 


 


Pulse Rate 76 68 


 


Respiratory 19 16 16





Rate   


 


Blood Pressure  147/74 





(mmHg)   


 


O2 Sat by Pulse 95 98 





Oximetry   














  05/03/18 05/03/18 05/03/18





  15:16 16:24 16:25


 


Temperature   


 


Pulse Rate   


 


Respiratory 16 20 20





Rate   


 


Blood Pressure   





(mmHg)   


 


O2 Sat by Pulse   





Oximetry   














  05/03/18





  16:41


 


Temperature 


 


Pulse Rate 


 


Respiratory 20





Rate 


 


Blood Pressure 





(mmHg) 


 


O2 Sat by Pulse 





Oximetry 











Oxygen Devices in Use Now: BiPAP


Appearance: chronically ill 71 yo female layingin bed A+O x3 in NAD


Eyes: No Scleral Icterus, PERRLA


Ears/Nose/Mouth/Throat: NL Teeth, Lips, Gums, Mucous Membranes Moist


Neck: NL Appearance and Movements; NL JVP


Respiratory: Symmetrical Chest Expansion and Respiratory Effort, Clear to 

Auscultation


Cardiovascular: NL Sounds; No Murmurs; No JVD, RRR, No Edema


Abdominal: NL Sounds; No Tenderness; No Distention, - - obese


Skin: - - multiple areas of ecchymosis on arms bilaterally (secondary to blood 

draws); healing 


Neurological: Alert and Oriented x 3, NL Sensation, NL Muscle Strength and Tone


Lines/Tubes/Other Access: Clean, Dry and Intact Peripheral IV


Nutrition: Taking PO's


Result Diagrams: 


 05/03/18 11:06





 05/03/18 11:06


Additional Lab and Data: 


.


Microbiology and Other Data: 


 Microbiology











 04/24/18 09:22 Legionella Urinary Antigen - Final





 Urine    Negative Legionella Antigen





 Streptococcus pneumoniae Ag Screen - Final





    Negative S. pneumo Antigen














Assess/Plan/Problems-Billing


.


Assessment: 





71 yo female with h/o severe COPD, lung cancer (and recurrence), s/p radiation, 

now with worsening SOB for acute COPD exacerbation and hypoxic respiratory 

failure





Medically complex patient with Crohn's s/p ileostomy with high output, on Opium 

as antimotility agent











- Patient Problems


(1) Acute and chronic respiratory failure with hypoxia


Comment: 


- Resolved


- End-stage COPD with severe emphysema and hx of lung cancer with suggestion of 

possible enlarging nodule. 


- demonstrated elevation in A-a gradient.


- BiPAP often needed


- certainly acute and chronic HYPOXIC respiratory failure - improving 


- Praneeth following  - discussed over the phone - moving forward slow prednsione 

taper down to 10 mg (home dose), encourage home cpap (she has been non-compliant

) and smoking cessation   





(2) COPD exacerbation


Comment: 


- Regressed with need for rescue BiPAP 4/26 and 4/27 - now doing much better 

and is back at her baseline. 


- Certainly anxiety component, but may also have been a mucus plug, etc (see 

Dr. Ocasio's note)


- BiPAP available as patient needs -


- Morphine seems to help greatly


- Spiriva daily,  PRN Levalbuterol


- Prednisone taper, 35 mg 5/3 - decrease to 30 mg tomorrow with continued slow 

taper


- finished azith and ceftri - 6 day course.   





(3) Mass of upper lobe of right lung


Comment: 


- Lung cancer - possible? mass on CT. follows with Dr. Higgins/Dr. Rothman


-  f/u with Onc/Onc Radiology to discuss further options prior to deciding 

hospice -


- Dr. Higgins looked at scans and her CT was discussed in Tumor Board including 

Dr. rothman ... it is possible there is some growth but could also be fibrous 

tissue aorund the nodule that was there already..... their team is following 

along peripherally. Dr. aguirre spoke with the patient twice this week. Most 

likely the patient is not a canidate for much treatment but pt should follow up 

with her onc if she decides she wants to discuss further.    





(4) Crohn's disease


Comment: 


S/P ileostomy with hx of high oupt - Continue home dose tincture of opium 





   





(5) History of DVT (deep vein thrombosis)


Comment: 


- Warfarin   





(6) Acute on chronic renal failure


Comment: 


- Cr at admission 1.9


- Now at baseline at ~1.5.   





(7) Anxiety


Comment: 


- Certainly an anxiety component


- Continue morphine SR - changed from 15 mg PO Q8 to Q12 5/2 (started this 

admission)


- Continue Ativan 0.5 mg PO TID


   





(8) DNR (do not resuscitate)








(9) DVT prophylaxis


Comment: 


- Warfarin, INR 1.7 today, continue 2 mg daily    


Status and Disposition: 


.


Inpatient. Patients goal was to return to home at discharge. It was discussed 

with her at length that to meet that goal she needs to be compliant with PT and 

she frequently refuses to get OOB with the nurse and/or not work with PT. the 

patient is very weak and has little reserve. She is medically stable for 

discharge at this point other than her deconditioning and weakness. There is a 

family meeting tomorrow at 230pm to discuss options with the patient and her 

wife, along with .

## 2018-05-04 ENCOUNTER — HOSPITAL ENCOUNTER (INPATIENT)
Dept: HOSPITAL 25 - MED | Age: 73
LOS: 6 days | Discharge: HOME HEALTH | DRG: 190 | End: 2018-05-10
Attending: INTERNAL MEDICINE | Admitting: INTERNAL MEDICINE
Payer: MEDICARE

## 2018-05-04 VITALS — DIASTOLIC BLOOD PRESSURE: 66 MMHG | SYSTOLIC BLOOD PRESSURE: 144 MMHG

## 2018-05-04 DIAGNOSIS — J44.1: Primary | ICD-10-CM

## 2018-05-04 DIAGNOSIS — K50.90: ICD-10-CM

## 2018-05-04 DIAGNOSIS — C34.90: ICD-10-CM

## 2018-05-04 DIAGNOSIS — Z79.01: ICD-10-CM

## 2018-05-04 DIAGNOSIS — Z83.3: ICD-10-CM

## 2018-05-04 DIAGNOSIS — J96.21: ICD-10-CM

## 2018-05-04 DIAGNOSIS — Z82.49: ICD-10-CM

## 2018-05-04 DIAGNOSIS — F41.9: ICD-10-CM

## 2018-05-04 DIAGNOSIS — Z88.7: ICD-10-CM

## 2018-05-04 DIAGNOSIS — Z88.1: ICD-10-CM

## 2018-05-04 DIAGNOSIS — Z79.82: ICD-10-CM

## 2018-05-04 DIAGNOSIS — Z86.718: ICD-10-CM

## 2018-05-04 DIAGNOSIS — Z86.711: ICD-10-CM

## 2018-05-04 DIAGNOSIS — F17.210: ICD-10-CM

## 2018-05-04 DIAGNOSIS — N18.3: ICD-10-CM

## 2018-05-04 DIAGNOSIS — H53.8: ICD-10-CM

## 2018-05-04 DIAGNOSIS — Z93.2: ICD-10-CM

## 2018-05-04 LAB — INR PPP/BLD: 1.9 (ref 0.77–1.02)

## 2018-05-04 PROCEDURE — 36415 COLL VENOUS BLD VENIPUNCTURE: CPT

## 2018-05-04 PROCEDURE — 85610 PROTHROMBIN TIME: CPT

## 2018-05-04 PROCEDURE — 94660 CPAP INITIATION&MGMT: CPT

## 2018-05-04 PROCEDURE — 94640 AIRWAY INHALATION TREATMENT: CPT

## 2018-05-04 RX ADMIN — WARFARIN SODIUM SCH MG: 2 TABLET ORAL at 17:10

## 2018-05-04 RX ADMIN — LORAZEPAM SCH MG: 0.5 TABLET ORAL at 22:52

## 2018-05-04 RX ADMIN — GLYCERIN PRN DROP: .002; .002; .01 SOLUTION/ DROPS OPHTHALMIC at 17:09

## 2018-05-04 RX ADMIN — LORAZEPAM SCH MG: 0.5 TABLET ORAL at 21:09

## 2018-05-04 RX ADMIN — GUAIFENESIN PRN MG: 600 TABLET, EXTENDED RELEASE ORAL at 10:26

## 2018-05-04 RX ADMIN — IPRATROPIUM BROMIDE SCH MG: 0.5 SOLUTION RESPIRATORY (INHALATION) at 13:48

## 2018-05-04 RX ADMIN — AMLODIPINE BESYLATE SCH MG: 5 TABLET ORAL at 10:26

## 2018-05-04 RX ADMIN — MAGNESIUM OXIDE TAB 400 MG (241.3 MG ELEMENTAL MG) SCH MG: 400 (241.3 MG) TAB at 10:27

## 2018-05-04 RX ADMIN — MORPHINE TINCTURE SCH MG: 1 SOLUTION ORAL at 05:51

## 2018-05-04 RX ADMIN — IPRATROPIUM BROMIDE SCH: 0.5 SOLUTION RESPIRATORY (INHALATION) at 01:44

## 2018-05-04 RX ADMIN — MORPHINE SULFATE SCH MG: 15 TABLET, EXTENDED RELEASE ORAL at 10:26

## 2018-05-04 RX ADMIN — PREDNISONE SCH MG: 10 TABLET ORAL at 10:26

## 2018-05-04 RX ADMIN — MORPHINE TINCTURE SCH MG: 1 SOLUTION ORAL at 21:15

## 2018-05-04 RX ADMIN — FLUTICASONE PROPIONATE SCH: 50 SPRAY, METERED NASAL at 11:07

## 2018-05-04 RX ADMIN — MAGNESIUM OXIDE TAB 400 MG (241.3 MG ELEMENTAL MG) SCH MG: 400 (241.3 MG) TAB at 21:11

## 2018-05-04 RX ADMIN — IPRATROPIUM BROMIDE PRN MG: 0.5 SOLUTION RESPIRATORY (INHALATION) at 17:45

## 2018-05-04 RX ADMIN — MORPHINE TINCTURE SCH MG: 1 SOLUTION ORAL at 10:25

## 2018-05-04 RX ADMIN — IPRATROPIUM BROMIDE PRN MG: 0.5 SOLUTION RESPIRATORY (INHALATION) at 22:51

## 2018-05-04 RX ADMIN — LORAZEPAM SCH MG: 1 TABLET ORAL at 10:27

## 2018-05-04 RX ADMIN — LORAZEPAM SCH MG: 0.5 TABLET ORAL at 17:10

## 2018-05-04 RX ADMIN — HEPARIN SODIUM SCH: 5000 INJECTION INTRAVENOUS; SUBCUTANEOUS at 22:06

## 2018-05-04 RX ADMIN — CALCITRIOL SCH MCG: 0.25 CAPSULE ORAL at 10:26

## 2018-05-04 RX ADMIN — IPRATROPIUM BROMIDE SCH MG: 0.5 SOLUTION RESPIRATORY (INHALATION) at 07:57

## 2018-05-04 RX ADMIN — CINACALCET HYDROCHLORIDE SCH MG: 30 TABLET, COATED ORAL at 10:26

## 2018-05-04 RX ADMIN — MORPHINE TINCTURE SCH MG: 1 SOLUTION ORAL at 17:09

## 2018-05-04 RX ADMIN — CLOTRIMAZOLE SCH MG: 10 LOZENGE ORAL; TOPICAL at 17:10

## 2018-05-04 RX ADMIN — ASPIRIN SCH MG: 81 TABLET, COATED ORAL at 10:26

## 2018-05-04 RX ADMIN — MORPHINE SULFATE SCH MG: 15 TABLET, EXTENDED RELEASE ORAL at 22:09

## 2018-05-04 RX ADMIN — CLOTRIMAZOLE SCH: 10 LOZENGE ORAL; TOPICAL at 22:14

## 2018-05-04 RX ADMIN — PAROXETINE SCH MG: 20 TABLET, FILM COATED ORAL at 17:09

## 2018-05-04 RX ADMIN — TIOTROPIUM BROMIDE SCH CAP: 18 CAPSULE ORAL; RESPIRATORY (INHALATION) at 07:57

## 2018-05-04 NOTE — PN
Subjective


Date of Service: 05/04/18


Interval History: 





Patient states she is breathing better but needs more time to get stronger so 

she can go home. 


Family History: Unchanged from Admission


Social History: Unchanged from Admission


Past Medical History: Unchanged from Admission





Objective


Active Medications: 








Acetaminophen (Tylenol Tab*)  650 mg PO Q6H PRN


   PRN Reason: pain/fever


   Last Admin: 05/02/18 01:14 Dose:  650 mg


Amlodipine Besylate (Norvasc Tab*)  5 mg PO DAILY [facility]


   Last Admin: 05/04/18 10:26 Dose:  5 mg


Aspirin (Aspirin Ec Tab*)  81 mg PO DAILY [facility]


   Last Admin: 05/04/18 10:26 Dose:  81 mg


Calcitriol (Rocaltrol  Cap*)  0.25 mcg PO QAM [facility]


   Last Admin: 05/04/18 10:26 Dose:  0.25 mcg


Cinacalcet (Sensipar Tab*)  30 mg PO QAM [facility]


   Last Admin: 05/04/18 10:26 Dose:  30 mg


Fluticasone Propionate (Flonase Nasal Spray 50mcg*)  2 spray BOTH NARES DAILY 

[facility]


   Last Admin: 05/04/18 11:07 Dose:  Not Given


Guaifenesin (Mucinex*)  600 mg PO BID PRN


   PRN Reason: CONGESTION


   Last Admin: 05/04/18 10:26 Dose:  600 mg


Ipratropium Bromide (Atrovent 0.5 Mg Neb.Sol*)  0.5 mg INH [id number] AWAKE 

[facility]


   Last Admin: 05/04/18 13:48 Dose:  0.5 mg


Lorazepam (Ativan Tab(*))  0.5 mg PO TID [facility]


   Last Admin: 05/04/18 10:27 Dose:  0.5 mg


Magnesium Oxide (Magox 400 Tab*)  400 mg PO BID [facility]


   Last Admin: 05/04/18 10:27 Dose:  400 mg


Melatonin (Melatonin (Nf))  3 mg PO BEDTIME PRN


   PRN Reason: INSOMNIA


   Last Admin: 05/04/18 02:43 Dose:  3 mg


Morphine Sulfate (Ms Contin(*))  15 mg PO Q12H [facility]


   Last Admin: 05/04/18 10:26 Dose:  15 mg


Opium Tincture (Opium Tincture*)  6 mg PO FIVE TIMES DAILY [facility]


   Last Admin: 05/04/18 10:25 Dose:  6 mg


Paroxetine HCl (Paxil Tab*)  30 mg PO QPM [facility]


   Last Admin: 05/03/18 16:42 Dose:  30 mg


Pharmacy Profile Note (Coumadin Daily Reminder*)  0 note FOLLOW UP 1700 [facility]


   Last Admin: 05/03/18 16:42 Dose:  1 note


Prednisone (Deltasone Tab*)  30 mg PO 0900 [facility]


   Last Admin: 05/04/18 10:26 Dose:  30 mg


Sodium Chloride (Sodium Chloride 0.65% Nasal Spray*)  1 spray BOTH NARES Q4H PRN


   PRN Reason: Dry nasal passages


Tiotropium Bromide (Spiriva Cap.Inh*)  1 cap INH DAILY [facility]


   Last Admin: 05/04/18 07:57 Dose:  1 cap


Warfarin Sodium (Coumadin Tab(*))  2 mg PO DAILY@1700 [facility]


   PRN Reason: Protocol


   Last Admin: 05/03/18 16:43 Dose:  2 mg








 Vital Signs - 8 hr











  05/04/18 05/04/18 05/04/18





  07:20 07:59 08:00


 


Temperature 98.6 F  


 


Pulse Rate 57 70 


 


Respiratory 17 17 26





Rate   


 


Blood Pressure 144/66  





(mmHg)   


 


O2 Sat by Pulse 100 98 





Oximetry   














  05/04/18 05/04/18 05/04/18





  08:28 10:25 10:26


 


Temperature   


 


Pulse Rate   


 


Respiratory 22 20 20





Rate   


 


Blood Pressure   





(mmHg)   


 


O2 Sat by Pulse   





Oximetry   














  05/04/18 05/04/18 05/04/18





  10:27 13:01 13:02


 


Temperature   


 


Pulse Rate   


 


Respiratory 20 16 16





Rate   


 


Blood Pressure   





(mmHg)   


 


O2 Sat by Pulse   





Oximetry   














  05/04/18





  13:50


 


Temperature 


 


Pulse Rate 74


 


Respiratory 17





Rate 


 


Blood Pressure 





(mmHg) 


 


O2 Sat by Pulse 98





Oximetry 











Oxygen Devices in Use Now: Nasal Cannula


Appearance: Alert, in a chair.  In fair spirits.  Looks comfortable.


Eyes: No Scleral Icterus


Respiratory: Symmetrical Chest Expansion and Respiratory Effort, Clear to 

Percussion, - - mild rhonchi BL


Cardiovascular: NL Sounds; No Murmurs; No JVD, RRR, No Edema, -


Extremities: No Edema, No Clubbing, Cyanosis, -


Skin: No Rash or Ulcers, No Nodules or Sclerosis, -


Neurological: Alert and Oriented x 3, NL Sensation


Result Diagrams: 


 05/03/18 11:06





 05/03/18 11:06


Additional Lab and Data: 


.


Microbiology and Other Data: 


 Microbiology











 04/24/18 09:22 Legionella Urinary Antigen - Final





 Urine    Negative Legionella Antigen





 Streptococcus pneumoniae Ag Screen - Final





    Negative S. pneumo Antigen














Assess/Plan/Problems-Billing


.


Assessment: 





71 yo female with h/o severe COPD, lung cancer (and recurrence), s/p radiation, 

now with worsening SOB for acute COPD exacerbation and hypoxic respiratory 

failure





Medically complex patient with Crohn's s/p ileostomy with high output, on Opium 

as antimotility agent











- Patient Problems


(1) COPD exacerbation


Current Visit: Yes   Status: Acute   Priority: High   Code(s): J44.1 - CHRONIC 

OBSTRUCTIVE PULMONARY DISEASE W (ACUTE) EXACERBATION   SNOMED Code(s): 

402707873853143


   Comment: 


- Regressed with need for rescue BiPAP 4/26 and 4/27 - now doing much better 

and is back at her baseline. 


- Certainly anxiety component


- BiPAP available as patient needs 


- Scheduled morphine seems to help


- Spiriva daily,  PRN Levalbuterol


- Prednisone taper, 25 mg 5/4, continue taper


- finished azith and ceftri - 6 day course.   





(2) Lung cancer


Current Visit: No   Status: Acute   Code(s): C34.90 - MALIGNANT NEOPLASM OF 

UNSP PART OF UNSP BRONCHUS OR LUNG   SNOMED Code(s): 656709733


   Comment: Fup with Dr. Higgins as outpt.   





(3) History of DVT (deep vein thrombosis)


Current Visit: Yes   Status: Chronic   Priority: High   Code(s): Z86.718 - 

PERSONAL HISTORY OF OTHER VENOUS THROMBOSIS AND EMBOLISM   SNOMED Code(s): 

528932584


   Comment: 


- Warfarin   





(4) Tobacco abuse


Current Visit: No   Status: Acute   Code(s): Z72.0 - TOBACCO USE   SNOMED Code(s

): 785275286


   Comment: Pt advised to quit smoking and avoid second hand smoke.    


Status and Disposition: 


.


Inpatient. Patients goal was to return to home at discharge. It was discussed 

with her at length that to meet that goal she needs to be compliant with PT and 

she frequently refuses to get OOB with the nurse and/or not work with PT. the 

patient is very weak and has little reserve. She is medically stable for 

discharge at this point other than her deconditioning and weakness. There is a 

family meeting tomorrow at 230pm to discuss options with the patient and her 

wife, along with .

## 2018-05-05 RX ADMIN — CINACALCET HYDROCHLORIDE SCH MG: 30 TABLET, COATED ORAL at 08:59

## 2018-05-05 RX ADMIN — HEPARIN SODIUM SCH: 5000 INJECTION INTRAVENOUS; SUBCUTANEOUS at 05:59

## 2018-05-05 RX ADMIN — AMLODIPINE BESYLATE SCH MG: 5 TABLET ORAL at 09:00

## 2018-05-05 RX ADMIN — CLOTRIMAZOLE SCH MG: 10 LOZENGE ORAL; TOPICAL at 08:59

## 2018-05-05 RX ADMIN — PAROXETINE SCH MG: 20 TABLET, FILM COATED ORAL at 18:22

## 2018-05-05 RX ADMIN — MORPHINE TINCTURE SCH MG: 1 SOLUTION ORAL at 08:58

## 2018-05-05 RX ADMIN — MORPHINE SULFATE SCH MG: 15 TABLET, EXTENDED RELEASE ORAL at 09:00

## 2018-05-05 RX ADMIN — GUAIFENESIN SCH: 600 TABLET, EXTENDED RELEASE ORAL at 21:02

## 2018-05-05 RX ADMIN — MAGNESIUM OXIDE TAB 400 MG (241.3 MG ELEMENTAL MG) SCH MG: 400 (241.3 MG) TAB at 09:00

## 2018-05-05 RX ADMIN — LORAZEPAM SCH MG: 0.5 TABLET ORAL at 20:37

## 2018-05-05 RX ADMIN — CLOTRIMAZOLE SCH MG: 10 LOZENGE ORAL; TOPICAL at 14:40

## 2018-05-05 RX ADMIN — GLYCERIN PRN DROP: .002; .002; .01 SOLUTION/ DROPS OPHTHALMIC at 09:06

## 2018-05-05 RX ADMIN — TIOTROPIUM BROMIDE SCH CAP: 18 CAPSULE ORAL; RESPIRATORY (INHALATION) at 08:52

## 2018-05-05 RX ADMIN — MAGNESIUM OXIDE TAB 400 MG (241.3 MG ELEMENTAL MG) SCH MG: 400 (241.3 MG) TAB at 20:37

## 2018-05-05 RX ADMIN — GUAIFENESIN SCH MG: 600 TABLET, EXTENDED RELEASE ORAL at 09:00

## 2018-05-05 RX ADMIN — IPRATROPIUM BROMIDE PRN MG: 0.5 SOLUTION RESPIRATORY (INHALATION) at 08:51

## 2018-05-05 RX ADMIN — MORPHINE SULFATE SCH MG: 15 TABLET, EXTENDED RELEASE ORAL at 21:22

## 2018-05-05 RX ADMIN — CLOTRIMAZOLE SCH MG: 10 LOZENGE ORAL; TOPICAL at 20:37

## 2018-05-05 RX ADMIN — CLOTRIMAZOLE SCH MG: 10 LOZENGE ORAL; TOPICAL at 18:22

## 2018-05-05 RX ADMIN — MORPHINE TINCTURE SCH MG: 1 SOLUTION ORAL at 18:22

## 2018-05-05 RX ADMIN — IPRATROPIUM BROMIDE PRN MG: 0.5 SOLUTION RESPIRATORY (INHALATION) at 23:16

## 2018-05-05 RX ADMIN — LORAZEPAM SCH MG: 0.5 TABLET ORAL at 08:59

## 2018-05-05 RX ADMIN — ASPIRIN SCH MG: 81 TABLET, COATED ORAL at 09:00

## 2018-05-05 RX ADMIN — HEPARIN SODIUM SCH: 5000 INJECTION INTRAVENOUS; SUBCUTANEOUS at 14:41

## 2018-05-05 RX ADMIN — WARFARIN SODIUM SCH MG: 2 TABLET ORAL at 18:22

## 2018-05-05 RX ADMIN — CALCITRIOL SCH MCG: 0.25 CAPSULE ORAL at 08:59

## 2018-05-05 RX ADMIN — GUAIFENESIN SCH MG: 600 TABLET, EXTENDED RELEASE ORAL at 18:36

## 2018-05-05 RX ADMIN — ACETAMINOPHEN PRN MG: 325 TABLET ORAL at 21:26

## 2018-05-05 RX ADMIN — MORPHINE TINCTURE SCH MG: 1 SOLUTION ORAL at 14:41

## 2018-05-05 RX ADMIN — LORAZEPAM SCH MG: 0.5 TABLET ORAL at 14:40

## 2018-05-05 RX ADMIN — IPRATROPIUM BROMIDE PRN MG: 0.5 SOLUTION RESPIRATORY (INHALATION) at 17:15

## 2018-05-05 RX ADMIN — MORPHINE TINCTURE SCH MG: 1 SOLUTION ORAL at 20:39

## 2018-05-05 NOTE — ADMNOTE
Subjective


Date of Service: 05/05/18


Interval History: 





   DISCHARGE SUMMARY FOR 5/4/18:


   ADMISSION HISTORY AND PHYSICAL EXAM:


 Allergies











Allergy/AdvReac Type Severity Reaction Status Date / Time


 


daptomycin Allergy  See Comment Verified 03/24/18 13:46


 


doxycycline Allergy  Vomiting Verified 03/24/18 13:46


 


epinephrine Allergy  See Comment Verified 03/24/18 13:46


 


infliximab [From Remicade] Allergy  Swelling Verified 03/24/18 13:46


 


levofloxacin [From Levaquin] Allergy  Rash Verified 03/24/18 13:46


 


tetanus toxoid, adsorbed Allergy  Swelling Verified 03/24/18 13:46


 


vancomycin Allergy  Wheezing Verified 03/24/18 13:46


 


eggplant Allergy Mild Difficulty Uncoded 11/29/17 23:59





   Breathing  








 Home Medications











 Medication  Instructions  Recorded  Confirmed  Type


 


Opium Tincture* [Opium*] 0.6 ml PO SEE INSTRUCTIONS MDD 24 11/25/13 05/04/18 

History





 mg   


 


Cinacalcet TAB* [Sensipar TAB*] 30 mg PO QAM 02/28/14 05/04/18 History


 


Calcitriol CAP* [Rocaltrol  CAP*] 0.25 mcg PO QAM 03/20/17 05/04/18 History


 


Cyanocobalamin INJ * [Vitamin B12 1,000 mcg IM WEEKLY 03/20/17 05/04/18 History





INJ *]    


 


PARoxetine HCL TAB* [Paxil TAB*] 30 mg PO QPM 03/20/17 05/04/18 History


 


guaiFENesin ER TAB [Mucinex*] 600 mg PO BID PRN 03/20/17 05/04/18 History


 


Ipratropium 0.5MG/2.5ML NEB* 0.5 mg INH Q6HR PRN 08/27/17 05/04/18 History





[Atrovent 0.5 MG NEB.SOL*]    


 


LORazepam TAB(*) [Ativan 1 MG TAB 0.5 mg PO TID MDD 6 08/27/17 05/04/18 History





(*)]    


 


Aspirin EC TAB* [Ecotrin EC Low 81 mg PO DAILY 03/24/18 05/04/18 History





Dose 81 MG*]    


 


Acetaminophen TAB* [Tylenol TAB*] 650 mg PO Q6H PRN  tab 03/30/18 05/04/18 Rx


 


Magnesium Oxide TAB* [MagOx 400 400 mg PO BID #60 tab 03/30/18 05/04/18 Rx





TAB*]    


 


Albuterol HFA INHALER* [Ventolin 2 puff INH Q4H PRN 04/23/18 05/04/18 History





HFA Inhaler*]    


 


Clotrimazole ALEXANDRA* [Mycelex 10 mg PO SEE INSTRUCTIONS 04/23/18 05/04/18 

History





Alexanrda*]    


 


Tiotropium CAP.INH* [Spiriva 1 cap INH DAILY 04/23/18 05/04/18 History





CAP.INH*]    


 


Warfarin TAB(*) [Coumadin TAB(*)] 2 mg PO DAILY 04/23/18 05/04/18 History








HPI:





The patient was admitted to an acute hospital bed 4/23/18 with cc SOB.  She was 

treated for a COPD exacerbation and changed to swing bed status on 5/4/18.  

There has been no significant clinical change since 5/4.  She states she had a 

good night's sleep last night. 


Family History: Findings - Paternal GF had diabetes, father had heart disease


Social History: Findings - Smokes 2ppd.  Lives with her partner Sarah crespo who 

is her SDM.


Past Medical History: Findings - Colectomy/ileostomy for Crohn's disease, mult 

abdominal surgeries for Crohn's, AV fistula L arm x 3.





Review of Systems





- Measurements


Intake and Output: 


Intake and Output Last 24 Hours











 05/03/18 05/04/18 05/05/18 05/06/18





 06:59 06:59 06:59 06:59


 


Intake Total   0 600


 


Output Total   1800 


 


Balance   -1800 600


 


Weight   151 lb 6.4 oz 


 


Intake:    


 


  Oral   0 600


 


Output:    


 


  Urine   0 


 


  Stark   1100 


 


  Colostomy   350 


 


  Ileostomy   350 


 


Other:    


 


  Estimated Stool Amount   Large 














- Review of Systems


Constitutional Symptoms: 


   Negative: Weight Gain, Weight Loss, Weakness, Fatigue, Fever, Night Sweats, 

Unexplained Falls, Other


Dermatology: 


   Negative: Normal, Rash, Skin Lesions, Cancer, Skin Lumps, Other


HEENT: 


   Negative: Normal, Change in Hearing, Vertigo, Dental Problems, Tinnitus, 

Sinus Problem, Other


Eyes: 


   Negative: Normal, Change in Vision, Double Vision, Eye Pain, Glaucoma, 

Cataract, Contacts or Glasses, Other


Thyroid: 


   Negative: Normal, Goiter, Thyroid Nodule, Cold Intolerance, Heat Intolerance

, Sweatiness, Tremor, Frequent Defecation, Constipation, Palpitations, Primary 

Hypothyroidism, Primary Hyperthyroidism, Weight Loss, Weight Gain, Change in 

Skin/Hair, Change in Menstruation, Radiation Exposure, Other


Pulmonary: Positive: Shortness of Breath, COPD


Cardiology: Positive: Normal


Gastroenterology: Positive: Normal


Genital - Urinary: Positive: Normal


Musculoskeletal: 


   Negative: Joint Pain, Joint Stiffness, Arthritis, Osteoporosis, Low Back Pain

, Sciatica, Joint Deformities, Kyphoscoliosis, Other


Endocrinology: Positive: Normal


Hematologic/Lymphatic: Positive: Anemia


Neurology: Positive: Normal


Psychiatry: Positive: Depression


Allergic/Immunologic: 


   Negative: Hx Anaphylaxis, Hx Angioedema, Hx Environmental, Hx Seasonal, 

Athsma, Hx HIV, Immunocompromise, Swollen Glands LymphNodes, Other





Objective


Active Medications: 








Acetaminophen (Tylenol Tab*)  650 mg PO Q6H PRN


   PRN Reason: pain/fever


Albuterol (Ventolin Hfa Inhaler*)  2 puff INH Q4H PRN


   PRN Reason: SOB/WHEEZING


Amlodipine Besylate (Norvasc Tab*)  5 mg PO DAILY [facility]


   Last Admin: 05/05/18 09:00 Dose:  5 mg


Aspirin (Aspirin Ec Tab*)  81 mg PO DAILY [facility]


   Last Admin: 05/05/18 09:00 Dose:  81 mg


Calcitriol (Rocaltrol  Cap*)  0.25 mcg PO QAM [facility]


   Last Admin: 05/05/18 08:59 Dose:  0.25 mcg


Cinacalcet (Sensipar Tab*)  30 mg PO QAM [facility]


   Last Admin: 05/05/18 08:59 Dose:  30 mg


Clotrimazole (Mycelex Alexandra*)  10 mg PO QID [facility]


   Last Admin: 05/05/18 08:59 Dose:  10 mg


Cyanocobalamin (Vitamin B12 Inj *)  1,000 mcg IM WEEKLY [facility]


Guaifenesin (Mucinex*)  600 mg PO BID [facility]


   Last Admin: 05/05/18 09:00 Dose:  600 mg


Heparin Sodium (Porcine) (Heparin Vial(*))  5,000 units SUBCUT Q8HR [facility]


   Last Admin: 05/05/18 05:59 Dose:  Not Given


Ipratropium Bromide (Atrovent 0.5 Mg Neb.Sol*)  0.5 mg INH Q6HR PRN


   PRN Reason: WHEEZING


   Last Admin: 05/05/18 08:51 Dose:  0.5 mg


Lorazepam (Ativan Tab(*))  0.5 mg PO TID [facility]


   Last Admin: 05/05/18 08:59 Dose:  0.5 mg


Magnesium Oxide (Magox 400 Tab*)  400 mg PO BID [facility]


   Last Admin: 05/05/18 09:00 Dose:  400 mg


Morphine Sulfate (Ms Contin(*))  15 mg PO Q12H [facility]


   Last Admin: 05/05/18 09:00 Dose:  15 mg


Opium Tincture (Opium Tincture*)  6 mg PO QID [facility]


   Last Admin: 05/05/18 08:58 Dose:  6 mg


Paroxetine HCl (Paxil Tab*)  30 mg PO QPM [facility]


   Last Admin: 05/04/18 17:09 Dose:  30 mg


Polyvinyl Alcohol (Polyvinyl Alcohol 1.4% Opth*)  1 drop BOTH EYES Q2H PRN


   PRN Reason: DRY EYE


   Last Admin: 05/05/18 09:06 Dose:  1 drop


Prednisone (Deltasone Tab*)  25 mg PO DAILY [facility]


   Last Admin: 05/05/18 08:59 Dose:  25 mg


Tiotropium Bromide (Spiriva Cap.Inh*)  1 cap INH DAILY [facility]


   Last Admin: 05/05/18 08:52 Dose:  1 cap


Warfarin Sodium (Coumadin Tab(*))  2 mg PO DAILY@1700 [facility]


   PRN Reason: Protocol


   Last Admin: 05/04/18 17:10 Dose:  2 mg








 Vital Signs - 8 hr











  05/05/18 05/05/18 05/05/18





  07:50 08:00 08:55


 


Temperature 96.2 F  


 


Pulse Rate 58  66


 


Respiratory 18 20 16





Rate   


 


Blood Pressure 134/63  





(mmHg)   


 


O2 Sat by Pulse 100  100





Oximetry   














  05/05/18 05/05/18 05/05/18





  08:58 08:59 09:00


 


Temperature   


 


Pulse Rate   


 


Respiratory 20 20 20





Rate   


 


Blood Pressure   





(mmHg)   


 


O2 Sat by Pulse   





Oximetry   














  05/05/18





  11:40


 


Temperature 96.1 F


 


Pulse Rate 68


 


Respiratory 15





Rate 


 


Blood Pressure 138/50





(mmHg) 


 


O2 Sat by Pulse 97





Oximetry 











Oxygen Devices in Use Now: BiPAP


Appearance: Alert, using BIPAP, lying on her L side.  In fair spirits.  Looks 

comfortable.


Eyes: No Scleral Icterus


Neck: NL Appearance and Movements; NL JVP, No Thyroid Enlargement, Masses


Respiratory: Symmetrical Chest Expansion and Respiratory Effort, Clear to 

Percussion, - - mild rhonchi BL


Cardiovascular: NL Sounds; No Murmurs; No JVD, RRR, No Edema, -


Extremities: No Edema, No Clubbing, Cyanosis, -


Skin: No Rash or Ulcers, No Nodules or Sclerosis, -


Neurological: Alert and Oriented x 3, NL Sensation





Assess/Plan/Problems-Billing


Assessment: 











- Patient Problems


(1) COPD exacerbation


Current Visit: No   Status: Acute   Priority: High   Code(s): J44.1 - CHRONIC 

OBSTRUCTIVE PULMONARY DISEASE W (ACUTE) EXACERBATION   SNOMED Code(s): 

207485021764179


   Comment: 


- Regressed with need for rescue BiPAP 4/26 and 4/27 - now doing much better 

and is back at her baseline. 


- Certainly anxiety component


- BiPAP available as patient needs 


- Scheduled morphine seems to help


- Spiriva daily,  PRN Levalbuterol


- Prednisone taper, 20 mg 5/6, continue taper.


- finished azith and ceftri - 6 day course.   





(2) Crohn's disease


Current Visit: No   Status: Chronic   Code(s): K50.90 - CROHN'S DISEASE, 

UNSPECIFIED, WITHOUT COMPLICATIONS   SNOMED Code(s): 37600757


   Comment: 


S/P ileostomy with hx of high oupt - Continue home dose tincture of opium 





   





(3) Anxiety


Current Visit: No   Status: Acute   Priority: High   Code(s): F41.9 - ANXIETY 

DISORDER, UNSPECIFIED   SNOMED Code(s): 46240377


   Comment: - Certainly an anxiety component


- Continue morphine SR 15 mg PO Q12.


- Continue Ativan 0.5 mg PO TID


   





(4) History of DVT (deep vein thrombosis)


Current Visit: No   Status: Chronic   Priority: High   Code(s): Z86.718 - 

PERSONAL HISTORY OF OTHER VENOUS THROMBOSIS AND EMBOLISM   SNOMED Code(s): 

855109441


   Comment: 


- Warfarin   





(5) History of pulmonary embolism


Current Visit: No   Status: Chronic   Priority: Medium   Code(s): Z86.711 - 

PERSONAL HISTORY OF PULMONARY EMBOLISM   SNOMED Code(s): 878842210


   Comment: Continue warfarin.  INR 5/6.   





(6) CKD (chronic kidney disease) stage 3, GFR 30-59 ml/min


Current Visit: No   Status: Chronic   Code(s): N18.3 - CHRONIC KIDNEY DISEASE, 

STAGE 3 (MODERATE)   SNOMED Code(s): 094312997


   Comment: Est GFR 32.8 5/3/18.     





(7) Lung cancer


Current Visit: No   Status: Acute   Code(s): C34.90 - MALIGNANT NEOPLASM OF 

UNSP PART OF UNSP BRONCHUS OR LUNG   SNOMED Code(s): 052396925


   Comment: Fup with Dr. Higgins as outpt.   





(8) Tobacco abuse


Current Visit: No   Status: Acute   Code(s): Z72.0 - TOBACCO USE   SNOMED Code(s

): 017607210


   Comment: Pt advised to quit smoking and avoid second hand smoke.

## 2018-05-06 LAB — INR PPP/BLD: 2.04 (ref 0.77–1.02)

## 2018-05-06 RX ADMIN — LORAZEPAM SCH MG: 0.5 TABLET ORAL at 12:41

## 2018-05-06 RX ADMIN — LORAZEPAM SCH MG: 0.5 TABLET ORAL at 20:08

## 2018-05-06 RX ADMIN — IPRATROPIUM BROMIDE PRN MG: 0.5 SOLUTION RESPIRATORY (INHALATION) at 09:19

## 2018-05-06 RX ADMIN — MAGNESIUM OXIDE TAB 400 MG (241.3 MG ELEMENTAL MG) SCH MG: 400 (241.3 MG) TAB at 09:06

## 2018-05-06 RX ADMIN — ACETAMINOPHEN PRN MG: 325 TABLET ORAL at 19:46

## 2018-05-06 RX ADMIN — AMLODIPINE BESYLATE SCH MG: 5 TABLET ORAL at 09:06

## 2018-05-06 RX ADMIN — IPRATROPIUM BROMIDE PRN MG: 0.5 SOLUTION RESPIRATORY (INHALATION) at 15:21

## 2018-05-06 RX ADMIN — MORPHINE TINCTURE SCH MG: 1 SOLUTION ORAL at 20:07

## 2018-05-06 RX ADMIN — CLOTRIMAZOLE SCH: 10 LOZENGE ORAL; TOPICAL at 15:06

## 2018-05-06 RX ADMIN — GLYCERIN PRN DROP: .002; .002; .01 SOLUTION/ DROPS OPHTHALMIC at 19:51

## 2018-05-06 RX ADMIN — GUAIFENESIN SCH MG: 600 TABLET, EXTENDED RELEASE ORAL at 19:44

## 2018-05-06 RX ADMIN — MAGNESIUM OXIDE TAB 400 MG (241.3 MG ELEMENTAL MG) SCH MG: 400 (241.3 MG) TAB at 19:51

## 2018-05-06 RX ADMIN — MORPHINE TINCTURE SCH MG: 1 SOLUTION ORAL at 16:09

## 2018-05-06 RX ADMIN — CLOTRIMAZOLE SCH MG: 10 LOZENGE ORAL; TOPICAL at 12:42

## 2018-05-06 RX ADMIN — WARFARIN SODIUM SCH MG: 2 TABLET ORAL at 16:09

## 2018-05-06 RX ADMIN — MORPHINE SULFATE SCH MG: 15 TABLET, EXTENDED RELEASE ORAL at 21:27

## 2018-05-06 RX ADMIN — GLYCERIN PRN DROP: .002; .002; .01 SOLUTION/ DROPS OPHTHALMIC at 16:09

## 2018-05-06 RX ADMIN — TIOTROPIUM BROMIDE SCH CAP: 18 CAPSULE ORAL; RESPIRATORY (INHALATION) at 09:19

## 2018-05-06 RX ADMIN — MORPHINE TINCTURE SCH MG: 1 SOLUTION ORAL at 12:42

## 2018-05-06 RX ADMIN — CALCITRIOL SCH MCG: 0.25 CAPSULE ORAL at 09:06

## 2018-05-06 RX ADMIN — PREDNISONE SCH MG: 50 TABLET ORAL at 09:05

## 2018-05-06 RX ADMIN — PAROXETINE SCH MG: 20 TABLET, FILM COATED ORAL at 16:09

## 2018-05-06 RX ADMIN — CLOTRIMAZOLE SCH MG: 10 LOZENGE ORAL; TOPICAL at 20:10

## 2018-05-06 RX ADMIN — LORAZEPAM SCH MG: 0.5 TABLET ORAL at 09:06

## 2018-05-06 RX ADMIN — CINACALCET HYDROCHLORIDE SCH MG: 30 TABLET, COATED ORAL at 09:06

## 2018-05-06 RX ADMIN — MORPHINE TINCTURE SCH MG: 1 SOLUTION ORAL at 09:05

## 2018-05-06 RX ADMIN — MORPHINE SULFATE SCH MG: 15 TABLET, EXTENDED RELEASE ORAL at 09:06

## 2018-05-06 RX ADMIN — ASPIRIN SCH MG: 81 TABLET, COATED ORAL at 09:06

## 2018-05-06 RX ADMIN — GUAIFENESIN SCH MG: 600 TABLET, EXTENDED RELEASE ORAL at 09:06

## 2018-05-06 RX ADMIN — CLOTRIMAZOLE SCH MG: 10 LOZENGE ORAL; TOPICAL at 09:06

## 2018-05-06 RX ADMIN — IPRATROPIUM BROMIDE PRN MG: 0.5 SOLUTION RESPIRATORY (INHALATION) at 20:17

## 2018-05-07 RX ADMIN — PAROXETINE SCH MG: 20 TABLET, FILM COATED ORAL at 18:00

## 2018-05-07 RX ADMIN — MAGNESIUM OXIDE TAB 400 MG (241.3 MG ELEMENTAL MG) SCH MG: 400 (241.3 MG) TAB at 09:25

## 2018-05-07 RX ADMIN — CLOTRIMAZOLE SCH MG: 10 LOZENGE ORAL; TOPICAL at 13:57

## 2018-05-07 RX ADMIN — MORPHINE SULFATE SCH MG: 15 TABLET, EXTENDED RELEASE ORAL at 21:31

## 2018-05-07 RX ADMIN — IPRATROPIUM BROMIDE PRN MG: 0.5 SOLUTION RESPIRATORY (INHALATION) at 08:08

## 2018-05-07 RX ADMIN — PREDNISONE SCH: 50 TABLET ORAL at 16:52

## 2018-05-07 RX ADMIN — MORPHINE TINCTURE SCH MG: 1 SOLUTION ORAL at 21:29

## 2018-05-07 RX ADMIN — CLOTRIMAZOLE SCH MG: 10 LOZENGE ORAL; TOPICAL at 18:42

## 2018-05-07 RX ADMIN — MAGNESIUM OXIDE TAB 400 MG (241.3 MG ELEMENTAL MG) SCH MG: 400 (241.3 MG) TAB at 21:30

## 2018-05-07 RX ADMIN — LORAZEPAM SCH MG: 0.5 TABLET ORAL at 09:25

## 2018-05-07 RX ADMIN — CLOTRIMAZOLE SCH MG: 10 LOZENGE ORAL; TOPICAL at 21:29

## 2018-05-07 RX ADMIN — MORPHINE SULFATE SCH MG: 15 TABLET, EXTENDED RELEASE ORAL at 09:24

## 2018-05-07 RX ADMIN — IPRATROPIUM BROMIDE PRN MG: 0.5 SOLUTION RESPIRATORY (INHALATION) at 02:53

## 2018-05-07 RX ADMIN — WARFARIN SODIUM SCH MG: 2 TABLET ORAL at 17:58

## 2018-05-07 RX ADMIN — GUAIFENESIN SCH MG: 600 TABLET, EXTENDED RELEASE ORAL at 09:26

## 2018-05-07 RX ADMIN — PREDNISONE SCH MG: 20 TABLET ORAL at 09:25

## 2018-05-07 RX ADMIN — GUAIFENESIN SCH MG: 600 TABLET, EXTENDED RELEASE ORAL at 21:30

## 2018-05-07 RX ADMIN — MORPHINE TINCTURE SCH MG: 1 SOLUTION ORAL at 17:57

## 2018-05-07 RX ADMIN — LORAZEPAM SCH MG: 0.5 TABLET ORAL at 13:57

## 2018-05-07 RX ADMIN — CALCITRIOL SCH MCG: 0.25 CAPSULE ORAL at 09:26

## 2018-05-07 RX ADMIN — MORPHINE TINCTURE SCH MG: 1 SOLUTION ORAL at 09:25

## 2018-05-07 RX ADMIN — MORPHINE TINCTURE SCH MG: 1 SOLUTION ORAL at 13:57

## 2018-05-07 RX ADMIN — CLOTRIMAZOLE SCH: 10 LOZENGE ORAL; TOPICAL at 12:59

## 2018-05-07 RX ADMIN — IPRATROPIUM BROMIDE PRN MG: 0.5 SOLUTION RESPIRATORY (INHALATION) at 14:03

## 2018-05-07 RX ADMIN — ASPIRIN SCH MG: 81 TABLET, COATED ORAL at 09:26

## 2018-05-07 RX ADMIN — LORAZEPAM SCH MG: 0.5 TABLET ORAL at 21:30

## 2018-05-07 RX ADMIN — TIOTROPIUM BROMIDE SCH CAP: 18 CAPSULE ORAL; RESPIRATORY (INHALATION) at 08:08

## 2018-05-07 RX ADMIN — CINACALCET HYDROCHLORIDE SCH MG: 30 TABLET, COATED ORAL at 09:26

## 2018-05-07 RX ADMIN — IPRATROPIUM BROMIDE PRN MG: 0.5 SOLUTION RESPIRATORY (INHALATION) at 21:20

## 2018-05-07 RX ADMIN — CLOTRIMAZOLE SCH MG: 10 LOZENGE ORAL; TOPICAL at 18:01

## 2018-05-07 RX ADMIN — AMLODIPINE BESYLATE SCH MG: 5 TABLET ORAL at 09:26

## 2018-05-08 RX ADMIN — CLOTRIMAZOLE SCH MG: 10 LOZENGE ORAL; TOPICAL at 17:09

## 2018-05-08 RX ADMIN — CLOTRIMAZOLE SCH MG: 10 LOZENGE ORAL; TOPICAL at 22:18

## 2018-05-08 RX ADMIN — LORAZEPAM SCH MG: 0.5 TABLET ORAL at 13:27

## 2018-05-08 RX ADMIN — CINACALCET HYDROCHLORIDE SCH MG: 30 TABLET, COATED ORAL at 09:34

## 2018-05-08 RX ADMIN — GLYCERIN PRN DROP: .002; .002; .01 SOLUTION/ DROPS OPHTHALMIC at 09:33

## 2018-05-08 RX ADMIN — MORPHINE TINCTURE SCH MG: 1 SOLUTION ORAL at 17:08

## 2018-05-08 RX ADMIN — MORPHINE TINCTURE SCH MG: 1 SOLUTION ORAL at 13:26

## 2018-05-08 RX ADMIN — CLOTRIMAZOLE SCH MG: 10 LOZENGE ORAL; TOPICAL at 13:27

## 2018-05-08 RX ADMIN — MORPHINE SULFATE SCH MG: 15 TABLET, EXTENDED RELEASE ORAL at 09:34

## 2018-05-08 RX ADMIN — LORAZEPAM SCH MG: 0.5 TABLET ORAL at 09:34

## 2018-05-08 RX ADMIN — GUAIFENESIN SCH MG: 600 TABLET, EXTENDED RELEASE ORAL at 21:53

## 2018-05-08 RX ADMIN — AMLODIPINE BESYLATE SCH MG: 5 TABLET ORAL at 09:35

## 2018-05-08 RX ADMIN — PREDNISONE SCH MG: 20 TABLET ORAL at 09:34

## 2018-05-08 RX ADMIN — MAGNESIUM OXIDE TAB 400 MG (241.3 MG ELEMENTAL MG) SCH MG: 400 (241.3 MG) TAB at 21:53

## 2018-05-08 RX ADMIN — PAROXETINE SCH MG: 20 TABLET, FILM COATED ORAL at 17:17

## 2018-05-08 RX ADMIN — MAGNESIUM OXIDE TAB 400 MG (241.3 MG ELEMENTAL MG) SCH MG: 400 (241.3 MG) TAB at 09:35

## 2018-05-08 RX ADMIN — MORPHINE TINCTURE SCH MG: 1 SOLUTION ORAL at 21:52

## 2018-05-08 RX ADMIN — GUAIFENESIN SCH MG: 600 TABLET, EXTENDED RELEASE ORAL at 09:35

## 2018-05-08 RX ADMIN — IPRATROPIUM BROMIDE PRN MG: 0.5 SOLUTION RESPIRATORY (INHALATION) at 07:56

## 2018-05-08 RX ADMIN — MORPHINE TINCTURE SCH MG: 1 SOLUTION ORAL at 09:36

## 2018-05-08 RX ADMIN — CLOTRIMAZOLE SCH MG: 10 LOZENGE ORAL; TOPICAL at 09:35

## 2018-05-08 RX ADMIN — MORPHINE SULFATE SCH MG: 15 TABLET, EXTENDED RELEASE ORAL at 21:53

## 2018-05-08 RX ADMIN — WARFARIN SODIUM SCH MG: 2 TABLET ORAL at 17:09

## 2018-05-08 RX ADMIN — IPRATROPIUM BROMIDE PRN MG: 0.5 SOLUTION RESPIRATORY (INHALATION) at 20:22

## 2018-05-08 RX ADMIN — LORAZEPAM SCH MG: 0.5 TABLET ORAL at 21:53

## 2018-05-08 RX ADMIN — CALCITRIOL SCH MCG: 0.25 CAPSULE ORAL at 09:34

## 2018-05-08 RX ADMIN — TIOTROPIUM BROMIDE SCH CAP: 18 CAPSULE ORAL; RESPIRATORY (INHALATION) at 07:56

## 2018-05-08 RX ADMIN — IPRATROPIUM BROMIDE PRN MG: 0.5 SOLUTION RESPIRATORY (INHALATION) at 14:05

## 2018-05-08 RX ADMIN — ASPIRIN SCH MG: 81 TABLET, COATED ORAL at 09:34

## 2018-05-08 NOTE — PN
Subjective


Date of Service: 05/07/18


Interval History: 





Feels that she is getting better.  She looks forward to getting out of bed 

today.  She has no complaints, breathing is comfortable on her home O2. 


Family History: Findings - Paternal GF had diabetes, father had heart disease


Social History: Findings - Smokes 2ppd.  Lives with her partner Sarah crespo who 

is her SDM.


Past Medical History: Findings - Colectomy/ileostomy for Crohn's disease, mult 

abdominal surgeries for Crohn's, AV fistula L arm x 3.





Objective


Active Medications: 








Acetaminophen (Tylenol Tab*)  650 mg PO Q6H PRN


   PRN Reason: pain/fever


   Last Admin: 05/06/18 19:46 Dose:  650 mg


Albuterol (Ventolin Hfa Inhaler*)  2 puff INH Q4H PRN


   PRN Reason: SOB/WHEEZING


Amlodipine Besylate (Norvasc Tab*)  5 mg PO DAILY [facility]


   Last Admin: 05/08/18 09:35 Dose:  5 mg


Aspirin (Aspirin Ec Tab*)  81 mg PO DAILY [facility]


   Last Admin: 05/08/18 09:34 Dose:  81 mg


Calcitriol (Rocaltrol  Cap*)  0.25 mcg PO QAM [facility]


   Last Admin: 05/08/18 09:34 Dose:  0.25 mcg


Cinacalcet (Sensipar Tab*)  30 mg PO QAM [facility]


   Last Admin: 05/08/18 09:34 Dose:  30 mg


Clotrimazole (Mycelex Alexandra*)  10 mg PO QID [facility]


   Last Admin: 05/08/18 17:09 Dose:  10 mg


Cyanocobalamin (Vitamin B12 Inj *)  1,000 mcg IM WEEKLY [facility]


Guaifenesin (Mucinex*)  600 mg PO BID [facility]


   Last Admin: 05/08/18 09:35 Dose:  600 mg


Ipratropium Bromide (Atrovent 0.5 Mg Neb.Sol*)  0.5 mg INH Q6HR PRN


   PRN Reason: WHEEZING


   Last Admin: 05/08/18 14:05 Dose:  0.5 mg


Lorazepam (Ativan Tab(*))  0.5 mg PO TID [facility]


   Last Admin: 05/08/18 13:27 Dose:  0.5 mg


Magnesium Oxide (Magox 400 Tab*)  400 mg PO BID [facility]


   Last Admin: 05/08/18 09:35 Dose:  400 mg


Morphine Sulfate (Ms Contin(*))  15 mg PO Q12H [facility]


   Last Admin: 05/08/18 09:34 Dose:  15 mg


Opium Tincture (Opium Tincture*)  6 mg PO QID [facility]


   Last Admin: 05/08/18 17:08 Dose:  6 mg


Paroxetine HCl (Paxil Tab*)  30 mg PO QPM [facility]


   Last Admin: 05/08/18 17:17 Dose:  30 mg


Polyvinyl Alcohol (Polyvinyl Alcohol 1.4% Opth*)  1 drop BOTH EYES Q2H PRN


   PRN Reason: DRY EYE


   Last Admin: 05/08/18 09:33 Dose:  1 drop


Prednisone (Deltasone Tab*)  20 mg PO DAILY [facility]


   Last Admin: 05/08/18 09:34 Dose:  20 mg


Throat Lozenges (Chloraseptic Radha*)  1 radha PO Q6H PRN


   PRN Reason: SORE THROAT


Tiotropium Bromide (Spiriva Cap.Inh*)  1 cap INH DAILY [facility]


   Last Admin: 05/08/18 07:56 Dose:  1 cap


Warfarin Sodium (Coumadin Tab(*))  2 mg PO DAILY@1700 [facility]


   PRN Reason: Protocol


   Last Admin: 05/08/18 17:09 Dose:  2 mg








 Vital Signs - 8 hr











  05/08/18 05/08/18 05/08/18





  11:30 11:50 13:26


 


Temperature  98.6 F 


 


Pulse Rate  65 


 


Respiratory 18 18 20





Rate   


 


Blood Pressure  138/44 





(mmHg)   


 


O2 Sat by Pulse  97 





Oximetry   














  05/08/18 05/08/18 05/08/18





  13:27 14:06 17:08


 


Temperature   


 


Pulse Rate  73 


 


Respiratory 20 18 16





Rate   


 


Blood Pressure   





(mmHg)   


 


O2 Sat by Pulse  98 





Oximetry   











Oxygen Devices in Use Now: Nasal Cannula


Appearance: alert, resting in bed, speaking in full sentences


Eyes: No Scleral Icterus


Neck: NL Appearance and Movements; NL JVP


Respiratory: Symmetrical Chest Expansion and Respiratory Effort, Clear to 

Auscultation, - - prolonged expiratory phase with no wheezes or rhonchi


Cardiovascular: NL Sounds; No Murmurs; No JVD, No Edema


Abdominal: NL Sounds; No Tenderness; No Distention


Lymphatic: No Cervical Adenopathy


Extremities: No Edema


Skin: No Rash or Ulcers


Neurological: Alert and Oriented x 3





Assess/Plan/Problems-Billing


Assessment: 











- Patient Problems


(1) Acute and chronic respiratory failure with hypoxia


Current Visit: No   Status: Acute   Priority: High   Code(s): J96.21 - ACUTE 

AND CHRONIC RESPIRATORY FAILURE WITH HYPOXIA   SNOMED Code(s): 84853760


   Comment: Now back to her baseline


Has been evaluated by hospice and deemed not a hospice candidate due to fev1


Slow prednsione taper down (still on 20mg) eventually down to 10 mg (home dose)

, encourage home cpap, and smoking cessation   





(2) Anxiety


Current Visit: No   Status: Acute   Priority: High   Code(s): F41.9 - ANXIETY 

DISORDER, UNSPECIFIED   SNOMED Code(s): 84363764


   Comment: Continue morphine SR 15 mg PO Q12 and Ativan 0.5 mg PO TID


   





(3) Mass of upper lobe of right lung


Current Visit: No   Status: Acute   Priority: High   Code(s): R91.8 - OTHER 

NONSPECIFIC ABNORMAL FINDING OF LUNG FIELD   SNOMED Code(s): 456466608


   Comment: Lung cancer - possible. mass on CT. follows with Dr. Higgins/Dr. Borjas


- Dr. Higgins looked at scans and her CT was discussed in Tumor Board including 

Dr. borjas ... it is possible there is some growth but could also be fibrous 

tissue aorund the nodule that was there already.  Needs outpatient follow up    





(4) Crohn's disease


Current Visit: No   Status: Chronic   Code(s): K50.90 - CROHN'S DISEASE, 

UNSPECIFIED, WITHOUT COMPLICATIONS   SNOMED Code(s): 04458576


   Comment: S/P ileostomy with hx of high oupt - Continue home dose tincture of 

opium 





   





(5) History of DVT (deep vein thrombosis)


Current Visit: No   Status: Chronic   Priority: High   Code(s): Z86.718 - 

PERSONAL HISTORY OF OTHER VENOUS THROMBOSIS AND EMBOLISM   SNOMED Code(s): 

078477524


   Comment: Warfarin, needs weekly INR   





(6) History of pulmonary embolism


Current Visit: No   Status: Chronic   Priority: Medium   Code(s): Z86.711 - 

PERSONAL HISTORY OF PULMONARY EMBOLISM   SNOMED Code(s): 554372765


   Comment: Continue warfarin.    


Status and Disposition: 





swing

## 2018-05-09 RX ADMIN — ASPIRIN SCH MG: 81 TABLET, COATED ORAL at 10:43

## 2018-05-09 RX ADMIN — MORPHINE TINCTURE SCH MG: 1 SOLUTION ORAL at 10:44

## 2018-05-09 RX ADMIN — LORAZEPAM SCH MG: 0.5 TABLET ORAL at 10:43

## 2018-05-09 RX ADMIN — CALCITRIOL SCH MCG: 0.25 CAPSULE ORAL at 10:42

## 2018-05-09 RX ADMIN — CLOTRIMAZOLE SCH MG: 10 LOZENGE ORAL; TOPICAL at 15:48

## 2018-05-09 RX ADMIN — IPRATROPIUM BROMIDE PRN MG: 0.5 SOLUTION RESPIRATORY (INHALATION) at 21:17

## 2018-05-09 RX ADMIN — TIOTROPIUM BROMIDE SCH: 18 CAPSULE ORAL; RESPIRATORY (INHALATION) at 08:33

## 2018-05-09 RX ADMIN — PAROXETINE SCH MG: 20 TABLET, FILM COATED ORAL at 19:27

## 2018-05-09 RX ADMIN — MORPHINE SULFATE SCH MG: 15 TABLET, EXTENDED RELEASE ORAL at 21:29

## 2018-05-09 RX ADMIN — CLOTRIMAZOLE SCH MG: 10 LOZENGE ORAL; TOPICAL at 21:30

## 2018-05-09 RX ADMIN — IPRATROPIUM BROMIDE PRN MG: 0.5 SOLUTION RESPIRATORY (INHALATION) at 00:56

## 2018-05-09 RX ADMIN — GUAIFENESIN SCH MG: 600 TABLET, EXTENDED RELEASE ORAL at 10:43

## 2018-05-09 RX ADMIN — AMLODIPINE BESYLATE SCH MG: 5 TABLET ORAL at 10:44

## 2018-05-09 RX ADMIN — MORPHINE SULFATE SCH MG: 15 TABLET, EXTENDED RELEASE ORAL at 10:42

## 2018-05-09 RX ADMIN — LORAZEPAM SCH MG: 0.5 TABLET ORAL at 15:48

## 2018-05-09 RX ADMIN — IPRATROPIUM BROMIDE PRN MG: 0.5 SOLUTION RESPIRATORY (INHALATION) at 16:20

## 2018-05-09 RX ADMIN — MAGNESIUM OXIDE TAB 400 MG (241.3 MG ELEMENTAL MG) SCH MG: 400 (241.3 MG) TAB at 10:43

## 2018-05-09 RX ADMIN — GUAIFENESIN SCH MG: 600 TABLET, EXTENDED RELEASE ORAL at 21:29

## 2018-05-09 RX ADMIN — CLOTRIMAZOLE SCH MG: 10 LOZENGE ORAL; TOPICAL at 10:44

## 2018-05-09 RX ADMIN — MORPHINE TINCTURE SCH MG: 1 SOLUTION ORAL at 23:24

## 2018-05-09 RX ADMIN — LORAZEPAM SCH MG: 0.5 TABLET ORAL at 21:30

## 2018-05-09 RX ADMIN — MORPHINE TINCTURE SCH MG: 1 SOLUTION ORAL at 15:48

## 2018-05-09 RX ADMIN — CINACALCET HYDROCHLORIDE SCH MG: 30 TABLET, COATED ORAL at 10:42

## 2018-05-09 RX ADMIN — PREDNISONE SCH MG: 20 TABLET ORAL at 10:43

## 2018-05-09 RX ADMIN — WARFARIN SODIUM SCH MG: 2 TABLET ORAL at 19:26

## 2018-05-09 RX ADMIN — CLOTRIMAZOLE SCH MG: 10 LOZENGE ORAL; TOPICAL at 19:26

## 2018-05-09 RX ADMIN — MORPHINE TINCTURE SCH MG: 1 SOLUTION ORAL at 19:10

## 2018-05-09 RX ADMIN — IPRATROPIUM BROMIDE PRN MG: 0.5 SOLUTION RESPIRATORY (INHALATION) at 21:18

## 2018-05-09 RX ADMIN — MAGNESIUM OXIDE TAB 400 MG (241.3 MG ELEMENTAL MG) SCH MG: 400 (241.3 MG) TAB at 21:30

## 2018-05-10 VITALS — SYSTOLIC BLOOD PRESSURE: 134 MMHG | DIASTOLIC BLOOD PRESSURE: 47 MMHG

## 2018-05-10 LAB — INR PPP/BLD: 2.77 (ref 0.77–1.02)

## 2018-05-10 RX ADMIN — CLOTRIMAZOLE SCH: 10 LOZENGE ORAL; TOPICAL at 13:50

## 2018-05-10 RX ADMIN — CINACALCET HYDROCHLORIDE SCH MG: 30 TABLET, COATED ORAL at 10:25

## 2018-05-10 RX ADMIN — MORPHINE TINCTURE SCH MG: 1 SOLUTION ORAL at 10:31

## 2018-05-10 RX ADMIN — CALCITRIOL SCH MCG: 0.25 CAPSULE ORAL at 10:25

## 2018-05-10 RX ADMIN — WARFARIN SODIUM SCH MG: 2 TABLET ORAL at 17:12

## 2018-05-10 RX ADMIN — MORPHINE SULFATE SCH MG: 15 TABLET, EXTENDED RELEASE ORAL at 10:32

## 2018-05-10 RX ADMIN — CLOTRIMAZOLE SCH MG: 10 LOZENGE ORAL; TOPICAL at 10:32

## 2018-05-10 RX ADMIN — MAGNESIUM OXIDE TAB 400 MG (241.3 MG ELEMENTAL MG) SCH MG: 400 (241.3 MG) TAB at 10:25

## 2018-05-10 RX ADMIN — PAROXETINE SCH MG: 20 TABLET, FILM COATED ORAL at 17:12

## 2018-05-10 RX ADMIN — CLOTRIMAZOLE SCH MG: 10 LOZENGE ORAL; TOPICAL at 17:13

## 2018-05-10 RX ADMIN — TIOTROPIUM BROMIDE SCH CAP: 18 CAPSULE ORAL; RESPIRATORY (INHALATION) at 08:11

## 2018-05-10 RX ADMIN — MORPHINE TINCTURE SCH MG: 1 SOLUTION ORAL at 17:13

## 2018-05-10 RX ADMIN — GUAIFENESIN SCH MG: 600 TABLET, EXTENDED RELEASE ORAL at 10:25

## 2018-05-10 RX ADMIN — IPRATROPIUM BROMIDE PRN MG: 0.5 SOLUTION RESPIRATORY (INHALATION) at 08:11

## 2018-05-10 RX ADMIN — MORPHINE TINCTURE SCH MG: 1 SOLUTION ORAL at 13:40

## 2018-05-10 RX ADMIN — AMLODIPINE BESYLATE SCH MG: 5 TABLET ORAL at 10:26

## 2018-05-10 RX ADMIN — LORAZEPAM SCH MG: 0.5 TABLET ORAL at 13:41

## 2018-05-10 RX ADMIN — LORAZEPAM SCH MG: 0.5 TABLET ORAL at 10:32

## 2018-05-10 RX ADMIN — ASPIRIN SCH MG: 81 TABLET, COATED ORAL at 10:25

## 2018-05-10 RX ADMIN — CLOTRIMAZOLE SCH: 10 LOZENGE ORAL; TOPICAL at 10:38

## 2018-05-11 NOTE — DS
CC:  Dr. Haile *

 

DISCHARGE SUMMARY:

 

DATE OF ADMISSION:  05/04/18

 

DATE OF DISCHARGE:  05/10/18

 

HOSPITAL COURSE:  This 73-year-old woman was [facility] with shortness of breath.  She was treated for COPD exacerbation and 
changed to swing bed status on 05/04/18.  She initially refused physical therapy
, but when she understood that she will not be able to go home without physical 
therapy, she did start to cooperate.  She was able to walk adequately and to 
manage at home.

 

She did complain of some blurry vision and some marcia feeling in her eyes.  
There was no redness in her eyes.  I recommended she see her ophthalmologist 
within 4 days.  We are also checking a fingerstick blood sugar.

 

The patient will be finishing her prednisone taper at home.  She was started on 
amlodipine for high blood pressure here and this will be continued.

 

FINAL DIAGNOSES:

1.  Chronic obstructive pulmonary disease with exacerbation.

2.  Crohn disease.

3.  Anxiety.

4.  History of deep venous thrombosis.

5.  Lung cancer.

6.  History of pulmonary embolism.

7.  Chronic kidney disease.

8.  Tobacco abuse.

 

DISCHARGE MEDICATIONS:

1.  Amlodipine 5 mg daily.

2.  Artificial Tears 1 drop every 2 hours p.r.n.

3.  Prednisone 5 mg, to taper from 2 to 1 over 4 days.

4.  Opium tincture 0.6 mL as prescribed.

5.  Cinacalcet 30 mg daily.

6.  Cyanocobalamin injection 1000 mcg IM weekly.

7.  Paroxetine 30 mg h.s.

8.  Guaifenesin  mg b.i.d. p.r.n.

9.  Calcitriol 0.25 mcg daily.

10.  Ipratropium by nebulizer 0.5 mg every 6 hours p.r.n.

11.  Lorazepam 0.5 mg t.i.d.

12.  Aspirin 81 mg daily.

13.  Acetaminophen 650 mg every 6 hours p.r.n.

14.  Magnesium oxide 400 mg b.i.d.

15.  Tiotropium 1 capsule daily.

16.  Clotrimazole guillaume as prescribed.

17.  Albuterol inhaler 2 puffs every 4 hours p.r.n.

18.  Warfarin 2 mg daily.

 

I note her INR on the day of discharge was 2.77.  She will get an INR as an 
outpatient in 4 to 5 days.

 

 [id number]/CPS #: [id number]

MTDD

## 2018-07-16 ENCOUNTER — HOSPITAL ENCOUNTER (EMERGENCY)
Dept: HOSPITAL 25 - ED | Age: 73
Discharge: HOME | End: 2018-07-16
Payer: MEDICARE

## 2018-07-16 VITALS — DIASTOLIC BLOOD PRESSURE: 75 MMHG | SYSTOLIC BLOOD PRESSURE: 140 MMHG

## 2018-07-16 DIAGNOSIS — Z79.01: ICD-10-CM

## 2018-07-16 DIAGNOSIS — Z93.2: ICD-10-CM

## 2018-07-16 DIAGNOSIS — R91.8: ICD-10-CM

## 2018-07-16 DIAGNOSIS — Z85.118: ICD-10-CM

## 2018-07-16 DIAGNOSIS — Z86.711: ICD-10-CM

## 2018-07-16 DIAGNOSIS — J44.9: ICD-10-CM

## 2018-07-16 DIAGNOSIS — N18.9: ICD-10-CM

## 2018-07-16 DIAGNOSIS — Z88.3: ICD-10-CM

## 2018-07-16 DIAGNOSIS — M54.5: ICD-10-CM

## 2018-07-16 DIAGNOSIS — Z90.49: ICD-10-CM

## 2018-07-16 DIAGNOSIS — Z88.8: ICD-10-CM

## 2018-07-16 DIAGNOSIS — F17.210: ICD-10-CM

## 2018-07-16 DIAGNOSIS — Z88.7: ICD-10-CM

## 2018-07-16 DIAGNOSIS — E86.0: Primary | ICD-10-CM

## 2018-07-16 DIAGNOSIS — R79.89: ICD-10-CM

## 2018-07-16 DIAGNOSIS — N17.9: ICD-10-CM

## 2018-07-16 DIAGNOSIS — Z87.01: ICD-10-CM

## 2018-07-16 DIAGNOSIS — Z99.81: ICD-10-CM

## 2018-07-16 DIAGNOSIS — Z86.718: ICD-10-CM

## 2018-07-16 DIAGNOSIS — K50.90: ICD-10-CM

## 2018-07-16 LAB
BASOPHILS # BLD: 0 10^3/UL (ref 0–0.2)
HCT VFR BLD AUTO: 39 % (ref 35–47)
HGB BLD-MCNC: 12.9 G/DL (ref 12–16)
INR PPP/BLD: 1.94 (ref 0.77–1.02)
MCH RBC QN AUTO: 31 PG (ref 27–31)
MCHC RBC AUTO-ENTMCNC: 33 G/DL (ref 31–36)
MCV RBC AUTO: 95 FL (ref 80–97)
NEUTROPHILS # BLD AUTO: 8.3 10^3/UL (ref 1.5–7.7)
NEUTROPHILS # BLD: 8.8 10^3/UL (ref 1.5–7.7)
PLATELET # BLD AUTO: 260 10^3/UL (ref 150–450)
RBC # BLD AUTO: 4.14 10^6/UL (ref 4–5.4)
WBC # BLD AUTO: 11.1 10^3/UL (ref 3.5–10.8)

## 2018-07-16 PROCEDURE — 96361 HYDRATE IV INFUSION ADD-ON: CPT

## 2018-07-16 PROCEDURE — 85025 COMPLETE CBC W/AUTO DIFF WBC: CPT

## 2018-07-16 PROCEDURE — 85060 BLOOD SMEAR INTERPRETATION: CPT

## 2018-07-16 PROCEDURE — 83605 ASSAY OF LACTIC ACID: CPT

## 2018-07-16 PROCEDURE — 71045 X-RAY EXAM CHEST 1 VIEW: CPT

## 2018-07-16 PROCEDURE — 72131 CT LUMBAR SPINE W/O DYE: CPT

## 2018-07-16 PROCEDURE — 80053 COMPREHEN METABOLIC PANEL: CPT

## 2018-07-16 PROCEDURE — 99284 EMERGENCY DEPT VISIT MOD MDM: CPT

## 2018-07-16 PROCEDURE — 96375 TX/PRO/DX INJ NEW DRUG ADDON: CPT

## 2018-07-16 PROCEDURE — 96374 THER/PROPH/DIAG INJ IV PUSH: CPT

## 2018-07-16 PROCEDURE — 85610 PROTHROMBIN TIME: CPT

## 2018-07-16 PROCEDURE — 36415 COLL VENOUS BLD VENIPUNCTURE: CPT

## 2018-07-16 NOTE — RAD
INDICATION: Right upper lobe lung carcinoma



COMPARISON: Chest x-ray April 23, 2018

 

TECHNIQUE: An AP portable view obtained at 1859 hours is submitted.



FINDINGS: 



Bones/Soft Tissues: There are no acute bony findings. There are small metallic

radiodensities projected over the right upper chest consistent with previous surgery.



Cardiomediastinal: The cardiomediastinal silhouette is unchanged.. 



Lungs: There is a right suprahilar mass abutting the mediastinum unchanged from prior

imaging. There are chronic interstitial changes with hyperinflation



Pleura: There are no pleural effusions. 



Other: None



IMPRESSION:  NO ACUTE CHANGES. RIGHT SUPRAHILAR MASS. HYPERINFLATION.

## 2018-07-16 NOTE — ED
Back Pain





- HPI Summary


HPI Summary: 


This is alida Bunn documenting for attending Albert Tillman M.D. Pt is a 

72 y/o female who presents to [facility] c/o back pain. Pt states she woke up 4 days 

ago at 3:00 with a coughing attack that lasted for 4 hours. She began to have 

excrutiating pain from T6 to her sacrum that radiates to her hips. She 

describes the pain as throbbing, aching, and 4/10 in severity. Pt states 

coughing and deep breathing bring cause sharp 8/10 pain, and movement causes 15/

10 pain. Laying still makes the pain better, and she lays on her left side. Pt 

also states she feels dehydrated. PMHx lung cancer, COPD, PNA, renal failure, 

Crohns disease, PE, and colectomy. In 2011 she had a T6 compression fracture 

due to her osteoporosis, and since has been semi bed-ridden. Pt is a smoker. 

She denies taking chronic pain medications.








- History of Current Complaint


Chief Complaint: EDBackInjuryPain


Stated Complaint: BACK PAIN


Time Seen by Provider: 07/16/18 16:11


Hx Obtained From: Patient


Onset/Duration: Gradual Onset, Lasting Days - 4, Still Present


Timing: Constant


Back Pain Location: Is Discrete @ - T6 - sacrum, Radiates To - Hips


Severity Initially: Moderate


Pain Intensity: 7 - 8/10 with coughing, 15/10 with movement


Pain Scale Used: 0-10 Numeric


Character: Sharp, Aching, Throbbing


Aggravating Symptom(s): Movement, Cough, Other - Deep breathing


Alleviating Symptom(s): Rest





- Allergies/Home Medications


Allergies/Adverse Reactions: 


 Allergies











Allergy/AdvReac Type Severity Reaction Status Date / Time


 


daptomycin Allergy  See Comment Verified 03/24/18 13:46


 


doxycycline Allergy  Vomiting Verified 03/24/18 13:46


 


epinephrine Allergy  See Comment Verified 03/24/18 13:46


 


infliximab [From Remicade] Allergy  Swelling Verified 03/24/18 13:46


 


levofloxacin [From Levaquin] Allergy  Rash Verified 03/24/18 13:46


 


tetanus toxoid, adsorbed Allergy  Swelling Verified 03/24/18 13:46


 


vancomycin Allergy  Wheezing Verified 03/24/18 13:46


 


eggplant Allergy Mild Difficulty Uncoded 11/29/17 23:59





   Breathing  














PMH/Surg Hx/FS Hx/Imm Hx


Endocrine/Hematology History: Reports: Hx Anticoagulant Therapy - coumadin


   Denies: Hx Diabetes


Cardiovascular History: Reports: Hx Angina, Hx Deep Vein Thrombosis, Hx 

Embolism - PE, Hx Hypotension, Hx Hypertension, Hx Syncope, Other 

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


   Denies: Hx Pacemaker/ICD


Respiratory History: Reports: Hx Asthma - intermittent, Hx Chronic Bronchitis, 

Hx Chronic Obstructive Pulmonary Disease (COPD), Hx Lung Cancer, Hx Pulmonary 

Embolism - 2011, Hx Sleep Apnea, Other Respiratory Problems/Disorders - O2 at 

home. RUL CA.


GI History: Reports: Hx Crohn's Disease, Hx Ileostomy, Other GI Disorders - 

Ileostomy placement


 History: Reports: Hx Acute Renal Failure, Hx Chronic Renal Failure, Other  

Problems/Disorders - Renal deficit per Dr Schroeder


   Denies: Hx Dialysis, Hx Renal Disease


Musculoskeletal History: Reports: Hx Back Problems, Hx Osteoporosis, Hx 

Scoliosis - lumbar disc problems; scoliosis


Sensory History: Reports: Hx Cataracts, Hx Contacts or Glasses, Hx Hearing 

Problem


   Denies: Hx Hearing Aid


Opthamlomology History: Reports: Hx Cataracts, Hx Contacts or Glasses


Neurological History: Reports: Hx Headaches, Hx Nerve Disease - neuropathy, 

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


Psychiatric History: Reports: Hx Anxiety, Hx Depression


   Denies: Hx Panic Disorder





- Cancer History


Cancer Type, Location and Year: LUNG CA


Hx Chemotherapy: No


Hx Radiation Therapy: Yes


Hx Palliative Cancer Treatment: Yes





- Surgical History


Surgery Procedure, Year, and Place: COLECTOMY AND ILEOSTOMY 3/3/2011 

APPENDECTOMY, GALLBLADDER REMOVED, CATARACTS REMOVED [place].  SEVERAL BOWEL 

RESECTIONS AND FISTULA, [facility]


Hx Anesthesia Reactions: Yes - LOW BP





- Immunization History


Date of Tetanus Vaccine: utd


Date of Influenza Vaccine: utd


Immunizations Up to Date: Yes


Infectious Disease History: No


Infectious Disease History: Reports: Hx of Known/Suspected MRSA


   Denies: Traveled Outside the [place] in Last 30 Days





- Family History


Known Family History: Positive: Other - Crohn's Disease





- Social History


Alcohol Use: None


Alcohol Amount: 1 Q 2-3 MONTHS


Hx Substance Use: No


Substance Use Type: Reports: Marijuana


Substance Use Comment - Amount & Last Used: prescribed opium


Hx Tobacco Use: Yes


Smoking Status (MU): Current Every Day Smoker


Type: Cigarettes


Amount Used/How Often: 3 cigarettes per day


Length of Time of Smoking/Using Tobacco: 53 years


Have You Smoked in the Last Year: Yes





Review of Systems


Positive: Other - Dehyrdated


Positive: Cough


Positive: Myalgia - Back pain


All Other Systems Reviewed And Are Negative: Yes





Physical Exam





- Summary


Physical Exam Summary: 


Appearance: Chronically ill appearing, mild pain distress, pain with movement, 

lays on left side


Skin: warm, dry, reflects adequate perfusion


Head/face: normal


Eyes: EOMI, ANURAG


ENT: normal


Neck: supple, non-tender


Respiratory: breath sounds present, scattered wheezes


Cardiovascular: RRR, pulses symmetrical, no LE edema


Abdomen: non-tender, soft, ileostomy. Midline tenderness in the upper lumbar 

area. Some minimal muscular tenderness in paralumbar spine.


Bowel Sounds: present


Musculoskeletal: normal, strength/ROM intact


Neuro: normal, sensory motor intact, A&Ox3





Triage Information Reviewed: Yes


Vital Signs On Initial Exam: 


 Initial Vitals











Temp Pulse Resp BP Pulse Ox


 


 97.6 F   70   20   127/71   97 


 


 07/16/18 15:41  07/16/18 15:41  07/16/18 15:41  07/16/18 15:41  07/16/18 15:41











Vital Signs Reviewed: Yes





Diagnostics





- Vital Signs


 Vital Signs











  Temp Pulse Resp BP Pulse Ox


 


 07/16/18 15:41  97.6 F  70  20  127/71  97














- Laboratory


Result Diagrams: 


 07/16/18 17:12





 07/16/18 17:12


Lab Statement: Any lab studies that have been ordered have been reviewed, and 

results considered in the medical decision making process.





Back Pain Course/Dx





- Course


Course Of Treatment: Patient with a history of lung cancer who presents with 

back pain that is atraumatic.  She does have a history of being largely 

bedridden with her multiple issues.  She has no abdominal pain or pulsatile 

abdominal mass.  She does have an ileostomy and no blood in it.  Her hemoglobin 

is stable and she is fairly comfortable appearing.  She has no pain radiating 

down her legs.  A CT of the lumbar area where her pain is shows no metastatic 

lesions or acute disc.  There is no fracture in the area.  She is treated 

symptomatically with improvement.  She also has a history of renal 

insufficiency with elevated creatinine over baseline today.  She was given IV 

fluids.  She'll follow closely with her doctor tomorrow.  She is on outpatient 

path program for COPD and is considering hospice care.  It is suggested that 

she talk to her doctor about home health nurse, PT.





- Diagnoses


Differential Diagnosis/HQI/PQRI: Positive: Other - Lumbar fracture, metastatic 

lung cancer, acute disc, muscular sprain strain, AAA, acute on chronic renal 

failure


Provider Diagnoses: 


 Dehydration, Acute on chronic renal failure, Mass of upper lobe of right lung, 

Acute lumbar back pain








- Critical Care Time


Critical Care Time: 30-74 min - Critical care time is exclusive of separately 

billable procedures.





Discharge





- Sign-Out/Discharge


Documenting (check all that apply): Sign-Out Patient


Signing out patient TO: Gonzalo Bronson





- Discharge Plan


Condition: Improved


Disposition: HOME


Prescriptions: 


Cyclobenzaprine (NF) [Cyclobenzaprine 5 MG (NF)] 5 mg PO TID PRN #15 tab


 PRN Reason: muscle pain


Docusate Sodium [Colace] 100 mg PO BID #30 capsule


Hydrocodone/Acetaminophen [Hydrocodone-Acetamin 5-325 mg] 1 each PO TID PRN #15 

tablet MDD 3


 PRN Reason: Severe Pain


Patient Education Materials:  Acute Low Back Pain (ED)


Referrals: 


Kira Haile MD [Primary Care Provider] - 


Additional Instructions: 


Stay well-hydrated.  Call your doctor first thing in the morning to follow-up.  

Make sure you take Colace along with the pain medications as these can make you 

constipated.  Return with belly pain, fever, numbness or weakness, new symptoms 

or other concerns as discussed.





- Billing Disposition and Condition


Condition: IMPROVED


Disposition: Home

## 2018-07-16 NOTE — ED
Progress





- Progress Note


Progress Note: 





CXR: No acute changes. Right suprahilar mass. Hyperinflation. This report was 

reviewed by ED physician.


CT lumbar spine: Minor degenerative changes at L4-L5 with minor central canal 

stenosis. No acute bony findings. This report was reviewed by ED physician. 





Course/Dx





- Course


Course Of Treatment: Patient with a history of lung cancer who presents with 

back pain that is atraumatic.  She does have a history of being largely 

bedridden with her multiple issues.  She has no abdominal pain or pulsatile 

abdominal mass.  She does have an ileostomy and no blood in it.  Her hemoglobin 

is stable and she is fairly comfortable appearing.  She has no pain radiating 

down her legs.  A CT of the lumbar area where her pain is shows no metastatic 

lesions or acute disc.  There is no fracture in the area.  She is treated 

symptomatically with improvement.  She also has a history of renal 

insufficiency with elevated creatinine over baseline today.  She was given IV 

fluids.  She'll follow closely with her doctor tomorrow.  She is on outpatient 

path program for COPD and is considering hospice care.  It is suggested that 

she talk to her doctor about home health nurse, PT.





- Diagnoses


Provider Diagnoses: 


 Dehydration, Acute on chronic renal failure, Mass of upper lobe of right lung, 

Acute lumbar back pain








- Critical Care Time


Critical Care Time: 30-74 min - Critical care time is exclusive of separately 

billable procedures.





Discharge





- Discharge Plan


Condition: Improved


Disposition: HOME


Prescriptions: 


Cyclobenzaprine (NF) [Cyclobenzaprine 5 MG (NF)] 5 mg PO TID PRN #15 tab


 PRN Reason: muscle pain


Docusate Sodium [Colace] 100 mg PO BID #30 capsule


Hydrocodone/Acetaminophen [Hydrocodone-Acetamin 5-325 mg] 1 each PO TID PRN #15 

tablet MDD 3


 PRN Reason: Severe Pain


Patient Education Materials:  Acute Low Back Pain (ED)


Referrals: 


Kira Haile MD [Primary Care Provider] - 


Additional Instructions: 


Stay well-hydrated.  Call your doctor first thing in the morning to follow-up.  

Make sure you take Colace along with the pain medications as these can make you 

constipated.  Return with belly pain, fever, numbness or weakness, new symptoms 

or other concerns as discussed.

## 2018-07-16 NOTE — RAD
INDICATION: Atraumatic back pain



COMPARISON: CT abdomen pelvis March 24, 2018

 

TECHNIQUE: Noncontrast axial source images was performed from the thoracolumbar junction

to the sacrum. Coronal and and sagittal reformatted images were generated.



FINDINGS: 



Vertebrae: There is no fracture or acute focal bony lesion.



Alignment: The lumbar vertebrae are normally aligned.



Central Canal: There is minor central canal stenosis at L4-L5 secondary to mild bulging

the disc with facet and ligamentous overgrowth. The nerve roots exit without direct

impingement. MR imaging is a more sensitive method to evaluate the canal and foramina.



Intervertebral disc spaces: The disc spaces are maintained.



Soft tissues: The paravertebral soft tissues are normal.



Other: There are aortic and iliac calcifications



IMPRESSION:  MINOR DEGENERATIVE CHANGES AT L4-L5 WITH MINOR CENTRAL CANAL STENOSIS. NO

ACUTE BONY FINDINGS.

## 2018-10-03 NOTE — ED
Jen JAMA Gabriel, scribed for Albert Tillman MD on 17 at 0028 .





Shortness of Breath





- HPI Summary


HPI Summary: 





This patient is a 72 year old F BIBA to [facility] with a chief complaint of SOB 

since 6 days ago. She was seen then and discharge but states it has been 

getting worse and would like to be admitted. She has done 4 albuterol 

treatments PTA and is on 15mg of prednisone. She uses a CPAP (5) and NC (3L) at 

home. Patient has a history of COPD and currently has lung cancer. Patient 

reports productive cough.





- History of Current Complaint


Chief Complaint: EDShortnessOfBreath


Time Seen by Provider: 17 00:25


Hx Obtained From: Patient


Onset/Duration: Lasting Days - 6, Still Present


Dyspnea At: Rest


Associated Signs & Symptoms: Cough (Productive)





- Allergy/Home Medications


Allergies/Adverse Reactions: 


 Allergies











Allergy/AdvReac Type Severity Reaction Status Date / Time


 


Levofloxacin [From Levaquin] Allergy Mild Rash Verified 17 23:59


 


Vancomycin Allergy Mild Wheezing Verified 17 23:59


 


Epinephrine AdvReac Intermediate Incr Verified 17 23:59





   HR/N/V/feels  





   faint  


 


Infliximab [From Remicade] AdvReac Intermediate arm Verified 17 23:59





   swelling/pain/itching  


 


Tetanus Toxoid AdvReac Intermediate arm Verified 17 23:59





   pain/swelling/itching  


 


Doxycycline AdvReac Mild Vomiting Verified 17 23:59


 


Daptomycin AdvReac  See Comment Verified 17 23:59


 


eggplant Allergy Mild Difficulty Uncoded 17 23:59





   Breathing  














PMH/Surg Hx/FS Hx/Imm Hx


Previously Healthy: No


Endocrine/Hematology History: Reports: Hx Anticoagulant Therapy - coumadin


   Denies: Hx Diabetes


Cardiovascular History: Reports: Hx Deep Vein Thrombosis, Hx Embolism - PE, Hx 

Hypotension, Hx Hypertension, Hx Syncope, Other Cardiovascular Problems/

Disorders - deep mesenteric thrombosis


   Denies: Hx Pacemaker/ICD


Respiratory History: Reports: Hx Asthma - intermittent, Hx Chronic Bronchitis, 

Hx Chronic Obstructive Pulmonary Disease (COPD), Hx Pulmonary Embolism - , 

Hx Sleep Apnea, Other Respiratory Problems/Disorders - O2 at home


GI History: Reports: Hx Crohn's Disease, Hx Ileostomy, Other GI Disorders - 

Ileostomy placement


 History: Reports: Hx Acute Renal Failure, Hx Chronic Renal Failure, Other  

Problems/Disorders - Renal deficit per Dr Schroeder


   Denies: Hx Dialysis, Hx Renal Disease


Musculoskeletal History: Reports: Hx Back Problems, Hx Osteoporosis, Hx 

Scoliosis - lumbar disc problems; scoliosis


Sensory History: Reports: Hx Cataracts, Hx Contacts or Glasses, Hx Hearing 

Problem


   Denies: Hx Hearing Aid


Opthamlomology History: Reports: Hx Cataracts, Hx Contacts or Glasses


Neurological History: Reports: Hx Nerve Disease - neuropathy, Other Neuro 

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


Psychiatric History: Reports: Hx Anxiety, Hx Depression


   Denies: Hx Panic Disorder





- Cancer History


Cancer Type, Location and Year: LUNG CA


Hx Chemotherapy: No


Hx Radiation Therapy: Yes


Hx Palliative Cancer Treatment: Yes





- Surgical History


Surgery Procedure, Year, and Place: COLECTOMY AND ILEOSTOMY 3/3/2011 

APPENDECTOMY, GALLBLADDER REMOVED, CATARACTS REMOVED [place].  SEVERAL BOWEL 

RESECTIONS AND FISTULA, [facility]


Hx Anesthesia Reactions: Yes - LOW BP





- Immunization History


Date of Tetanus Vaccine: utd


Date of Influenza Vaccine: utd


Infectious Disease History: No


Infectious Disease History: Reports: Hx of Known/Suspected MRSA


   Denies: Traveled Outside the [place] in Last 30 Days





- Family History


Known Family History: Positive: Unknown - Parents  very young., Other - 

Crohn's Disease





- Social History


Alcohol Use: Rare


Alcohol Amount: 1 Q 2-3 MONTHS


Hx Substance Use: No


Substance Use Type: Reports: Other


Substance Use Comment - Amount & Last Used: prescribed opium


Hx Tobacco Use: Yes


Smoking Status (MU): Former Smoker


Type: Cigarettes


Amount Used/How Often: 1-2 cigarettes per day


Length of Time of Smoking/Using Tobacco: 53 years


Have You Smoked in the Last Year: Yes





Review of Systems


Positive: Shortness Of Breath, Cough


Negative: Slurred Speech


All Other Systems Reviewed And Are Negative: Yes





Physical Exam





- Summary


Physical Exam Summary: 





Appearance: Well appearing, no pain distress


Skin: warm, dry, reflects adequate perfusion


Head/face: normal


Eyes: EOMI, ANURAG


ENT: gross amount of nasal drainage


Neck: supple, non-tender


Respiratory: CTA, breath sounds present, Corse in left base with diffuse 

wheezing, harsh dry cough


Cardiovascular: pulses symmetrical, tachycardic but regular  


Abdomen: non-tender, soft, Ostomy bag


Bowel: present


Musculoskeletal: normal, strength/ROM intact


Neuro: normal, sensory motor intact, A&Ox3 


Triage Information Reviewed: Yes


Vital Signs On Initial Exam: 


 Initial Vitals











Pulse Resp Pulse Ox


 


 106   12   97 


 


 17 23:52  17 23:52  17 23:52











Vital Signs Reviewed: Yes





- Pascual Coma Scale


Coma Scale Total: 15





Diagnostics





- Vital Signs


 Vital Signs











  Temp Pulse Resp BP Pulse Ox


 


 17 00:15    20  


 


 17 23:57  98.6 F  106  17  147/83  97


 


 17 23:52   106  12   97














- Laboratory


Lab Results: 


 Lab Results











  17 Range/Units





  01:30 01:30 01:30 


 


WBC   13.2 H   (3.5-10.8)  10^3/ul


 


RBC   4.28   (4.0-5.4)  10^6/ul


 


Hgb   13.4   (12.0-16.0)  g/dl


 


Hct   41   (35-47)  %


 


MCV   95   (80-97)  fL


 


MCH   31   (27-31)  pg


 


MCHC   33   (31-36)  g/dl


 


RDW   14   (10.5-15)  %


 


Plt Count   261   (150-450)  10^3/ul


 


MPV   8   (7.4-10.4)  um3


 


Neut % (Auto)   65.8   (38-83)  %


 


Lymph % (Auto)   25.5   (25-47)  %


 


Mono % (Auto)   7.0   (1-9)  %


 


Eos % (Auto)   0.6   (0-6)  %


 


Baso % (Auto)   1.1   (0-2)  %


 


Absolute Neuts (auto)   8.7 H   (1.5-7.7)  10^3/ul


 


Absolute Lymphs (auto)   3.4   (1.0-4.8)  10^3/ul


 


Absolute Monos (auto)   0.9 H   (0-0.8)  10^3/ul


 


Absolute Eos (auto)   0.1   (0-0.6)  10^3/ul


 


Absolute Basos (auto)   0.1   (0-0.2)  10^3/ul


 


Absolute Nucleated RBC   0.01   10^3/ul


 


Nucleated RBC %   0.1   


 


Sodium    136  (133-145)  mmol/L


 


Potassium    TNP  


 


Chloride    98 L  (101-111)  mmol/L


 


Carbon Dioxide    25  (22-32)  mmol/L


 


Anion Gap    13 H  (2-11)  mmol/L


 


BUN    38 H  (6-24)  mg/dL


 


Creatinine    1.77 H  (0.51-0.95)  mg/dL


 


Est GFR ( Amer)    36.2  (>60)  


 


Est GFR (Non-Af Amer)    28.1  (>60)  


 


BUN/Creatinine Ratio    21.5 H  (8-20)  


 


Glucose    170 H  ()  mg/dL


 


Lactic Acid  3.9 H*    (0.5-2.0)  mmol/L


 


Calcium    9.1  (8.6-10.3)  mg/dL


 


Total Bilirubin    0.30  (0.2-1.0)  mg/dL


 


AST    TNP  


 


ALT    38  (7-52)  U/L


 


Alkaline Phosphatase    70  ()  U/L


 


Troponin I    0.03  (<0.04)  ng/mL


 


Total Protein    6.6  (6.4-8.9)  g/dL


 


Albumin    3.7  (3.2-5.2)  g/dL


 


Globulin    2.9  (2-4)  g/dL


 


Albumin/Globulin Ratio    1.3  (1-3)  














  /17 Range/Units





  03:23 


 


WBC   (3.5-10.8)  10^3/ul


 


RBC   (4.0-5.4)  10^6/ul


 


Hgb   (12.0-16.0)  g/dl


 


Hct   (35-47)  %


 


MCV   (80-97)  fL


 


MCH   (27-31)  pg


 


MCHC   (31-36)  g/dl


 


RDW   (10.5-15)  %


 


Plt Count   (150-450)  10^3/ul


 


MPV   (7.4-10.4)  um3


 


Neut % (Auto)   (38-83)  %


 


Lymph % (Auto)   (25-47)  %


 


Mono % (Auto)   (1-9)  %


 


Eos % (Auto)   (0-6)  %


 


Baso % (Auto)   (0-2)  %


 


Absolute Neuts (auto)   (1.5-7.7)  10^3/ul


 


Absolute Lymphs (auto)   (1.0-4.8)  10^3/ul


 


Absolute Monos (auto)   (0-0.8)  10^3/ul


 


Absolute Eos (auto)   (0-0.6)  10^3/ul


 


Absolute Basos (auto)   (0-0.2)  10^3/ul


 


Absolute Nucleated RBC   10^3/ul


 


Nucleated RBC %   


 


Sodium   (133-145)  mmol/L


 


Potassium  4.0  


 


Chloride   (101-111)  mmol/L


 


Carbon Dioxide   (22-32)  mmol/L


 


Anion Gap   (2-11)  mmol/L


 


BUN   (6-24)  mg/dL


 


Creatinine   (0.51-0.95)  mg/dL


 


Est GFR ( Amer)   (>60)  


 


Est GFR (Non-Af Amer)   (>60)  


 


BUN/Creatinine Ratio   (8-20)  


 


Glucose   ()  mg/dL


 


Lactic Acid   (0.5-2.0)  mmol/L


 


Calcium   (8.6-10.3)  mg/dL


 


Total Bilirubin   (0.2-1.0)  mg/dL


 


AST  30  


 


ALT   (7-52)  U/L


 


Alkaline Phosphatase   ()  U/L


 


Troponin I   (<0.04)  ng/mL


 


Total Protein   (6.4-8.9)  g/dL


 


Albumin   (3.2-5.2)  g/dL


 


Globulin   (2-4)  g/dL


 


Albumin/Globulin Ratio   (1-3)  











Result Diagrams: 


 17 01:30





 17 03:23


Lab Statement: Any lab studies that have been ordered have been reviewed, and 

results considered in the medical decision making process.





- Radiology


  ** CXR


Radiology Interpretation Completed By: ED Physician - modular mass in right 

base and right upper lobe scaring, unchanged from previous





- EKG


  ** 01:41


Cardiac Rate: Tachycardia


EKG Rhythm: Sinus Tachycardia - 106 BPM


EKG Interpretation: LAD, Normal ST





Re-Evaluation





- Re-Evaluation


  ** First Eval


Change: Improved - pt felt anxious initially with steroid and felt throat was 

tight. This subsided. Breathing improved modestly





Course/Dx





- Course


Course Of Treatment: Pt with longstanding COPD on O2 with viral exacerbation. 

Mult drug sensitivilites. Small dose of steroid and she could tolerate larger 

doses in past. Failed outpt low dose steroid. Increased O2 demand. Refused ABG. 

Start abx for exacerbation. No infiltrate. Admit for further.





- Diagnoses


Provider Diagnoses: 


 URI (upper respiratory infection), COPD exacerbation, Anxiety disorder, 

Dehydration








- Physician Notifications


Discussed Care of Patient With: Helen Winkler


Time Discussed With Above Provider: 03:07


Instructed by Provider To: Other - Discussed patient care with Dr. Winkler, she 

has agreed to admit the patient.





- Critical Care Time


Critical Care Time: 30-74 min - CCT is EXCLUSIVE of separately billable 

procedures





Discharge





- Discharge Plan


Condition: Stable


Disposition: ADMITTED TO [facility] documentation as recorded by the Jen irwin Gabriel accurately reflects 

the service I personally performed and the decisions made by me, Albert Tillman MD.  Symptoms

## 2018-10-25 ENCOUNTER — HOSPITAL ENCOUNTER (EMERGENCY)
Dept: HOSPITAL 25 - ED | Age: 73
Discharge: HOME | End: 2018-10-25
Payer: MEDICARE

## 2018-10-25 VITALS — DIASTOLIC BLOOD PRESSURE: 71 MMHG | SYSTOLIC BLOOD PRESSURE: 107 MMHG

## 2018-10-25 DIAGNOSIS — R10.9: ICD-10-CM

## 2018-10-25 DIAGNOSIS — F17.210: ICD-10-CM

## 2018-10-25 DIAGNOSIS — E87.6: ICD-10-CM

## 2018-10-25 DIAGNOSIS — M25.551: Primary | ICD-10-CM

## 2018-10-25 DIAGNOSIS — Z79.01: ICD-10-CM

## 2018-10-25 LAB
BASOPHILS # BLD: 0.1 10^3/UL (ref 0–0.2)
HCT VFR BLD AUTO: 41 % (ref 35–47)
HGB BLD-MCNC: 13.5 G/DL (ref 12–16)
MCH RBC QN AUTO: 32 PG (ref 27–31)
MCHC RBC AUTO-ENTMCNC: 33 G/DL (ref 31–36)
MCV RBC AUTO: 95 FL (ref 80–97)
NEUTROPHILS # BLD AUTO: 7.3 10^3/UL (ref 1.5–7.7)
NEUTROPHILS # BLD: 7.4 10^3/UL (ref 1.5–7.7)
PLATELET # BLD AUTO: 292 10^3/UL (ref 150–450)
RBC # BLD AUTO: 4.28 10^6/UL (ref 4–5.4)
RBC UR QL AUTO: (no result)
WBC # BLD AUTO: 10.3 10^3/UL (ref 3.5–10.8)
WBC UR QL AUTO: (no result)

## 2018-10-25 PROCEDURE — 87086 URINE CULTURE/COLONY COUNT: CPT

## 2018-10-25 PROCEDURE — 99283 EMERGENCY DEPT VISIT LOW MDM: CPT

## 2018-10-25 PROCEDURE — 85060 BLOOD SMEAR INTERPRETATION: CPT

## 2018-10-25 PROCEDURE — 81015 MICROSCOPIC EXAM OF URINE: CPT

## 2018-10-25 PROCEDURE — 87077 CULTURE AEROBIC IDENTIFY: CPT

## 2018-10-25 PROCEDURE — 36415 COLL VENOUS BLD VENIPUNCTURE: CPT

## 2018-10-25 PROCEDURE — 86140 C-REACTIVE PROTEIN: CPT

## 2018-10-25 PROCEDURE — 74176 CT ABD & PELVIS W/O CONTRAST: CPT

## 2018-10-25 PROCEDURE — 81003 URINALYSIS AUTO W/O SCOPE: CPT

## 2018-10-25 PROCEDURE — 87186 SC STD MICRODIL/AGAR DIL: CPT

## 2018-10-25 PROCEDURE — 85025 COMPLETE CBC W/AUTO DIFF WBC: CPT

## 2018-10-25 PROCEDURE — 80053 COMPREHEN METABOLIC PANEL: CPT

## 2018-10-25 NOTE — RAD
EXAM: 

 CT Abdomen and Pelvis Without Intravenous Contrast 



EXAM DATE/TIME: 

 10/25/2018 7:40 PM 



CLINICAL HISTORY: 

 73 years old, female; Pain; Abdominal pain; Localized; Right lower quadrant 

(rlq); Additional info: RT hip pain, rlq pain 



TECHNIQUE: 

 Axial computed tomography images of the abdomen and pelvis without intravenous 

contrast. 

 All CT scans at this facility use at least one of these dose optimization 

techniques: automated exposure control; mA and/or kV adjustment per patient 

size (includes targeted exams where dose is matched to clinical indication); or 

iterative reconstruction. 

 Coronal and sagittal reformatted images were created and reviewed. 



COMPARISON: 

 A/P WO CT ABD/PEL W/O 3/24/2018 3:39 PM 



FINDINGS: 

 Lower thorax:  There is bibasilar atelectatic change or scarring. 



ABDOMEN: 

 Liver: Normal. No mass. 

 Gallbladder and bile ducts:  Stable postoperative changes of cholecystectomy. 

 Pancreas: Normal. No ductal dilation. 

 Spleen: Normal. No splenomegaly. 

 Adrenals: Normal. No mass. 

 Kidneys and ureters:  Stable 1.8 cm left renal cyst and 1.9 cm right renal 

cyst. 

 Stomach and bowel:  Stable likely postoperative changes of colectomy and 

partial small bowel resection and stable right lower quadrant ileostomy with no 

signs of bowel obstruction. 

 Appendix: The appendix is likely surgically absent. 



PELVIS: 

 Bladder: Unremarkable as visualized. 

 Reproductive:  Stable 3.7 cm left ovarian cyst. 



ABDOMEN and PELVIS: 

 Intraperitoneal space: Normal. No free air. No significant fluid collection. 

 Bones/joints:  Stable diffuse osteopenia and degenerative changes of the 

spine. 

 Soft tissues: Unremarkable. 

 Vasculature:  There are stable atherosclerotic aortic and iliac and femoral 

artery calcifications. 

 Lymph nodes: Normal. No enlarged lymph nodes. 



IMPRESSION: 

1. Stable 3.7 cm left ovarian cyst. 

2. Stable likely postoperative changes of colectomy and partial small bowel 

resection and stable right lower quadrant ileostomy with no signs of bowel 

obstruction. 

3.  No other acute CT pathology.



To contact [facility] with a general question: Operations Center - [phone]

For direct physician to physician contact: Physician Hotline - [phone]

[facility] ([facility] Facility ID #[id number])

## 2018-10-25 NOTE — ED
Lower Extremity





- HPI Summary


HPI Summary: 





Patient complains of right hip pain 1 week which is not improved.  Patient 

states she turned over in bed and had sharp right hip pain that has been 

persistent since.  Patient states progressive over the past 2 days, barely able 

to ambulate.  Patient walks with walker at baseline.  Denies trauma, fever, 

cough, sore throat, CP, SOB, N/V/V abdominal pain, change in urine, change in 

BM.  Took hydrocodone at 8:30 AM this morning.  Current pain rated 4/10





- History of Current Complaint


Chief Complaint: EDExtremityLower


Stated Complaint: RT SIDE HIP PAIN


Time Seen by Provider: 10/25/18 19:13


Hx Obtained From: Patient, Family/Caretaker


Mechanism Of Injury: Unknown


Onset of Pain: Immediate


Onset/Duration: Days


Severity Initially: Moderate


Severity Currently: Moderate


Pain Intensity: 4


Pain Scale Used: 0-10 Numeric


Timing: Constant


Location: Is Discrete @


Character Of Pain: Sharp, Aching


Associated Signs And Symptoms: Positive: Negative


Aggravating Factor(s): Standing, Ambulation, Movement, Weight Bearing


Alleviating Factor(s): Rest





- Allergies/Home Medications


Allergies/Adverse Reactions: 


 Allergies











Allergy/AdvReac Type Severity Reaction Status Date / Time


 


daptomycin Allergy  See Comment Verified 10/25/18 18:51


 


doxycycline Allergy  Vomiting Verified 10/25/18 18:51


 


epinephrine Allergy  See Comment Verified 10/25/18 18:51


 


infliximab [From Remicade] Allergy  Swelling Verified 10/25/18 18:51


 


levofloxacin [From Levaquin] Allergy  Rash Verified 10/25/18 18:51


 


tetanus toxoid, adsorbed Allergy  Swelling Verified 10/25/18 18:51


 


vancomycin Allergy  Wheezing Verified 10/25/18 18:51


 


eggplant Allergy Mild Difficulty Uncoded 10/25/18 18:51





   Breathing  














PMH/Surg Hx/FS Hx/Imm Hx


Endocrine/Hematology History: Reports: Hx Anticoagulant Therapy - coumadin


   Denies: Hx Diabetes


Cardiovascular History: Reports: Hx Angina, Hx Deep Vein Thrombosis, Hx 

Embolism - PE, Hx Hypotension, Hx Hypertension, Hx Syncope, Other 

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


   Denies: Hx Pacemaker/ICD


Respiratory History: Reports: Hx Asthma - intermittent, Hx Chronic Bronchitis, 

Hx Chronic Obstructive Pulmonary Disease (COPD), Hx Lung Cancer, Hx Pulmonary 

Embolism - , Hx Sleep Apnea, Other Respiratory Problems/Disorders - O2 at 

home. RUL CA.


GI History: Reports: Hx Crohn's Disease, Hx Ileostomy, Other GI Disorders - 

Ileostomy placement


 History: Reports: Hx Acute Renal Failure, Hx Chronic Renal Failure, Other  

Problems/Disorders - Renal deficit per Dr Schroeder


   Denies: Hx Dialysis, Hx Renal Disease


Musculoskeletal History: Reports: Hx Back Problems, Hx Osteoporosis, Hx 

Scoliosis - lumbar disc problems; scoliosis


Sensory History: Reports: Hx Cataracts, Hx Contacts or Glasses, Hx Hearing 

Problem


   Denies: Hx Hearing Aid


Opthamlomology History: Reports: Hx Cataracts, Hx Contacts or Glasses


Neurological History: Reports: Hx Headaches, Hx Nerve Disease - neuropathy, 

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


Psychiatric History: Reports: Hx Anxiety, Hx Depression


   Denies: Hx Panic Disorder





- Cancer History


Cancer Type, Location and Year: LUNG CA


Hx Chemotherapy: No


Hx Radiation Therapy: Yes


Hx Palliative Cancer Treatment: Yes





- Surgical History


Surgery Procedure, Year, and Place: COLECTOMY AND ILEOSTOMY 3/3/2011 

APPENDECTOMY, GALLBLADDER REMOVED, CATARACTS REMOVED [place].  SEVERAL BOWEL 

RESECTIONS AND FISTULA, [facility]


Hx Anesthesia Reactions: Yes - LOW BP





- Immunization History


Date of Tetanus Vaccine: utd


Date of Influenza Vaccine: utd


Immunizations Up to Date: Yes


Infectious Disease History: Yes


Infectious Disease History: Reports: Hx of Known/Suspected MRSA


   Denies: Traveled Outside the [place] in Last 30 Days





- Family History


Known Family History: Positive: Unknown - Parents  very young., Other - 

Crohn's Disease





- Social History


Alcohol Use: None


Alcohol Amount: 1 Q 2-3 MONTHS


Hx Substance Use: No


Substance Use Type: Reports: Marijuana


Substance Use Comment - Amount & Last Used: prescribed opium


Hx Tobacco Use: Yes


Smoking Status (MU): Current Every Day Smoker


Type: Cigarettes


Amount Used/How Often: 3 cigarettes per day


Length of Time of Smoking/Using Tobacco: 53 years


Have You Smoked in the Last Year: Yes





Review of Systems


Constitutional: Negative


Eyes: Negative


ENT: Negative


Cardiovascular: Negative


Respiratory: Negative


Gastrointestinal: Negative


Genitourinary: Negative


Positive: Arthralgia


Skin: Negative


Neurological: Negative


Psychological: Normal


All Other Systems Reviewed And Are Negative: Yes





Physical Exam





- Summary


Physical Exam Summary: 





Minimal tenderness to palpation of right hip.  Patient flexes and extends right 

hip and right knee with minimal indication of pain.  MS intact distally.  Mild 

tenderness right lower quadrant of abdomen.  Physical exam otherwise 

unremarkable


Triage Information Reviewed: Yes


Vital Signs On Initial Exam: 


 Initial Vitals











Temp Pulse Resp BP Pulse Ox


 


 98 F   84   20   123/75   97 


 


 10/25/18 18:43  10/25/18 18:43  10/25/18 18:43  10/25/18 18:43  10/25/18 18:43











Vital Signs Reviewed: Yes


Appearance: Positive: Well-Appearing


Skin: Positive: Warm


Head/Face: Positive: Normal Head/Face Inspection


Eyes: Positive: Normal


Neck: Positive: Supple


Respiratory/Lung Sounds: Positive: Clear to Auscultation


Cardiovascular: Positive: Normal


Abdomen Description: Positive: Other:


Musculoskeletal: Positive: Normal


Neurological: Positive: Normal


Psychiatric: Positive: Normal


AVPU Assessment: Alert





- [place] Coma Scale


Best Eye Response: 4 - Spontaneous


Best Motor Response: 6 - Obeys Commands


Best Verbal Response: 5 - Oriented


Coma Scale Total: 15





Diagnostics





- Vital Signs


 Vital Signs











  Temp Pulse Resp BP Pulse Ox


 


 10/25/18 20:34   84  20   100


 


 10/25/18 18:43  98 F  84  20  123/75  97














- Laboratory


Lab Results: 


 Lab Results











  10/25/18 10/25/18 10/25/18 Range/Units





  20:00 20:00 20:11 


 


WBC  10.3    (3.5-10.8)  10^3/ul


 


RBC  4.28    (4.00-5.40)  10^6/ul


 


Hgb  13.5    (12.0-16.0)  g/dl


 


Hct  41    (35-47)  %


 


MCV  95    (80-97)  fL


 


MCH  32 H    (27-31)  pg


 


MCHC  33    (31-36)  g/dl


 


RDW  16 H    (10.5-15)  %


 


Plt Count  292    (150-450)  10^3/ul


 


MPV  7.8    (7.4-10.4)  um3


 


Neut % (Auto)  Not Reportable    


 


Lymph % (Auto)  Not Reportable    


 


Mono % (Auto)  Not Reportable    


 


Eos % (Auto)  Not Reportable    


 


Baso % (Auto)  Not Reportable    


 


Absolute Neuts (auto)  7.3    (1.5-7.7)  10^3/ul


 


Absolute Lymphs (auto)  Not Reportable    


 


Absolute Monos (auto)  Not Reportable    


 


Absolute Eos (auto)  Not Reportable    


 


Absolute Basos (auto)  Not Reportable    


 


Absolute Nucleated RBC  Not Reportable    


 


Neutrophils %  72    (38-83)  %


 


Lymphocytes %  17 L    (25-47)  %


 


Monocytes %  9 H    (0-7)  %


 


Eosinophils %  1    (0-6)  %


 


Basophils %  1    (0-2)  %


 


Nucleated RBC %  Not Reportable    


 


Abs Neuts (Manual)  7.4    (1.5-7.7)  10^3/ul


 


Abs Lymphs (Manual)  1.8    (1.0-4.8)  10^3/ul


 


Abs Monocytes (Manual)  0.9 H    (0-0.8)  10^3/ul


 


Absolute Eos (Manual)  0.1    (0-0.6)  10^3/ul


 


Abs Basophils (Manual)  0.1    (0-0.2)  10^3/ul


 


Normal RBC Morphology  Normal    (Normal)  


 


Hem Pathologist Commnt  Pending    


 


Sodium   137   (135-145)  mmol/L


 


Potassium   3.2 L   (3.5-5.0)  mmol/L


 


Chloride   90 L   (101-111)  mmol/L


 


Carbon Dioxide   36 H   (22-32)  mmol/L


 


Anion Gap   11   (2-11)  mmol/L


 


BUN   35 H   (6-24)  mg/dL


 


Creatinine   1.68 H   (0.51-0.95)  mg/dL


 


Est GFR ( Amer)   36.0   (>60)  


 


Est GFR (Non-Af Amer)   29.8   (>60)  


 


BUN/Creatinine Ratio   20.8 H   (8-20)  


 


Glucose   136 H   ()  mg/dL


 


Calcium   9.0   (8.6-10.3)  mg/dL


 


Total Bilirubin   0.30   (0.2-1.0)  mg/dL


 


AST   26   (13-39)  U/L


 


ALT   24   (7-52)  U/L


 


Alkaline Phosphatase   63   ()  U/L


 


C-Reactive Protein   8.31 H   (<8.01)  mg/L


 


Total Protein   6.7   (6.4-8.9)  g/dL


 


Albumin   3.7   (3.2-5.2)  g/dL


 


Globulin   3.0   (2-4)  g/dL


 


Albumin/Globulin Ratio   1.2   (1-3)  


 


Urine Color    Yellow  


 


Urine Appearance    Clear  


 


Urine pH    7.0  (5-9)  


 


Ur Specific Gravity    1.016  (1.010-1.030)  


 


Urine Protein    Negative  (Negative)  


 


Urine Ketones    Negative  (Negative)  


 


Urine Blood    1+ A  (Negative)  


 


Urine Nitrate    Negative  (Negative)  


 


Urine Bilirubin    Negative  (Negative)  


 


Urine Urobilinogen    Negative  (Negative)  


 


Ur Leukocyte Esterase    1+ A  (Negative)  


 


Urine WBC (Auto)    1+(6-10/hpf) A  (Absent)  


 


Urine RBC (Auto)    3+(>10/hpf) A  (Absent)  


 


Ur Squamous Epith Cells    Present A  (Absent)  


 


Urine Bacteria    Absent  (Absent)  


 


Hyaline Casts    Present A  (Absent)  


 


Urine Glucose    Negative  (Negative)  


 


Urine Ascorbic Acid    * A  (Negative)  











Result Diagrams: 


 10/25/18 20:00





 10/25/18 20:00


Lab Statement: Any lab studies that have been ordered have been reviewed, and 

results considered in the medical decision making process.





- CT


  ** ab/pel


CT Interpretation Completed By: Radiologist - Negative





Lower Extremity Course/Dx





- Course


Course Of Treatment: Patient complains of right hip pain 1 week which is not 

improved.  Patient states she turned over in bed and had sharp right hip pain 

that has been persistent since.  Patient states progressive over the past 2 days

, barely able to ambulate.  Patient walks with walker at baseline.  Denies 

trauma, fever, cough, sore throat, CP, SOB, N/V/V abdominal pain, change in 

urine, change in BM.  Took hydrocodone at 8:30 AM this morning.  Current pain 

rated 4/10.  Physical exam:Minimal tenderness to palpation of right hip.  

Patient flexes and extends right hip and right knee with minimal indication of 

pain.  MS intact distally.  Mild tenderness right lower quadrant of abdomen.  

Physical exam otherwise unremarkable.  Vital signs within normal limits.  Labs 

the patient baseline.  CT abdomen and pelvis without contrast unremarkable.  

Likely musculoskeletal strain.  Follow-up with orthopedics for possible 

physical therapy.





- Diagnoses


Provider Diagnoses: 


 Hip pain, Right sided abdominal pain, Hypokalemia








Discharge





- Sign-Out/Discharge


Documenting (check all that apply): Patient Departure





- Discharge Plan


Condition: Stable


Disposition: HOME


Prescriptions: 


Cyclobenzaprine TAB* [Flexeril 10 MG TAB*] 10 mg PO TID PRN 3 Days #10 tab


 PRN Reason: Pain


predniSONE TAB* [Deltasone 20 MG TAB*] 40 mg PO DAILY 5 Days #5 tab


Tramadol HCl 50 mg PO TID 3 Days #8 tablet MDD 3


Patient Education Materials:  Hypokalemia (ED), Musculoskeletal Pain (ED)


Referrals: 


Kira Haile MD [Primary Care Provider] - 


Tamanna Brennan MD [Medical Doctor] - 


Additional Instructions: 


Follow-up with primary care on Monday for potassium level checked.  Follow-up 

with orthopedics for potential physical therapy.  Return to the ED for any new 

or worsening symptoms





- Billing Disposition and Condition


Condition: STABLE


Disposition: Home

## 2018-10-28 NOTE — ED
Progress





- Progress Note


Progress Note: 





Patient's preliminary urine culture reveals 25-50,000 enterococcus faecalis.  

Patient was diagnosed with hypokalemia and muscle pain.  No antibiotics were 

prescribed.  Based on low bacterial count, would suggest reviewing the chart 

morein depth for possible link to early UTI once final results return as well 

as contacting the patient to see her symptoms are progressing. No further 

action at this time.





Course/Dx





- Course


Course Of Treatment: Patient complains of right hip pain 1 week which is not 

improved.  Patient states she turned over in bed and had sharp right hip pain 

that has been persistent since.  Patient states progressive over the past 2 days

, barely able to ambulate.  Patient walks with walker at baseline.  Denies 

trauma, fever, cough, sore throat, CP, SOB, N/V/V abdominal pain, change in 

urine, change in BM.  Took hydrocodone at 8:30 AM this morning.  Current pain 

rated 4/10.  Physical exam:Minimal tenderness to palpation of right hip.  

Patient flexes and extends right hip and right knee with minimal indication of 

pain.  MS intact distally.  Mild tenderness right lower quadrant of abdomen.  

Physical exam otherwise unremarkable.  Vital signs within normal limits.  Labs 

the patient baseline.  CT abdomen and pelvis without contrast unremarkable.  

Likely musculoskeletal strain.  Follow-up with orthopedics for possible 

physical therapy.





- Diagnoses


Provider Diagnoses: 


 Hip pain, Right sided abdominal pain, Hypokalemia








Discharge





- Sign-Out/Discharge


Documenting (check all that apply): Post-Discharge Follow Up





- Discharge Plan


Condition: Stable


Disposition: HOME


Prescriptions: 


Cyclobenzaprine TAB* [Flexeril 10 MG TAB*] 10 mg PO TID PRN 3 Days #10 tab


 PRN Reason: Pain


predniSONE TAB* [Deltasone 20 MG TAB*] 40 mg PO DAILY 5 Days #5 tab


Tramadol HCl 50 mg PO TID 3 Days #8 tablet MDD 3


Patient Education Materials:  Hypokalemia (ED), Musculoskeletal Pain (ED)


Referrals: 


Kira Haile MD [Primary Care Provider] - 


Tamanna Brennan MD [Medical Doctor] - 


Additional Instructions: 


Follow-up with primary care on Monday for potassium level checked.  Follow-up 

with orthopedics for potential physical therapy.  Return to the ED for any new 

or worsening symptoms





- Billing Disposition and Condition


Condition: STABLE


Disposition: Home

## 2018-10-29 NOTE — ED
Progress





- Progress Note


Progress Note: 





Patient's preliminary urine culture reveals 25-50,000 enterococcus faecalis.  

Patient was diagnosed with hypokalemia and muscle pain.  No antibiotics were 

prescribed.  Based on low bacterial count, would suggest reviewing the chart 

morein depth for possible link to early UTI once final results return as well 

as contacting the patient to see her symptoms are progressing. No further 

action at this time.





UPDATE: Discussed final results with patient who denies urinary symptoms - no 

fevers, chills, abdominal pain, flank pain, dysuria, urinary frequency, urinary 

hesitation, hematuria or vaginal irritation in general.  She is not prone to UTI

's.  She agrees to follow up with Dr. Halie regarding both her musculoskeletal 

issues as well as her urine findings before the end of the week (states she put 

a call into her today).  Recommended Dr. Haile recheck her urine to make sure 

she is not developing more bacteria in this area as the week goes on.  Also 

reviewed danger signs and symptoms of when to seek medical attention for 

potential UTI.  Patient agrees with plan and does not want to start antibiotics 

at this time.





Course/Dx





- Course


Course Of Treatment: Patient complains of right hip pain 1 week which is not 

improved.  Patient states she turned over in bed and had sharp right hip pain 

that has been persistent since.  Patient states progressive over the past 2 days

, barely able to ambulate.  Patient walks with walker at baseline.  Denies 

trauma, fever, cough, sore throat, CP, SOB, N/V/V abdominal pain, change in 

urine, change in BM.  Took hydrocodone at 8:30 AM this morning.  Current pain 

rated 4/10.  Physical exam:Minimal tenderness to palpation of right hip.  

Patient flexes and extends right hip and right knee with minimal indication of 

pain.  MS intact distally.  Mild tenderness right lower quadrant of abdomen.  

Physical exam otherwise unremarkable.  Vital signs within normal limits.  Labs 

the patient baseline.  CT abdomen and pelvis without contrast unremarkable.  

Likely musculoskeletal strain.  Follow-up with orthopedics for possible 

physical therapy.





- Diagnoses


Provider Diagnoses: 


 Hip pain, Right sided abdominal pain, Hypokalemia








Discharge





- Sign-Out/Discharge


Documenting (check all that apply): Post-Discharge Follow Up





- Discharge Plan


Condition: Stable


Disposition: HOME


Prescriptions: 


Cyclobenzaprine TAB* [Flexeril 10 MG TAB*] 10 mg PO TID PRN 3 Days #10 tab


 PRN Reason: Pain


predniSONE TAB* [Deltasone 20 MG TAB*] 40 mg PO DAILY 5 Days #5 tab


Tramadol HCl 50 mg PO TID 3 Days #8 tablet MDD 3


Patient Education Materials:  Hypokalemia (ED), Musculoskeletal Pain (ED)


Referrals: 


Kira Haile MD [Primary Care Provider] - 


Tamanna Brennan MD [Medical Doctor] - 


Additional Instructions: 


Follow-up with primary care on Monday for potassium level checked.  Follow-up 

with orthopedics for potential physical therapy.  Return to the ED for any new 

or worsening symptoms





- Billing Disposition and Condition


Condition: STABLE


Disposition: Home

## 2018-12-01 ENCOUNTER — HOSPITAL ENCOUNTER (INPATIENT)
Dept: HOSPITAL 25 - ED | Age: 73
LOS: 10 days | DRG: 535 | End: 2018-12-11
Attending: INTERNAL MEDICINE | Admitting: HOSPITALIST
Payer: MEDICARE

## 2018-12-01 DIAGNOSIS — Z86.718: ICD-10-CM

## 2018-12-01 DIAGNOSIS — Z98.41: ICD-10-CM

## 2018-12-01 DIAGNOSIS — R25.1: ICD-10-CM

## 2018-12-01 DIAGNOSIS — G47.30: ICD-10-CM

## 2018-12-01 DIAGNOSIS — F41.9: ICD-10-CM

## 2018-12-01 DIAGNOSIS — I13.0: ICD-10-CM

## 2018-12-01 DIAGNOSIS — Z74.01: ICD-10-CM

## 2018-12-01 DIAGNOSIS — Z88.1: ICD-10-CM

## 2018-12-01 DIAGNOSIS — F32.9: ICD-10-CM

## 2018-12-01 DIAGNOSIS — R74.8: ICD-10-CM

## 2018-12-01 DIAGNOSIS — Z86.711: ICD-10-CM

## 2018-12-01 DIAGNOSIS — Z83.79: ICD-10-CM

## 2018-12-01 DIAGNOSIS — Z98.42: ICD-10-CM

## 2018-12-01 DIAGNOSIS — Z79.52: ICD-10-CM

## 2018-12-01 DIAGNOSIS — Z92.3: ICD-10-CM

## 2018-12-01 DIAGNOSIS — E87.2: ICD-10-CM

## 2018-12-01 DIAGNOSIS — S00.81XA: ICD-10-CM

## 2018-12-01 DIAGNOSIS — S72.001A: Primary | ICD-10-CM

## 2018-12-01 DIAGNOSIS — G62.9: ICD-10-CM

## 2018-12-01 DIAGNOSIS — Z86.14: ICD-10-CM

## 2018-12-01 DIAGNOSIS — C34.11: ICD-10-CM

## 2018-12-01 DIAGNOSIS — Z51.5: ICD-10-CM

## 2018-12-01 DIAGNOSIS — A41.9: ICD-10-CM

## 2018-12-01 DIAGNOSIS — I50.32: ICD-10-CM

## 2018-12-01 DIAGNOSIS — G89.29: ICD-10-CM

## 2018-12-01 DIAGNOSIS — M81.0: ICD-10-CM

## 2018-12-01 DIAGNOSIS — N17.9: ICD-10-CM

## 2018-12-01 DIAGNOSIS — R58: ICD-10-CM

## 2018-12-01 DIAGNOSIS — F17.210: ICD-10-CM

## 2018-12-01 DIAGNOSIS — Z66: ICD-10-CM

## 2018-12-01 DIAGNOSIS — Z93.2: ICD-10-CM

## 2018-12-01 DIAGNOSIS — J45.20: ICD-10-CM

## 2018-12-01 DIAGNOSIS — Z88.8: ICD-10-CM

## 2018-12-01 DIAGNOSIS — Z99.81: ICD-10-CM

## 2018-12-01 DIAGNOSIS — J96.21: ICD-10-CM

## 2018-12-01 DIAGNOSIS — Z83.3: ICD-10-CM
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
## 2018-12-01 DIAGNOSIS — Z90.49: ICD-10-CM

## 2018-12-01 DIAGNOSIS — K50.90: ICD-10-CM

## 2018-12-01 DIAGNOSIS — J44.9: ICD-10-CM

## 2018-12-01 DIAGNOSIS — M41.9: ICD-10-CM

## 2018-12-01 DIAGNOSIS — Z72.89: ICD-10-CM

## 2018-12-01 DIAGNOSIS — J18.9: ICD-10-CM

## 2018-12-01 DIAGNOSIS — R62.7: ICD-10-CM

## 2018-12-01 DIAGNOSIS — N18.9: ICD-10-CM
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
## 2018-12-01 DIAGNOSIS — I45.81: ICD-10-CM

## 2018-12-01 DIAGNOSIS — Z88.7: ICD-10-CM

## 2018-12-01 DIAGNOSIS — R79.1: ICD-10-CM

## 2018-12-01 DIAGNOSIS — Z82.49: ICD-10-CM

## 2018-12-01 LAB
ALBUMIN SERPL BCG-MCNC: 3.7 G/DL (ref 3.2–5.2)
ALBUMIN/GLOB SERPL: 1.2 {RATIO} (ref 1–3)
ALP SERPL-CCNC: 173 U/L (ref 34–104)
ALT SERPL W P-5'-P-CCNC: 30 U/L (ref 7–52)
ANION GAP SERPL CALC-SCNC: 14 MMOL/L (ref 2–11)
APTT PPP: 38.9 SECONDS (ref 26–36.3)
AST SERPL-CCNC: 26 U/L (ref 13–39)
BASOPHILS # BLD AUTO: 0 10^3/UL (ref 0–0.2)
BUN SERPL-MCNC: 43 MG/DL (ref 6–24)
BUN/CREAT SERPL: 18.2 (ref 8–20)
CALCIUM SERPL-MCNC: 9.1 MG/DL (ref 8.6–10.3)
CHLORIDE SERPL-SCNC: 82 MMOL/L (ref 101–111)
CK SERPL-CCNC: 55 U/L (ref 10–223)
EOSINOPHIL # BLD AUTO: 0.1 10^3/UL (ref 0–0.6)
GLOBULIN SER CALC-MCNC: 3.2 G/DL (ref 2–4)
GLUCOSE SERPL-MCNC: 138 MG/DL (ref 70–100)
HCO3 SERPL-SCNC: 41 MMOL/L (ref 22–32)
HCT VFR BLD AUTO: 41 % (ref 35–47)
HGB BLD-MCNC: 13.5 G/DL (ref 12–16)
INR PPP/BLD: 1.81 (ref 0.77–1.02)
LYMPHOCYTES # BLD AUTO: 3.2 10^3/UL (ref 1–4.8)
MCH RBC QN AUTO: 31 PG (ref 27–31)
MCHC RBC AUTO-ENTMCNC: 33 G/DL (ref 31–36)
MCV RBC AUTO: 94 FL (ref 80–97)
MONOCYTES # BLD AUTO: 1.7 10^3/UL (ref 0–0.8)
NEUTROPHILS # BLD AUTO: 12.5 10^3/UL (ref 1.5–7.7)
NRBC # BLD AUTO: 0 10^3/UL
NRBC BLD QL AUTO: 0
PLATELET # BLD AUTO: 341 10^3/UL (ref 150–450)
POTASSIUM SERPL-SCNC: 3.1 MMOL/L (ref 3.5–5)
PROT SERPL-MCNC: 6.9 G/DL (ref 6.4–8.9)
RBC # BLD AUTO: 4.32 10^6/UL (ref 4–5.4)
SODIUM SERPL-SCNC: 137 MMOL/L (ref 135–145)
VIT C UR QL: (no result)
WBC # BLD AUTO: 17.5 10^3/UL (ref 3.5–10.8)

## 2018-12-01 PROCEDURE — 83735 ASSAY OF MAGNESIUM: CPT

## 2018-12-01 PROCEDURE — 99285 EMERGENCY DEPT VISIT HI MDM: CPT

## 2018-12-01 PROCEDURE — 85610 PROTHROMBIN TIME: CPT

## 2018-12-01 PROCEDURE — 83605 ASSAY OF LACTIC ACID: CPT

## 2018-12-01 PROCEDURE — 80053 COMPREHEN METABOLIC PANEL: CPT

## 2018-12-01 PROCEDURE — 85060 BLOOD SMEAR INTERPRETATION: CPT

## 2018-12-01 PROCEDURE — 31622 DX BRONCHOSCOPE/WASH: CPT

## 2018-12-01 PROCEDURE — 70450 CT HEAD/BRAIN W/O DYE: CPT

## 2018-12-01 PROCEDURE — 36415 COLL VENOUS BLD VENIPUNCTURE: CPT

## 2018-12-01 PROCEDURE — 94640 AIRWAY INHALATION TREATMENT: CPT

## 2018-12-01 PROCEDURE — 93005 ELECTROCARDIOGRAM TRACING: CPT

## 2018-12-01 PROCEDURE — 85730 THROMBOPLASTIN TIME PARTIAL: CPT

## 2018-12-01 PROCEDURE — 81003 URINALYSIS AUTO W/O SCOPE: CPT

## 2018-12-01 PROCEDURE — 71045 X-RAY EXAM CHEST 1 VIEW: CPT

## 2018-12-01 PROCEDURE — 86901 BLOOD TYPING SEROLOGIC RH(D): CPT

## 2018-12-01 PROCEDURE — 86850 RBC ANTIBODY SCREEN: CPT

## 2018-12-01 PROCEDURE — 80048 BASIC METABOLIC PNL TOTAL CA: CPT

## 2018-12-01 PROCEDURE — 84484 ASSAY OF TROPONIN QUANT: CPT

## 2018-12-01 PROCEDURE — 86900 BLOOD TYPING SEROLOGIC ABO: CPT

## 2018-12-01 PROCEDURE — 85025 COMPLETE CBC W/AUTO DIFF WBC: CPT

## 2018-12-01 PROCEDURE — 82550 ASSAY OF CK (CPK): CPT

## 2018-12-01 RX ADMIN — LORAZEPAM SCH MG: 0.5 TABLET ORAL at 20:33

## 2018-12-01 RX ADMIN — SODIUM CHLORIDE SCH MLS/HR: 900 IRRIGANT IRRIGATION at 11:29

## 2018-12-01 RX ADMIN — HYDROMORPHONE HYDROCHLORIDE PRN MG: 1 INJECTION, SOLUTION INTRAMUSCULAR; INTRAVENOUS; SUBCUTANEOUS at 23:35

## 2018-12-01 RX ADMIN — POTASSIUM CHLORIDE SCH MLS/HR: 200 INJECTION, SOLUTION INTRAVENOUS at 15:19

## 2018-12-01 RX ADMIN — CINACALCET HYDROCHLORIDE SCH: 30 TABLET, COATED ORAL at 12:22

## 2018-12-01 RX ADMIN — TIOTROPIUM BROMIDE SCH: 18 CAPSULE ORAL; RESPIRATORY (INHALATION) at 12:22

## 2018-12-01 RX ADMIN — ACETAMINOPHEN PRN MG: 325 TABLET ORAL at 20:33

## 2018-12-01 RX ADMIN — PREDNISONE SCH MG: 1 TABLET ORAL at 12:23

## 2018-12-01 RX ADMIN — CALCITRIOL SCH: 0.25 CAPSULE ORAL at 12:22

## 2018-12-01 RX ADMIN — PAROXETINE SCH: 10 TABLET, FILM COATED ORAL at 18:00

## 2018-12-01 RX ADMIN — POTASSIUM CHLORIDE SCH MLS/HR: 200 INJECTION, SOLUTION INTRAVENOUS at 11:30

## 2018-12-01 RX ADMIN — SODIUM CHLORIDE SCH MLS/HR: 900 IRRIGANT IRRIGATION at 21:14

## 2018-12-01 RX ADMIN — CEFTRIAXONE SODIUM SCH MLS/HR: 1 INJECTION, POWDER, FOR SOLUTION INTRAVENOUS at 09:37

## 2018-12-01 RX ADMIN — PREDNISONE SCH MG: 5 TABLET ORAL at 12:23

## 2018-12-01 NOTE — CONS
CC:  PCP

 

CONSULTATION REPORT:

 

DATE OF CONSULT:  12/01/18

 

ATTENDING PHYSICIAN:  Richard Francis MD

 

CHIEF COMPLAINT:  Right hip pain.

 

HISTORY OF PRESENT ILLNESS:  Briefly, Marisol Santiago is a 73-year-old female, who presents with ri
ght hip pain.  She was using a walker when she sustained a mechanical fall.  She lied in bed for 6 ho
urs after falling, she had significant severe pain, 9-10/10.  She hit her head on the space heater.  
She is unable to bear weight, has pain with movement and ambulation.  She uses O2 at home for COPD an
d lung cancer.  She has had 2 treatments with radiation.  She is currently not being treated for that
 for her lung cancer.  She takes Coumadin.  She is present with her wife.  She is currently sleepy, a
 lot of history is coming from previous notes as well as her spouse.

 

PAST MEDICAL HISTORY:  Significant for Crohn's disease; history of DVT and PE; COPD, on continuous ox
ygen; current tobacco abuse; history of lung cancer, status post radiation; anxiety; depression; rest
less legs syndrome; T6 compression fracture; chronic kidney disease, stage 3.

 

PAST SURGICAL HISTORY:  Significant for colectomy, ileostomy in March of 2011; appendectomy; cholecys
tectomy; cataracts bilaterally; several bowel resections; AV fistulas.

 

MEDICATIONS:

1.  Tylenol.

2.  Maalox.

3.  DuoNeb.

4.  Rocaltrol.

5.  Sensipar.

6.  Vitamin B12.

7.  Tiotropium.

8.  Mucinex.

9.  Dilaudid.

10.  Atrovent.

11.  Lorazepam.

12.  Opium tincture.

13.  Paroxetine.

14.  KCl.

15.  Prednisone.

16.  Spiriva.

17.  Coumadin.

 

ALLERGIES:  To EGGPLANT, VANCOMYCIN, TETANUS, LEVOFLOXACIN, REMICADE, EPINEPHRINE, DOXYCYCLINE, and D
APTOMYCIN.

 

FAMILY HISTORY:  Significant for heart disease.  Paternal grandmother had diabetes.

 

SOCIAL HISTORY:  She is a long-time smoker, smoked for 60 years.  Denies alcohol use.  Her partner, CHRISTI Alvarado, is her healthcare proxy.  She ambulates with a walker, but she is mostly bed-bound and li
es in bed.

 

REVIEW OF SYSTEMS:  Significant for hip pain, COPD, shortness of breath, lung cancer.  Negative for f
juli or chills.  She does have some neuropathy of her feet at baseline which comes and goes.  She ha
s hypersensitivity to heat or cold.

 

PHYSICAL EXAM:  She is ill-appearing, in no acute distress.  She is quiet.  She will respond if I ask
 her numerous questions, but overall she is mildly uncomfortable and she did receive her recent pain 
medication.  Vitals:  Temperature of 97.5, pulse of 84, respiratory rate 17, O2 is 94% on 5 L of oxyg
en, blood pressure 132/71.  She is lying quietly in the bed.  She will respond to questions. She is a
ble to flex and extend her toes.  Dorsiflex and plantar flex her ankle. She is sensate to light touch
 grossly distally, but has some hypersensitivity.  She has 2+ DP and PT pulse.  Her calf is soft and 
nontender.  The skin is otherwise intact.  EOMI when she opens her eyes.  Chest:  She does not have l
abored breathing.  Abdomen is soft.  She has an ileostomy that is present.  Heart: Regular rate and r
hythm.

 

DIAGNOSTIC STUDIES/LAB DATA:  Labs obtained today demonstrates a white count of 17.5, her hematocrit 
of 41, platelet count of 341.  INR of 1.81.  Sodium 137; potassium 3.1; chloride 82; carbon dioxide 4
1; BUN 43; creatinine 2.36; glucose 138; lactic acid 3.7, recheck later was 2.2.  Troponin 0.04.  Alk
 phos 173.  UA was negative.

 

X-rays of the hip and full length femur films demonstrate no mass identified, but she does have a dis
placed femoral neck fracture of the right hip.

 

ASSESSMENT AND PLAN:  She has a complicated medical history with limited functionality.  At this poin
t, we talked about options with the right hip hemiarthroplasty.  She is not currently medically optim
ized for this.  We also talked about other options.  Non-operative treatment was discussed; this is s
omething that they were considering, but I would recommend hospice care if that is the case as it arturo
l be painful.  We talked about the surgery can help with mobilization, turning in the bed, with pain,
 and allowing her to sit up, but there are risks associated with surgery.  There are also risks assoc
iated with conservative treatment including bedsores, pneumonia, risk of DVT.  For now, the patient i
s not sure how she would like to proceed, so we will keep her n.p.o. after midnight.  If she is optim
ized, we can take her as early as tomorrow for right hip hemiarthroplasty.  Alternatively, if they ch
oose not to proceed with surgery, we would recommend hospice care.

 

 [id number]/CPS #: [id number]

## 2018-12-01 NOTE — PN
Hospitalist Progress Note


Date of Service: 12/01/18





I came back this afternoon to talk with Edi about her wishes and she is 

still too drowsy after receiving another dose of dilaudid.  Requested that her 

RN Magdy page me when she is more awake.  I talked with Robin again; she is 

leaning towards operative management but also has not been able to have a 

meaningful discussion with Edi.

## 2018-12-01 NOTE — PN
Hospitalist Progress Note


Date of Service: 12/01/18





I came back to attempt to talk with Edi about operative vs. nonoperative 

options, but again she is too sleepy.  She awakes to voice, and answers simple 

questions, but falls back to sleep when I begin talking with her.  

Unfortunately we cannot proceed with a risk stratification without her 

participation.  I am reducing her dilaudid dose and have asked Magdy to give it 

to her only when she is awake for a sustained period of time and requesting 

pain meds.  I am also getting a CT of her brain since she fell on coumadin.  I 

have provided Robin with Edi's NSQIP numbers.  I explained that this is not a 

perfect calculator, but it helps us to stratify patients pre-opratively for 

their perioperative risk.  Edi scores well above average risk for major 

cardiac event, pneumonia, poor wound healing, readmission, return to the OR, 

surgical site infection, and death.

## 2018-12-01 NOTE — ED
Lower Extremity





- HPI Summary


HPI Summary: 


The patient is a 72 y/o F brought in by ambulance to [facility] with a chief 

complaint of sharp pain in the right hip starting six hours PTA. She was using 

a walker when it came out from underneath her, causing her to fall; she lied in 

bed for six hours after falling. The pain is rated 10/10 in severity. She hit 

her head on a nearby space heater, but she denies LOC. She is unable to bear 

weight on the leg as ambulation and movement aggravate the pain. She uses O2 at 

home for COPD and lung cancer. She takes Coumadin.





- History of Current Complaint


Chief Complaint: EDHipPelvisInjury


Stated Complaint: FALL, HIP PAIN


Time Seen by Provider: 12/01/18 03:29


Hx Obtained From: Patient


Mechanism Of Injury: Fall From A Standing Position - tripped over walker


Onset of Pain: Immediate


Onset/Duration: Still Present


Severity Initially: Severe


Severity Currently: Severe


Pain Intensity: 10


Pain Scale Used: 0-10 Numeric


Timing: Constant, Lasting Hours


Location: Is Discrete @ - right hip


Associated Signs And Symptoms: Positive: Other - NEGATIVE: LOC


Aggravating Factor(s): Standing, Ambulation


Alleviating Factor(s): Rest


Able to Bear Weight: No





- Allergies/Home Medications


Allergies/Adverse Reactions: 


 Allergies











Allergy/AdvReac Type Severity Reaction Status Date / Time


 


daptomycin Allergy  See Comment Verified 12/01/18 03:55


 


doxycycline Allergy  Vomiting Verified 12/01/18 03:55


 


epinephrine Allergy  See Comment Verified 12/01/18 03:55


 


infliximab [From Remicade] Allergy  Swelling Verified 12/01/18 03:55


 


levofloxacin [From Levaquin] Allergy  Rash Verified 12/01/18 03:55


 


tetanus toxoid, adsorbed Allergy  Swelling Verified 12/01/18 03:55


 


vancomycin Allergy  Wheezing Verified 12/01/18 03:55


 


eggplant Allergy Mild Difficulty Uncoded 12/01/18 03:55





   Breathing  











Home Medications: 


 Home Medications





predniSONE TAB* [Deltasone TAB*] 8 mg PO DAILY 12/01/18 [History Confirmed 12/01 /18]











PMH/Surg Hx/FS Hx/Imm Hx


Endocrine/Hematology History: Reports: Hx Anticoagulant Therapy - coumadin


   Denies: Hx Diabetes


Cardiovascular History: Reports: Hx Angina, Hx Deep Vein Thrombosis, Hx 

Embolism - PE, Hx Hypotension, Hx Hypertension, Hx Syncope, Other 

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


   Denies: Hx Pacemaker/ICD


Respiratory History: Reports: Hx Asthma - intermittent, Hx Chronic Bronchitis, 

Hx Chronic Obstructive Pulmonary Disease (COPD), Hx Lung Cancer, Hx Pulmonary 

Embolism - 2011, Hx Sleep Apnea, Other Respiratory Problems/Disorders - O2 at 

home. RUL CA.


GI History: Reports: Hx Crohn's Disease, Hx Ileostomy, Other GI Disorders - 

Ileostomy placement


 History: Reports: Hx Acute Renal Failure, Hx Chronic Renal Failure, Other  

Problems/Disorders - Renal deficit per Dr Schroeder


   Denies: Hx Dialysis, Hx Renal Disease


Musculoskeletal History: Reports: Hx Back Problems, Hx Osteoporosis, Hx 

Scoliosis - lumbar disc problems; scoliosis


Sensory History: Reports: Hx Cataracts, Hx Contacts or Glasses, Hx Hearing 

Problem


   Denies: Hx Hearing Aid


Opthamlomology History: Reports: Hx Cataracts, Hx Contacts or Glasses


Neurological History: Reports: Hx Headaches, Hx Nerve Disease - neuropathy, 

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


Psychiatric History: Reports: Hx Anxiety, Hx Depression


   Denies: Hx Panic Disorder





- Cancer History


Cancer Type, Location and Year: LUNG CA


Hx Chemotherapy: No


Hx Radiation Therapy: Yes


Hx Palliative Cancer Treatment: Yes





- Surgical History


Surgery Procedure, Year, and Place: COLECTOMY AND ILEOSTOMY 3/3/2011 

APPENDECTOMY, GALLBLADDER REMOVED, CATARACTS REMOVED [place].  SEVERAL BOWEL 

RESECTIONS AND FISTULA, [facility]


Hx Anesthesia Reactions: Yes - LOW BP





- Immunization History


Date of Tetanus Vaccine: utd


Date of Influenza Vaccine: utd


Infectious Disease History: No


Infectious Disease History: Reports: Hx of Known/Suspected MRSA


   Denies: Traveled Outside the [place] in Last 30 Days





- Family History


Known Family History: Positive: Other - Crohn's Disease





- Social History


Alcohol Use: None


Alcohol Amount: 1 Q 2-3 MONTHS


Hx Substance Use: No


Substance Use Type: Reports: Marijuana


Substance Use Comment - Amount & Last Used: prescribed opium


Hx Tobacco Use: Yes


Smoking Status (MU): Current Every Day Smoker


Type: Cigarettes


Amount Used/How Often: 3 cigarettes per day


Length of Time of Smoking/Using Tobacco: 53 years


Have You Smoked in the Last Year: Yes





Review of Systems


Positive: Other - pain in right hip


Neurological: Other - NEGATIVE: LOC


All Other Systems Reviewed And Are Negative: Yes





Physical Exam





- Summary


Physical Exam Summary: 


Appearance: Well appearing,


Skin: warm, dry, reflects adequate perfusion


Head/face: normal


Eyes: EOMI, ANURAG


ENT: normal


Neck: supple, non-tender


Respiratory: CTA, breath sounds present


Cardiovascular: RRR, pulses symmetrical  


Abdomen: non-tender, soft, ileostomy in right abdomen


Musculoskeletal: tenderness in right hip, restricted ROM in right leg, no 

neurovascular deficit


Neuro: normal, sensory motor intact, A&Ox3


Triage Information Reviewed: Yes


Vital Signs On Initial Exam: 


 Initial Vitals











Temp Pulse Resp BP Pulse Ox


 


 97.5 F   93   18   115/79   99 


 


 12/01/18 03:18  12/01/18 03:18  12/01/18 03:18  12/01/18 03:18  12/01/18 03:18











Vital Signs Reviewed: Yes





Diagnostics





- Vital Signs


 Vital Signs











  Temp Pulse Resp BP Pulse Ox


 


 12/01/18 04:02    28  


 


 12/01/18 03:40    28  


 


 12/01/18 03:18  97.5 F  93  18  115/79  99














- Laboratory


Lab Results: 


 Lab Results











  12/01/18 12/01/18 12/01/18 Range/Units





  03:44 03:44 03:44 


 


WBC  17.5 H    (3.5-10.8)  10^3/ul


 


RBC  4.32    (4.00-5.40)  10^6/ul


 


Hgb  13.5    (12.0-16.0)  g/dl


 


Hct  41    (35-47)  %


 


MCV  94    (80-97)  fL


 


MCH  31    (27-31)  pg


 


MCHC  33    (31-36)  g/dl


 


RDW  15    (10.5-15)  %


 


Plt Count  341    (150-450)  10^3/ul


 


MPV  7.7    (7.4-10.4)  fL


 


Neut % (Auto)  71.0    %


 


Lymph % (Auto)  18.0    %


 


Mono % (Auto)  9.9    %


 


Eos % (Auto)  0.8    %


 


Baso % (Auto)  0.3    %


 


Absolute Neuts (auto)  12.5 H    (1.5-7.7)  10^3/ul


 


Absolute Lymphs (auto)  3.2    (1.0-4.8)  10^3/ul


 


Absolute Monos (auto)  1.7 H    (0-0.8)  10^3/ul


 


Absolute Eos (auto)  0.1    (0-0.6)  10^3/ul


 


Absolute Basos (auto)  0    (0-0.2)  10^3/ul


 


Absolute Nucleated RBC  0    10^3/ul


 


Nucleated RBC %  0    


 


INR (Anticoag Therapy)   1.81 H   (0.77-1.02)  


 


APTT   38.9 H   (26.0-36.3)  seconds


 


Sodium    137  (135-145)  mmol/L


 


Potassium    3.1 L  (3.5-5.0)  mmol/L


 


Chloride    82 L  (101-111)  mmol/L


 


Carbon Dioxide    41 H*  (22-32)  mmol/L


 


Anion Gap    14 H  (2-11)  mmol/L


 


BUN    43 H  (6-24)  mg/dL


 


Creatinine    2.36 H  (0.51-0.95)  mg/dL


 


Est GFR ( Amer)    24.3  (>60)  


 


Est GFR (Non-Af Amer)    20.1  (>60)  


 


BUN/Creatinine Ratio    18.2  (8-20)  


 


Glucose    138 H  ()  mg/dL


 


Lactic Acid     (0.5-2.0)  mmol/L


 


Calcium    9.1  (8.6-10.3)  mg/dL


 


Total Bilirubin    0.40  (0.2-1.0)  mg/dL


 


AST    26  (13-39)  U/L


 


ALT    30  (7-52)  U/L


 


Alkaline Phosphatase    173 H  ()  U/L


 


Troponin I    0.04 H*  (<0.04)  ng/mL


 


Total Protein    6.9  (6.4-8.9)  g/dL


 


Albumin    3.7  (3.2-5.2)  g/dL


 


Globulin    3.2  (2-4)  g/dL


 


Albumin/Globulin Ratio    1.2  (1-3)  


 


Blood Type     


 


Antibody Screen     














  12/01/18 12/01/18 Range/Units





  03:44 03:44 


 


WBC    (3.5-10.8)  10^3/ul


 


RBC    (4.00-5.40)  10^6/ul


 


Hgb    (12.0-16.0)  g/dl


 


Hct    (35-47)  %


 


MCV    (80-97)  fL


 


MCH    (27-31)  pg


 


MCHC    (31-36)  g/dl


 


RDW    (10.5-15)  %


 


Plt Count    (150-450)  10^3/ul


 


MPV    (7.4-10.4)  fL


 


Neut % (Auto)    %


 


Lymph % (Auto)    %


 


Mono % (Auto)    %


 


Eos % (Auto)    %


 


Baso % (Auto)    %


 


Absolute Neuts (auto)    (1.5-7.7)  10^3/ul


 


Absolute Lymphs (auto)    (1.0-4.8)  10^3/ul


 


Absolute Monos (auto)    (0-0.8)  10^3/ul


 


Absolute Eos (auto)    (0-0.6)  10^3/ul


 


Absolute Basos (auto)    (0-0.2)  10^3/ul


 


Absolute Nucleated RBC    10^3/ul


 


Nucleated RBC %    


 


INR (Anticoag Therapy)    (0.77-1.02)  


 


APTT    (26.0-36.3)  seconds


 


Sodium    (135-145)  mmol/L


 


Potassium    (3.5-5.0)  mmol/L


 


Chloride    (101-111)  mmol/L


 


Carbon Dioxide    (22-32)  mmol/L


 


Anion Gap    (2-11)  mmol/L


 


BUN    (6-24)  mg/dL


 


Creatinine    (0.51-0.95)  mg/dL


 


Est GFR ( Amer)    (>60)  


 


Est GFR (Non-Af Amer)    (>60)  


 


BUN/Creatinine Ratio    (8-20)  


 


Glucose    ()  mg/dL


 


Lactic Acid  3.7 H*   (0.5-2.0)  mmol/L


 


Calcium    (8.6-10.3)  mg/dL


 


Total Bilirubin    (0.2-1.0)  mg/dL


 


AST    (13-39)  U/L


 


ALT    (7-52)  U/L


 


Alkaline Phosphatase    ()  U/L


 


Troponin I    (<0.04)  ng/mL


 


Total Protein    (6.4-8.9)  g/dL


 


Albumin    (3.2-5.2)  g/dL


 


Globulin    (2-4)  g/dL


 


Albumin/Globulin Ratio    (1-3)  


 


Blood Type   O Negative  


 


Antibody Screen   Pending  











Result Diagrams: 


 12/01/18 03:44





 12/01/18 03:44


Lab Statement: Any lab studies that have been ordered have been reviewed, and 

results considered in the medical decision making process.





- Radiology


  ** Right Hip/Pelvis XR


Radiology Interpretation Completed By: Radiologist


Summary of Radiographic Findings: Findings are consistent with fracture of the 

right hip. ED physician has reviewed this report.





  ** CXR


Radiology Interpretation Completed By: Radiologist


Summary of Radiographic Findings: No acute findings. ED physician has reviewed 

this report.





- EKG


  ** 03:23


Cardiac Rate: NL - 92 BPM


EKG Rhythm: Sinus Rhythm





Re-Evaluation





- Re-Evaluation


  ** First Eval


Re-Evaluation Time: 04:30


Change: Unchanged


Comment: I spoke with the patient about admission to [facility].





Lower Extremity Course/Dx





- Course


Course Of Treatment: The patient is a 72 y/o F [facility] 

with a chief complaint of sharp pain in the right hip starting six hours PTA. 

She tripped over her walker, causing her to fall; she hit her head but denies 

LOC. Upon exam, the patient has tenderness in the right hip with restricted ROM 

and no neurological deficit. In the ED course, the patient was given Ns, 

Dilaudid, Morphine, Zofran, Flexeril, and Potassium Chloride. Bloodwork 

obtained. At [04:15], it was made aware that the pt's lactic acid was 3.7 and 

Trop was 0.04. EKG reveals NSR. CXR reveals no acute pathology. Hip/Pelvis XR 

reveals fractured rip hip. She is diagnosed with right hip fracture, ileostomy, 

and Chrohn's disease. Dr. Laguerre accepts the patient for admission at 04:15. 

Patient agrees with this plan and understands the need for admission to [facility].





- Diagnoses


Differential Diagnosis/HQI/PQRI: Positive: Contusion, Fracture (Closed), Strain


Provider Diagnoses: 


 Closed right hip fracture, Ileostomy in place, Crohn's disease








- Physician Notifications


Discussed Care Of Patient With: Chasidy Laguerre


Time Discussed With Above Provider: 04:15


Instructed by Provider To: Other - Patient will be admitted to [facility] for further 

care by Dr. Laguerre at 04:15. I also spoke with charlene Aranda, for consult 

with the pt's diagnosis.


Admit/Transition Orders Completed By ED Provider: Yes





- Critical Care Time


Critical Care Time: 30-74 min





Discharge





- Sign-Out/Discharge


Documenting (check all that apply): Patient Departure - Patient will be 

admitted to [facility] for further care by Dr. Laguerre at 04:15.





- Discharge Plan


Condition: Stable


Disposition: ADMITTED TO [facility]


Referrals: 


Kira Haile MD [Primary Care Provider] - 





- Billing Disposition and Condition


Condition: STABLE


Disposition: Admitted to [facility]





- Attestation Statements


Document Initiated by Scribe: Yes


Documenting Scribe: Lexy Carrion


Provider For Whom Heriberto is Documenting (Include Credential): Dr. Reji Hassan MD


Scribe Attestation: 


I, piero Del Cided for Dr. Reji Hassan MD on 12/01/18 at 0633. 


Scribe Documentation Reviewed: Yes


Provider Attestation: 


The documentation as recorded by the Lexy irwin accurately reflects 

the service I personally performed and the decisions made by me, Dr. Reji Hassan MD


Status of Scribe Document: Viewed

## 2018-12-01 NOTE — PN
Progress Note





- Progress Note


Date of Service: 12/01/18


Note: 





Pt seen and examined in ER and then again on floor. 72 yo F with multiple 

comorbidities essentially bed bound with displaced femoral neck fx. Offered 

both operative and nonoperative treatment. Recommended hospice for nonop 

treatment. Full note dictated. Pt unsure so will plan for hemiarthroplasty when 

optimized in case patient chooses to proceed. NPO after midnight

## 2018-12-02 LAB
ANION GAP SERPL CALC-SCNC: 9 MMOL/L (ref 2–11)
BASOPHILS # BLD AUTO: 0.1 10^3/UL (ref 0–0.2)
BUN SERPL-MCNC: 34 MG/DL (ref 6–24)
BUN/CREAT SERPL: 16.8 (ref 8–20)
CALCIUM SERPL-MCNC: 8.1 MG/DL (ref 8.6–10.3)
CHLORIDE SERPL-SCNC: 97 MMOL/L (ref 101–111)
EOSINOPHIL # BLD AUTO: 0.1 10^3/UL (ref 0–0.6)
EOSINOPHIL # BLD MANUAL: 0.1 10^3/UL (ref 0–0.6)
GLUCOSE SERPL-MCNC: 105 MG/DL (ref 70–100)
HCO3 SERPL-SCNC: 33 MMOL/L (ref 22–32)
HCT VFR BLD AUTO: 33 % (ref 35–47)
HGB BLD-MCNC: 10.9 G/DL (ref 12–16)
INR PPP/BLD: 2.38 (ref 0.77–1.02)
LYMPHOCYTES # BLD AUTO: 1.7 10^3/UL (ref 1–4.8)
MCH RBC QN AUTO: 32 PG (ref 27–31)
MCHC RBC AUTO-ENTMCNC: 33 G/DL (ref 31–36)
MCV RBC AUTO: 96 FL (ref 80–97)
MONOCYTES # BLD AUTO: 1.2 10^3/UL (ref 0–0.8)
NEUTROPHILS # BLD AUTO: 9 10^3/UL (ref 1.5–7.7)
NEUTROPHILS # BLD: 10.2 10^3/UL (ref 1.5–7.7)
NRBC # BLD AUTO: 0 10^3/UL
PLATELET # BLD AUTO: 246 10^3/UL (ref 150–450)
POTASSIUM SERPL-SCNC: 4.1 MMOL/L (ref 3.5–5)
RBC # BLD AUTO: 3.4 10^6/UL (ref 4–5.4)
SODIUM SERPL-SCNC: 139 MMOL/L (ref 135–145)
WBC # BLD AUTO: 12.1 10^3/UL (ref 3.5–10.8)

## 2018-12-02 RX ADMIN — MORPHINE TINCTURE PRN MG: 1 SOLUTION ORAL at 11:10

## 2018-12-02 RX ADMIN — PREDNISONE SCH: 5 TABLET ORAL at 08:01

## 2018-12-02 RX ADMIN — IPRATROPIUM BROMIDE AND ALBUTEROL SULFATE PRN NEB: .5; 3 SOLUTION RESPIRATORY (INHALATION) at 22:48

## 2018-12-02 RX ADMIN — LORAZEPAM SCH: 0.5 TABLET ORAL at 08:01

## 2018-12-02 RX ADMIN — GUAIFENESIN PRN MG: 600 TABLET, EXTENDED RELEASE ORAL at 10:53

## 2018-12-02 RX ADMIN — ACETAMINOPHEN PRN MG: 325 TABLET ORAL at 18:43

## 2018-12-02 RX ADMIN — IPRATROPIUM BROMIDE PRN MG: 0.5 SOLUTION RESPIRATORY (INHALATION) at 14:39

## 2018-12-02 RX ADMIN — PREDNISONE SCH MG: 1 TABLET ORAL at 10:53

## 2018-12-02 RX ADMIN — CINACALCET HYDROCHLORIDE SCH: 30 TABLET, COATED ORAL at 08:00

## 2018-12-02 RX ADMIN — IPRATROPIUM BROMIDE PRN MG: 0.5 SOLUTION RESPIRATORY (INHALATION) at 19:55

## 2018-12-02 RX ADMIN — HYDROMORPHONE HYDROCHLORIDE PRN MG: 1 INJECTION, SOLUTION INTRAMUSCULAR; INTRAVENOUS; SUBCUTANEOUS at 10:48

## 2018-12-02 RX ADMIN — TIOTROPIUM BROMIDE SCH CAP: 18 CAPSULE ORAL; RESPIRATORY (INHALATION) at 09:58

## 2018-12-02 RX ADMIN — CEFTRIAXONE SODIUM SCH MLS/HR: 1 INJECTION, POWDER, FOR SOLUTION INTRAVENOUS at 08:28

## 2018-12-02 RX ADMIN — CINACALCET HYDROCHLORIDE SCH MG: 30 TABLET, COATED ORAL at 10:53

## 2018-12-02 RX ADMIN — IPRATROPIUM BROMIDE AND ALBUTEROL SULFATE PRN NEB: .5; 3 SOLUTION RESPIRATORY (INHALATION) at 02:59

## 2018-12-02 RX ADMIN — SODIUM CHLORIDE SCH MLS/HR: 900 IRRIGANT IRRIGATION at 10:55

## 2018-12-02 RX ADMIN — HYDROMORPHONE HYDROCHLORIDE PRN MG: 1 INJECTION, SOLUTION INTRAMUSCULAR; INTRAVENOUS; SUBCUTANEOUS at 04:33

## 2018-12-02 RX ADMIN — CALCITRIOL SCH: 0.25 CAPSULE ORAL at 08:00

## 2018-12-02 RX ADMIN — HYDROMORPHONE HYDROCHLORIDE PRN MG: 1 INJECTION, SOLUTION INTRAMUSCULAR; INTRAVENOUS; SUBCUTANEOUS at 20:04

## 2018-12-02 RX ADMIN — PREDNISONE SCH: 1 TABLET ORAL at 08:02

## 2018-12-02 RX ADMIN — HYDROMORPHONE HYDROCHLORIDE PRN MG: 1 INJECTION, SOLUTION INTRAMUSCULAR; INTRAVENOUS; SUBCUTANEOUS at 14:50

## 2018-12-02 RX ADMIN — HYDROMORPHONE HYDROCHLORIDE PRN MG: 1 INJECTION, SOLUTION INTRAMUSCULAR; INTRAVENOUS; SUBCUTANEOUS at 23:07

## 2018-12-02 RX ADMIN — ACETAMINOPHEN PRN MG: 325 TABLET ORAL at 02:23

## 2018-12-02 RX ADMIN — PAROXETINE SCH MG: 10 TABLET, FILM COATED ORAL at 18:45

## 2018-12-02 RX ADMIN — LORAZEPAM SCH MG: 0.5 TABLET ORAL at 09:12

## 2018-12-02 RX ADMIN — ACETAMINOPHEN PRN MG: 325 TABLET ORAL at 09:14

## 2018-12-02 RX ADMIN — PREDNISONE SCH MG: 5 TABLET ORAL at 10:53

## 2018-12-02 RX ADMIN — LORAZEPAM SCH MG: 0.5 TABLET ORAL at 21:28

## 2018-12-02 RX ADMIN — MORPHINE TINCTURE PRN MG: 1 SOLUTION ORAL at 18:44

## 2018-12-02 RX ADMIN — CALCITRIOL SCH MCG: 0.25 CAPSULE ORAL at 10:52

## 2018-12-02 RX ADMIN — IPRATROPIUM BROMIDE PRN MG: 0.5 SOLUTION RESPIRATORY (INHALATION) at 09:57

## 2018-12-02 NOTE — PN
Subjective


Date of Service: 12/02/18


Interval History: 





This morning I came to see Edi and she would not talk with me until Robin 

arrived.  I just went to talk to them both, and she won't talk to me until she 

received pain medication.  





She has just received dilaudid and agrees to talk with me.  She is wishing to 

do surgery. I have recommended against surgery given her elevated risk of death

, cardiac event, need for return to OR, pneumonia, surgical site infection.  I 

have explained all of this to both Edi and Robin.  Still, they wish to 

proceed in order to reduce her pain.  Mobility is not their goal.  





Objective


Active Medications: 








Acetaminophen (Tylenol Tab*)  650 mg PO Q4H PRN


   PRN Reason: FEVER/PAIN


   Last Admin: 12/02/18 09:14 Dose:  650 mg


Al Hydrox/Mg Hydrox/Simethicone (Maalox Plus*)  30 ml PO Q6H PRN


   PRN Reason: INDIGESTION


Albuterol/Ipratropium (Duoneb (Albuterol 2.5 Mg/Ipratropium 0.5 Mg))  1 neb INH 

[id number] AWAKE PRN


   PRN Reason: sob/wheexing


   Last Admin: 12/02/18 02:59 Dose:  1 neb


Calcitriol (Rocaltrol  Cap*)  0.25 mcg PO DAILY [facility]


   Last Admin: 12/02/18 10:52 Dose:  0.25 mcg


Cinacalcet (Sensipar Tab*)  30 mg PO DAILY [facility]


   Last Admin: 12/02/18 10:53 Dose:  30 mg


Cyanocobalamin (Vitamin B12 Inj *)  1,000 mcg IM Th@0900 [facility]


Guaifenesin (Mucinex*)  600 mg PO BID PRN


   PRN Reason: CONGESTION


   Last Admin: 12/02/18 10:53 Dose:  600 mg


Hydromorphone HCl (Dilaudid Inj1s*)  0.5 mg IV SLOW PU Q4H PRN


   PRN Reason: PAIN


   Last Admin: 12/02/18 14:50 Dose:  0.5 mg


Ceftriaxone Sodium 1 gm/ (Sodium Chloride)  50 mls @ 200 mls/hr IVPB Q24H [facility]


   Last Admin: 12/02/18 08:28 Dose:  200 mls/hr


Sodium Chloride (Ns 0.9% 1000 Ml*)  1,000 mls @ 75 mls/hr IV PER RATE [facility]


   Last Admin: 12/02/18 10:55 Dose:  75 mls/hr


Ipratropium Bromide (Atrovent 0.5 Mg Neb.Sol*)  0.5 mg INH Q6HR PRN


   PRN Reason: WHEEZING


   Last Admin: 12/02/18 14:39 Dose:  0.5 mg


Lorazepam (Ativan Tab(*))  0.5 mg PO BID [facility]


   Last Admin: 12/02/18 09:12 Dose:  0.5 mg


Opium Tincture (Opium Tincture*)  6 mg PO QID PRN


   PRN Reason: LOOSE STOOL/DIARRHEA


   Last Admin: 12/02/18 11:10 Dose:  6 mg


Paroxetine HCl (Paxil Tab*)  30 mg PO QPM [facility]


   Last Admin: 12/01/18 18:00 Dose:  Not Given


Prednisone (Deltasone Tab*)  5 mg PO QAM [facility]


   Last Admin: 12/02/18 10:53 Dose:  5 mg


Prednisone (Deltasone Tab*)  3 mg PO QAM [facility]


   Last Admin: 12/02/18 10:53 Dose:  3 mg


Tiotropium Bromide (Spiriva Cap.Inh*)  1 cap INH DAILY [facility]


   Last Admin: 12/02/18 09:58 Dose:  1 cap








 Vital Signs - 8 hr











  12/02/18 12/02/18 12/02/18





  09:59 10:48 11:10


 


Temperature   


 


Pulse Rate 78  


 


Respiratory 20 18 16





Rate   


 


Blood Pressure   





(mmHg)   


 


O2 Sat by Pulse 92  





Oximetry   














  12/02/18 12/02/18 12/02/18





  11:21 14:40 14:50


 


Temperature 98.0 F  


 


Pulse Rate 93 80 


 


Respiratory 24 20 18





Rate   


 


Blood Pressure 117/42  





(mmHg)   


 


O2 Sat by Pulse 93 91 





Oximetry   














  12/02/18 12/02/18





  15:28 16:38


 


Temperature 98.3 F 


 


Pulse Rate 85 


 


Respiratory 16 





Rate  


 


Blood Pressure 111/50 





(mmHg)  


 


O2 Sat by Pulse 93 91





Oximetry  











Oxygen Devices in Use Now: Nasal Cannula


Appearance: drowsy, alerts to voice, answers my questions


Eyes: No Scleral Icterus


Ears/Nose/Mouth/Throat: - - dry mucosa


Neck: NL Appearance and Movements; NL JVP


Respiratory: Symmetrical Chest Expansion and Respiratory Effort, - - wet cough 


Cardiovascular: NL Sounds; No Murmurs; No JVD, RRR


Abdominal: NL Sounds; No Tenderness; No Distention, - - ostomy and hernia to 

right of ostomy 


Lymphatic: No Cervical Adenopathy


Extremities: No Edema


Skin: - - echymosis on chin and hands 


Neurological: Alert and Oriented x 3


Result Diagrams: 


 12/02/18 05:41





 12/02/18 05:41


Additional Lab and Data: 


 Lab Results











  12/01/18 12/01/18 12/01/18 Range/Units





  03:44 03:44 03:44 


 


WBC  17.5 H    (3.5-10.8)  10^3/ul


 


RBC  4.32    (4.00-5.40)  10^6/ul


 


Hgb  13.5    (12.0-16.0)  g/dl


 


Hct  41    (35-47)  %


 


MCV  94    (80-97)  fL


 


MCH  31    (27-31)  pg


 


MCHC  33    (31-36)  g/dl


 


RDW  15    (10.5-15)  %


 


Plt Count  341    (150-450)  10^3/ul


 


MPV  7.7    (7.4-10.4)  fL


 


Neut % (Auto)  71.0    %


 


Lymph % (Auto)  18.0    %


 


Mono % (Auto)  9.9    %


 


Eos % (Auto)  0.8    %


 


Baso % (Auto)  0.3    %


 


Absolute Neuts (auto)  12.5 H    (1.5-7.7)  10^3/ul


 


Absolute Lymphs (auto)  3.2    (1.0-4.8)  10^3/ul


 


Absolute Monos (auto)  1.7 H    (0-0.8)  10^3/ul


 


Absolute Eos (auto)  0.1    (0-0.6)  10^3/ul


 


Absolute Basos (auto)  0    (0-0.2)  10^3/ul


 


Absolute Nucleated RBC  0    10^3/ul


 


Nucleated RBC %  0    


 


INR (Anticoag Therapy)   1.81 H   (0.77-1.02)  


 


APTT   38.9 H   (26.0-36.3)  seconds


 


Sodium    137  (135-145)  mmol/L


 


Potassium    3.1 L  (3.5-5.0)  mmol/L


 


Chloride    82 L  (101-111)  mmol/L


 


Carbon Dioxide    41 H*  (22-32)  mmol/L


 


Anion Gap    14 H  (2-11)  mmol/L


 


BUN    43 H  (6-24)  mg/dL


 


Creatinine    2.36 H  (0.51-0.95)  mg/dL


 


Est GFR ( Amer)    24.3  (>60)  


 


Est GFR (Non-Af Amer)    20.1  (>60)  


 


BUN/Creatinine Ratio    18.2  (8-20)  


 


Glucose    138 H  ()  mg/dL


 


Lactic Acid     (0.5-2.0)  mmol/L


 


Calcium    9.1  (8.6-10.3)  mg/dL


 


Total Bilirubin    0.40  (0.2-1.0)  mg/dL


 


AST    26  (13-39)  U/L


 


ALT    30  (7-52)  U/L


 


Alkaline Phosphatase    173 H  ()  U/L


 


Troponin I    0.04 H*  (<0.04)  ng/mL


 


Total Protein    6.9  (6.4-8.9)  g/dL


 


Albumin    3.7  (3.2-5.2)  g/dL


 


Globulin    3.2  (2-4)  g/dL


 


Albumin/Globulin Ratio    1.2  (1-3)  


 


Blood Type     


 


Antibody Screen     














  12/01/18 12/01/18 Range/Units





  03:44 03:44 


 


WBC    (3.5-10.8)  10^3/ul


 


RBC    (4.00-5.40)  10^6/ul


 


Hgb    (12.0-16.0)  g/dl


 


Hct    (35-47)  %


 


MCV    (80-97)  fL


 


MCH    (27-31)  pg


 


MCHC    (31-36)  g/dl


 


RDW    (10.5-15)  %


 


Plt Count    (150-450)  10^3/ul


 


MPV    (7.4-10.4)  fL


 


Neut % (Auto)    %


 


Lymph % (Auto)    %


 


Mono % (Auto)    %


 


Eos % (Auto)    %


 


Baso % (Auto)    %


 


Absolute Neuts (auto)    (1.5-7.7)  10^3/ul


 


Absolute Lymphs (auto)    (1.0-4.8)  10^3/ul


 


Absolute Monos (auto)    (0-0.8)  10^3/ul


 


Absolute Eos (auto)    (0-0.6)  10^3/ul


 


Absolute Basos (auto)    (0-0.2)  10^3/ul


 


Absolute Nucleated RBC    10^3/ul


 


Nucleated RBC %    


 


INR (Anticoag Therapy)    (0.77-1.02)  


 


APTT    (26.0-36.3)  seconds


 


Sodium    (135-145)  mmol/L


 


Potassium    (3.5-5.0)  mmol/L


 


Chloride    (101-111)  mmol/L


 


Carbon Dioxide    (22-32)  mmol/L


 


Anion Gap    (2-11)  mmol/L


 


BUN    (6-24)  mg/dL


 


Creatinine    (0.51-0.95)  mg/dL


 


Est GFR ( Amer)    (>60)  


 


Est GFR (Non-Af Amer)    (>60)  


 


BUN/Creatinine Ratio    (8-20)  


 


Glucose    ()  mg/dL


 


Lactic Acid  3.7 H*   (0.5-2.0)  mmol/L


 


Calcium    (8.6-10.3)  mg/dL


 


Total Bilirubin    (0.2-1.0)  mg/dL


 


AST    (13-39)  U/L


 


ALT    (7-52)  U/L


 


Alkaline Phosphatase    ()  U/L


 


Troponin I    (<0.04)  ng/mL


 


Total Protein    (6.4-8.9)  g/dL


 


Albumin    (3.2-5.2)  g/dL


 


Globulin    (2-4)  g/dL


 


Albumin/Globulin Ratio    (1-3)  


 


Blood Type   O Negative  


 


Antibody Screen   Pending  














Assess/Plan/Problems-Billing


Assessment: 





This is a 73 year old chronically ill, immobile woman who presented to the ED 

after a fall and was found to have a right femoral neck fracture.  





- Patient Problems


(1) Fracture of femoral neck, right


Current Visit: Yes   Status: Acute   Code(s): S72.001A - FRACTURE OF UNSP PART 

OF NECK OF RIGHT FEMUR, INIT   SNOMED Code(s): 4019713


   Comment: She is very elevated risk for perioperative complications. 


Her RCRI is 2, placing her at 6.6% risk of MACE, and NSQIP puts her 3.6% risk 

of MACE, but 30.5% risk of serious complication, 13.6% change of pneumonia, 2.1

% change of surgical site infection, 21% change of readmission, and 16% chance 

of death.  These are all well above average. 


Based on these findings, I recommended against surgery to Edi and Robin.  

Still, they wish to accept those risks with the hope that she may get pain 

relief.  They understand these risks well and express good comprehension and 

wish to proceed. 


I will contact Dr. Francis about this update. 





Her INR is >2 today, so I am giving her vitamin k to reverse the INR.  The 

indication for warfarin was a remote DVT (2011) so will hold off on heparin   





(2) Acute on chronic renal failure


Current Visit: No   Status: Acute   Priority: High   Onset Date: 11/17/14   Code

(s): N17.9 - ACUTE KIDNEY FAILURE, UNSPECIFIED; N18.9 - CHRONIC KIDNEY DISEASE, 

UNSPECIFIED   SNOMED Code(s): 131537421


   Comment: Improving with IVF, continue as still not at baseline   





(3) Pneumonia


Current Visit: Yes   Status: Acute   Code(s): J18.9 - PNEUMONIA, UNSPECIFIED 

ORGANISM   SNOMED Code(s): 062643473


   Comment: with sepsis present at admission, sepsis now resolved


continue ceftriaxone    





(4) Elevated troponin


Current Visit: Yes   Status: Acute   Code(s): R74.8 - ABNORMAL LEVELS OF OTHER 

SERUM ENZYMES   SNOMED Code(s): 529875804


   Comment: likely demand but ekg was poor baseline, will repeat tomorrow 

morning pre-op   





(5) COPD (chronic obstructive pulmonary disease)


Current Visit: No   Status: Chronic   Priority: Medium   Code(s): J44.9 - 

CHRONIC OBSTRUCTIVE PULMONARY DISEASE, UNSPECIFIED   SNOMED Code(s): 22145867


   Comment: Continue home O2 and home prednisone


   





(6) History of DVT (deep vein thrombosis)


Current Visit: No   Status: Chronic   Priority: High   Code(s): Z86.718 - 

PERSONAL HISTORY OF OTHER VENOUS THROMBOSIS AND EMBOLISM   SNOMED Code(s): 

434041415


   Comment: remote 


warfarin on hold for surgery    





(7) Chronic steroid use


Current Visit: Yes   Status: Acute   Code(s): XZL1789 -    SNOMED Code(s): 

509849961


   Comment: raises risk for perioperative complications   





(8) Anxiety


Current Visit: No   Status: Acute   Priority: High   Code(s): F41.9 - ANXIETY 

DISORDER, UNSPECIFIED   SNOMED Code(s): 36570438


   Comment: continue ativan

## 2018-12-02 NOTE — PN
Progress Note





- Progress Note


Date of Service: 12/02/18


SOAP: 


Subjective:


Pt awake and alert today. Pain medication was decreased overnight. Pt in 

significant discomfort with spasms during interview. Wife at bedside. 








Objective:


 











Temp Pulse Resp BP Pulse Ox


 


 98.3 F   85   18   136/94   92 


 


 12/02/18 04:31  12/02/18 04:31  12/02/18 05:47  12/02/18 04:31  12/02/18 04:31








NAD. shaking in bed. Alert and responsive. Conversant. RLE: skin intact. Able 

to dorsiflex/plantarflex ankle. flex/ext toes. SILT about 1st dws, dorsal, 

plantar, medial, and lateral. 2+ PT pulse. 





 Laboratory Results - last 24 hr











  12/01/18 12/01/18 12/01/18





  03:44 08:39 08:55


 


WBC   


 


RBC   


 


Hgb   


 


Hct   


 


MCV   


 


MCH   


 


MCHC   


 


RDW   


 


Plt Count   


 


MPV   


 


Neut % (Auto)   


 


Lymph % (Auto)   


 


Mono % (Auto)   


 


Eos % (Auto)   


 


Baso % (Auto)   


 


Absolute Neuts (auto)   


 


Absolute Lymphs (auto)   


 


Absolute Monos (auto)   


 


Absolute Eos (auto)   


 


Absolute Basos (auto)   


 


Absolute Nucleated RBC   


 


Neutrophils %   


 


Lymphocytes %   


 


Monocytes %   


 


Eosinophils %   


 


Nucleated RBC %   


 


Abs Neuts (Manual)   


 


Abs Lymphs (Manual)   


 


Abs Monocytes (Manual)   


 


Absolute Eos (Manual)   


 


Normal RBC Morphology   


 


INR (Anticoag Therapy)   


 


Sodium  137  


 


Potassium  3.1 L  


 


Chloride  82 L  


 


Carbon Dioxide  41 H*  


 


Anion Gap  14 H  


 


BUN  43 H  


 


Creatinine  2.36 H  


 


Est GFR ( Amer)  24.3  


 


Est GFR (Non-Af Amer)  20.1  


 


BUN/Creatinine Ratio  18.2  


 


Glucose  138 H  


 


Lactic Acid    2.2 H*


 


Calcium  9.1  


 


Total Bilirubin  0.40  


 


AST  26  


 


ALT  30  


 


Alkaline Phosphatase  173 H  


 


Total Creatine Kinase  55  


 


Troponin I  0.04 H*  


 


Total Protein  6.9  


 


Albumin  3.7  


 


Globulin  3.2  


 


Albumin/Globulin Ratio  1.2  


 


Urine Color   Yellow 


 


Urine Appearance   Cloudy 


 


Urine pH   6.0 


 


Ur Specific Gravity   1.021 


 


Urine Protein   Negative 


 


Urine Ketones   Negative 


 


Urine Blood   Negative 


 


Urine Nitrate   Negative 


 


Urine Bilirubin   Negative 


 


Urine Urobilinogen   Negative 


 


Ur Leukocyte Esterase   Negative 


 


Urine Glucose   Negative 


 


Urine Ascorbic Acid   * A 














  12/01/18 12/01/18 12/02/18





  11:38 15:52 05:41


 


WBC    12.1 H


 


RBC    3.40 L


 


Hgb    10.9 L


 


Hct    33 L


 


MCV    96


 


MCH    32 H


 


MCHC    33


 


RDW    16 H


 


Plt Count    246


 


MPV    7.8


 


Neut % (Auto)    Not Reportable


 


Lymph % (Auto)    Not Reportable


 


Mono % (Auto)    Not Reportable


 


Eos % (Auto)    Not Reportable


 


Baso % (Auto)    Not Reportable


 


Absolute Neuts (auto)    9.0 H


 


Absolute Lymphs (auto)    1.7


 


Absolute Monos (auto)    1.2 H


 


Absolute Eos (auto)    0.1


 


Absolute Basos (auto)    0.1


 


Absolute Nucleated RBC    0


 


Neutrophils %    84


 


Lymphocytes %    9


 


Monocytes %    6


 


Eosinophils %    1


 


Nucleated RBC %    Not Reportable


 


Abs Neuts (Manual)    10.2 H


 


Abs Lymphs (Manual)    1.1


 


Abs Monocytes (Manual)    0.7


 


Absolute Eos (Manual)    0.1


 


Normal RBC Morphology    Normal


 


INR (Anticoag Therapy)   


 


Sodium   


 


Potassium   


 


Chloride   


 


Carbon Dioxide   


 


Anion Gap   


 


BUN   


 


Creatinine   


 


Est GFR ( Amer)   


 


Est GFR (Non-Af Amer)   


 


BUN/Creatinine Ratio   


 


Glucose   


 


Lactic Acid   


 


Calcium   


 


Total Bilirubin   


 


AST   


 


ALT   


 


Alkaline Phosphatase   


 


Total Creatine Kinase   


 


Troponin I  0.03  0.03 


 


Total Protein   


 


Albumin   


 


Globulin   


 


Albumin/Globulin Ratio   


 


Urine Color   


 


Urine Appearance   


 


Urine pH   


 


Ur Specific Gravity   


 


Urine Protein   


 


Urine Ketones   


 


Urine Blood   


 


Urine Nitrate   


 


Urine Bilirubin   


 


Urine Urobilinogen   


 


Ur Leukocyte Esterase   


 


Urine Glucose   


 


Urine Ascorbic Acid   














  12/02/18 12/02/18 12/02/18





  05:41 05:41 05:41


 


WBC   


 


RBC   


 


Hgb   


 


Hct   


 


MCV   


 


MCH   


 


MCHC   


 


RDW   


 


Plt Count   


 


MPV   


 


Neut % (Auto)   


 


Lymph % (Auto)   


 


Mono % (Auto)   


 


Eos % (Auto)   


 


Baso % (Auto)   


 


Absolute Neuts (auto)   


 


Absolute Lymphs (auto)   


 


Absolute Monos (auto)   


 


Absolute Eos (auto)   


 


Absolute Basos (auto)   


 


Absolute Nucleated RBC   


 


Neutrophils %   


 


Lymphocytes %   


 


Monocytes %   


 


Eosinophils %   


 


Nucleated RBC %   


 


Abs Neuts (Manual)   


 


Abs Lymphs (Manual)   


 


Abs Monocytes (Manual)   


 


Absolute Eos (Manual)   


 


Normal RBC Morphology   


 


INR (Anticoag Therapy)  2.38 H  


 


Sodium   139 


 


Potassium   4.1 


 


Chloride   97 L 


 


Carbon Dioxide   33 H 


 


Anion Gap   9 


 


BUN   34 H 


 


Creatinine   2.02 H 


 


Est GFR ( Amer)   29.1 


 


Est GFR (Non-Af Amer)   24.1 


 


BUN/Creatinine Ratio   16.8 


 


Glucose   105 H 


 


Lactic Acid    0.9


 


Calcium   8.1 L 


 


Total Bilirubin   


 


AST   


 


ALT   


 


Alkaline Phosphatase   


 


Total Creatine Kinase   


 


Troponin I   


 


Total Protein   


 


Albumin   


 


Globulin   


 


Albumin/Globulin Ratio   


 


Urine Color   


 


Urine Appearance   


 


Urine pH   


 


Ur Specific Gravity   


 


Urine Protein   


 


Urine Ketones   


 


Urine Blood   


 


Urine Nitrate   


 


Urine Bilirubin   


 


Urine Urobilinogen   


 


Ur Leukocyte Esterase   


 


Urine Glucose   


 


Urine Ascorbic Acid   














Assessment:


72 yo with multiple medical problems and right displaced femoral neck fracture.








Plan:


Pt with limited functionality at baseline and this qualifies for a kavita.


Discussed with patient and her wife about options. Reviewed risks and benefits 

including mortalitiy risk with surgery.


Pt and her wife will discuss with medicine team.


Not optimized today as her INR is 2.38. Would treat and check tomorrow.


If pt interested tomorrow, will set up for kavita with one of my partners. 


NPO after midnight tonight if decides to proceed with surgery.

## 2018-12-03 LAB
ANION GAP SERPL CALC-SCNC: 11 MMOL/L (ref 2–11)
BASOPHILS # BLD AUTO: 0.1 10^3/UL (ref 0–0.2)
BUN SERPL-MCNC: 24 MG/DL (ref 6–24)
BUN/CREAT SERPL: 15.1 (ref 8–20)
CALCIUM SERPL-MCNC: 8.2 MG/DL (ref 8.6–10.3)
CHLORIDE SERPL-SCNC: 97 MMOL/L (ref 101–111)
EOSINOPHIL # BLD AUTO: 0.3 10^3/UL (ref 0–0.6)
GLUCOSE SERPL-MCNC: 105 MG/DL (ref 70–100)
HCO3 SERPL-SCNC: 31 MMOL/L (ref 22–32)
HCT VFR BLD AUTO: 34 % (ref 35–47)
HGB BLD-MCNC: 10.9 G/DL (ref 12–16)
INR PPP/BLD: 1.74 (ref 0.77–1.02)
LYMPHOCYTES # BLD AUTO: 2 10^3/UL (ref 1–4.8)
MCH RBC QN AUTO: 32 PG (ref 27–31)
MCHC RBC AUTO-ENTMCNC: 33 G/DL (ref 31–36)
MCV RBC AUTO: 98 FL (ref 80–97)
MONOCYTES # BLD AUTO: 1.4 10^3/UL (ref 0–0.8)
NEUTROPHILS # BLD AUTO: 9.7 10^3/UL (ref 1.5–7.7)
NRBC # BLD AUTO: 0 10^3/UL
NRBC BLD QL AUTO: 0
PLATELET # BLD AUTO: 250 10^3/UL (ref 150–450)
POTASSIUM SERPL-SCNC: 4.1 MMOL/L (ref 3.5–5)
RBC # BLD AUTO: 3.45 10^6/UL (ref 4–5.4)
SODIUM SERPL-SCNC: 139 MMOL/L (ref 135–145)
WBC # BLD AUTO: 13.4 10^3/UL (ref 3.5–10.8)

## 2018-12-03 RX ADMIN — HYDROMORPHONE HYDROCHLORIDE PRN MG: 1 INJECTION, SOLUTION INTRAMUSCULAR; INTRAVENOUS; SUBCUTANEOUS at 16:19

## 2018-12-03 RX ADMIN — PREDNISONE SCH MG: 1 TABLET ORAL at 07:37

## 2018-12-03 RX ADMIN — SODIUM CHLORIDE SCH MLS/HR: 900 IRRIGANT IRRIGATION at 13:20

## 2018-12-03 RX ADMIN — HYDROMORPHONE HYDROCHLORIDE PRN MG: 1 INJECTION, SOLUTION INTRAMUSCULAR; INTRAVENOUS; SUBCUTANEOUS at 21:34

## 2018-12-03 RX ADMIN — HYDROMORPHONE HYDROCHLORIDE PRN MG: 1 INJECTION, SOLUTION INTRAMUSCULAR; INTRAVENOUS; SUBCUTANEOUS at 06:23

## 2018-12-03 RX ADMIN — HYDROMORPHONE HYDROCHLORIDE PRN MG: 1 INJECTION, SOLUTION INTRAMUSCULAR; INTRAVENOUS; SUBCUTANEOUS at 13:52

## 2018-12-03 RX ADMIN — TIOTROPIUM BROMIDE SCH CAP: 18 CAPSULE ORAL; RESPIRATORY (INHALATION) at 07:23

## 2018-12-03 RX ADMIN — IPRATROPIUM BROMIDE AND ALBUTEROL SULFATE PRN NEB: .5; 3 SOLUTION RESPIRATORY (INHALATION) at 03:59

## 2018-12-03 RX ADMIN — PREDNISONE SCH MG: 5 TABLET ORAL at 07:38

## 2018-12-03 RX ADMIN — HYDROMORPHONE HYDROCHLORIDE PRN MG: 1 INJECTION, SOLUTION INTRAMUSCULAR; INTRAVENOUS; SUBCUTANEOUS at 19:16

## 2018-12-03 RX ADMIN — IPRATROPIUM BROMIDE AND ALBUTEROL SULFATE PRN NEB: .5; 3 SOLUTION RESPIRATORY (INHALATION) at 07:25

## 2018-12-03 RX ADMIN — SODIUM CHLORIDE SCH MLS/HR: 900 IRRIGANT IRRIGATION at 09:34

## 2018-12-03 RX ADMIN — CALCITRIOL SCH MCG: 0.25 CAPSULE ORAL at 07:35

## 2018-12-03 RX ADMIN — IPRATROPIUM BROMIDE AND ALBUTEROL SULFATE PRN NEB: .5; 3 SOLUTION RESPIRATORY (INHALATION) at 11:15

## 2018-12-03 RX ADMIN — MORPHINE TINCTURE PRN MG: 1 SOLUTION ORAL at 13:59

## 2018-12-03 RX ADMIN — HYDROMORPHONE HYDROCHLORIDE PRN MG: 1 INJECTION, SOLUTION INTRAMUSCULAR; INTRAVENOUS; SUBCUTANEOUS at 11:35

## 2018-12-03 RX ADMIN — SODIUM CHLORIDE SCH MLS/HR: 900 IRRIGANT IRRIGATION at 00:29

## 2018-12-03 RX ADMIN — CINACALCET HYDROCHLORIDE SCH MG: 30 TABLET, COATED ORAL at 07:35

## 2018-12-03 RX ADMIN — HYDROMORPHONE HYDROCHLORIDE PRN MG: 1 INJECTION, SOLUTION INTRAMUSCULAR; INTRAVENOUS; SUBCUTANEOUS at 02:53

## 2018-12-03 RX ADMIN — CEFTRIAXONE SODIUM SCH MLS/HR: 1 INJECTION, POWDER, FOR SOLUTION INTRAVENOUS at 07:38

## 2018-12-03 RX ADMIN — SODIUM CHLORIDE SCH MLS/HR: 900 IRRIGANT IRRIGATION at 23:14

## 2018-12-03 RX ADMIN — MORPHINE TINCTURE PRN MG: 1 SOLUTION ORAL at 07:35

## 2018-12-03 RX ADMIN — LORAZEPAM SCH MG: 0.5 TABLET ORAL at 07:38

## 2018-12-03 RX ADMIN — PAROXETINE SCH MG: 10 TABLET, FILM COATED ORAL at 19:16

## 2018-12-03 RX ADMIN — ACETAMINOPHEN PRN MG: 325 TABLET ORAL at 04:13

## 2018-12-03 RX ADMIN — LORAZEPAM SCH MG: 0.5 TABLET ORAL at 22:56

## 2018-12-03 NOTE — PN
Subjective


Date of Service: 12/03/18


Interval History: 





Pt's tremor that had been ongoing for "several weeks " has increased today. As 

per pt's wife , its likely due to pain, which had been difficult to control 

this AM and Dilaudid had been increased.


Pt has been less coherent due to the narcotic pain meds provided. No new 

complaints apart for r hip pain. Poor appetite





Objective


Active Medications: 








Acetaminophen (Tylenol Tab*)  650 mg PO Q4H PRN


   PRN Reason: FEVER/PAIN


   Last Admin: 12/03/18 04:13 Dose:  650 mg


Al Hydrox/Mg Hydrox/Simethicone (Maalox Plus*)  30 ml PO Q6H PRN


   PRN Reason: INDIGESTION


Albuterol/Ipratropium (Duoneb (Albuterol 2.5 Mg/Ipratropium 0.5 Mg))  1 neb INH 

[id number] AWAKE PRN


   PRN Reason: sob/wheexing


   Last Admin: 12/03/18 11:15 Dose:  1 neb


Calcitriol (Rocaltrol  Cap*)  0.25 mcg PO DAILY [facility]


   Last Admin: 12/03/18 07:35 Dose:  0.25 mcg


Cinacalcet (Sensipar Tab*)  30 mg PO DAILY [facility]


   Last Admin: 12/03/18 07:35 Dose:  30 mg


Cyanocobalamin (Vitamin B12 Inj *)  1,000 mcg IM Th@0900 [facility]


Guaifenesin (Mucinex*)  600 mg PO BID PRN


   PRN Reason: CONGESTION


   Last Admin: 12/02/18 10:53 Dose:  600 mg


Hydromorphone HCl (Dilaudid Inj1s*)  1 mg IV SLOW PU Q2H PRN


   PRN Reason: PAIN


   Last Admin: 12/03/18 11:35 Dose:  1 mg


Ceftriaxone Sodium 1 gm/ (Sodium Chloride)  50 mls @ 200 mls/hr IVPB Q24H [facility]


   Last Admin: 12/03/18 07:38 Dose:  200 mls/hr


Sodium Chloride (Ns 0.9% 1000 Ml*)  1,000 mls @ 100 mls/hr IV PER RATE [facility]


   Last Admin: 12/03/18 13:20 Dose:  100 mls/hr


Ipratropium Bromide (Atrovent 0.5 Mg Neb.Sol*)  0.5 mg INH Q6HR PRN


   PRN Reason: WHEEZING


   Last Admin: 12/02/18 19:55 Dose:  0.5 mg


Lorazepam (Ativan Tab(*))  0.5 mg PO BID [facility]


   Last Admin: 12/03/18 07:38 Dose:  0.5 mg


Opium Tincture (Opium Tincture*)  6 mg PO QID PRN


   PRN Reason: LOOSE STOOL/DIARRHEA


   Last Admin: 12/03/18 07:35 Dose:  6 mg


Paroxetine HCl (Paxil Tab*)  30 mg PO QPM [facility]


   Last Admin: 12/02/18 18:45 Dose:  30 mg


Prednisone (Deltasone Tab*)  5 mg PO QAM [facility]


   Last Admin: 12/03/18 07:38 Dose:  5 mg


Prednisone (Deltasone Tab*)  3 mg PO QAM [facility]


   Last Admin: 12/03/18 07:37 Dose:  3 mg


Tiotropium Bromide (Spiriva Cap.Inh*)  1 cap INH DAILY [facility]


   Last Admin: 12/03/18 07:23 Dose:  1 cap








 Vital Signs - 8 hr











  12/03/18 12/03/18 12/03/18





  06:23 06:31 07:27


 


Temperature   


 


Pulse Rate   95


 


Respiratory 19 19 17





Rate   


 


Blood Pressure   





(mmHg)   


 


O2 Sat by Pulse   92





Oximetry   














  12/03/18 12/03/18 12/03/18





  07:35 07:38 08:00


 


Temperature   


 


Pulse Rate   


 


Respiratory 20 20 20





Rate   


 


Blood Pressure   





(mmHg)   


 


O2 Sat by Pulse   





Oximetry   














  12/03/18 12/03/18 12/03/18





  08:05 09:29 09:55


 


Temperature 98.1 F  


 


Pulse Rate 102  


 


Respiratory 22 18 18





Rate   


 


Blood Pressure 116/47  





(mmHg)   


 


O2 Sat by Pulse 93  





Oximetry   














  12/03/18 12/03/18 12/03/18





  11:15 11:18 11:19


 


Temperature   


 


Pulse Rate 92 94 


 


Respiratory 20 18 20





Rate   


 


Blood Pressure   





(mmHg)   


 


O2 Sat by Pulse 93 93 





Oximetry   














  12/03/18 12/03/18





  11:35 11:43


 


Temperature  98.4 F


 


Pulse Rate  79


 


Respiratory 20 22





Rate  


 


Blood Pressure  111/50





(mmHg)  


 


O2 Sat by Pulse  91





Oximetry  











Oxygen Devices in Use Now: Nasal Cannula


Appearance: 72 yo F in NAD, aAOx2


Eyes: No Scleral Icterus, PERRLA


Ears/Nose/Mouth/Throat: NL Teeth, Lips, Gums, Mucous Membranes Moist


Neck: NL Appearance and Movements; NL JVP, Trachea Midline


Respiratory: Symmetrical Chest Expansion and Respiratory Effort, - - rhonchi at 

LLL


Cardiovascular: NL Sounds; No Murmurs; No JVD, RRR


Abdominal: NL Sounds; No Tenderness; No Distention, No Hepatosplenomegaly


Lymphatic: No Cervical Adenopathy


Extremities: No Clubbing, Cyanosis, - - R leg rotated and appears shortened c/w 

left


Skin: No Nodules or Sclerosis, - - chin abrasion noted


Neurological: - - diffuse intentional fine tremor noted mostly in b/l UE's


Result Diagrams: 


 12/03/18 06:12





 12/03/18 06:12


Additional Lab and Data: 


 Lab Results











  12/01/18 12/01/18 12/01/18 Range/Units





  03:44 03:44 03:44 


 


WBC  17.5 H    (3.5-10.8)  10^3/ul


 


RBC  4.32    (4.00-5.40)  10^6/ul


 


Hgb  13.5    (12.0-16.0)  g/dl


 


Hct  41    (35-47)  %


 


MCV  94    (80-97)  fL


 


MCH  31    (27-31)  pg


 


MCHC  33    (31-36)  g/dl


 


RDW  15    (10.5-15)  %


 


Plt Count  341    (150-450)  10^3/ul


 


MPV  7.7    (7.4-10.4)  fL


 


Neut % (Auto)  71.0    %


 


Lymph % (Auto)  18.0    %


 


Mono % (Auto)  9.9    %


 


Eos % (Auto)  0.8    %


 


Baso % (Auto)  0.3    %


 


Absolute Neuts (auto)  12.5 H    (1.5-7.7)  10^3/ul


 


Absolute Lymphs (auto)  3.2    (1.0-4.8)  10^3/ul


 


Absolute Monos (auto)  1.7 H    (0-0.8)  10^3/ul


 


Absolute Eos (auto)  0.1    (0-0.6)  10^3/ul


 


Absolute Basos (auto)  0    (0-0.2)  10^3/ul


 


Absolute Nucleated RBC  0    10^3/ul


 


Nucleated RBC %  0    


 


INR (Anticoag Therapy)   1.81 H   (0.77-1.02)  


 


APTT   38.9 H   (26.0-36.3)  seconds


 


Sodium    137  (135-145)  mmol/L


 


Potassium    3.1 L  (3.5-5.0)  mmol/L


 


Chloride    82 L  (101-111)  mmol/L


 


Carbon Dioxide    41 H*  (22-32)  mmol/L


 


Anion Gap    14 H  (2-11)  mmol/L


 


BUN    43 H  (6-24)  mg/dL


 


Creatinine    2.36 H  (0.51-0.95)  mg/dL


 


Est GFR ( Amer)    24.3  (>60)  


 


Est GFR (Non-Af Amer)    20.1  (>60)  


 


BUN/Creatinine Ratio    18.2  (8-20)  


 


Glucose    138 H  ()  mg/dL


 


Lactic Acid     (0.5-2.0)  mmol/L


 


Calcium    9.1  (8.6-10.3)  mg/dL


 


Total Bilirubin    0.40  (0.2-1.0)  mg/dL


 


AST    26  (13-39)  U/L


 


ALT    30  (7-52)  U/L


 


Alkaline Phosphatase    173 H  ()  U/L


 


Troponin I    0.04 H*  (<0.04)  ng/mL


 


Total Protein    6.9  (6.4-8.9)  g/dL


 


Albumin    3.7  (3.2-5.2)  g/dL


 


Globulin    3.2  (2-4)  g/dL


 


Albumin/Globulin Ratio    1.2  (1-3)  


 


Blood Type     


 


Antibody Screen     














  12/01/18 12/01/18 Range/Units





  03:44 03:44 


 


WBC    (3.5-10.8)  10^3/ul


 


RBC    (4.00-5.40)  10^6/ul


 


Hgb    (12.0-16.0)  g/dl


 


Hct    (35-47)  %


 


MCV    (80-97)  fL


 


MCH    (27-31)  pg


 


MCHC    (31-36)  g/dl


 


RDW    (10.5-15)  %


 


Plt Count    (150-450)  10^3/ul


 


MPV    (7.4-10.4)  fL


 


Neut % (Auto)    %


 


Lymph % (Auto)    %


 


Mono % (Auto)    %


 


Eos % (Auto)    %


 


Baso % (Auto)    %


 


Absolute Neuts (auto)    (1.5-7.7)  10^3/ul


 


Absolute Lymphs (auto)    (1.0-4.8)  10^3/ul


 


Absolute Monos (auto)    (0-0.8)  10^3/ul


 


Absolute Eos (auto)    (0-0.6)  10^3/ul


 


Absolute Basos (auto)    (0-0.2)  10^3/ul


 


Absolute Nucleated RBC    10^3/ul


 


Nucleated RBC %    


 


INR (Anticoag Therapy)    (0.77-1.02)  


 


APTT    (26.0-36.3)  seconds


 


Sodium    (135-145)  mmol/L


 


Potassium    (3.5-5.0)  mmol/L


 


Chloride    (101-111)  mmol/L


 


Carbon Dioxide    (22-32)  mmol/L


 


Anion Gap    (2-11)  mmol/L


 


BUN    (6-24)  mg/dL


 


Creatinine    (0.51-0.95)  mg/dL


 


Est GFR ( Amer)    (>60)  


 


Est GFR (Non-Af Amer)    (>60)  


 


BUN/Creatinine Ratio    (8-20)  


 


Glucose    ()  mg/dL


 


Lactic Acid  3.7 H*   (0.5-2.0)  mmol/L


 


Calcium    (8.6-10.3)  mg/dL


 


Total Bilirubin    (0.2-1.0)  mg/dL


 


AST    (13-39)  U/L


 


ALT    (7-52)  U/L


 


Alkaline Phosphatase    ()  U/L


 


Troponin I    (<0.04)  ng/mL


 


Total Protein    (6.4-8.9)  g/dL


 


Albumin    (3.2-5.2)  g/dL


 


Globulin    (2-4)  g/dL


 


Albumin/Globulin Ratio    (1-3)  


 


Blood Type   O Negative  


 


Antibody Screen   Pending  














Assess/Plan/Problems-Billing


Assessment: 





This is a 73 year old chronically ill, immobile woman who presented to the ED 

after a fall and was found to have a right femoral neck fracture.  





- Patient Problems


(1) Fracture of femoral neck, right


Comment: She is very elevated risk for perioperative complications. 


Her RCRI is 2, placing her at 6.6% risk of MACE, and NSQIP puts her 3.6% risk 

of MACE, but 30.5% risk of serious complication, 13.6% change of pneumonia, 2.1

% change of surgical site infection, 21% change of readmission, and 16% chance 

of death.  These are all well above average. 


Based on these findings,it was recommended against surgery to [facility].  

Still, they wish to accept those risks with the hope that she may get pain 

relief.  They understand these risks well and express good comprehension and 

wish to proceed. 


Her INR is >1.7 today.  The indication for warfarin was a remote DVT (2011) so 

will hold off on heparin   





(2) Chronic steroid use


Comment: raises risk for perioperative complications


due to pt's overal increased surgical risk will tx with stress dose steroids to 

begin on 12/4/18   





(3) Elevated troponin


Comment: likely demand 


   





(4) Pneumonia


Comment: with sepsis present at admission, sepsis now resolved


continue ceftriaxone    





(5) Acute on chronic renal failure


Comment: Improving with IVF, continue  due to poor PO intake   





(6) DVT prophylaxis


Comment: 


 INR 1.7 today, coumadin on hold


SCD's   





(7) COPD (chronic obstructive pulmonary disease)


Comment: Continue home O2 and  home prednisone


   





(8) History of DVT (deep vein thrombosis)


Comment: remote 


warfarin on hold for surgery    


Status and Disposition: 


inpatient

## 2018-12-03 NOTE — PN
Progress Note





- Progress Note


Date of Service: 12/03/18


Note: 





Patient seen by Dr. Balderas this morning and discussed nature of her hip 

fracture and options.  She is at very high risk for surgery per Dr. Riddle and 

Dr. Bray.  The patient and wife would like to pursue surgical intervention for 

pain management as the patient is non ambulatory.  Her INR was still elevated 

at 1.74 this morning, therefore surgery postponed.  Girdlestone procedure to be 

done per Dr. Balderas once medically optimized, possibly 12/4.  Coumadin held.  





A/P:  Displaced right femoral neck fracture in high risk non ambulator, 

Girdlestone procedure once medically optimized, NPO after MN tonight. Check INR 

am 12/4.

## 2018-12-03 NOTE — HP
HISTORY AND PHYSICAL:

 

ADDENDUM:  Sarah reports that Marisol wishes to be DNR/DNI and she will bring 
her MOLST in later today.

 

 

 

[id number]/CPS #: [id number]

[facility]

## 2018-12-04 LAB
ANION GAP SERPL CALC-SCNC: 12 MMOL/L (ref 2–11)
BASOPHILS # BLD AUTO: 0.1 10^3/UL (ref 0–0.2)
BUN SERPL-MCNC: 17 MG/DL (ref 6–24)
BUN/CREAT SERPL: 13.7 (ref 8–20)
CALCIUM SERPL-MCNC: 7.8 MG/DL (ref 8.6–10.3)
CHLORIDE SERPL-SCNC: 100 MMOL/L (ref 101–111)
EOSINOPHIL # BLD AUTO: 0.3 10^3/UL (ref 0–0.6)
GLUCOSE SERPL-MCNC: 92 MG/DL (ref 70–100)
HCO3 SERPL-SCNC: 25 MMOL/L (ref 22–32)
HCT VFR BLD AUTO: 32 % (ref 35–47)
HGB BLD-MCNC: 10.7 G/DL (ref 12–16)
INR PPP/BLD: 1.11 (ref 0.77–1.02)
LYMPHOCYTES # BLD AUTO: 1.8 10^3/UL (ref 1–4.8)
MAGNESIUM SERPL-MCNC: 1.3 MG/DL (ref 1.9–2.7)
MCH RBC QN AUTO: 32 PG (ref 27–31)
MCHC RBC AUTO-ENTMCNC: 33 G/DL (ref 31–36)
MCV RBC AUTO: 96 FL (ref 80–97)
MONOCYTES # BLD AUTO: 1.3 10^3/UL (ref 0–0.8)
NEUTROPHILS # BLD AUTO: 9.1 10^3/UL (ref 1.5–7.7)
NRBC # BLD AUTO: 0 10^3/UL
NRBC BLD QL AUTO: 0
PLATELET # BLD AUTO: 248 10^3/UL (ref 150–450)
POTASSIUM SERPL-SCNC: 3.9 MMOL/L (ref 3.5–5)
RBC # BLD AUTO: 3.36 10^6/UL (ref 4–5.4)
SODIUM SERPL-SCNC: 137 MMOL/L (ref 135–145)
WBC # BLD AUTO: 12.6 10^3/UL (ref 3.5–10.8)

## 2018-12-04 RX ADMIN — MORPHINE TINCTURE PRN MG: 1 SOLUTION ORAL at 10:26

## 2018-12-04 RX ADMIN — CEFTRIAXONE SODIUM SCH MLS/HR: 1 INJECTION, POWDER, FOR SOLUTION INTRAVENOUS at 08:08

## 2018-12-04 RX ADMIN — HYDROMORPHONE HYDROCHLORIDE PRN MG: 1 INJECTION, SOLUTION INTRAMUSCULAR; INTRAVENOUS; SUBCUTANEOUS at 10:21

## 2018-12-04 RX ADMIN — HYDROCORTISONE SODIUM SUCCINATE SCH: 100 INJECTION, POWDER, FOR SOLUTION INTRAMUSCULAR; INTRAVENOUS at 18:17

## 2018-12-04 RX ADMIN — HYDROMORPHONE HYDROCHLORIDE PRN MG: 1 INJECTION, SOLUTION INTRAMUSCULAR; INTRAVENOUS; SUBCUTANEOUS at 00:59

## 2018-12-04 RX ADMIN — CINACALCET HYDROCHLORIDE SCH: 30 TABLET, COATED ORAL at 08:01

## 2018-12-04 RX ADMIN — ACETAMINOPHEN PRN MG: 325 TABLET ORAL at 22:39

## 2018-12-04 RX ADMIN — TIOTROPIUM BROMIDE SCH CAP: 18 CAPSULE ORAL; RESPIRATORY (INHALATION) at 08:25

## 2018-12-04 RX ADMIN — IPRATROPIUM BROMIDE PRN MG: 0.5 SOLUTION RESPIRATORY (INHALATION) at 08:36

## 2018-12-04 RX ADMIN — CALCITRIOL SCH: 0.25 CAPSULE ORAL at 08:01

## 2018-12-04 RX ADMIN — HYDROMORPHONE HYDROCHLORIDE PRN MG: 1 INJECTION, SOLUTION INTRAMUSCULAR; INTRAVENOUS; SUBCUTANEOUS at 12:29

## 2018-12-04 RX ADMIN — HYDROCORTISONE SODIUM SUCCINATE SCH MG: 100 INJECTION, POWDER, FOR SOLUTION INTRAMUSCULAR; INTRAVENOUS at 22:40

## 2018-12-04 RX ADMIN — PAROXETINE SCH MG: 10 TABLET, FILM COATED ORAL at 18:42

## 2018-12-04 RX ADMIN — HYDROCORTISONE SODIUM SUCCINATE SCH MG: 100 INJECTION, POWDER, FOR SOLUTION INTRAMUSCULAR; INTRAVENOUS at 07:32

## 2018-12-04 RX ADMIN — HYDROMORPHONE HYDROCHLORIDE PRN MG: 1 INJECTION, SOLUTION INTRAMUSCULAR; INTRAVENOUS; SUBCUTANEOUS at 23:54

## 2018-12-04 RX ADMIN — IPRATROPIUM BROMIDE AND ALBUTEROL SULFATE PRN NEB: .5; 3 SOLUTION RESPIRATORY (INHALATION) at 03:08

## 2018-12-04 RX ADMIN — HYDROMORPHONE HYDROCHLORIDE PRN MG: 1 INJECTION, SOLUTION INTRAMUSCULAR; INTRAVENOUS; SUBCUTANEOUS at 18:09

## 2018-12-04 RX ADMIN — HYDROMORPHONE HYDROCHLORIDE PRN MG: 1 INJECTION, SOLUTION INTRAMUSCULAR; INTRAVENOUS; SUBCUTANEOUS at 08:08

## 2018-12-04 RX ADMIN — HYDROMORPHONE HYDROCHLORIDE PRN MG: 1 INJECTION, SOLUTION INTRAMUSCULAR; INTRAVENOUS; SUBCUTANEOUS at 20:28

## 2018-12-04 RX ADMIN — LORAZEPAM SCH MG: 0.5 TABLET ORAL at 20:30

## 2018-12-04 RX ADMIN — LORAZEPAM PRN MG: 2 INJECTION INTRAMUSCULAR; INTRAVENOUS at 17:31

## 2018-12-04 RX ADMIN — HYDROMORPHONE HYDROCHLORIDE PRN MG: 1 INJECTION, SOLUTION INTRAMUSCULAR; INTRAVENOUS; SUBCUTANEOUS at 06:09

## 2018-12-04 RX ADMIN — IPRATROPIUM BROMIDE AND ALBUTEROL SULFATE PRN NEB: .5; 3 SOLUTION RESPIRATORY (INHALATION) at 13:30

## 2018-12-04 RX ADMIN — LORAZEPAM SCH: 0.5 TABLET ORAL at 08:01

## 2018-12-04 RX ADMIN — IPRATROPIUM BROMIDE AND ALBUTEROL SULFATE PRN NEB: .5; 3 SOLUTION RESPIRATORY (INHALATION) at 20:33

## 2018-12-04 RX ADMIN — LORAZEPAM PRN MG: 2 INJECTION INTRAMUSCULAR; INTRAVENOUS at 09:03

## 2018-12-04 NOTE — PN
Subjective


Date of Service: 12/04/18


Interval History: 





Pt appears anxious and tremulous again, but more cheerful and awake than 

yesterday. Lorazepam IV "helped a lot".


Awaiting OR at 4PM





Objective


Active Medications: 








Acetaminophen (Tylenol Tab*)  650 mg PO Q4H PRN


   PRN Reason: FEVER/PAIN


   Last Admin: 12/03/18 04:13 Dose:  650 mg


Al Hydrox/Mg Hydrox/Simethicone (Maalox Plus*)  30 ml PO Q6H PRN


   PRN Reason: INDIGESTION


Albuterol/Ipratropium (Duoneb (Albuterol 2.5 Mg/Ipratropium 0.5 Mg))  1 neb INH 

[id number] AWAKE PRN


   PRN Reason: sob/wheexing


   Last Admin: 12/04/18 03:08 Dose:  1 neb


Calcitriol (Rocaltrol  Cap*)  0.25 mcg PO DAILY [facility]


   Last Admin: 12/04/18 08:01 Dose:  Not Given


Cinacalcet (Sensipar Tab*)  30 mg PO DAILY [facility]


   Last Admin: 12/04/18 08:01 Dose:  Not Given


Cyanocobalamin (Vitamin B12 Inj *)  1,000 mcg IM Th@0900 [facility]


Guaifenesin (Mucinex*)  600 mg PO BID PRN


   PRN Reason: CONGESTION


   Last Admin: 12/02/18 10:53 Dose:  600 mg


Hydrocortisone Sodium Succinate (Solu-Cortef*)  50 mg IV Q8H [facility]


   Last Admin: 12/04/18 07:32 Dose:  50 mg


Hydromorphone HCl (Dilaudid Inj1s*)  1 mg IV SLOW PU Q2H PRN


   PRN Reason: PAIN


   Last Admin: 12/04/18 10:21 Dose:  1 mg


Ceftriaxone Sodium 1 gm/ (Sodium Chloride)  50 mls @ 200 mls/hr IVPB Q24H [facility]


   Last Admin: 12/04/18 08:08 Dose:  200 mls/hr


Sodium Chloride (Ns 0.9% 1000 Ml*)  1,000 mls @ 100 mls/hr IV PER RATE [facility]


   Last Admin: 12/03/18 23:14 Dose:  100 mls/hr


Magnesium Sulfate (Magnesium Sulf 4 Gm/100 Ml Iv*)  4,000 mg in 100 mls @ 

33.333 mls/hr IVPB ONCE ONE


   Stop: 12/04/18 12:10


   Last Admin: 12/04/18 10:21 Dose:  33.333 mls/hr


Ipratropium Bromide (Atrovent 0.5 Mg Neb.Sol*)  0.5 mg INH Q6HR PRN


   PRN Reason: WHEEZING


   Last Admin: 12/04/18 08:36 Dose:  0.5 mg


Lorazepam (Ativan Tab(*))  0.5 mg PO BID [facility]


   Last Admin: 12/04/18 08:01 Dose:  Not Given


Lorazepam (Ativan Inj*)  0.5 mg IV PUSH Q4H PRN


   PRN Reason: ANXIETY


   Last Admin: 12/04/18 09:03 Dose:  0.5 mg


Opium Tincture (Opium Tincture*)  6 mg PO QID PRN


   PRN Reason: LOOSE STOOL/DIARRHEA


   Last Admin: 12/04/18 10:26 Dose:  6 mg


Paroxetine HCl (Paxil Tab*)  30 mg PO QPM [facility]


   Last Admin: 12/03/18 19:16 Dose:  30 mg


Tiotropium Bromide (Spiriva Cap.Inh*)  1 cap INH DAILY [facility]


   Last Admin: 12/04/18 08:25 Dose:  1 cap








 Vital Signs - 8 hr











  12/04/18 12/04/18 12/04/18





  03:01 03:10 06:09


 


Temperature 98.5 F  


 


Pulse Rate 90 70 


 


Respiratory 20 16 23





Rate   


 


Blood Pressure 152/86  





(mmHg)   


 


O2 Sat by Pulse 94 98 





Oximetry   














  12/04/18 12/04/18 12/04/18





  07:47 07:57 08:08


 


Temperature 99.0 F  


 


Pulse Rate 84  


 


Respiratory 20 20 18





Rate   


 


Blood Pressure 118/50  





(mmHg)   


 


O2 Sat by Pulse 92  





Oximetry   














  12/04/18 12/04/18 12/04/18





  08:37 09:03 10:21


 


Temperature   


 


Pulse Rate 75  


 


Respiratory 20 18 18





Rate   


 


Blood Pressure   





(mmHg)   


 


O2 Sat by Pulse 92  





Oximetry   














  12/04/18





  10:26


 


Temperature 


 


Pulse Rate 


 


Respiratory 18





Rate 


 


Blood Pressure 





(mmHg) 


 


O2 Sat by Pulse 





Oximetry 











Oxygen Devices in Use Now: Nasal Cannula


Appearance: 74 yo F in nAD, AAOx2


Eyes: No Scleral Icterus, PERRLA


Ears/Nose/Mouth/Throat: NL Teeth, Lips, Gums, Mucous Membranes Moist


Neck: NL Appearance and Movements; NL JVP, Trachea Midline


Respiratory: Symmetrical Chest Expansion and Respiratory Effort, - - scant b/l 

mid lung wheezes


Cardiovascular: NL Sounds; No Murmurs; No JVD, RRR


Abdominal: NL Sounds; No Tenderness; No Distention, No Hepatosplenomegaly, - - 

colostomy  in place


Lymphatic: No Cervical Adenopathy


Extremities: No Edema, No Clubbing, Cyanosis


Skin: No Nodules or Sclerosis, - - chin abrasion noted


Neurological: - - diffuse intentional tremor mostly in UE's noted-improved from 

yesterday, motor 5/5 b/l apart for  R hip- ROM not checked due to fx and pain


Result Diagrams: 


 12/04/18 05:33





 12/04/18 05:33


Additional Lab and Data: 


 Lab Results











  12/01/18 12/01/18 12/01/18 Range/Units





  03:44 03:44 03:44 


 


WBC  17.5 H    (3.5-10.8)  10^3/ul


 


RBC  4.32    (4.00-5.40)  10^6/ul


 


Hgb  13.5    (12.0-16.0)  g/dl


 


Hct  41    (35-47)  %


 


MCV  94    (80-97)  fL


 


MCH  31    (27-31)  pg


 


MCHC  33    (31-36)  g/dl


 


RDW  15    (10.5-15)  %


 


Plt Count  341    (150-450)  10^3/ul


 


MPV  7.7    (7.4-10.4)  fL


 


Neut % (Auto)  71.0    %


 


Lymph % (Auto)  18.0    %


 


Mono % (Auto)  9.9    %


 


Eos % (Auto)  0.8    %


 


Baso % (Auto)  0.3    %


 


Absolute Neuts (auto)  12.5 H    (1.5-7.7)  10^3/ul


 


Absolute Lymphs (auto)  3.2    (1.0-4.8)  10^3/ul


 


Absolute Monos (auto)  1.7 H    (0-0.8)  10^3/ul


 


Absolute Eos (auto)  0.1    (0-0.6)  10^3/ul


 


Absolute Basos (auto)  0    (0-0.2)  10^3/ul


 


Absolute Nucleated RBC  0    10^3/ul


 


Nucleated RBC %  0    


 


INR (Anticoag Therapy)   1.81 H   (0.77-1.02)  


 


APTT   38.9 H   (26.0-36.3)  seconds


 


Sodium    137  (135-145)  mmol/L


 


Potassium    3.1 L  (3.5-5.0)  mmol/L


 


Chloride    82 L  (101-111)  mmol/L


 


Carbon Dioxide    41 H*  (22-32)  mmol/L


 


Anion Gap    14 H  (2-11)  mmol/L


 


BUN    43 H  (6-24)  mg/dL


 


Creatinine    2.36 H  (0.51-0.95)  mg/dL


 


Est GFR ( Amer)    24.3  (>60)  


 


Est GFR (Non-Af Amer)    20.1  (>60)  


 


BUN/Creatinine Ratio    18.2  (8-20)  


 


Glucose    138 H  ()  mg/dL


 


Lactic Acid     (0.5-2.0)  mmol/L


 


Calcium    9.1  (8.6-10.3)  mg/dL


 


Total Bilirubin    0.40  (0.2-1.0)  mg/dL


 


AST    26  (13-39)  U/L


 


ALT    30  (7-52)  U/L


 


Alkaline Phosphatase    173 H  ()  U/L


 


Troponin I    0.04 H*  (<0.04)  ng/mL


 


Total Protein    6.9  (6.4-8.9)  g/dL


 


Albumin    3.7  (3.2-5.2)  g/dL


 


Globulin    3.2  (2-4)  g/dL


 


Albumin/Globulin Ratio    1.2  (1-3)  


 


Blood Type     


 


Antibody Screen     














  12/01/18 12/01/18 Range/Units





  03:44 03:44 


 


WBC    (3.5-10.8)  10^3/ul


 


RBC    (4.00-5.40)  10^6/ul


 


Hgb    (12.0-16.0)  g/dl


 


Hct    (35-47)  %


 


MCV    (80-97)  fL


 


MCH    (27-31)  pg


 


MCHC    (31-36)  g/dl


 


RDW    (10.5-15)  %


 


Plt Count    (150-450)  10^3/ul


 


MPV    (7.4-10.4)  fL


 


Neut % (Auto)    %


 


Lymph % (Auto)    %


 


Mono % (Auto)    %


 


Eos % (Auto)    %


 


Baso % (Auto)    %


 


Absolute Neuts (auto)    (1.5-7.7)  10^3/ul


 


Absolute Lymphs (auto)    (1.0-4.8)  10^3/ul


 


Absolute Monos (auto)    (0-0.8)  10^3/ul


 


Absolute Eos (auto)    (0-0.6)  10^3/ul


 


Absolute Basos (auto)    (0-0.2)  10^3/ul


 


Absolute Nucleated RBC    10^3/ul


 


Nucleated RBC %    


 


INR (Anticoag Therapy)    (0.77-1.02)  


 


APTT    (26.0-36.3)  seconds


 


Sodium    (135-145)  mmol/L


 


Potassium    (3.5-5.0)  mmol/L


 


Chloride    (101-111)  mmol/L


 


Carbon Dioxide    (22-32)  mmol/L


 


Anion Gap    (2-11)  mmol/L


 


BUN    (6-24)  mg/dL


 


Creatinine    (0.51-0.95)  mg/dL


 


Est GFR ( Amer)    (>60)  


 


Est GFR (Non-Af Amer)    (>60)  


 


BUN/Creatinine Ratio    (8-20)  


 


Glucose    ()  mg/dL


 


Lactic Acid  3.7 H*   (0.5-2.0)  mmol/L


 


Calcium    (8.6-10.3)  mg/dL


 


Total Bilirubin    (0.2-1.0)  mg/dL


 


AST    (13-39)  U/L


 


ALT    (7-52)  U/L


 


Alkaline Phosphatase    ()  U/L


 


Troponin I    (<0.04)  ng/mL


 


Total Protein    (6.4-8.9)  g/dL


 


Albumin    (3.2-5.2)  g/dL


 


Globulin    (2-4)  g/dL


 


Albumin/Globulin Ratio    (1-3)  


 


Blood Type   O Negative  


 


Antibody Screen   Pending  














Assess/Plan/Problems-Billing


Assessment: 





This is a 73 year old chronically ill, immobile woman who presented to the ED 

after a fall and was found to have a right femoral neck fracture.  





- Patient Problems


(1) Fracture of femoral neck, right


Comment: She is very elevated risk for perioperative complications. 


Her RCRI is 2, placing her at 6.6% risk of MACE, and NSQIP puts her 3.6% risk 

of MACE, but 30.5% risk of serious complication, 13.6% change of pneumonia, 2.1

% change of surgical site infection, 21% change of readmission, and 16% chance 

of death.  These are all well above average. 


Based on these findings,it was recommended against surgery to [facility].  

Still, they wish to accept those risks with the hope that she may get pain 

relief.  They understand these risks well and express good comprehension and 

wish to proceed. .  The indication for warfarin was a remote DVT (2011) so will 

hold off on heparin   





(2) Chronic steroid use


Comment: raises risk for perioperative complications


due to pt's overal increased surgical risk will tx with stress dose steroids to 

begin on 12/4/18   





(3) Elevated troponin


Comment: likely demand 


   





(4) Pneumonia


Comment: with sepsis present at admission, sepsis now resolved


continue ceftriaxone    





(5) Acute on chronic renal failure


Comment: Improving with IVF, continue  due to poor PO intake   





(6) DVT prophylaxis


Comment: 


 INR 1.1 today, coumadin on hold


SCD's   





(7) COPD (chronic obstructive pulmonary disease)


Comment: Continue home O2 and  home prednisone


   





(8) History of DVT (deep vein thrombosis)


Comment: remote 


warfarin on hold for surgery    


Status and Disposition: 


inpatient

## 2018-12-04 NOTE — CONSULT
Consult


Consult: 





Marisol Santiago is a 74yo woman with PMH significant for end stage lung CA, 

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 

was admitted on 11/30/18 after a right femur fracture and found to be septic 2/

2 a PNA.  She presented to the OR today for a right hemiarthroplasty.  While 

waiting for the procedure she was having multiple oxygen desaturations into the 

mid 80s.  A CXR was done at that time, which did not show worsening PNA.  

However, given her clinical picture and her slightly elevated INR her case was 

cancelled.  





The pt and her wife are quite clear about the risks of surgery and would like 

to proceed despite the high risks including CV events, stroke, and death.  They 

see the surgery as palliative.  Ideally, to minimize risk, it would be best to 

proceed with a spinal anesthetic.  Hospitals that utilize higher rates of 

spinal anesthetics for hip fracture patients are associated with increased 

rates of patient survival (Jair et al Anesthesiology March 2018).  However, 

her INR is slightly elevated.  And given her current oxygen desaturations, I do 

not think that she would tolerate the sedation that should be coupled with a 

spinal anesthetic.  





It would be best to proceed once her INR is within our laboratory's normal range

, <1.02.  Please give vitamin K and recheck her INR in the AM.  Also, given her 

O2 desaturations in pre-op she may benefit from another day of antibiotics for 

her PNA. Please encourage duonebs and incentive spirometry.  





Thank you very much for your care.  I will reevaluate her in the AM. 





- Ivy Rivera,

## 2018-12-05 LAB
ANION GAP SERPL CALC-SCNC: 13 MMOL/L (ref 2–11)
BASOPHILS # BLD AUTO: 0 10^3/UL (ref 0–0.2)
BUN SERPL-MCNC: 24 MG/DL (ref 6–24)
BUN/CREAT SERPL: 15.9 (ref 8–20)
CALCIUM SERPL-MCNC: 8 MG/DL (ref 8.6–10.3)
CHLORIDE SERPL-SCNC: 101 MMOL/L (ref 101–111)
EOSINOPHIL # BLD AUTO: 0 10^3/UL (ref 0–0.6)
GLUCOSE SERPL-MCNC: 146 MG/DL (ref 70–100)
HCO3 SERPL-SCNC: 24 MMOL/L (ref 22–32)
HCT VFR BLD AUTO: 31 % (ref 35–47)
HGB BLD-MCNC: 10.2 G/DL (ref 12–16)
INR PPP/BLD: 0.94 (ref 0.77–1.02)
LYMPHOCYTES # BLD AUTO: 1.2 10^3/UL (ref 1–4.8)
MAGNESIUM SERPL-MCNC: 2.4 MG/DL (ref 1.9–2.7)
MCH RBC QN AUTO: 32 PG (ref 27–31)
MCHC RBC AUTO-ENTMCNC: 33 G/DL (ref 31–36)
MCV RBC AUTO: 97 FL (ref 80–97)
MONOCYTES # BLD AUTO: 0.8 10^3/UL (ref 0–0.8)
NEUTROPHILS # BLD AUTO: 10.7 10^3/UL (ref 1.5–7.7)
NRBC # BLD AUTO: 0 10^3/UL
NRBC BLD QL AUTO: 0
PLATELET # BLD AUTO: 287 10^3/UL (ref 150–450)
POTASSIUM SERPL-SCNC: 3.7 MMOL/L (ref 3.5–5)
RBC # BLD AUTO: 3.21 10^6/UL (ref 4–5.4)
SODIUM SERPL-SCNC: 138 MMOL/L (ref 135–145)
WBC # BLD AUTO: 12.8 10^3/UL (ref 3.5–10.8)

## 2018-12-05 RX ADMIN — CINACALCET HYDROCHLORIDE SCH: 30 TABLET, COATED ORAL at 08:23

## 2018-12-05 RX ADMIN — GUAIFENESIN PRN MG: 600 TABLET, EXTENDED RELEASE ORAL at 18:09

## 2018-12-05 RX ADMIN — MORPHINE TINCTURE PRN MG: 1 SOLUTION ORAL at 08:42

## 2018-12-05 RX ADMIN — HYDROMORPHONE HYDROCHLORIDE PRN MG: 1 INJECTION, SOLUTION INTRAMUSCULAR; INTRAVENOUS; SUBCUTANEOUS at 16:49

## 2018-12-05 RX ADMIN — CALCITRIOL SCH: 0.25 CAPSULE ORAL at 08:01

## 2018-12-05 RX ADMIN — LORAZEPAM SCH MG: 0.5 TABLET ORAL at 17:26

## 2018-12-05 RX ADMIN — PAROXETINE SCH MG: 10 TABLET, FILM COATED ORAL at 18:07

## 2018-12-05 RX ADMIN — HYDROCORTISONE SODIUM SUCCINATE SCH MG: 100 INJECTION, POWDER, FOR SOLUTION INTRAMUSCULAR; INTRAVENOUS at 08:28

## 2018-12-05 RX ADMIN — HYDROMORPHONE HYDROCHLORIDE PRN MG: 1 INJECTION, SOLUTION INTRAMUSCULAR; INTRAVENOUS; SUBCUTANEOUS at 19:38

## 2018-12-05 RX ADMIN — HYDROMORPHONE HYDROCHLORIDE PRN MG: 1 INJECTION, SOLUTION INTRAMUSCULAR; INTRAVENOUS; SUBCUTANEOUS at 13:26

## 2018-12-05 RX ADMIN — LORAZEPAM SCH: 0.5 TABLET ORAL at 23:41

## 2018-12-05 RX ADMIN — HYDROCORTISONE SODIUM SUCCINATE SCH: 100 INJECTION, POWDER, FOR SOLUTION INTRAMUSCULAR; INTRAVENOUS at 16:59

## 2018-12-05 RX ADMIN — IPRATROPIUM BROMIDE AND ALBUTEROL SULFATE PRN NEB: .5; 3 SOLUTION RESPIRATORY (INHALATION) at 15:25

## 2018-12-05 RX ADMIN — NYSTATIN SCH UNITS: 100000 SUSPENSION ORAL at 17:26

## 2018-12-05 RX ADMIN — NYSTATIN SCH UNITS: 100000 SUSPENSION ORAL at 22:01

## 2018-12-05 RX ADMIN — TIOTROPIUM BROMIDE SCH CAP: 18 CAPSULE ORAL; RESPIRATORY (INHALATION) at 07:15

## 2018-12-05 RX ADMIN — HYDROMORPHONE HYDROCHLORIDE PRN MG: 1 INJECTION, SOLUTION INTRAMUSCULAR; INTRAVENOUS; SUBCUTANEOUS at 10:47

## 2018-12-05 RX ADMIN — CEFTRIAXONE SODIUM SCH MLS/HR: 1 INJECTION, POWDER, FOR SOLUTION INTRAVENOUS at 08:28

## 2018-12-05 RX ADMIN — LORAZEPAM PRN MG: 2 INJECTION INTRAMUSCULAR; INTRAVENOUS at 13:27

## 2018-12-05 RX ADMIN — HYDROMORPHONE HYDROCHLORIDE PRN MG: 1 INJECTION, SOLUTION INTRAMUSCULAR; INTRAVENOUS; SUBCUTANEOUS at 21:53

## 2018-12-05 RX ADMIN — IPRATROPIUM BROMIDE AND ALBUTEROL SULFATE PRN NEB: .5; 3 SOLUTION RESPIRATORY (INHALATION) at 07:09

## 2018-12-05 RX ADMIN — LORAZEPAM PRN MG: 2 INJECTION INTRAMUSCULAR; INTRAVENOUS at 08:41

## 2018-12-05 RX ADMIN — MORPHINE TINCTURE SCH MG: 1 SOLUTION ORAL at 22:02

## 2018-12-05 RX ADMIN — HYDROMORPHONE HYDROCHLORIDE PRN MG: 1 INJECTION, SOLUTION INTRAMUSCULAR; INTRAVENOUS; SUBCUTANEOUS at 08:41

## 2018-12-05 RX ADMIN — NYSTATIN SCH: 100000 SUSPENSION ORAL at 23:27

## 2018-12-05 RX ADMIN — LORAZEPAM SCH: 0.5 TABLET ORAL at 08:23

## 2018-12-05 RX ADMIN — LORAZEPAM PRN MG: 2 INJECTION INTRAMUSCULAR; INTRAVENOUS at 23:55

## 2018-12-05 RX ADMIN — MORPHINE TINCTURE SCH MG: 1 SOLUTION ORAL at 16:49

## 2018-12-05 NOTE — PN
Progress Note





- Progress Note


Date of Service: 12/05/18


SOAP: 


Subjective:


Pt in bed. Confused. Dozing and then talking. Wife at bedside. No acute events. 

INR normal. HD#5








Objective:


 











Temp Pulse Resp BP Pulse Ox


 


 98.1 F   83   18   97/53   94 


 


 12/05/18 11:30  12/05/18 11:30  12/05/18 13:27  12/05/18 11:30  12/05/18 11:30








NAD. Alert when woken up. Confused however. Oriented to self and wife. 

Spontaneously moves extremities. Leg warm and well perfused. Sensate. Calf soft.





 Laboratory Results - last 24 hr











  12/05/18 12/05/18 12/05/18





  07:28 07:28 07:28


 


WBC  12.8 H  


 


RBC  3.21 L  


 


Hgb  10.2 L  


 


Hct  31 L  


 


MCV  97  


 


MCH  32 H  


 


MCHC  33  


 


RDW  16 H  


 


Plt Count  287  


 


MPV  7.8  


 


Neut % (Auto)  83.3  


 


Lymph % (Auto)  9.6  


 


Mono % (Auto)  6.3  


 


Eos % (Auto)  0.4  


 


Baso % (Auto)  0.4  


 


Absolute Neuts (auto)  10.7 H  


 


Absolute Lymphs (auto)  1.2  


 


Absolute Monos (auto)  0.8  


 


Absolute Eos (auto)  0  


 


Absolute Basos (auto)  0  


 


Absolute Nucleated RBC  0  


 


Nucleated RBC %  0  


 


INR (Anticoag Therapy)    0.94


 


Sodium   138 


 


Potassium   3.7 


 


Chloride   101 


 


Carbon Dioxide   24 


 


Anion Gap   13 H 


 


BUN   24 


 


Creatinine   1.51 H 


 


Est GFR ( Amer)   40.8 


 


Est GFR (Non-Af Amer)   33.7 


 


BUN/Creatinine Ratio   15.9 


 


Glucose   146 H 


 


Calcium   8.0 L 


 


Magnesium   2.4 

















Assessment:


74 yo F with multiple medical problems and right femoral neck fracture








Plan:


After discussiong with multiple orthopedic surgeons in our group as well as 

several anesthesiologists, and the medicine team, I saw with patient and her 

wife to discuss options. Recommended against surgical treatment due to the 

increased risk of mortality with surgery. Wife understood. Patient agreed but 

then was confused again. We will not proceed with any surgical intervention. 

Recommend pain management with palliative care. Appreciate consult. Will sign 

off for now.

## 2018-12-05 NOTE — PN
Subjective


Date of Service: 12/05/18


Interval History: 





After talking with Dr. Francis .Pt and her wife agreed with palliative approach. 

Pt feels a little better now. Discussed all the options with pt and wife and a 

female friend. Pt would like to go to [facility] and her wife 

stated "I can't take her home like this". 


they requested for pt to have Ativan BERTHA and to start morphine concentrate in 

addition to IV Dilaudid and Ativan that will be unchanged.


Stark to stay in foe comfort. Wife also inquired if pt's midline IV can stay at 

d/c which could be possible after discussion with hospice


They're OK with discontinuation of Coumadin, but requests all other meds to be 

continued for now.





Objective


Active Medications: 








Acetaminophen (Tylenol Tab*)  650 mg PO Q4H PRN


   PRN Reason: FEVER/PAIN


   Last Admin: 12/04/18 22:39 Dose:  650 mg


Al Hydrox/Mg Hydrox/Simethicone (Maalox Plus*)  30 ml PO Q6H PRN


   PRN Reason: INDIGESTION


Albuterol/Ipratropium (Duoneb (Albuterol 2.5 Mg/Ipratropium 0.5 Mg))  1 neb INH 

Q2H PRN


   PRN Reason: SOB/WHEEZING


   Last Admin: 12/05/18 15:25 Dose:  1 neb


Calcitriol (Rocaltrol  Cap*)  0.25 mcg PO DAILY [facility]


   Last Admin: 12/05/18 08:01 Dose:  Not Given


Cinacalcet (Sensipar Tab*)  30 mg PO DAILY [facility]


   Last Admin: 12/05/18 08:23 Dose:  Not Given


Cyanocobalamin (Vitamin B12 Inj *)  1,000 mcg IM Th@0900 [facility]


Guaifenesin (Mucinex*)  600 mg PO BID PRN


   PRN Reason: CONGESTION


   Last Admin: 12/02/18 10:53 Dose:  600 mg


Hydrocortisone Sodium Succinate (Solu-Cortef*)  50 mg IV Q8H [facility]


   Last Admin: 12/05/18 08:28 Dose:  50 mg


Hydromorphone HCl (Dilaudid Inj1s*)  1 mg IV SLOW PU Q2H PRN


   PRN Reason: PAIN


   Last Admin: 12/05/18 13:26 Dose:  1 mg


Ceftriaxone Sodium 1 gm/ (Sodium Chloride)  50 mls @ 200 mls/hr IVPB Q24H [facility]


   Last Admin: 12/05/18 08:28 Dose:  200 mls/hr


Ipratropium Bromide (Atrovent 0.5 Mg Neb.Sol*)  0.5 mg INH Q6HR PRN


   PRN Reason: WHEEZING


   Last Admin: 12/04/18 08:36 Dose:  0.5 mg


Lorazepam (Ativan Tab(*))  0.5 mg PO BID [facility]


   Last Admin: 12/05/18 08:23 Dose:  Not Given


Lorazepam (Ativan Inj*)  0.5 mg IV PUSH Q4H PRN


   PRN Reason: ANXIETY


   Last Admin: 12/05/18 13:27 Dose:  0.5 mg


Opium Tincture (Opium Tincture*)  6 mg PO QID PRN


   PRN Reason: LOOSE STOOL/DIARRHEA


   Last Admin: 12/05/18 08:42 Dose:  6 mg


Paroxetine HCl (Paxil Tab*)  30 mg PO QPM [facility]


   Last Admin: 12/04/18 18:42 Dose:  30 mg


Tiotropium Bromide (Spiriva Cap.Inh*)  1 cap INH DAILY [facility]


   Last Admin: 12/05/18 07:15 Dose:  1 cap








 Vital Signs - 8 hr











  12/05/18 12/05/18 12/05/18





  08:41 08:42 09:59


 


Temperature   


 


Pulse Rate   


 


Respiratory 18 18 16





Rate   


 


Blood Pressure   





(mmHg)   


 


O2 Sat by Pulse   





Oximetry   














  12/05/18 12/05/18 12/05/18





  10:47 10:50 11:30


 


Temperature   98.1 F


 


Pulse Rate   83


 


Respiratory 18 18 24





Rate   


 


Blood Pressure   97/53





(mmHg)   


 


O2 Sat by Pulse   94





Oximetry   














  12/05/18 12/05/18 12/05/18





  13:26 13:27 15:11


 


Temperature   


 


Pulse Rate   


 


Respiratory 18 18 18





Rate   


 


Blood Pressure   





(mmHg)   


 


O2 Sat by Pulse   





Oximetry   














  12/05/18 12/05/18





  15:12 15:25


 


Temperature  


 


Pulse Rate  86


 


Respiratory 18 





Rate  


 


Blood Pressure  





(mmHg)  


 


O2 Sat by Pulse  





Oximetry  











Oxygen Devices in Use Now: Nasal Cannula


Appearance: 72 yo F in nAD, AAOx3, mild lethargy noted, intermittent confusion


Eyes: No Scleral Icterus, PERRLA


Ears/Nose/Mouth/Throat: NL Teeth, Lips, Gums, Mucous Membranes Moist


Neck: NL Appearance and Movements; NL JVP, Trachea Midline


Respiratory: - - scattered rhonchi b/l mid lungs


Cardiovascular: NL Sounds; No Murmurs; No JVD, RRR


Abdominal: NL Sounds; No Tenderness; No Distention, No Hepatosplenomegaly


Lymphatic: No Cervical Adenopathy


Extremities: No Edema, No Clubbing, Cyanosis, -


Skin: No Nodules or Sclerosis, - - abrasion on chin, ecchymoses on b/l forearms


Neurological: NL Muscle Strength and Tone


Result Diagrams: 


 12/05/18 07:28





 12/05/18 07:28


Additional Lab and Data: 


 Lab Results











  12/01/18 12/01/18 12/01/18 Range/Units





  03:44 03:44 03:44 


 


WBC  17.5 H    (3.5-10.8)  10^3/ul


 


RBC  4.32    (4.00-5.40)  10^6/ul


 


Hgb  13.5    (12.0-16.0)  g/dl


 


Hct  41    (35-47)  %


 


MCV  94    (80-97)  fL


 


MCH  31    (27-31)  pg


 


MCHC  33    (31-36)  g/dl


 


RDW  15    (10.5-15)  %


 


Plt Count  341    (150-450)  10^3/ul


 


MPV  7.7    (7.4-10.4)  fL


 


Neut % (Auto)  71.0    %


 


Lymph % (Auto)  18.0    %


 


Mono % (Auto)  9.9    %


 


Eos % (Auto)  0.8    %


 


Baso % (Auto)  0.3    %


 


Absolute Neuts (auto)  12.5 H    (1.5-7.7)  10^3/ul


 


Absolute Lymphs (auto)  3.2    (1.0-4.8)  10^3/ul


 


Absolute Monos (auto)  1.7 H    (0-0.8)  10^3/ul


 


Absolute Eos (auto)  0.1    (0-0.6)  10^3/ul


 


Absolute Basos (auto)  0    (0-0.2)  10^3/ul


 


Absolute Nucleated RBC  0    10^3/ul


 


Nucleated RBC %  0    


 


INR (Anticoag Therapy)   1.81 H   (0.77-1.02)  


 


APTT   38.9 H   (26.0-36.3)  seconds


 


Sodium    137  (135-145)  mmol/L


 


Potassium    3.1 L  (3.5-5.0)  mmol/L


 


Chloride    82 L  (101-111)  mmol/L


 


Carbon Dioxide    41 H*  (22-32)  mmol/L


 


Anion Gap    14 H  (2-11)  mmol/L


 


BUN    43 H  (6-24)  mg/dL


 


Creatinine    2.36 H  (0.51-0.95)  mg/dL


 


Est GFR ( Amer)    24.3  (>60)  


 


Est GFR (Non-Af Amer)    20.1  (>60)  


 


BUN/Creatinine Ratio    18.2  (8-20)  


 


Glucose    138 H  ()  mg/dL


 


Lactic Acid     (0.5-2.0)  mmol/L


 


Calcium    9.1  (8.6-10.3)  mg/dL


 


Total Bilirubin    0.40  (0.2-1.0)  mg/dL


 


AST    26  (13-39)  U/L


 


ALT    30  (7-52)  U/L


 


Alkaline Phosphatase    173 H  ()  U/L


 


Troponin I    0.04 H*  (<0.04)  ng/mL


 


Total Protein    6.9  (6.4-8.9)  g/dL


 


Albumin    3.7  (3.2-5.2)  g/dL


 


Globulin    3.2  (2-4)  g/dL


 


Albumin/Globulin Ratio    1.2  (1-3)  


 


Blood Type     


 


Antibody Screen     














  12/01/18 12/01/18 Range/Units





  03:44 03:44 


 


WBC    (3.5-10.8)  10^3/ul


 


RBC    (4.00-5.40)  10^6/ul


 


Hgb    (12.0-16.0)  g/dl


 


Hct    (35-47)  %


 


MCV    (80-97)  fL


 


MCH    (27-31)  pg


 


MCHC    (31-36)  g/dl


 


RDW    (10.5-15)  %


 


Plt Count    (150-450)  10^3/ul


 


MPV    (7.4-10.4)  fL


 


Neut % (Auto)    %


 


Lymph % (Auto)    %


 


Mono % (Auto)    %


 


Eos % (Auto)    %


 


Baso % (Auto)    %


 


Absolute Neuts (auto)    (1.5-7.7)  10^3/ul


 


Absolute Lymphs (auto)    (1.0-4.8)  10^3/ul


 


Absolute Monos (auto)    (0-0.8)  10^3/ul


 


Absolute Eos (auto)    (0-0.6)  10^3/ul


 


Absolute Basos (auto)    (0-0.2)  10^3/ul


 


Absolute Nucleated RBC    10^3/ul


 


Nucleated RBC %    


 


INR (Anticoag Therapy)    (0.77-1.02)  


 


APTT    (26.0-36.3)  seconds


 


Sodium    (135-145)  mmol/L


 


Potassium    (3.5-5.0)  mmol/L


 


Chloride    (101-111)  mmol/L


 


Carbon Dioxide    (22-32)  mmol/L


 


Anion Gap    (2-11)  mmol/L


 


BUN    (6-24)  mg/dL


 


Creatinine    (0.51-0.95)  mg/dL


 


Est GFR ( Amer)    (>60)  


 


Est GFR (Non-Af Amer)    (>60)  


 


BUN/Creatinine Ratio    (8-20)  


 


Glucose    ()  mg/dL


 


Lactic Acid  3.7 H*   (0.5-2.0)  mmol/L


 


Calcium    (8.6-10.3)  mg/dL


 


Total Bilirubin    (0.2-1.0)  mg/dL


 


AST    (13-39)  U/L


 


ALT    (7-52)  U/L


 


Alkaline Phosphatase    ()  U/L


 


Troponin I    (<0.04)  ng/mL


 


Total Protein    (6.4-8.9)  g/dL


 


Albumin    (3.2-5.2)  g/dL


 


Globulin    (2-4)  g/dL


 


Albumin/Globulin Ratio    (1-3)  


 


Blood Type   O Negative  


 


Antibody Screen   Pending  














Assess/Plan/Problems-Billing


Assessment: 





This is a 73 year old chronically ill, immobile woman who presented to the ED 

after a fall and was found to have a right femoral neck fracture.  





- Patient Problems


(1) Fracture of femoral neck, right


Comment: She is very elevated risk for perioperative complications. 


Based on these findings,it was recommended against surgery to [facility]. 

After d/w DR. Francis today they agreed to palliative approach.


Palliative consult in place. will start morphine oral concentrate adn lorazepam 

PO BERTHA    





(2) Chronic steroid use


Comment: Cont home prednisone   





(3) Elevated troponin


Comment: likely demand 


   





(4) Pneumonia


Comment: 


continue ceftriaxone    





(5) Acute on chronic renal failure


Comment: Improving    





(6) DVT prophylaxis


Comment: 


coumadin d/c'd due to comfort care   





(7) COPD (chronic obstructive pulmonary disease)


Comment: Continue home O2 and  home prednisone


   





(8) History of DVT (deep vein thrombosis)


Comment: remote 


warfarin d/c'd   


Status and Disposition: 


inpatient

## 2018-12-06 VITALS — DIASTOLIC BLOOD PRESSURE: 59 MMHG | SYSTOLIC BLOOD PRESSURE: 101 MMHG

## 2018-12-06 RX ADMIN — MORPHINE TINCTURE SCH: 1 SOLUTION ORAL at 14:45

## 2018-12-06 RX ADMIN — HYDROMORPHONE HYDROCHLORIDE PRN MG: 1 INJECTION, SOLUTION INTRAMUSCULAR; INTRAVENOUS; SUBCUTANEOUS at 21:24

## 2018-12-06 RX ADMIN — METHYLPREDNISOLONE SODIUM SUCCINATE SCH MG: 40 INJECTION, POWDER, FOR SOLUTION INTRAMUSCULAR; INTRAVENOUS at 23:19

## 2018-12-06 RX ADMIN — MORPHINE SULFATE PRN MG: 100 SOLUTION ORAL at 23:19

## 2018-12-06 RX ADMIN — LORAZEPAM SCH: 0.5 TABLET ORAL at 10:57

## 2018-12-06 RX ADMIN — IPRATROPIUM BROMIDE PRN MG: 0.5 SOLUTION RESPIRATORY (INHALATION) at 12:03

## 2018-12-06 RX ADMIN — NYSTATIN SCH: 100000 SUSPENSION ORAL at 12:44

## 2018-12-06 RX ADMIN — LORAZEPAM PRN MG: 2 INJECTION INTRAMUSCULAR; INTRAVENOUS at 08:21

## 2018-12-06 RX ADMIN — NYSTATIN SCH: 100000 SUSPENSION ORAL at 09:56

## 2018-12-06 RX ADMIN — HYDROMORPHONE HYDROCHLORIDE PRN MG: 1 INJECTION, SOLUTION INTRAMUSCULAR; INTRAVENOUS; SUBCUTANEOUS at 11:55

## 2018-12-06 RX ADMIN — LORAZEPAM SCH: 0.5 TABLET ORAL at 05:47

## 2018-12-06 RX ADMIN — HYDROMORPHONE HYDROCHLORIDE PRN MG: 1 INJECTION, SOLUTION INTRAMUSCULAR; INTRAVENOUS; SUBCUTANEOUS at 02:16

## 2018-12-06 RX ADMIN — CEFTRIAXONE SODIUM SCH MLS/HR: 1 INJECTION, POWDER, FOR SOLUTION INTRAVENOUS at 09:47

## 2018-12-06 RX ADMIN — LORAZEPAM PRN MG: 2 INJECTION INTRAMUSCULAR; INTRAVENOUS at 11:55

## 2018-12-06 RX ADMIN — TIOTROPIUM BROMIDE SCH: 18 CAPSULE ORAL; RESPIRATORY (INHALATION) at 08:56

## 2018-12-06 RX ADMIN — MORPHINE SULFATE PRN MG: 100 SOLUTION ORAL at 21:25

## 2018-12-06 RX ADMIN — IPRATROPIUM BROMIDE AND ALBUTEROL SULFATE PRN NEB: .5; 3 SOLUTION RESPIRATORY (INHALATION) at 07:56

## 2018-12-06 RX ADMIN — HYDROMORPHONE HYDROCHLORIDE PRN MG: 1 INJECTION, SOLUTION INTRAMUSCULAR; INTRAVENOUS; SUBCUTANEOUS at 04:37

## 2018-12-06 RX ADMIN — MORPHINE TINCTURE SCH: 1 SOLUTION ORAL at 09:57

## 2018-12-06 RX ADMIN — CINACALCET HYDROCHLORIDE SCH: 30 TABLET, COATED ORAL at 09:56

## 2018-12-06 RX ADMIN — MORPHINE TINCTURE SCH: 1 SOLUTION ORAL at 23:10

## 2018-12-06 RX ADMIN — PAROXETINE SCH: 10 TABLET, FILM COATED ORAL at 17:22

## 2018-12-06 RX ADMIN — HYDROMORPHONE HYDROCHLORIDE PRN MG: 1 INJECTION, SOLUTION INTRAMUSCULAR; INTRAVENOUS; SUBCUTANEOUS at 07:57

## 2018-12-06 RX ADMIN — HYDROMORPHONE HYDROCHLORIDE PRN MG: 1 INJECTION, SOLUTION INTRAMUSCULAR; INTRAVENOUS; SUBCUTANEOUS at 14:40

## 2018-12-06 RX ADMIN — TIOTROPIUM BROMIDE SCH CAP: 18 CAPSULE ORAL; RESPIRATORY (INHALATION) at 12:04

## 2018-12-06 RX ADMIN — LORAZEPAM SCH: 0.5 TABLET ORAL at 17:22

## 2018-12-06 RX ADMIN — HYDROMORPHONE HYDROCHLORIDE PRN MG: 1 INJECTION, SOLUTION INTRAMUSCULAR; INTRAVENOUS; SUBCUTANEOUS at 00:02

## 2018-12-06 RX ADMIN — MORPHINE TINCTURE SCH: 1 SOLUTION ORAL at 17:22

## 2018-12-06 RX ADMIN — CALCITRIOL SCH: 0.25 CAPSULE ORAL at 09:56

## 2018-12-06 RX ADMIN — HYDROMORPHONE HYDROCHLORIDE PRN MG: 1 INJECTION, SOLUTION INTRAMUSCULAR; INTRAVENOUS; SUBCUTANEOUS at 19:09

## 2018-12-06 RX ADMIN — LORAZEPAM PRN MG: 2 INJECTION INTRAMUSCULAR; INTRAVENOUS at 05:16

## 2018-12-06 RX ADMIN — HYDROMORPHONE HYDROCHLORIDE PRN MG: 1 INJECTION, SOLUTION INTRAMUSCULAR; INTRAVENOUS; SUBCUTANEOUS at 21:32

## 2018-12-06 RX ADMIN — LORAZEPAM PRN MG: 2 INJECTION INTRAMUSCULAR; INTRAVENOUS at 16:41

## 2018-12-06 RX ADMIN — LORAZEPAM SCH MG: 0.5 TABLET ORAL at 21:30

## 2018-12-06 NOTE — PN
Subjective


Date of Service: 12/06/18


Interval History: 





Pt was in more distress today in AM, anxious and tachypneic. now seen after a 

dose of Dilaudid and Ativan, sedated, nods head in  response, eyes closed





Objective


Active Medications: 








Acetaminophen (Tylenol Tab*)  650 mg PO Q4H PRN


   PRN Reason: FEVER/PAIN


   Last Admin: 12/04/18 22:39 Dose:  650 mg


Al Hydrox/Mg Hydrox/Simethicone (Maalox Plus*)  30 ml PO Q6H PRN


   PRN Reason: INDIGESTION


Albuterol/Ipratropium (Duoneb (Albuterol 2.5 Mg/Ipratropium 0.5 Mg))  1 neb INH 

Q2H PRN


   PRN Reason: SOB/WHEEZING


   Last Admin: 12/06/18 07:56 Dose:  1 neb


Calcitriol (Rocaltrol  Cap*)  0.25 mcg [facility]ILY [facility]


   Last Admin: 12/06/18 09:56 Dose:  Not Given


Cinacalcet (Sensipar Tab*)  30 mg PO DAILY [facility]


   Last Admin: 12/06/18 09:56 Dose:  Not Given


Cyanocobalamin (Vitamin B12 Inj *)  1,000 mcg IM Th@0900 [facility]


   Last Admin: 12/06/18 09:56 Dose:  Not Given


Guaifenesin (Mucinex*)  600 mg PO BID PRN


   PRN Reason: CONGESTION


   Last Admin: 12/05/18 18:09 Dose:  600 mg


Hydromorphone HCl (Dilaudid Inj1s*)  1 mg IV SLOW PU Q2H PRN


   PRN Reason: PAIN


   Last Admin: 12/06/18 11:55 Dose:  1 mg


Ceftriaxone Sodium 1 gm/ (Sodium Chloride)  50 mls @ 200 mls/hr IVPB Q24H [facility]


   Last Admin: 12/06/18 09:47 Dose:  200 mls/hr


Ipratropium Bromide (Atrovent 0.5 Mg Neb.Sol*)  0.5 mg INH Q6HR PRN


   PRN Reason: WHEEZING


   Last Admin: 12/06/18 12:03 Dose:  0.5 mg


Lorazepam (Ativan Inj*)  0.5 mg IV PUSH Q4H PRN


   PRN Reason: ANXIETY


   Last Admin: 12/06/18 11:55 Dose:  0.5 mg


Lorazepam (Ativan Tab(*))  0.5 mg PO Q6H [facility]


   Last Admin: 12/06/18 10:57 Dose:  Not Given


Morphine Sulfate (Morphine Oral Concentrate*)  5 mg PO Q2H PRN


   PRN Reason: PAIN


Nystatin (Nystatin Suspension*)  200,000 units PO QID [facility]


   Last Admin: 12/06/18 09:56 Dose:  Not Given


Opium Tincture (Opium Tincture*)  6 mg PO QID [facility]


   Last Admin: 12/06/18 09:57 Dose:  Not Given


Paroxetine HCl (Paxil Tab*)  30 mg PO QPM [facility]


   Last Admin: 12/05/18 18:07 Dose:  30 mg


Prednisone (Deltasone Tab*)  5 mg PO DAILY [facility]


   Last Admin: 12/06/18 09:57 Dose:  Not Given


Prednisone (Deltasone Tab*)  3 mg PO DAILY [facility]


   Last Admin: 12/06/18 09:57 Dose:  Not Given


Tiotropium Bromide (Spiriva Cap.Inh*)  1 cap INH DAILY [facility]


   Last Admin: 12/06/18 12:04 Dose:  1 cap








 Vital Signs - 8 hr











  12/06/18 12/06/18 12/06/18





  04:37 05:16 05:47


 


Pulse Rate   


 


Respiratory 18 18 18





Rate   


 


Blood Pressure   





(mmHg)   


 


O2 Sat by Pulse   





Oximetry   














  12/06/18 12/06/18 12/06/18





  05:51 06:11 07:51


 


Pulse Rate   158


 


Respiratory 18 18 





Rate   


 


Blood Pressure   





(mmHg)   


 


O2 Sat by Pulse   86





Oximetry   














  12/06/18 12/06/18 12/06/18





  07:57 07:59 08:00


 


Pulse Rate  110 


 


Respiratory 18 28 22





Rate   


 


Blood Pressure   





(mmHg)   


 


O2 Sat by Pulse  87 





Oximetry   














  12/06/18 12/06/18 12/06/18





  08:05 08:21 08:23


 


Pulse Rate   116


 


Respiratory 28 22 22





Rate   


 


Blood Pressure   101/59





(mmHg)   


 


O2 Sat by Pulse   





Oximetry   














  12/06/18 12/06/18 12/06/18





  09:57 10:58 11:55


 


Pulse Rate   


 


Respiratory 18 14 28





Rate   


 


Blood Pressure   





(mmHg)   


 


O2 Sat by Pulse   





Oximetry   














  12/06/18





  12:04


 


Pulse Rate 132


 


Respiratory 20





Rate 


 


Blood Pressure 





(mmHg) 


 


O2 Sat by Pulse 80





Oximetry 











Oxygen Devices in Use Now: Nasal Cannula


Appearance: 74 yo F tachypneic, nonverbal, alert, eyes closed


Eyes: No Scleral Icterus, PERRLA


Ears/Nose/Mouth/Throat: NL Teeth, Lips, Gums, Mucous Membranes Moist, - - dry 

mucosa


Neck: NL Appearance and Movements; NL JVP, Trachea Midline


Respiratory: Symmetrical Chest Expansion and Respiratory Effort, - - diffuse 

rhonchi b/l, prolonged exp phase


Cardiovascular: NL Sounds; No Murmurs; No JVD, RRR


Abdominal: NL Sounds; No Tenderness; No Distention, No Hepatosplenomegaly, - - 

colostomy in place


Lymphatic: No Cervical Adenopathy


Extremities: No Clubbing, Cyanosis, - - trace edema in feet and hands


Skin: - - abrasion on chin and ecchymoses on b/l shoulders


Neurological: - - deferred due to comfort care


Result Diagrams: 


 12/05/18 07:28





 12/05/18 07:28


Additional Lab and Data: 


 Lab Results











  12/01/18 12/01/18 12/01/18 Range/Units





  03:44 03:44 03:44 


 


WBC  17.5 H    (3.5-10.8)  10^3/ul


 


RBC  4.32    (4.00-5.40)  10^6/ul


 


Hgb  13.5    (12.0-16.0)  g/dl


 


Hct  41    (35-47)  %


 


MCV  94    (80-97)  fL


 


MCH  31    (27-31)  pg


 


MCHC  33    (31-36)  g/dl


 


RDW  15    (10.5-15)  %


 


Plt Count  341    (150-450)  10^3/ul


 


MPV  7.7    (7.4-10.4)  fL


 


Neut % (Auto)  71.0    %


 


Lymph % (Auto)  18.0    %


 


Mono % (Auto)  9.9    %


 


Eos % (Auto)  0.8    %


 


Baso % (Auto)  0.3    %


 


Absolute Neuts (auto)  12.5 H    (1.5-7.7)  10^3/ul


 


Absolute Lymphs (auto)  3.2    (1.0-4.8)  10^3/ul


 


Absolute Monos (auto)  1.7 H    (0-0.8)  10^3/ul


 


Absolute Eos (auto)  0.1    (0-0.6)  10^3/ul


 


Absolute Basos (auto)  0    (0-0.2)  10^3/ul


 


Absolute Nucleated RBC  0    10^3/ul


 


Nucleated RBC %  0    


 


INR (Anticoag Therapy)   1.81 H   (0.77-1.02)  


 


APTT   38.9 H   (26.0-36.3)  seconds


 


Sodium    137  (135-145)  mmol/L


 


Potassium    3.1 L  (3.5-5.0)  mmol/L


 


Chloride    82 L  (101-111)  mmol/L


 


Carbon Dioxide    41 H*  (22-32)  mmol/L


 


Anion Gap    14 H  (2-11)  mmol/L


 


BUN    43 H  (6-24)  mg/dL


 


Creatinine    2.36 H  (0.51-0.95)  mg/dL


 


Est GFR ( Amer)    24.3  (>60)  


 


Est GFR (Non-Af Amer)    20.1  (>60)  


 


BUN/Creatinine Ratio    18.2  (8-20)  


 


Glucose    138 H  ()  mg/dL


 


Lactic Acid     (0.5-2.0)  mmol/L


 


Calcium    9.1  (8.6-10.3)  mg/dL


 


Total Bilirubin    0.40  (0.2-1.0)  mg/dL


 


AST    26  (13-39)  U/L


 


ALT    30  (7-52)  U/L


 


Alkaline Phosphatase    173 H  ()  U/L


 


Troponin I    0.04 H*  (<0.04)  ng/mL


 


Total Protein    6.9  (6.4-8.9)  g/dL


 


Albumin    3.7  (3.2-5.2)  g/dL


 


Globulin    3.2  (2-4)  g/dL


 


Albumin/Globulin Ratio    1.2  (1-3)  


 


Blood Type     


 


Antibody Screen     














  12/01/18 12/01/18 Range/Units





  03:44 03:44 


 


WBC    (3.5-10.8)  10^3/ul


 


RBC    (4.00-5.40)  10^6/ul


 


Hgb    (12.0-16.0)  g/dl


 


Hct    (35-47)  %


 


MCV    (80-97)  fL


 


MCH    (27-31)  pg


 


MCHC    (31-36)  g/dl


 


RDW    (10.5-15)  %


 


Plt Count    (150-450)  10^3/ul


 


MPV    (7.4-10.4)  fL


 


Neut % (Auto)    %


 


Lymph % (Auto)    %


 


Mono % (Auto)    %


 


Eos % (Auto)    %


 


Baso % (Auto)    %


 


Absolute Neuts (auto)    (1.5-7.7)  10^3/ul


 


Absolute Lymphs (auto)    (1.0-4.8)  10^3/ul


 


Absolute Monos (auto)    (0-0.8)  10^3/ul


 


Absolute Eos (auto)    (0-0.6)  10^3/ul


 


Absolute Basos (auto)    (0-0.2)  10^3/ul


 


Absolute Nucleated RBC    10^3/ul


 


Nucleated RBC %    


 


INR (Anticoag Therapy)    (0.77-1.02)  


 


APTT    (26.0-36.3)  seconds


 


Sodium    (135-145)  mmol/L


 


Potassium    (3.5-5.0)  mmol/L


 


Chloride    (101-111)  mmol/L


 


Carbon Dioxide    (22-32)  mmol/L


 


Anion Gap    (2-11)  mmol/L


 


BUN    (6-24)  mg/dL


 


Creatinine    (0.51-0.95)  mg/dL


 


Est GFR ( Amer)    (>60)  


 


Est GFR (Non-Af Amer)    (>60)  


 


BUN/Creatinine Ratio    (8-20)  


 


Glucose    ()  mg/dL


 


Lactic Acid  3.7 H*   (0.5-2.0)  mmol/L


 


Calcium    (8.6-10.3)  mg/dL


 


Total Bilirubin    (0.2-1.0)  mg/dL


 


AST    (13-39)  U/L


 


ALT    (7-52)  U/L


 


Alkaline Phosphatase    ()  U/L


 


Troponin I    (<0.04)  ng/mL


 


Total Protein    (6.4-8.9)  g/dL


 


Albumin    (3.2-5.2)  g/dL


 


Globulin    (2-4)  g/dL


 


Albumin/Globulin Ratio    (1-3)  


 


Blood Type   O Negative  


 


Antibody Screen   Pending  














Assess/Plan/Problems-Billing


Assessment: 





This is a 73 year old chronically ill, immobile woman who presented to the ED 

after a fall and was found to have a right femoral neck fracture.  





- Patient Problems


(1) Fracture of femoral neck, right


Comment: She is very elevated risk for perioperative complications. 


Based on these findings,it was recommended against surgery to [facility]. 

After d/w DR. Francis  on 12/5/18 they agreed to palliative approach.


Palliative consult in place. Cont  morphine oral concentrate and lorazepam PO 

[facility]    





(2) Chronic steroid use


Comment: Cont home prednisone   





(3) Elevated troponin


Comment: likely demand 


   





(4) Pneumonia


Comment: 


continue ceftriaxone    





(5) Acute on chronic renal failure


Comment: Improving    





(6) DVT prophylaxis


Comment: 


coumadin d/c'd due to comfort care   





(7) COPD (chronic obstructive pulmonary disease)


Comment: Continue home O2 and  home prednisone


   





(8) History of DVT (deep vein thrombosis)


Comment: remote 


warfarin d/c'd   


Status and Disposition: 


inpatient

## 2018-12-06 NOTE — PN
Hospitalist Progress Note


Date of Service: 12/06/18


HOSPITALIST ADDENDUM


CAT called at 9:30PM due to severe respiratory distress.


Mrs Santiago is a 74yo F with multiple medical problems including lung CA, Crohn

's with braden output ostomy, diastolic CHF, admitted with right hip fracture, 

deemed to be too high risk for surgical repair. Patient and her wife elected 

comfort care measures only earlier today.


As per RN, patient was comfortable at change of shift, but around 9:30 became 

severely tachypneic. On arrival at bedside, she was in severe respiratory 

distress, with accessory muscle use and nasal flaring. Complained of pain and 

feeling "panicky".


Lung sounds where coarse with diffuse rhonchi. 


She received Dilaudid 1mg x 2, Ativan 0.5mg IV x 1, and SL Morphine 5mg. After 

medications she calmed down and appears to be comfortable.


Her wife arrived and we had a long conversation about what comfort care entails 

and her very poor prognosis. She understands she may need high doses of pain/

anxiety meds to be kept comfortable and this will decreased her level of 

consciousness and PO intake. I explained IVF will not add to her comfort as she 

already appears to be fluid overloaded and has a h/o CHF.


Will resume her steroids IV as she may become uncomfortable without it, but if 

[facility], the wife understands we'll have to 

stop IV meds and manage with PO/SL meds. If her condition continues to decline, 

she may be better served with inpatient hospice.


45 minutes critical care time.

## 2018-12-07 RX ADMIN — MORPHINE SULFATE PRN MG: 100 SOLUTION ORAL at 09:40

## 2018-12-07 RX ADMIN — LORAZEPAM PRN MG: 2 INJECTION INTRAMUSCULAR; INTRAVENOUS at 02:43

## 2018-12-07 RX ADMIN — METHYLPREDNISOLONE SODIUM SUCCINATE SCH MG: 40 INJECTION, POWDER, FOR SOLUTION INTRAMUSCULAR; INTRAVENOUS at 08:43

## 2018-12-07 RX ADMIN — HYDROMORPHONE HYDROCHLORIDE PRN MG: 1 INJECTION, SOLUTION INTRAMUSCULAR; INTRAVENOUS; SUBCUTANEOUS at 19:40

## 2018-12-07 RX ADMIN — LORAZEPAM PRN MG: 2 INJECTION INTRAMUSCULAR; INTRAVENOUS at 08:41

## 2018-12-07 RX ADMIN — LORAZEPAM PRN MG: 2 INJECTION INTRAMUSCULAR; INTRAVENOUS at 10:50

## 2018-12-07 RX ADMIN — HYDROMORPHONE HYDROCHLORIDE PRN MG: 1 INJECTION, SOLUTION INTRAMUSCULAR; INTRAVENOUS; SUBCUTANEOUS at 17:25

## 2018-12-07 RX ADMIN — LORAZEPAM SCH: 0.5 TABLET ORAL at 17:49

## 2018-12-07 RX ADMIN — MORPHINE TINCTURE SCH MG: 1 SOLUTION ORAL at 08:47

## 2018-12-07 RX ADMIN — LORAZEPAM PRN MG: 2 INJECTION INTRAMUSCULAR; INTRAVENOUS at 18:25

## 2018-12-07 RX ADMIN — LORAZEPAM PRN MG: 2 INJECTION INTRAMUSCULAR; INTRAVENOUS at 22:53

## 2018-12-07 RX ADMIN — HYDROMORPHONE HYDROCHLORIDE PRN MG: 1 INJECTION, SOLUTION INTRAMUSCULAR; INTRAVENOUS; SUBCUTANEOUS at 10:50

## 2018-12-07 RX ADMIN — MORPHINE TINCTURE SCH MG: 1 SOLUTION ORAL at 21:03

## 2018-12-07 RX ADMIN — MORPHINE SULFATE PRN MG: 100 SOLUTION ORAL at 02:26

## 2018-12-07 RX ADMIN — PAROXETINE SCH: 10 TABLET, FILM COATED ORAL at 18:27

## 2018-12-07 RX ADMIN — LORAZEPAM SCH: 0.5 TABLET ORAL at 05:19

## 2018-12-07 RX ADMIN — LORAZEPAM SCH: 0.5 TABLET ORAL at 22:55

## 2018-12-07 RX ADMIN — HYDROMORPHONE HYDROCHLORIDE PRN MG: 1 INJECTION, SOLUTION INTRAMUSCULAR; INTRAVENOUS; SUBCUTANEOUS at 03:57

## 2018-12-07 RX ADMIN — HYDROMORPHONE HYDROCHLORIDE PRN MG: 1 INJECTION, SOLUTION INTRAMUSCULAR; INTRAVENOUS; SUBCUTANEOUS at 12:26

## 2018-12-07 RX ADMIN — HYDROMORPHONE HYDROCHLORIDE PRN MG: 1 INJECTION, SOLUTION INTRAMUSCULAR; INTRAVENOUS; SUBCUTANEOUS at 07:20

## 2018-12-07 RX ADMIN — MORPHINE TINCTURE SCH MG: 1 SOLUTION ORAL at 17:21

## 2018-12-07 RX ADMIN — HYDROMORPHONE HYDROCHLORIDE PRN MG: 1 INJECTION, SOLUTION INTRAMUSCULAR; INTRAVENOUS; SUBCUTANEOUS at 22:54

## 2018-12-07 RX ADMIN — LORAZEPAM SCH: 0.5 TABLET ORAL at 11:55

## 2018-12-07 RX ADMIN — TIOTROPIUM BROMIDE SCH CAP: 18 CAPSULE ORAL; RESPIRATORY (INHALATION) at 07:45

## 2018-12-07 RX ADMIN — MORPHINE TINCTURE SCH MG: 1 SOLUTION ORAL at 13:10

## 2018-12-07 RX ADMIN — HYDROMORPHONE HYDROCHLORIDE PRN MG: 1 INJECTION, SOLUTION INTRAMUSCULAR; INTRAVENOUS; SUBCUTANEOUS at 00:47

## 2018-12-07 NOTE — CONSULT
Palliative / Hospice Consult


Ordering Provider: Angelita Bray - Dr. Haile is PCP





- Subjective


Code Status: DNR


Advance Directives Location: In Chart


MOLST Part A Completed: Yes - completed with wife Robin Hernandez


Date: 18


MOLST Part E Completed:: Yes - completeed with wife Robin Hernandez


Date: 18


HCP Completed: Yes - wife Robin Hernandez





- History or Present Illness


History or Present Illness: 





74 yo female who fell with her walker while walking into the house. She had 

gone out to smoke a cigarette. She fell on her R side. She called an ambulance 

due to pain and had trouble getting up but she declined to go ER. Later because 

of increasing pain she called ambulance again and was taken to the ER. She has 

a history of COPD oxygen and steriod dependent , lung ca, Crohn's disease with 

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 

warfin. She has been lying in her bed and sleeping most of the day . Will get 

up to eat or smoke. She was enrolled in hospice in the past for her lung cancer 

and currently is in the [facility].


Lab Values: 


 Laboratory Last Values











WBC  12.8 10^3/ul (3.5-10.8)  H  18  07:28    


 


RBC  3.21 10^6/ul (4.00-5.40)  L  18  07:28    


 


Hgb  10.2 g/dl (12.0-16.0)  L  18  07:28    


 


Hct  31 % (35-47)  L  18  07:28    


 


MCV  97 fL (80-97)   18  07:28    


 


MCH  32 pg (27-31)  H  18  07:28    


 


MCHC  33 g/dl (31-36)   18  07:28    


 


RDW  16 % (10.5-15)  H  18  07:28    


 


Plt Count  287 10^3/ul (150-450)   18  07:28    


 


MPV  7.8 fL (7.4-10.4)   18  07:28    


 


Neut % (Auto)  83.3 %  18  07:28    


 


Lymph % (Auto)  9.6 %  18  07:28    


 


Mono % (Auto)  6.3 %  18  07:28    


 


Eos % (Auto)  0.4 %  18  07:28    


 


Baso % (Auto)  0.4 %  18  07:28    


 


Absolute Neuts (auto)  10.7 10^3/ul (1.5-7.7)  H  18  07:28    


 


Absolute Lymphs (auto)  1.2 10^3/ul (1.0-4.8)   18  07:28    


 


Absolute Monos (auto)  0.8 10^3/ul (0-0.8)   18  07:28    


 


Absolute Eos (auto)  0 10^3/ul (0-0.6)   18  07:28    


 


Absolute Basos (auto)  0 10^3/ul (0-0.2)   18  07:28    


 


Absolute Nucleated RBC  0 10^3/ul  18  07:28    


 


Neutrophils %  84 %  18  05:41    


 


Lymphocytes %  9 %  18  05:41    


 


Monocytes %  6 %  18  05:41    


 


Eosinophils %  1 %  18  05:41    


 


Nucleated RBC %  0   18  07:28    


 


Abs Neuts (Manual)  10.2 10^3/ul (1.5-7.7)  H  18  05:41    


 


Abs Lymphs (Manual)  1.1 10^3/ul (1.0-4.8)   18  05:41    


 


Abs Monocytes (Manual)  0.7 10^3/ul (0-0.8)   18  05:41    


 


Absolute Eos (Manual)  0.1 10^3/ul (0-0.6)   18  05:41    


 


Normal RBC Morphology  Normal  (Normal)   18  05:41    


 


Hem Pathologist Commnt     18  05:41    


 


INR (Anticoag Therapy)  0.94  (0.77-1.02)   18  07:28    


 


APTT  38.9 seconds (26.0-36.3)  H  18  03:44    


 


Sodium  138 mmol/L (135-145)   18  07:28    


 


Potassium  3.7 mmol/L (3.5-5.0)   18  07:28    


 


Chloride  101 mmol/L (101-111)   18  07:28    


 


Carbon Dioxide  24 mmol/L (22-32)   18  07:28    


 


Anion Gap  13 mmol/L (2-11)  H  18  07:28    


 


BUN  24 mg/dL (6-24)   18  07:28    


 


Creatinine  1.51 mg/dL (0.51-0.95)  H  18  07:28    


 


Est GFR ( Amer)  40.8  (>60)   18  07:28    


 


Est GFR (Non-Af Amer)  33.7  (>60)   18  07:28    


 


BUN/Creatinine Ratio  15.9  (8-20)   18  07:28    


 


Glucose  146 mg/dL ()  H  18  07:28    


 


Lactic Acid  1.3 mmol/L (0.5-2.0)   18  11:49    


 


Calcium  8.0 mg/dL (8.6-10.3)  L  18  07:28    


 


Magnesium  2.4 mg/dL (1.9-2.7)   18  07:28    


 


Total Bilirubin  0.40 mg/dL (0.2-1.0)   18  03:44    


 


AST  26 U/L (13-39)   18  03:44    


 


ALT  30 U/L (7-52)   18  03:44    


 


Alkaline Phosphatase  173 U/L ()  H  18  03:44    


 


Total Creatine Kinase  55 U/L ()   18  03:44    


 


Troponin I  0.03 ng/mL (<0.04)   18  15:52    


 


Total Protein  6.9 g/dL (6.4-8.9)   18  03:44    


 


Albumin  3.7 g/dL (3.2-5.2)   18  03:44    


 


Globulin  3.2 g/dL (2-4)   18  03:44    


 


Albumin/Globulin Ratio  1.2  (1-3)   18  03:44    


 


Urine Color  Yellow   18  08:39    


 


Urine Appearance  Cloudy   18  08:39    


 


Urine pH  6.0  (5-9)   18  08:39    


 


Ur Specific Gravity  1.021  (1.010-1.030)   18  08:39    


 


Urine Protein  Negative  (Negative)   18  08:39    


 


Urine Ketones  Negative  (Negative)   18  08:39    


 


Urine Blood  Negative  (Negative)   18  08:39    


 


Urine Nitrate  Negative  (Negative)   18  08:39    


 


Urine Bilirubin  Negative  (Negative)   18  08:39    


 


Urine Urobilinogen  Negative  (Negative)   18  08:39    


 


Ur Leukocyte Esterase  Negative  (Negative)   18  08:39    


 


Urine Glucose  Negative  (Negative)   18  08:39    


 


Urine Ascorbic Acid  *  (Negative)  A  18  08:39    


 


Blood Type  O Negative   18  03:44    


 


Antibody Screen  Negative   18  03:44    














- Objective


Active Medications: 








Acetaminophen (Tylenol Tab*)  650 mg PO Q4H PRN


   PRN Reason: FEVER/PAIN


   Last Admin: 18 22:39 Dose:  650 mg


Al Hydrox/Mg Hydrox/Simethicone (Maalox Plus*)  30 ml PO Q6H PRN


   PRN Reason: INDIGESTION


Albuterol/Ipratropium (Duoneb (Albuterol 2.5 Mg/Ipratropium 0.5 Mg))  1 neb INH 

Q2H PRN


   PRN Reason: SOB/WHEEZING


   Last Admin: 18 07:56 Dose:  1 neb


Hydromorphone HCl (Dilaudid Inj1s*)  1 mg IV SLOW PU Q1H PRN


   PRN Reason: PAIN


   Last Admin: 18 07:20 Dose:  1 mg


Ipratropium Bromide (Atrovent 0.5 Mg Neb.Sol*)  0.5 mg INH Q6HR PRN


   PRN Reason: WHEEZING


   Last Admin: 18 12:03 Dose:  0.5 mg


Lorazepam (Ativan Tab(*))  0.5 mg PO Q6H BERTHA


   Last Admin: 18 11:55 Dose:  Not Given


Lorazepam (Ativan Inj*)  0.5 mg IV PUSH Q2H PRN


   PRN Reason: ANXIETY


   Last Admin: 18 10:50 Dose:  0.5 mg


Methylprednisolone Sodium Succinate (Solu-Medrol 40 Mg)  10 mg IV DAILY [facility]


   Last Admin: 18 08:43 Dose:  10 mg


Morphine Sulfate (Morphine Oral Concentrate*)  5 mg PO Q1H PRN


   PRN Reason: Pain/RR>24


   Last Admin: 18 09:40 Dose:  5 mg


Opium Tincture (Opium Tincture*)  6 mg PO QID [facility]


   Last Admin: 18 08:47 Dose:  6 mg


Paroxetine HCl (Paxil Tab*)  30 mg PO QPM [facility]


   Last Admin: 18 17:22 Dose:  Not Given


Tiotropium Bromide (Spiriva Cap.Inh*)  1 cap INH DAILY [facility]


   Last Admin: 18 07:45 Dose:  1 cap








Vital Signs: 


Vital Signs:











Temp Pulse Resp BP Pulse Ox


 


 99.1 F   132   15   101/59   92 


 


 18 00:41  18 12:04  18 11:56  18 08:23  18 15:30











Patient Weight: 


 





Weight                           56.699 kg








Intake and Output: 


 Intake & Output











 18





 06:59 06:59 06:59 06:59


 


Intake Total 80 976 75 


 


Output Total 1250 1050  200


 


Balance -1170 -74 75 -200


 


Intake:    


 


  IV Fluids  736 20 


 


    NS (0.9%)  736 20 


 


  IVPB 50   


 


    Vitamin K 50   


 


  Medicated IV   55 


 


    ceftriaxone   55 


 


  Oral 30 240 0 


 


Output:    


 


  Stark 375 450  


 


  Colostomy 200 100  


 


  Ileostomy 675 500  200


 


Other:    


 


  # Bowel Movements   0 


 


  # Voids   0 





 





ADLs: Meal  Record                                         Start:  18 09:

43


Freq:                                                      Status: Active      

  


Protocol:                                                                      

  


 Created      18 09:43  System  (Rec: 18 09:43  System  SSU-M14)


 Document     18 11:05  [id number]  (Rec: 18 11:06  [id number]  SSU-C19)


 Document     18 13:59  [id number]  (Rec: 18 14:01  [id number]  U-C19)


 Document     18 13:29  [id number]  (Rec: 18 13:29  [id number]  SSU-C09)


 Document     18 13:30  [id number]  (Rec: 18 13:30  [id number]  SSU-C02)


Intake and Output                                          Start:  18 03:

22


Freq:                                                      Status: Active      

  


Protocol:                                                                      

  


 Created      18 03:22  System  (Rec: 18 03:22  System  EDRM-C16)


Intake and Output                                          Start:  18 09:

43


Freq:   DAILY@0600,1400,2200                               Status: Active      

  


Protocol:                                                                      

  


 Created      18 09:43  System  (Rec: 18 09:43  System  SSU-M14)


 Document     18 16:13  [id number]  (Rec: 18 16:15  [id number]  SSU-M14)


 Document     18 22:33  [id number]  (Rec: 18 22:37  [id number]  SSU-M15)


 Document     18 02:40  [id number]  (Rec: 18 02:55  [id number]  [facility]


 Document     18 05:35  [id number]  (Rec: 18 05:37  [id number]  SSU-M18)


 Document     18 06:42  [id number]  (Rec: 18 06:42  [id number]  SSU-M17)


 Document     18 13:23  [id number]  (Rec: 18 13:24  [id number]  SSU-C03)


 Document     18 17:05  [id number]  (Rec: 18 17:06  [id number]  SSU-M17)


 Document     18 22:08  [id number]  (Rec: 18 22:09  [id number]  SSU-M17)


 Document     18 05:13  [id number]  (Rec: 18 05:17  [id number]  SSU-L02)


 Document     18 14:52  [id number]  (Rec: 18 14:52  [id number]  SSU-C19)


 Document     18 21:58  [id number]  (Rec: 18 21:59  [id number]  SSU-C19)


 Document     18 03:17  [id number]  (Rec: 18 03:18  [id number]  SSU-L02)


 Document     18 03:58  [id number]  (Rec: 18 03:59  [id number]  SSU-M06)


 Document     18 05:44  [id number]  (Rec: 18 05:45  [id number]  SSU-L02)


 Document     18 22:26  [id number]  (Rec: 18 22:30  [id number]  SSU-C05)


 Document     18 05:39  [id number]  (Rec: 18 05:40  [id number]  SSU-C02)


 Document     18 13:46  [id number]  (Rec: 18 13:47  [id number]  SSU-C02)


 Document     18 23:00  [id number]  (Rec: 18 23:00  [id number]  U-C19)


 Document     18 23:30  [id number]  (Rec: 18 04:00  [id number]  SSU-C05)


 Document     18 06:00  [id number]  (Rec: 18 06:26  [id number]  SSU-M07)


 Document     18 22:00  [id number]  (Rec: 18 22:25  [id number]  MED-C11)


 Document     18 05:56  [id number]  (Rec: 18 05:57  [id number]  MED-C11)


 Document     18 07:29  [id number]  (Rec: 18 07:29  [id number]  MED-M11)








Head: Normal


Eyes: No Scleral Icterus, PERRLA


Ears/Nose/Mouth/Throat: NL Teeth, Lips, Gums, Mucous Membranes Moist, - - dry 

mucosa


Neck: NL Appearance and Movements; NL JVP, Trachea Midline


Cardiovascular: NL Sounds; No Murmurs; No JVD, RRR


Abdominal: NL Sounds; No Tenderness; No Distention, No Hepatosplenomegaly, - - 

colostomy in place


Extremities: No Clubbing, Cyanosis, - - trace edema in feet and hands


Neurological: - - deferred due to comfort care


Other Findings: 





Opened eyes to name being called.





- Assessment


Assessment: 





72yo female with new R hip fracture not a surgical candidate because of her 

lung status requiring IV medication for pain control and poor intake.





- Plan


Consult Plan (MU): Hospice


Plan: 





1. Marisol is a candidate for hospice. Her MOSLT was updated with her wife 

Robin. She is in chronic pain due to her hip fracture but is not a surgical 

candidate because of her COPD. Her oral intake has decreased and on this 

trajectory which her wife has been told she may die in the next 48 hours. At 

this time she does not want to move her. If she has not  she is a candidate 

for hospice because of her advanced COPD and unrepaired hip fracture and will 

pursue that path. Currently her pain is controlled with IV dilaudid and IV 

steriods. All of the discussion has been with the wife because Mana has 

been sedated .





Thank you for this consult.





- Time On Unit


Date of Evaluation: 18


Hospice Consult Time in: 11:00


Hospice Consult Time Out: 12:30


Hospice Consult Time Total: 90


> 50% of Time Spend In Counseling or Coordinating Care: Yes

## 2018-12-07 NOTE — PN
Subjective


Date of Service: 12/07/18


Interval History: 





Pt was in distress last night, placed on Dilaudid 1 mg IV Q1H and Solu Medrol. 

Now calm, awakes to voice, but minimally verbal





Objective


Active Medications: 








Acetaminophen (Tylenol Tab*)  650 mg PO Q4H PRN


   PRN Reason: FEVER/PAIN


   Last Admin: 12/04/18 22:39 Dose:  650 mg


Al Hydrox/Mg Hydrox/Simethicone (Maalox Plus*)  30 ml PO Q6H PRN


   PRN Reason: INDIGESTION


Albuterol/Ipratropium (Duoneb (Albuterol 2.5 Mg/Ipratropium 0.5 Mg))  1 neb INH 

Q2H PRN


   PRN Reason: SOB/WHEEZING


   Last Admin: 12/06/18 07:56 Dose:  1 neb


Hydromorphone HCl (Dilaudid Inj1s*)  1 mg IV SLOW PU Q1H PRN


   PRN Reason: PAIN


   Last Admin: 12/07/18 07:20 Dose:  1 mg


Ipratropium Bromide (Atrovent 0.5 Mg Neb.Sol*)  0.5 mg INH Q6HR PRN


   PRN Reason: WHEEZING


   Last Admin: 12/06/18 12:03 Dose:  0.5 mg


Lorazepam (Ativan Tab(*))  0.5 mg PO Q6H [facility]


   Last Admin: 12/07/18 11:55 Dose:  Not Given


Lorazepam (Ativan Inj*)  0.5 mg IV PUSH Q2H PRN


   PRN Reason: ANXIETY


   Last Admin: 12/07/18 10:50 Dose:  0.5 mg


Methylprednisolone Sodium Succinate (Solu-Medrol 40 Mg)  10 mg IV DAILY [facility]


   Last Admin: 12/07/18 08:43 Dose:  10 mg


Morphine Sulfate (Morphine Oral Concentrate*)  5 mg PO Q1H PRN


   PRN Reason: Pain/RR>24


   Last Admin: 12/07/18 09:40 Dose:  5 mg


Opium Tincture (Opium Tincture*)  6 mg PO QID [facility]


   Last Admin: 12/07/18 08:47 Dose:  6 mg


Paroxetine HCl (Paxil Tab*)  30 mg PO QPM [facility]


   Last Admin: 12/06/18 17:22 Dose:  Not Given


Tiotropium Bromide (Spiriva Cap.Inh*)  1 cap INH DAILY [facility]


   Last Admin: 12/07/18 07:45 Dose:  1 cap








 Vital Signs - 8 hr











  12/07/18 12/07/18 12/07/18





  04:29 05:18 07:20


 


Respiratory 15 12 14





Rate   














  12/07/18 12/07/18 12/07/18





  08:00 08:41 08:47


 


Respiratory 14 14 14





Rate   














  12/07/18 12/07/18 12/07/18





  09:40 10:50 11:55


 


Respiratory 20 14 15





Rate   














  12/07/18





  11:56


 


Respiratory 15





Rate 











Oxygen Devices in Use Now: Simple Face Mask


Appearance: 74 yo F in nAD, lethargic, awakes to voice, orineted x 2


Eyes: No Scleral Icterus, PERRLA


Ears/Nose/Mouth/Throat: - - dry mucosa


Neck: NL Appearance and Movements; NL JVP


Respiratory: Symmetrical Chest Expansion and Respiratory Effort, - - diffuse b/

l rhonchi


Cardiovascular: NL Sounds; No Murmurs; No JVD, RRR


Abdominal: NL Sounds; No Tenderness; No Distention, No Hepatosplenomegaly, - - 

colostomy in place


Lymphatic: No Cervical Adenopathy


Extremities: No Clubbing, Cyanosis, - - b/l UE's edema R>L


Skin: No Nodules or Sclerosis, - - ebrasion on chin, ecchymoses on b/l arms


Neurological: - - deferred due to comfort care


Result Diagrams: 


 12/05/18 07:28





 12/05/18 07:28


Additional Lab and Data: 


 Lab Results











  12/01/18 12/01/18 12/01/18 Range/Units





  03:44 03:44 03:44 


 


WBC  17.5 H    (3.5-10.8)  10^3/ul


 


RBC  4.32    (4.00-5.40)  10^6/ul


 


Hgb  13.5    (12.0-16.0)  g/dl


 


Hct  41    (35-47)  %


 


MCV  94    (80-97)  fL


 


MCH  31    (27-31)  pg


 


MCHC  33    (31-36)  g/dl


 


RDW  15    (10.5-15)  %


 


Plt Count  341    (150-450)  10^3/ul


 


MPV  7.7    (7.4-10.4)  fL


 


Neut % (Auto)  71.0    %


 


Lymph % (Auto)  18.0    %


 


Mono % (Auto)  9.9    %


 


Eos % (Auto)  0.8    %


 


Baso % (Auto)  0.3    %


 


Absolute Neuts (auto)  12.5 H    (1.5-7.7)  10^3/ul


 


Absolute Lymphs (auto)  3.2    (1.0-4.8)  10^3/ul


 


Absolute Monos (auto)  1.7 H    (0-0.8)  10^3/ul


 


Absolute Eos (auto)  0.1    (0-0.6)  10^3/ul


 


Absolute Basos (auto)  0    (0-0.2)  10^3/ul


 


Absolute Nucleated RBC  0    10^3/ul


 


Nucleated RBC %  0    


 


INR (Anticoag Therapy)   1.81 H   (0.77-1.02)  


 


APTT   38.9 H   (26.0-36.3)  seconds


 


Sodium    137  (135-145)  mmol/L


 


Potassium    3.1 L  (3.5-5.0)  mmol/L


 


Chloride    82 L  (101-111)  mmol/L


 


Carbon Dioxide    41 H*  (22-32)  mmol/L


 


Anion Gap    14 H  (2-11)  mmol/L


 


BUN    43 H  (6-24)  mg/dL


 


Creatinine    2.36 H  (0.51-0.95)  mg/dL


 


Est GFR ( Amer)    24.3  (>60)  


 


Est GFR (Non-Af Amer)    20.1  (>60)  


 


BUN/Creatinine Ratio    18.2  (8-20)  


 


Glucose    138 H  ()  mg/dL


 


Lactic Acid     (0.5-2.0)  mmol/L


 


Calcium    9.1  (8.6-10.3)  mg/dL


 


Total Bilirubin    0.40  (0.2-1.0)  mg/dL


 


AST    26  (13-39)  U/L


 


ALT    30  (7-52)  U/L


 


Alkaline Phosphatase    173 H  ()  U/L


 


Troponin I    0.04 H*  (<0.04)  ng/mL


 


Total Protein    6.9  (6.4-8.9)  g/dL


 


Albumin    3.7  (3.2-5.2)  g/dL


 


Globulin    3.2  (2-4)  g/dL


 


Albumin/Globulin Ratio    1.2  (1-3)  


 


Blood Type     


 


Antibody Screen     














  12/01/18 12/01/18 Range/Units





  03:44 03:44 


 


WBC    (3.5-10.8)  10^3/ul


 


RBC    (4.00-5.40)  10^6/ul


 


Hgb    (12.0-16.0)  g/dl


 


Hct    (35-47)  %


 


MCV    (80-97)  fL


 


MCH    (27-31)  pg


 


MCHC    (31-36)  g/dl


 


RDW    (10.5-15)  %


 


Plt Count    (150-450)  10^3/ul


 


MPV    (7.4-10.4)  fL


 


Neut % (Auto)    %


 


Lymph % (Auto)    %


 


Mono % (Auto)    %


 


Eos % (Auto)    %


 


Baso % (Auto)    %


 


Absolute Neuts (auto)    (1.5-7.7)  10^3/ul


 


Absolute Lymphs (auto)    (1.0-4.8)  10^3/ul


 


Absolute Monos (auto)    (0-0.8)  10^3/ul


 


Absolute Eos (auto)    (0-0.6)  10^3/ul


 


Absolute Basos (auto)    (0-0.2)  10^3/ul


 


Absolute Nucleated RBC    10^3/ul


 


Nucleated RBC %    


 


INR (Anticoag Therapy)    (0.77-1.02)  


 


APTT    (26.0-36.3)  seconds


 


Sodium    (135-145)  mmol/L


 


Potassium    (3.5-5.0)  mmol/L


 


Chloride    (101-111)  mmol/L


 


Carbon Dioxide    (22-32)  mmol/L


 


Anion Gap    (2-11)  mmol/L


 


BUN    (6-24)  mg/dL


 


Creatinine    (0.51-0.95)  mg/dL


 


Est GFR ( Amer)    (>60)  


 


Est GFR (Non-Af Amer)    (>60)  


 


BUN/Creatinine Ratio    (8-20)  


 


Glucose    ()  mg/dL


 


Lactic Acid  3.7 H*   (0.5-2.0)  mmol/L


 


Calcium    (8.6-10.3)  mg/dL


 


Total Bilirubin    (0.2-1.0)  mg/dL


 


AST    (13-39)  U/L


 


ALT    (7-52)  U/L


 


Alkaline Phosphatase    ()  U/L


 


Troponin I    (<0.04)  ng/mL


 


Total Protein    (6.4-8.9)  g/dL


 


Albumin    (3.2-5.2)  g/dL


 


Globulin    (2-4)  g/dL


 


Albumin/Globulin Ratio    (1-3)  


 


Blood Type   O Negative  


 


Antibody Screen   Pending  














Assess/Plan/Problems-Billing


Assessment: 





This is a 73 year old chronically ill, immobile woman who presented to the ED 

after a fall and was found to have a right femoral neck fracture.  





- Patient Problems


(1) Fracture of femoral neck, right


Comment: She had very elevated risk for perioperative complications. 


Based on these findings,it was recommended against surgery to Edi and Robin. 

After d/w DR. Francis  on 12/5/18 they agreed to palliative approach.


Palliative consult in place. Family was offered hospice residence but would 

prefer to "see how she does" for the next 2 days   





(2) Chronic steroid use


Comment: Cont Solu Medrol IV   





(3) Elevated troponin


Comment: likely demand 


   





(4) Pneumonia


Comment: 


antibiotics discontinued for comfort care    





(5) Acute on chronic renal failure


Comment: not adressed today due to comfort care measures   





(6) DVT prophylaxis


Comment: 


coumadin d/c'd due to comfort care   





(7) COPD (chronic obstructive pulmonary disease)


Comment: with acute hypoxemic resp failure omn chronic resp failure, cont 

supportive 02 for now


Prednisone switched to IV solu Medrol as per family's request to improve 

comfort in pt unable to swallow tabs for now   





(8) History of DVT (deep vein thrombosis)


Comment: remote 


warfarin d/c'd   


Status and Disposition: 


inpatient

## 2018-12-08 RX ADMIN — MORPHINE TINCTURE SCH MG: 1 SOLUTION ORAL at 08:58

## 2018-12-08 RX ADMIN — HYDROMORPHONE HYDROCHLORIDE PRN MG: 1 INJECTION, SOLUTION INTRAMUSCULAR; INTRAVENOUS; SUBCUTANEOUS at 05:22

## 2018-12-08 RX ADMIN — LORAZEPAM PRN MG: 2 INJECTION INTRAMUSCULAR; INTRAVENOUS at 05:22

## 2018-12-08 RX ADMIN — HYDROMORPHONE HYDROCHLORIDE PRN MG: 1 INJECTION, SOLUTION INTRAMUSCULAR; INTRAVENOUS; SUBCUTANEOUS at 03:28

## 2018-12-08 RX ADMIN — HYDROMORPHONE HYDROCHLORIDE PRN MG: 1 INJECTION, SOLUTION INTRAMUSCULAR; INTRAVENOUS; SUBCUTANEOUS at 22:09

## 2018-12-08 RX ADMIN — MORPHINE TINCTURE SCH MG: 1 SOLUTION ORAL at 12:34

## 2018-12-08 RX ADMIN — MORPHINE SULFATE PRN MG: 100 SOLUTION ORAL at 19:59

## 2018-12-08 RX ADMIN — HYDROMORPHONE HYDROCHLORIDE PRN MG: 1 INJECTION, SOLUTION INTRAMUSCULAR; INTRAVENOUS; SUBCUTANEOUS at 09:57

## 2018-12-08 RX ADMIN — MORPHINE SULFATE PRN MG: 100 SOLUTION ORAL at 13:40

## 2018-12-08 RX ADMIN — LORAZEPAM PRN MG: 2 INJECTION INTRAMUSCULAR; INTRAVENOUS at 16:59

## 2018-12-08 RX ADMIN — LORAZEPAM SCH: 0.5 TABLET ORAL at 09:57

## 2018-12-08 RX ADMIN — METHYLPREDNISOLONE SODIUM SUCCINATE SCH MG: 40 INJECTION, POWDER, FOR SOLUTION INTRAMUSCULAR; INTRAVENOUS at 08:58

## 2018-12-08 RX ADMIN — LORAZEPAM SCH: 0.5 TABLET ORAL at 05:21

## 2018-12-08 RX ADMIN — MORPHINE SULFATE PRN MG: 100 SOLUTION ORAL at 10:56

## 2018-12-08 RX ADMIN — MORPHINE SULFATE PRN MG: 100 SOLUTION ORAL at 12:09

## 2018-12-08 RX ADMIN — MORPHINE SULFATE PRN MG: 100 SOLUTION ORAL at 15:50

## 2018-12-08 RX ADMIN — LORAZEPAM PRN MG: 2 INJECTION INTRAMUSCULAR; INTRAVENOUS at 09:58

## 2018-12-08 RX ADMIN — LORAZEPAM PRN MG: 2 INJECTION INTRAMUSCULAR; INTRAVENOUS at 21:07

## 2018-12-08 RX ADMIN — LORAZEPAM SCH: 0.5 TABLET ORAL at 17:03

## 2018-12-08 RX ADMIN — TIOTROPIUM BROMIDE SCH CAP: 18 CAPSULE ORAL; RESPIRATORY (INHALATION) at 07:39

## 2018-12-08 RX ADMIN — PAROXETINE SCH: 10 TABLET, FILM COATED ORAL at 17:05

## 2018-12-08 RX ADMIN — HYDROMORPHONE HYDROCHLORIDE PRN MG: 1 INJECTION, SOLUTION INTRAMUSCULAR; INTRAVENOUS; SUBCUTANEOUS at 14:25

## 2018-12-08 RX ADMIN — LORAZEPAM SCH: 0.5 TABLET ORAL at 22:30

## 2018-12-08 RX ADMIN — HYDROMORPHONE HYDROCHLORIDE PRN MG: 1 INJECTION, SOLUTION INTRAMUSCULAR; INTRAVENOUS; SUBCUTANEOUS at 20:04

## 2018-12-08 RX ADMIN — MORPHINE TINCTURE SCH: 1 SOLUTION ORAL at 17:03

## 2018-12-08 RX ADMIN — MORPHINE SULFATE PRN MG: 100 SOLUTION ORAL at 17:45

## 2018-12-08 RX ADMIN — MORPHINE TINCTURE SCH: 1 SOLUTION ORAL at 21:05

## 2018-12-08 RX ADMIN — IPRATROPIUM BROMIDE PRN MG: 0.5 SOLUTION RESPIRATORY (INHALATION) at 11:35

## 2018-12-08 NOTE — PN
Subjective


Date of Service: 12/08/18


Interval History: 








Snoring loudly today on exam, Not responding to moderate volume voice. Friend 

Domenica at bedside. Sometimes raising hands above head, perhaps involuntarily 

per Denies. 


Per RN no signs of acute discomfort. 


Did say "thank you" earlier today


not eating or taking other pills. taking oral concentrate morphine and IV prn 

ativan frequently. 


decreasing uop. 











Objective


Active Medications: 








Acetaminophen (Tylenol Tab*)  650 mg PO Q4H PRN


   PRN Reason: FEVER/PAIN


   Last Admin: 12/04/18 22:39 Dose:  650 mg


Al Hydrox/Mg Hydrox/Simethicone (Maalox Plus*)  30 ml PO Q6H PRN


   PRN Reason: INDIGESTION


Albuterol/Ipratropium (Duoneb (Albuterol 2.5 Mg/Ipratropium 0.5 Mg))  1 neb INH 

Q2H PRN


   PRN Reason: SOB/WHEEZING


   Last Admin: 12/06/18 07:56 Dose:  1 neb


Hydromorphone HCl (Dilaudid Inj1s*)  1 mg IV SLOW PU Q1H PRN


   PRN Reason: PAIN


   Last Admin: 12/08/18 14:25 Dose:  1 mg


Ipratropium Bromide (Atrovent 0.5 Mg Neb.Sol*)  0.5 mg INH Q6HR PRN


   PRN Reason: WHEEZING


   Last Admin: 12/08/18 11:35 Dose:  0.5 mg


Lorazepam (Ativan Tab(*))  0.5 mg PO Q6H [facility]


   Last Admin: 12/08/18 17:03 Dose:  Not Given


Lorazepam (Ativan Inj*)  0.5 mg IV PUSH Q2H PRN


   PRN Reason: ANXIETY


   Last Admin: 12/08/18 16:59 Dose:  0.5 mg


Methylprednisolone Sodium Succinate (Solu-Medrol 40 Mg)  10 mg IV DAILY [facility]


   Last Admin: 12/08/18 08:58 Dose:  10 mg


Morphine Sulfate (Morphine Oral Concentrate*)  5 mg PO Q1H PRN


   PRN Reason: Pain/RR>24


   Last Admin: 12/08/18 17:45 Dose:  5 mg


Opium Tincture (Opium Tincture*)  6 mg PO QID [facility]


   Last Admin: 12/08/18 17:03 Dose:  Not Given


Paroxetine HCl (Paxil Tab*)  30 mg PO QPM [facility]


   Last Admin: 12/08/18 17:05 Dose:  Not Given


Tiotropium Bromide (Spiriva Cap.Inh*)  1 cap INH DAILY [facility]


   Last Admin: 12/08/18 07:39 Dose:  1 cap








 Vital Signs - 8 hr











  12/08/18 12/08/18 12/08/18





  10:19 10:20 10:56


 


Pulse Rate   


 


Respiratory 18 18 24





Rate   


 


O2 Sat by Pulse   





Oximetry   














  12/08/18 12/08/18 12/08/18





  11:36 11:39 12:09


 


Pulse Rate  105 


 


Respiratory 18 20 22





Rate   


 


O2 Sat by Pulse  80 





Oximetry   














  12/08/18 12/08/18 12/08/18





  12:34 13:05 13:40


 


Pulse Rate   


 


Respiratory 18 18 22





Rate   


 


O2 Sat by Pulse   





Oximetry   














  12/08/18 12/08/18 12/08/18





  14:25 15:44 15:50


 


Pulse Rate   


 


Respiratory 22 20 22





Rate   


 


O2 Sat by Pulse   





Oximetry   














  12/08/18 12/08/18 12/08/18





  16:59 17:16 17:17


 


Pulse Rate   


 


Respiratory 20 20 20





Rate   


 


O2 Sat by Pulse   





Oximetry   














  12/08/18





  17:45


 


Pulse Rate 


 


Respiratory 22





Rate 


 


O2 Sat by Pulse 





Oximetry 











Oxygen Devices in Use Now: Nasal Cannula


Appearance: Chronically ill appearing. Somnolent.


Eyes: No Scleral Icterus


Respiratory: - - anteriorly clear to ausculatation w/o wheezing, rales or 

rhonchi. Snoring loudly. 


Cardiovascular: NL Sounds; No Murmurs; No JVD


Abdominal: NL Sounds; No Tenderness; No Distention


Extremities: - - right arm slightly edematous. trace edema LE. 


Skin: - - bruise on chin. 


Neurological: - - snoring. not responsive to voice. 


Result Diagrams: 


 12/05/18 07:28





 12/05/18 07:28





Assess/Plan/Problems-Billing


Assessment: 





73 year old PMH COPD, chronic hypoxic respiratory failure, steroid dependant , 

Lung Cancer, Crohn's s/p ileostomy, FTT for several months, immobile woman who 

presented to the ED after a fall and was found to have a right femoral neck 

fracture. Now on (mostly) comfort measures with addtional therapies being 

supplemental oxygen, IV steroids.  





- Patient Problems


(1) Fracture of femoral neck, right


Current Visit: Yes   Status: Acute   Code(s): S72.001A - FRACTURE OF UNSP PART 

OF NECK OF RIGHT FEMUR, INIT   SNOMED Code(s): 3229363


   Comment: She had very elevated risk for perioperative complications. 


Based on these findings,it was recommended against surgery to [facility]. 

After d/w DR. Francis  on 12/5/18 they agreed to palliative approach.


Appreciate Palliative recs. 


Family was offered hospice residence but would prefer to "see how she does" 

over the weekend.


getting scheduled ativan 0.5mg IV q2h prn and 0.5 mg po q6h standing, morphine 

oral concentrate 5mg q1h prn   





(2) Chronic respiratory failure with hypoxia


Current Visit: No   Status: Acute   Comment: 


- Currently 4L NC. 


- Continue Oral morphine and atvian prn   





(3) Chronic steroid use


Current Visit: Yes   Status: Acute   Code(s): SJZ1925 -    SNOMED Code(s): 

310078924


   Comment: patient is getting Solu Medrol IV, consider stopping depending on 

goals of care.    





(4) DNR (do not resuscitate)


Current Visit: No   Status: Acute   Priority: Medium   





(5) Lung cancer


Current Visit: No   Status: Acute   Code(s): C34.90 - MALIGNANT NEOPLASM OF 

UNSP PART OF UNSP BRONCHUS OR LUNG   SNOMED Code(s): 117464770


   Comment: now on comfort measures.    


Status and Disposition: 








medicine inpatient,

## 2018-12-09 RX ADMIN — LORAZEPAM PRN MG: 2 INJECTION INTRAMUSCULAR; INTRAVENOUS at 10:04

## 2018-12-09 RX ADMIN — HYDROMORPHONE HYDROCHLORIDE PRN MG: 1 INJECTION, SOLUTION INTRAMUSCULAR; INTRAVENOUS; SUBCUTANEOUS at 16:48

## 2018-12-09 RX ADMIN — LORAZEPAM SCH: 0.5 TABLET ORAL at 10:09

## 2018-12-09 RX ADMIN — HYDROMORPHONE HYDROCHLORIDE PRN MG: 1 INJECTION, SOLUTION INTRAMUSCULAR; INTRAVENOUS; SUBCUTANEOUS at 05:27

## 2018-12-09 RX ADMIN — MORPHINE TINCTURE SCH: 1 SOLUTION ORAL at 15:02

## 2018-12-09 RX ADMIN — METHYLPREDNISOLONE SODIUM SUCCINATE SCH MG: 40 INJECTION, POWDER, FOR SOLUTION INTRAMUSCULAR; INTRAVENOUS at 07:40

## 2018-12-09 RX ADMIN — LORAZEPAM SCH: 0.5 TABLET ORAL at 21:02

## 2018-12-09 RX ADMIN — HYDROMORPHONE HYDROCHLORIDE PRN MG: 1 INJECTION, SOLUTION INTRAMUSCULAR; INTRAVENOUS; SUBCUTANEOUS at 00:54

## 2018-12-09 RX ADMIN — TIOTROPIUM BROMIDE SCH: 18 CAPSULE ORAL; RESPIRATORY (INHALATION) at 07:40

## 2018-12-09 RX ADMIN — MORPHINE SULFATE PRN MG: 100 SOLUTION ORAL at 04:24

## 2018-12-09 RX ADMIN — LORAZEPAM SCH: 0.5 TABLET ORAL at 15:02

## 2018-12-09 RX ADMIN — LORAZEPAM PRN MG: 2 INJECTION INTRAMUSCULAR; INTRAVENOUS at 21:19

## 2018-12-09 RX ADMIN — LORAZEPAM PRN MG: 2 INJECTION INTRAMUSCULAR; INTRAVENOUS at 00:54

## 2018-12-09 RX ADMIN — LORAZEPAM PRN MG: 2 INJECTION INTRAMUSCULAR; INTRAVENOUS at 19:17

## 2018-12-09 RX ADMIN — HYDROMORPHONE HYDROCHLORIDE PRN MG: 1 INJECTION, SOLUTION INTRAMUSCULAR; INTRAVENOUS; SUBCUTANEOUS at 18:20

## 2018-12-09 RX ADMIN — MORPHINE SULFATE PRN MG: 100 SOLUTION ORAL at 19:43

## 2018-12-09 RX ADMIN — HYDROMORPHONE HYDROCHLORIDE PRN MG: 1 INJECTION, SOLUTION INTRAMUSCULAR; INTRAVENOUS; SUBCUTANEOUS at 19:42

## 2018-12-09 RX ADMIN — MORPHINE SULFATE PRN MG: 100 SOLUTION ORAL at 21:18

## 2018-12-09 RX ADMIN — LORAZEPAM PRN MG: 2 INJECTION INTRAMUSCULAR; INTRAVENOUS at 13:08

## 2018-12-09 RX ADMIN — MORPHINE TINCTURE SCH: 1 SOLUTION ORAL at 21:02

## 2018-12-09 RX ADMIN — HYDROMORPHONE HYDROCHLORIDE PRN MG: 1 INJECTION, SOLUTION INTRAMUSCULAR; INTRAVENOUS; SUBCUTANEOUS at 07:40

## 2018-12-09 RX ADMIN — HYDROMORPHONE HYDROCHLORIDE PRN MG: 1 INJECTION, SOLUTION INTRAMUSCULAR; INTRAVENOUS; SUBCUTANEOUS at 21:17

## 2018-12-09 RX ADMIN — HYDROMORPHONE HYDROCHLORIDE PRN MG: 1 INJECTION, SOLUTION INTRAMUSCULAR; INTRAVENOUS; SUBCUTANEOUS at 13:08

## 2018-12-09 RX ADMIN — MORPHINE SULFATE PRN MG: 100 SOLUTION ORAL at 10:03

## 2018-12-09 RX ADMIN — MORPHINE SULFATE PRN MG: 100 SOLUTION ORAL at 18:23

## 2018-12-09 RX ADMIN — LORAZEPAM PRN MG: 2 INJECTION INTRAMUSCULAR; INTRAVENOUS at 04:23

## 2018-12-09 RX ADMIN — PAROXETINE SCH: 10 TABLET, FILM COATED ORAL at 17:03

## 2018-12-09 RX ADMIN — HYDROMORPHONE HYDROCHLORIDE PRN MG: 1 INJECTION, SOLUTION INTRAMUSCULAR; INTRAVENOUS; SUBCUTANEOUS at 11:00

## 2018-12-09 RX ADMIN — LORAZEPAM PRN MG: 2 INJECTION INTRAMUSCULAR; INTRAVENOUS at 16:47

## 2018-12-09 RX ADMIN — HYDROMORPHONE HYDROCHLORIDE PRN MG: 1 INJECTION, SOLUTION INTRAMUSCULAR; INTRAVENOUS; SUBCUTANEOUS at 15:06

## 2018-12-09 RX ADMIN — MORPHINE TINCTURE SCH MG: 1 SOLUTION ORAL at 07:41

## 2018-12-09 RX ADMIN — MORPHINE SULFATE PRN MG: 100 SOLUTION ORAL at 01:01

## 2018-12-09 RX ADMIN — LORAZEPAM SCH: 0.5 TABLET ORAL at 04:31

## 2018-12-09 RX ADMIN — HYDROMORPHONE HYDROCHLORIDE PRN MG: 1 INJECTION, SOLUTION INTRAMUSCULAR; INTRAVENOUS; SUBCUTANEOUS at 03:16

## 2018-12-09 RX ADMIN — MORPHINE TINCTURE SCH: 1 SOLUTION ORAL at 13:18

## 2018-12-09 NOTE — PN
Subjective


Date of Service: 12/09/18


Interval History: 








medicated frequently with IV ativan and po concentrated morphine with 

intermittent IV dialudid. 


Occasionally reaching above head. 


Wife Sarah at bedside, she explains that there had been a hospicare bed 

available friday but they appealed discharge.


I later Talked to JAY Welch and that was still pending resolution. 





not responding to voice or squeeze of hands. 


not taking anything po other than morphine. 











Objective


Active Medications: 








Acetaminophen (Tylenol Tab*)  650 mg PO Q4H PRN


   PRN Reason: FEVER/PAIN


   Last Admin: 12/04/18 22:39 Dose:  650 mg


Al Hydrox/Mg Hydrox/Simethicone (Maalox Plus*)  30 ml PO Q6H PRN


   PRN Reason: INDIGESTION


Albuterol/Ipratropium (Duoneb (Albuterol 2.5 Mg/Ipratropium 0.5 Mg))  1 neb INH 

Q2H PRN


   PRN Reason: SOB/WHEEZING


   Last Admin: 12/06/18 07:56 Dose:  1 neb


Hydromorphone HCl (Dilaudid Inj1s*)  1 mg IV SLOW PU Q1H PRN


   PRN Reason: PAIN


   Last Admin: 12/09/18 18:20 Dose:  1 mg


Ipratropium Bromide (Atrovent 0.5 Mg Neb.Sol*)  0.5 mg INH Q6HR PRN


   PRN Reason: WHEEZING


   Last Admin: 12/08/18 11:35 Dose:  0.5 mg


Lorazepam (Ativan Tab(*))  0.5 mg PO Q6H [facility]


   Last Admin: 12/09/18 15:02 Dose:  Not Given


Lorazepam (Ativan Inj*)  0.5 mg IV PUSH Q2H PRN


   PRN Reason: ANXIETY


   Last Admin: 12/09/18 16:47 Dose:  0.5 mg


Methylprednisolone Sodium Succinate (Solu-Medrol 40 Mg)  10 mg IV DAILY [facility]


   Last Admin: 12/09/18 07:40 Dose:  10 mg


Morphine Sulfate (Morphine Oral Concentrate*)  5 mg PO Q1H PRN


   PRN Reason: Pain/RR>24


   Last Admin: 12/09/18 18:23 Dose:  5 mg


Opium Tincture (Opium Tincture*)  6 mg PO QID [facility]


   Last Admin: 12/09/18 15:02 Dose:  Not Given


Paroxetine HCl (Paxil Tab*)  30 mg PO QPM [facility]


   Last Admin: 12/09/18 17:03 Dose:  Not Given


Tiotropium Bromide (Spiriva Cap.Inh*)  1 cap INH DAILY [facility]


   Last Admin: 12/09/18 07:40 Dose:  Not Given








 Vital Signs - 8 hr











  12/09/18 12/09/18 12/09/18





  11:00 11:26 12:16


 


Respiratory 26 22 20





Rate   














  12/09/18 12/09/18 12/09/18





  12:17 13:08 14:59


 


Respiratory 14 26 20





Rate   














  12/09/18 12/09/18 12/09/18





  15:02 15:06 16:31


 


Respiratory 22 22 22





Rate   














  12/09/18 12/09/18 12/09/18





  16:47 16:48 18:20


 


Respiratory 26 26 16





Rate   














  12/09/18 12/09/18





  18:23 18:30


 


Respiratory 16 22





Rate  











Oxygen Devices in Use Now: Nasal Cannula


Appearance: NAD, snoring loudly, some belly breathing.


Eyes: No Scleral Icterus, - - PERRL, does not track when eyes opened. 


Ears/Nose/Mouth/Throat: NL Teeth, Lips, Gums


Neck: NL Appearance and Movements; NL JVP


Respiratory: - - rhonchrous snoring. no wheezing or rales anteriorly 


Cardiovascular: NL Sounds; No Murmurs; No JVD


Abdominal: NL Sounds; No Tenderness; No Distention


Extremities: - - 1+ swelling right arm. no LE edema. 


Neurological: - - not responsive to moderate voice, squeezing of hands. Per 

report TRAN


Result Diagrams: 


 12/05/18 07:28





 12/05/18 07:28


Additional Lab and Data: 


 Lab Results











  12/01/18 12/01/18 12/01/18 Range/Units





  03:44 03:44 03:44 


 


WBC  17.5 H    (3.5-10.8)  10^3/ul


 


RBC  4.32    (4.00-5.40)  10^6/ul


 


Hgb  13.5    (12.0-16.0)  g/dl


 


Hct  41    (35-47)  %


 


MCV  94    (80-97)  fL


 


MCH  31    (27-31)  pg


 


MCHC  33    (31-36)  g/dl


 


RDW  15    (10.5-15)  %


 


Plt Count  341    (150-450)  10^3/ul


 


MPV  7.7    (7.4-10.4)  fL


 


Neut % (Auto)  71.0    %


 


Lymph % (Auto)  18.0    %


 


Mono % (Auto)  9.9    %


 


Eos % (Auto)  0.8    %


 


Baso % (Auto)  0.3    %


 


Absolute Neuts (auto)  12.5 H    (1.5-7.7)  10^3/ul


 


Absolute Lymphs (auto)  3.2    (1.0-4.8)  10^3/ul


 


Absolute Monos (auto)  1.7 H    (0-0.8)  10^3/ul


 


Absolute Eos (auto)  0.1    (0-0.6)  10^3/ul


 


Absolute Basos (auto)  0    (0-0.2)  10^3/ul


 


Absolute Nucleated RBC  0    10^3/ul


 


Nucleated RBC %  0    


 


INR (Anticoag Therapy)   1.81 H   (0.77-1.02)  


 


APTT   38.9 H   (26.0-36.3)  seconds


 


Sodium    137  (135-145)  mmol/L


 


Potassium    3.1 L  (3.5-5.0)  mmol/L


 


Chloride    82 L  (101-111)  mmol/L


 


Carbon Dioxide    41 H*  (22-32)  mmol/L


 


Anion Gap    14 H  (2-11)  mmol/L


 


BUN    43 H  (6-24)  mg/dL


 


Creatinine    2.36 H  (0.51-0.95)  mg/dL


 


Est GFR ( Amer)    24.3  (>60)  


 


Est GFR (Non-Af Amer)    20.1  (>60)  


 


BUN/Creatinine Ratio    18.2  (8-20)  


 


Glucose    138 H  ()  mg/dL


 


Lactic Acid     (0.5-2.0)  mmol/L


 


Calcium    9.1  (8.6-10.3)  mg/dL


 


Total Bilirubin    0.40  (0.2-1.0)  mg/dL


 


AST    26  (13-39)  U/L


 


ALT    30  (7-52)  U/L


 


Alkaline Phosphatase    173 H  ()  U/L


 


Troponin I    0.04 H*  (<0.04)  ng/mL


 


Total Protein    6.9  (6.4-8.9)  g/dL


 


Albumin    3.7  (3.2-5.2)  g/dL


 


Globulin    3.2  (2-4)  g/dL


 


Albumin/Globulin Ratio    1.2  (1-3)  


 


Blood Type     


 


Antibody Screen     














  12/01/18 12/01/18 Range/Units





  03:44 03:44 


 


WBC    (3.5-10.8)  10^3/ul


 


RBC    (4.00-5.40)  10^6/ul


 


Hgb    (12.0-16.0)  g/dl


 


Hct    (35-47)  %


 


MCV    (80-97)  fL


 


MCH    (27-31)  pg


 


MCHC    (31-36)  g/dl


 


RDW    (10.5-15)  %


 


Plt Count    (150-450)  10^3/ul


 


MPV    (7.4-10.4)  fL


 


Neut % (Auto)    %


 


Lymph % (Auto)    %


 


Mono % (Auto)    %


 


Eos % (Auto)    %


 


Baso % (Auto)    %


 


Absolute Neuts (auto)    (1.5-7.7)  10^3/ul


 


Absolute Lymphs (auto)    (1.0-4.8)  10^3/ul


 


Absolute Monos (auto)    (0-0.8)  10^3/ul


 


Absolute Eos (auto)    (0-0.6)  10^3/ul


 


Absolute Basos (auto)    (0-0.2)  10^3/ul


 


Absolute Nucleated RBC    10^3/ul


 


Nucleated RBC %    


 


INR (Anticoag Therapy)    (0.77-1.02)  


 


APTT    (26.0-36.3)  seconds


 


Sodium    (135-145)  mmol/L


 


Potassium    (3.5-5.0)  mmol/L


 


Chloride    (101-111)  mmol/L


 


Carbon Dioxide    (22-32)  mmol/L


 


Anion Gap    (2-11)  mmol/L


 


BUN    (6-24)  mg/dL


 


Creatinine    (0.51-0.95)  mg/dL


 


Est GFR ( Amer)    (>60)  


 


Est GFR (Non-Af Amer)    (>60)  


 


BUN/Creatinine Ratio    (8-20)  


 


Glucose    ()  mg/dL


 


Lactic Acid  3.7 H*   (0.5-2.0)  mmol/L


 


Calcium    (8.6-10.3)  mg/dL


 


Total Bilirubin    (0.2-1.0)  mg/dL


 


AST    (13-39)  U/L


 


ALT    (7-52)  U/L


 


Alkaline Phosphatase    ()  U/L


 


Troponin I    (<0.04)  ng/mL


 


Total Protein    (6.4-8.9)  g/dL


 


Albumin    (3.2-5.2)  g/dL


 


Globulin    (2-4)  g/dL


 


Albumin/Globulin Ratio    (1-3)  


 


Blood Type   O Negative  


 


Antibody Screen   Pending  














Assess/Plan/Problems-Billing


Assessment: 





73 year old PMH COPD, chronic hypoxic respiratory failure, steroid dependant , 

Lung Cancer, Crohn's s/p ileostomy, FTT for several months, immobile woman who 

presented to the ED after a fall and was found to have a right femoral neck 

fracture. Now on (mostly) comfort measures with additional therapies being 

supplemental oxygen, IV steroids.  





- Patient Problems


(1) Fracture of femoral neck, right


Current Visit: Yes   Status: Acute   Code(s): S72.001A - FRACTURE OF UNSP PART 

OF NECK OF RIGHT FEMUR, INIT   SNOMED Code(s): 5208679


   Comment: She had very elevated risk for perioperative complications. 


Based on these findings,it was recommended against surgery to [facility]. 

After d/w DR. Francis  on 12/5/18 they agreed to palliative approach.


Appreciate Palliative recs. 


Family was offered hospice residence but would prefer to "see how she does" 

over the weekend. [facility] now clar[facility]. 


getting scheduled ativan 0.5mg IV q2h prn and 0.5 mg po q6h standing, morphine 

oral concentrate 5mg q1h prn   





(2) Chronic respiratory failure with hypoxia


Current Visit: No   Status: Acute   Comment: 


- Currently 4L NC. 


- Continue Oral morphine and atvian prn   





(3) Chronic steroid use


Current Visit: Yes   Status: Acute   Code(s): ZLK8415 -    SNOMED Code(s): 

304500925


   Comment: patient is getting Solu Medrol IV, consider stopping depending on 

goals of care.    





(4) DNR (do not resuscitate)


Current Visit: No   Status: Acute   Priority: Medium   





(5) Lung cancer


Current Visit: No   Status: Acute   Code(s): C34.90 - MALIGNANT NEOPLASM OF 

UNSP PART OF UNSP BRONCHUS OR LUNG   SNOMED Code(s): 635327255


   Comment: now on comfort measures.    


Status and Disposition: 








medicine inpatient, [facility] stay hosp[facility] as an 

inpatient.

## 2018-12-10 RX ADMIN — MORPHINE SULFATE PRN MG: 100 SOLUTION ORAL at 08:38

## 2018-12-10 RX ADMIN — HYDROMORPHONE HYDROCHLORIDE PRN MG: 1 INJECTION, SOLUTION INTRAMUSCULAR; INTRAVENOUS; SUBCUTANEOUS at 17:39

## 2018-12-10 RX ADMIN — LORAZEPAM SCH: 0.5 TABLET ORAL at 05:51

## 2018-12-10 RX ADMIN — HYDROMORPHONE HYDROCHLORIDE PRN MG: 1 INJECTION, SOLUTION INTRAMUSCULAR; INTRAVENOUS; SUBCUTANEOUS at 20:24

## 2018-12-10 RX ADMIN — MORPHINE SULFATE PRN MG: 100 SOLUTION ORAL at 18:42

## 2018-12-10 RX ADMIN — MORPHINE TINCTURE SCH: 1 SOLUTION ORAL at 21:47

## 2018-12-10 RX ADMIN — HYDROMORPHONE HYDROCHLORIDE PRN MG: 1 INJECTION, SOLUTION INTRAMUSCULAR; INTRAVENOUS; SUBCUTANEOUS at 10:34

## 2018-12-10 RX ADMIN — MORPHINE TINCTURE SCH: 1 SOLUTION ORAL at 15:45

## 2018-12-10 RX ADMIN — LORAZEPAM PRN MG: 2 INJECTION INTRAMUSCULAR; INTRAVENOUS at 10:32

## 2018-12-10 RX ADMIN — MORPHINE SULFATE PRN MG: 100 SOLUTION ORAL at 02:40

## 2018-12-10 RX ADMIN — HYDROMORPHONE HYDROCHLORIDE PRN MG: 1 INJECTION, SOLUTION INTRAMUSCULAR; INTRAVENOUS; SUBCUTANEOUS at 04:28

## 2018-12-10 RX ADMIN — LORAZEPAM PRN MG: 2 INJECTION INTRAMUSCULAR; INTRAVENOUS at 04:29

## 2018-12-10 RX ADMIN — LORAZEPAM SCH: 0.5 TABLET ORAL at 23:10

## 2018-12-10 RX ADMIN — MORPHINE TINCTURE SCH MG: 1 SOLUTION ORAL at 07:33

## 2018-12-10 RX ADMIN — LORAZEPAM PRN MG: 2 INJECTION INTRAMUSCULAR; INTRAVENOUS at 23:18

## 2018-12-10 RX ADMIN — MORPHINE TINCTURE SCH: 1 SOLUTION ORAL at 12:52

## 2018-12-10 RX ADMIN — LORAZEPAM PRN MG: 2 INJECTION INTRAMUSCULAR; INTRAVENOUS at 13:27

## 2018-12-10 RX ADMIN — LORAZEPAM PRN MG: 2 INJECTION INTRAMUSCULAR; INTRAVENOUS at 07:30

## 2018-12-10 RX ADMIN — PAROXETINE SCH: 10 TABLET, FILM COATED ORAL at 16:36

## 2018-12-10 RX ADMIN — HYDROMORPHONE HYDROCHLORIDE PRN MG: 1 INJECTION, SOLUTION INTRAMUSCULAR; INTRAVENOUS; SUBCUTANEOUS at 07:31

## 2018-12-10 RX ADMIN — LORAZEPAM PRN MG: 2 INJECTION INTRAMUSCULAR; INTRAVENOUS at 18:42

## 2018-12-10 RX ADMIN — HYDROMORPHONE HYDROCHLORIDE PRN MG: 1 INJECTION, SOLUTION INTRAMUSCULAR; INTRAVENOUS; SUBCUTANEOUS at 23:18

## 2018-12-10 RX ADMIN — MORPHINE SULFATE PRN MG: 100 SOLUTION ORAL at 07:36

## 2018-12-10 RX ADMIN — MORPHINE SULFATE PRN MG: 100 SOLUTION ORAL at 21:48

## 2018-12-10 RX ADMIN — LORAZEPAM PRN MG: 2 INJECTION INTRAMUSCULAR; INTRAVENOUS at 02:41

## 2018-12-10 RX ADMIN — METHYLPREDNISOLONE SODIUM SUCCINATE SCH MG: 40 INJECTION, POWDER, FOR SOLUTION INTRAMUSCULAR; INTRAVENOUS at 07:30

## 2018-12-10 RX ADMIN — MORPHINE SULFATE PRN MG: 100 SOLUTION ORAL at 17:40

## 2018-12-10 RX ADMIN — TIOTROPIUM BROMIDE SCH: 18 CAPSULE ORAL; RESPIRATORY (INHALATION) at 07:20

## 2018-12-10 RX ADMIN — LORAZEPAM SCH: 0.5 TABLET ORAL at 10:42

## 2018-12-10 RX ADMIN — HYDROMORPHONE HYDROCHLORIDE PRN MG: 1 INJECTION, SOLUTION INTRAMUSCULAR; INTRAVENOUS; SUBCUTANEOUS at 15:02

## 2018-12-10 RX ADMIN — MORPHINE SULFATE PRN MG: 100 SOLUTION ORAL at 04:30

## 2018-12-10 RX ADMIN — LORAZEPAM SCH: 0.5 TABLET ORAL at 15:44

## 2018-12-10 RX ADMIN — LORAZEPAM PRN MG: 2 INJECTION INTRAMUSCULAR; INTRAVENOUS at 15:52

## 2018-12-10 RX ADMIN — LORAZEPAM PRN MG: 2 INJECTION INTRAMUSCULAR; INTRAVENOUS at 20:23

## 2018-12-10 NOTE — PN
Subjective


Date of Service: 12/10/18


Interval History: 


Pt seen and examined.  Meds and labs reviewed.  





CC: N/A





ROS:  Could not be reliably assesed





PHYSICAL EXAM:


GEN APPEARANCE: Asleep, nonresponsive, slightly tachypnic


HEENT: NC/AT, PERRLA, moist oral mucosa, (-) throat erythema


NECK: Soft, supple, (-) cervical LAD, (-)JVD


HEART: S1S2 WNL, RRR, No MRG


CHEST: CTA, BL, GAE, No W/R/R


ABD: Soft, ND/NT, NABS 4x Q


EXT: No C/C/E


SKIN: Warm to touch


PSYCH: Could not be assessed





Objective


Active Medications: 








Acetaminophen (Tylenol Tab*)  650 mg PO Q4H PRN


   PRN Reason: FEVER/PAIN


   Last Admin: 12/04/18 22:39 Dose:  650 mg


Al Hydrox/Mg Hydrox/Simethicone (Maalox Plus*)  30 ml PO Q6H PRN


   PRN Reason: INDIGESTION


Albuterol/Ipratropium (Duoneb (Albuterol 2.5 Mg/Ipratropium 0.5 Mg))  1 neb INH 

Q2H PRN


   PRN Reason: SOB/WHEEZING


   Last Admin: 12/06/18 07:56 Dose:  1 neb


Hydromorphone HCl (Dilaudid Inj1s*)  1 mg IV SLOW PU Q2H PRN


   PRN Reason: PAIN


Ipratropium Bromide (Atrovent 0.5 Mg Neb.Sol*)  0.5 mg INH Q6HR PRN


   PRN Reason: WHEEZING


   Last Admin: 12/08/18 11:35 Dose:  0.5 mg


Lorazepam (Ativan Tab(*))  0.5 mg PO Q6H [facility]


   Last Admin: 12/10/18 10:42 Dose:  Not Given


Lorazepam (Ativan Inj*)  0.5 mg IV PUSH Q2H PRN


   PRN Reason: ANXIETY


   Last Admin: 12/10/18 13:27 Dose:  0.5 mg


Morphine Sulfate (Morphine Oral Concentrate*)  20 mg PO Q2H PRN


   PRN Reason: Pain/RR>24


   Last Admin: 12/10/18 13:29 Dose:  20 mg


Morphine Sulfate (Morphine Oral Concentrate*)  10 mg SL Q1H PRN


   PRN Reason: Pain/SOB/Anxiety/Resp dist


Opium Tincture (Opium Tincture*)  6 mg PO QID [facility]


   Last Admin: 12/10/18 12:52 Dose:  Not Given


Paroxetine HCl (Paxil Tab*)  30 mg PO QPM [facility]


   Last Admin: 12/09/18 17:03 Dose:  Not Given


Pharmacy Profile Note (Scopolamine Patch Remove*)  1 note PATCH OFF 0530 [facility]


Scopolamine (Transderm-Scop 1.5 Mg Patch*)  1 patch TRANSDERM Q72H [facility]


   Last Admin: 12/10/18 07:01 Dose:  1 patch


Tiotropium Bromide (Spiriva Cap.Inh*)  1 cap INH DAILY [facility]


   Last Admin: 12/10/18 07:20 Dose:  Not Given








 Vital Signs - 8 hr











  12/10/18 12/10/18 12/10/18





  07:30 07:31 07:33


 


Respiratory 30 30 30





Rate   














  12/10/18 12/10/18 12/10/18





  07:36 07:38 08:38


 


Respiratory 30 30 16





Rate   














  12/10/18 12/10/18 12/10/18





  10:32 10:34 10:41


 


Respiratory 24 22 22





Rate   














  12/10/18 12/10/18 12/10/18





  11:42 11:43 13:27


 


Respiratory 20 20 34





Rate   














  12/10/18 12/10/18 12/10/18





  13:29 15:02 15:06


 


Respiratory 38 22 22





Rate   











Oxygen Devices in Use Now: Nasal Cannula


Result Diagrams: 


 12/05/18 07:28





 12/05/18 07:28


Additional Lab and Data: 


 Lab Results











  12/01/18 12/01/18 12/01/18 Range/Units





  03:44 03:44 03:44 


 


WBC  17.5 H    (3.5-10.8)  10^3/ul


 


RBC  4.32    (4.00-5.40)  10^6/ul


 


Hgb  13.5    (12.0-16.0)  g/dl


 


Hct  41    (35-47)  %


 


MCV  94    (80-97)  fL


 


MCH  31    (27-31)  pg


 


MCHC  33    (31-36)  g/dl


 


RDW  15    (10.5-15)  %


 


Plt Count  341    (150-450)  10^3/ul


 


MPV  7.7    (7.4-10.4)  fL


 


Neut % (Auto)  71.0    %


 


Lymph % (Auto)  18.0    %


 


Mono % (Auto)  9.9    %


 


Eos % (Auto)  0.8    %


 


Baso % (Auto)  0.3    %


 


Absolute Neuts (auto)  12.5 H    (1.5-7.7)  10^3/ul


 


Absolute Lymphs (auto)  3.2    (1.0-4.8)  10^3/ul


 


Absolute Monos (auto)  1.7 H    (0-0.8)  10^3/ul


 


Absolute Eos (auto)  0.1    (0-0.6)  10^3/ul


 


Absolute Basos (auto)  0    (0-0.2)  10^3/ul


 


Absolute Nucleated RBC  0    10^3/ul


 


Nucleated RBC %  0    


 


INR (Anticoag Therapy)   1.81 H   (0.77-1.02)  


 


APTT   38.9 H   (26.0-36.3)  seconds


 


Sodium    137  (135-145)  mmol/L


 


Potassium    3.1 L  (3.5-5.0)  mmol/L


 


Chloride    82 L  (101-111)  mmol/L


 


Carbon Dioxide    41 H*  (22-32)  mmol/L


 


Anion Gap    14 H  (2-11)  mmol/L


 


BUN    43 H  (6-24)  mg/dL


 


Creatinine    2.36 H  (0.51-0.95)  mg/dL


 


Est GFR ( Amer)    24.3  (>60)  


 


Est GFR (Non-Af Amer)    20.1  (>60)  


 


BUN/Creatinine Ratio    18.2  (8-20)  


 


Glucose    138 H  ()  mg/dL


 


Lactic Acid     (0.5-2.0)  mmol/L


 


Calcium    9.1  (8.6-10.3)  mg/dL


 


Total Bilirubin    0.40  (0.2-1.0)  mg/dL


 


AST    26  (13-39)  U/L


 


ALT    30  (7-52)  U/L


 


Alkaline Phosphatase    173 H  ()  U/L


 


Troponin I    0.04 H*  (<0.04)  ng/mL


 


Total Protein    6.9  (6.4-8.9)  g/dL


 


Albumin    3.7  (3.2-5.2)  g/dL


 


Globulin    3.2  (2-4)  g/dL


 


Albumin/Globulin Ratio    1.2  (1-3)  


 


Blood Type     


 


Antibody Screen     














  12/01/18 12/01/18 Range/Units





  03:44 03:44 


 


WBC    (3.5-10.8)  10^3/ul


 


RBC    (4.00-5.40)  10^6/ul


 


Hgb    (12.0-16.0)  g/dl


 


Hct    (35-47)  %


 


MCV    (80-97)  fL


 


MCH    (27-31)  pg


 


MCHC    (31-36)  g/dl


 


RDW    (10.5-15)  %


 


Plt Count    (150-450)  10^3/ul


 


MPV    (7.4-10.4)  fL


 


Neut % (Auto)    %


 


Lymph % (Auto)    %


 


Mono % (Auto)    %


 


Eos % (Auto)    %


 


Baso % (Auto)    %


 


Absolute Neuts (auto)    (1.5-7.7)  10^3/ul


 


Absolute Lymphs (auto)    (1.0-4.8)  10^3/ul


 


Absolute Monos (auto)    (0-0.8)  10^3/ul


 


Absolute Eos (auto)    (0-0.6)  10^3/ul


 


Absolute Basos (auto)    (0-0.2)  10^3/ul


 


Absolute Nucleated RBC    10^3/ul


 


Nucleated RBC %    


 


INR (Anticoag Therapy)    (0.77-1.02)  


 


APTT    (26.0-36.3)  seconds


 


Sodium    (135-145)  mmol/L


 


Potassium    (3.5-5.0)  mmol/L


 


Chloride    (101-111)  mmol/L


 


Carbon Dioxide    (22-32)  mmol/L


 


Anion Gap    (2-11)  mmol/L


 


BUN    (6-24)  mg/dL


 


Creatinine    (0.51-0.95)  mg/dL


 


Est GFR ( Amer)    (>60)  


 


Est GFR (Non-Af Amer)    (>60)  


 


BUN/Creatinine Ratio    (8-20)  


 


Glucose    ()  mg/dL


 


Lactic Acid  3.7 H*   (0.5-2.0)  mmol/L


 


Calcium    (8.6-10.3)  mg/dL


 


Total Bilirubin    (0.2-1.0)  mg/dL


 


AST    (13-39)  U/L


 


ALT    (7-52)  U/L


 


Alkaline Phosphatase    ()  U/L


 


Troponin I    (<0.04)  ng/mL


 


Total Protein    (6.4-8.9)  g/dL


 


Albumin    (3.2-5.2)  g/dL


 


Globulin    (2-4)  g/dL


 


Albumin/Globulin Ratio    (1-3)  


 


Blood Type   O Negative  


 


Antibody Screen   Pending  














Assess/Plan/Problems-Billing


Assessment: 





73 year old PMH COPD, chronic hypoxic respiratory failure, steroid dependant , 

Lung Cancer, Crohn's s/p ileostomy, FTT for several months, immobile woman who 

presented to the ED after a fall and was found to have a right femoral neck 

fracture. Now on (mostly) comfort measures with additional therapies being 

supplemental oxygen, IV steroids.  





- Patient Problems


(1) Fracture of femoral neck, right


Current Visit: Yes   Status: Acute   Code(s): S72.001A - FRACTURE OF UNSP PART 

OF NECK OF RIGHT FEMUR, INIT   SNOMED Code(s): 8503840


   Comment: 


-Based on her high perioperative risk, it was recommended against surgery to 

[facility]. After d/w DR. Francis  on 12/5/18 they agreed to palliative 

approach.


-Appreciate Dr. Camara re-eval and will continue to monitor pt given poor 

PO intake and pt more unresponsive today with decreasing UO.  Pt may pass soon, 

however, may be D/Cd tomorrow if pt still stable by AM 


-Pt comfort measures only   





(2) Chronic respiratory failure with hypoxia


Current Visit: No   Status: Acute   Comment: 


- Continue Oral morphine and atvian prn   


-Per Dr. Camara reccs, increased interval between Dilaudid dosing and 

placed on PRN SL Morphine for breakthroughs   





(3) Chronic steroid use


Current Visit: Yes   Status: Acute   Code(s): [id number] -    SNOMED Code(s): 

364529394


   Comment: 


-D/C low dose IV Solumedrol given goal of care is CMO   





(4) Lung cancer


Current Visit: No   Status: Acute   Code(s): C34.90 - MALIGNANT NEOPLASM OF 

UNSP PART OF UNSP BRONCHUS OR LUNG   SNOMED Code(s): 998683276


   Comment: now on comfort measures.    


Status and Disposition: 


-Continue to observe o/n and possible [facility]

## 2018-12-10 NOTE — PN
Progress Note





- Progress Note


Date of Service: 12/10/18


Note: 





Asked to see patient as she has now been denied Medicare coverage for continued 

hospitalization. She has been taking nothing po for several days, getting 

minimal IV fluid for flushes, has severely declining urine output, and no 

ileostomy output. She has been unresponsive. She has upper airway "death rattle

", rhonchorous lungs and I:E ratio is 1:3. I suspect she will  in a day 

or two. We would be able to admit her to the [facility] residence tomorrow, if 

desired. It might be beneficial to decrease her IV Dilaudid to minimize fluid 

administration which will prolong the process of her dying. She gets 5 cc per 

flush and can be getting up to 120 cc per day if given hourly.

## 2018-12-11 RX ADMIN — MORPHINE SULFATE PRN MG: 100 SOLUTION ORAL at 02:25

## 2018-12-11 RX ADMIN — LORAZEPAM PRN MG: 2 INJECTION INTRAMUSCULAR; INTRAVENOUS at 05:43

## 2018-12-11 RX ADMIN — HYDROMORPHONE HYDROCHLORIDE PRN MG: 1 INJECTION, SOLUTION INTRAMUSCULAR; INTRAVENOUS; SUBCUTANEOUS at 05:43

## 2018-12-11 RX ADMIN — LORAZEPAM PRN MG: 2 INJECTION INTRAMUSCULAR; INTRAVENOUS at 03:35

## 2018-12-11 RX ADMIN — MORPHINE SULFATE PRN MG: 100 SOLUTION ORAL at 00:18

## 2018-12-11 RX ADMIN — MORPHINE SULFATE PRN MG: 100 SOLUTION ORAL at 04:23

## 2018-12-11 RX ADMIN — LORAZEPAM SCH: 0.5 TABLET ORAL at 05:36

## 2018-12-11 RX ADMIN — HYDROMORPHONE HYDROCHLORIDE PRN MG: 1 INJECTION, SOLUTION INTRAMUSCULAR; INTRAVENOUS; SUBCUTANEOUS at 01:21

## 2018-12-11 RX ADMIN — MORPHINE SULFATE PRN MG: 100 SOLUTION ORAL at 06:30

## 2018-12-11 RX ADMIN — HYDROMORPHONE HYDROCHLORIDE PRN MG: 1 INJECTION, SOLUTION INTRAMUSCULAR; INTRAVENOUS; SUBCUTANEOUS at 03:35

## 2018-12-11 RX ADMIN — LORAZEPAM PRN MG: 2 INJECTION INTRAMUSCULAR; INTRAVENOUS at 01:22

## 2018-12-11 NOTE — DS
DATE OF ADMISSION:  2018.

 

DATE OF DEATH:  2018.

 

DISCHARGE DIAGNOSES:

1.  Fracture of the right femoral neck.

2.  Chronic respiratory failure with hypoxia, history of.

3.  History of chronic steroid use.

 

HISTORY OF PRESENT ILLNESS/HOSPITAL COURSE:  The patient is a 73-year-old lady 
with a history of COPD, lung cancer, and Crohn's disease who presented to the 
emergency department on 2018 after a mechanical fall at home the night 
before.  She mentioned that she was using her walker to go outside and smoke 
and when she came back into the house, her walker got caught on the lip of the 
doorway and fell forward onto her right side.  She then subsequently sustained 
a right femoral neck fracture.  Unfortunately, given she has high perioperative 
risk, surgery was not recommended after being seen by Dr. Francis.  By 2018
, the patient and family had agreed with palliative approach and was then 
subsequently made comfort measures only.  She has been evaluated by Dr. Calvin who deemed her appropriate for [facility]; however, she deteriorated at 
the time of her planned transfer to hospitals and hence we kept her in the 
hospital to continue her end of life hospice care. She passed away; per nursing 
documentation she  on 2018 at 6:30 a.m.  Apical and radial pulses 
were noted to be absent for one full minute. Dr. Lee was notified and the 
death certificate has been completed.

 

 [id number]/[facility] #: [id number]

MTDD

## 2021-01-01 NOTE — HP
*** ADDENDUM NOW INCLUDED ON THIS REPORT ***



CC:  Dr. Francis; Dr. Haile *

 

HISTORY AND PHYSICAL:

 

DATE OF ADMISSION:  12/01/18

 

TIME OF ADMISSION:  8 a.m.

 

CHIEF COMPLAINT:  Fall.

 

HISTORY OF PRESENT ILLNESS:  This is a 73-year-old lady with history of COPD, 
lung cancer, Crohn's disease, chronic kidney disease, and immobility, who 
presents to the emergency department after a mechanical fall at home last 
night.  She was using her walker to go outside to smoke and when she was coming 
back into the house, her walker got caught on a lip in the doorway and she fell 
forward and onto her right side.  Her wife called EMS, who came and put her 
back in her chair and they recommended transfer to the emergency department; 
however, she declined and said she felt okay.  However a few hours later, she 
had worsening right hip pain and they elected to call EMS again around 2 a.m.  
When she got to the emergency department, she was found to have a right femoral 
neck fracture and we were asked to evaluate for admission.

 

Pertinent to her history is given by her wife, Sarah, who reports that Marisol 
spends most of her day in bed for the past few months.  A recent home visit 
note from Dr. Haile 3 days ago also described this history and comments on 
Marisol's lack of mobility and weakness over the past several months.  Sarah 
says that she only gets out of bed to eat and smoke, which is approximately 4 
times per day.  Otherwise, she lies in bed and sleeps the rest of the time.  
Her course over the past few months has been complicated by worsening pain for 
which she has been started on hydrocodone, a pressure ulcer on her right arm 
which has since healed, and ongoing drowsiness.

 

At this time, Marisol who goes by Edi is sleeping and most of the history 
is obtained from her wife.  Her wife reports that her pain had been very 
difficult to control in the emergency department and she finally got some 
Dilaudid, which has given her some relief and she is resting.  She does awake 
to voice and says she has pain in her hip, but otherwise has no complaints.  
After answering brief questions, she falls back to sleep.

 

The last time Marisol walked a block was over the summer on a particularly 
cool day, she and Sarah took a slow walk, during which time she had no chest 
pain or shortness of breath; however, she got fatigued quickly as expected.  
Other than that, Sarah cannot give me a description of Marisol's functional 
capacity due to her recent immobility and even more chronically than the past 3 
months, she has not been active.

 

PAST MEDICAL HISTORY:

1.  Chronic kidney disease.

2.  Chronic hypoxic respiratory failure on 3 L round-the-clock and chronic 
steroids.

3.  Lung cancer, status post radiation; however, she has forgone further 
treatment for this.

4.  Crohn's disease with ileostomy.

5.  History of high-output ostomy.

6.  Chronic diastolic heart failure.

7.  COPD.

8.  Tobacco use.

9.  History of DVT, on anticoagulation.

 

HOME MEDICATIONS:  This is from a list that Sarah brings in dated 10/25/18.

1.  Aspirin 81 mg daily.

2.  Calcitriol 0.25 mcg daily.

3.  Prednisone 8 mg daily.

4.  Lorazepam 0.5 mg b.i.d. p.r.n. anxiety.

5.  Mucinex 600 mg p.r.n. congestion.

6.  Spiriva inhaled daily.

7.  Warfarin 2 mg daily.

8.  Paxil 30 mg daily.

9.  Sensipar 30 mg daily.

10.  Opium tincture 0.6 mg 5 times a day as needed.  This is the 10 mg/mL 
concentration.

11.  Ipratropium nebulizer 3 to 4 times per day as needed.

12.  B12 injections weekly.

13.  CBD oil drop 1 ounce/30 mL p.r.n. pain.

14.  Vitamin B complex daily.

15.  Tylenol p.r.n. pain.

 

ALLERGIES:  REMICADE, TETANUS, EPINEPHRINE, VANCOMYCIN, DAPTOMYCIN, LEVOFLOXACIN
, and DOXYCYCLINE.

 

SOCIAL HISTORY:  She lives at home with her wife, Sarah.  They do have an aide 
who comes in overnight to help them.  She is a current ongoing tobacco smoker.  
She does not drink alcohol or use any other illicit drugs.

 

REVIEW OF SYSTEMS:  Sarah also comments on Marisol's decreased appetite over 
the past few days.  She has reported nausea and early satiety.  She has had 
decreased ostomy output, which has been more liquid like than usual.  She 
denies any fevers. She does not get chest pain.  She has not been more short of 
breath than usual and she has not had to turn off her oxygen.  Remainder of the 
14-point review of systems is negative other than in the HPI.

 

                               PHYSICAL EXAMINATION

 

GENERAL:  This is an elderly ill-appearing female who is in no distress.  She 
awakes to voice and answers simple questions but falls back to sleep quickly.

 

VITAL SIGNS:  Temperature 97.5, heart rate 92, respiratory rate 14, pulse ox 92
% on 3 L, blood pressure 118/79.

 

HEENT:  Her pupils are 1-mm bilaterally.  Her oral mucosa is very dry.  She has 
an ecchymosis on her chin.

 

NECK:  No JVP or adenopathy.

 

LUNGS:  Sound congested in the upper airways on visualization and her lungs are 
clear anteriorly, but she is unable to roll for me to listen posteriorly.

 

CHEST:  She is in a regular rate and rhythm.  I cannot hear any murmurs.

 

ABDOMEN:  She has laparotomy incision and an ostomy in the mid abdomen.

 

EXTREMITIES:  Her extremities are warm.  Her pulses are 2+.  She is able to 
move her right hip but points to the right hip where her pain is.  Her pulses 
are 2+. She had no ulcers or rashes.

 

NEUROLOGIC:  She is drowsy but follows simple commands.  Her strength is equal 
on both sides.  Her face is symmetric.

 

 DIAGNOSTIC STUDIES/LAB DATA:  Chest x-ray shows a right suprahilar mass 
unchanged and a small left basilar infiltrate.

 

Hip and pelvis x-ray shows displaced right subcapital femoral neck fracture.

 

White blood cells 17.5, hemoglobin 13.5, platelets 341.  INR 1.81.  Sodium 137, 
potassium 3.1, chloride 82, bicarb 41, BUN 43, creatinine 2.36, glucose 138, 
lactic acid 3.7.  Troponin 0.04.

 

EKG shows normal sinus rhythm with left axis deviation, a PVC, no Q waves, a 
long QTc, and no ST or T-wave changes.

 

ASSESSMENT AND PLAN:  This is a 73-year-old female, who is very ill at baseline 
and immobile, who presents to the emergency department after a mechanical fall 
and is found to have a right femoral neck fracture.

 

1.  Right femoral neck fracture.  I have discussed surgical options with Sarah 
at length.  Unfortunately, Marisol is too sleepy to appropriately participate 
with conversation and I will need to come back and speak with her more about 
it.  I explained the risks of operating and of not operating and the 
challenging decision that we face.  Certainly Marisol has very elevated 
perioperative risk for all kinds of complications and Sarah has a good 
understanding of this.  Given her immobility and that the purpose of operating 
would be to restore mobility, the risks of surgery may not be acceptable.  I 
explained that not operating would make her bed bound; however, this may not be 
far from her baseline.  Sarah's major concern is chronic pain.  Dr. Francis came 
in and did elaborate on the difference in the pain between operating and not 
operating and explained that while acutely after a fracture if it is managed 
nonoperatively, she certainly will have pain but that it can heal a little bit 
on its own and pain can sometimes resolve after about 6 weeks.  Certainly, at 
this moment, Marisol is too elevated medical risk for surgery and I think it 
is worthwhile to continue this conversation as Sarah and Marisol have been 
through hospice discussions multiple times in the past and have a good grasp of 
its purpose.  I think hospice would be a reasonable decision for her at this 
time but I will come back and discuss with them both when Marisol is more 
awake and able to participate.

2.  Acute kidney injury.  Given Sarah's report of decreased appetite, nausea, 
and decreased ostomy output, I suspect this is prerenal in nature.  I am going 
to hydrate her and encourage p.o. intake as able.

3.  Lactic acidosis.  This may be in response to the fall; however, I cannot 
rule out an infection.  I am doing an infectious workup, hydrating her, and 
rechecking a lactic acid in 3 hours.

4.  Leukocytosis.  Can be a response to her fracture but again I would like to 
rule out the infection as mentioned above, which may have contributed to her 
fall as well.

5.  Mechanical fall.  This may be attributable to her overall deconditioning 
and weakness, but we will rule out metabolic or infectious etiology as well.

6.  Elevated troponin.  This was likely demand in the setting of acute illness. 
Her EKG does not show any ischemic changes.  I will continue to trend her 
troponins and put her on telemetry.

7.  Chronic hypoxic respiratory failure.  She is on her home O2 requirement of 
3 L. We will continue this.

8.  Left basilar infiltrate.  She may have an underlying pneumonia that was 
causing her increased weakness or fall and some of the lab abnormalities we are 
seeing. Clinically, it is difficult to tell I do not think she has even the 
respiratory reserve to produce an effective cough, so I think it is reasonable 
to treat her for a community-acquired pneumonia at this time.

9.  History of deep venous thrombosis, on warfarin.  Her INR is subtherapeutic 
at 1.81.  We should put her on heparin since she is subtherapeutic and maybe 
going to the OR, but I will discuss this more with them as if they decide on 
hospice, I would avoid anticoagulation altogether.

10.  Chronic diastolic heart failure.  Her volume exam is extremely challenging 
but I think she is actually dry at this time, so I am hydrating her as 
mentioned above.

11.  Chronic obstructive pulmonary disease.  She is on daily prednisone, which 
also puts her at high risk of poor healing and certainly speaks to her poor 
respiratory reserve.

12.  Long QTc.  It is prudent that all QT-prolonging medications are avoided.  
She should not have any antiemetics.

13.  Immobility.  This has been ongoing and discussed above certainly puts her 
elevated risk for surgery and I am not sure at this point that she is a 
surgical candidate, but I will discuss this further with Dr. Francis.

 

DISPOSITION:  Ms. Santiago is being admitted to the medical service with an 
orthopedic consult.  I will put her on unrestricted diet until a surgical 
decision is made and no DVT prophylaxis given her elevated INR.

 

ADDENDUM:  



Sarah reports that Marisol wishes to be DNR/DNI and she will bring her MOLST 
in later today.

 

 

[id number] #: [id number]

[id number] #: [id number]

LAUREN · Continue her home dose PO levothyroxine  · Recheck a TSH level in 1-2 weeks with her PCP    Results for Nelly Cortes (MRN [id number]) as of 1/1/2021 11:36   Ref   Range 1/1/2021 06:25   TSH 3RD GENERATON Latest Ref Range: 0 358 - 3 740 uIU/mL 0 217 (L)

## 2024-12-13 NOTE — PN
Subjective


Date of Service: 03/25/18


Interval History: 


.


Interviewed and examined patient at bedside;


Discussed case with SHRUTHI Kinney;


Reviewed previous notes and radiology results;





MRI T-spine did NOT show disciiis or epidural collection, but did show chronic 

T6 vertebral body compression.





 WBC Still Quite Elevated











  03/24/18 03/25/18





  14:20 05:57


 


WBC  32.4 H  27.0 H


 


Neut % (Auto)  86.8 H  88.3 H











Family History: Unchanged from Admission


Social History: Unchanged from Admission


Past Medical History: Unchanged from Admission





Objective


Active Medications: 


.


Acetaminophen (Tylenol Tab*)  650 mg PO Q6H PRN


   PRN Reason: pain/fever


   Last Admin: 03/25/18 03:50 Dose:  650 mg


Albuterol/Ipratropium (Duoneb (Albuterol 2.5 Mg/Ipratropium 0.5 Mg))  1 neb INH 

Q4H PRN


   PRN Reason: SOB/WHEEZING


   Last Admin: 03/25/18 15:17 Dose:  1 neb


Aspirin (Aspirin Ec Low Dose*)  81 mg PO DAILY [facility]


   Last Admin: 03/25/18 08:58 Dose:  81 mg


Calcitriol (Rocaltrol  Cap*)  0.25 mcg PO QAM [facility]


   Last Admin: 03/25/18 08:58 Dose:  0.25 mcg


Cinacalcet (Sensipar Tab*)  30 mg PO QAM [facility]


   Last Admin: 03/25/18 08:58 Dose:  30 mg


Cyanocobalamin (Vitamin B12 Inj *)  1,000 mcg IM WEEKLY [facility]


Guaifenesin (Mucinex*)  600 mg PO BID PRN


   PRN Reason: CONGESTION


   Last Admin: 03/25/18 08:58 Dose:  600 mg


Sodium Chloride (Ns 0.9% 1000 Ml*)  1,000 mls @ 150 mls/hr IV PER RATE [facility]


   Last Admin: 03/25/18 14:39 Dose:  150 mls/hr


Piperacillin Sod/Tazobactam (Sod 2.25 gm/ Dextrose)  50 mls @ 100 mls/hr IVPB 

Q6H [facility]


   Last Admin: 03/25/18 17:03 Dose:  100 mls/hr


Lorazepam (Ativan Tab(*))  0.5 mg PO BID PRN


   PRN Reason: ANXIETY


   Last Admin: 03/25/18 08:58 Dose:  0.5 mg


Morphine Sulfate (Morphine Inj (Syringe)*)  2 mg IV Q2H PRN


   PRN Reason: PAIN


   Last Admin: 03/25/18 14:40 Dose:  2 mg


Ondansetron HCl (Zofran Inj*)  4 mg IV Q6H PRN


   PRN Reason: NAUSEA


Opium Tincture (Opium Tincture*)  0.6 mg PO Q5H PRN


   PRN Reason: diarrhea


Paroxetine HCl (Paxil Tab*)  30 mg PO QPM [facility]


   Last Admin: 03/24/18 20:15 Dose:  30 mg


Pharmacy Consult (Zosyn Per Pharmacy*)  1 note FOLLOW UP .ZOSYN PER PHARMACY [facility]


Prednisone (Deltasone Tab*)  10 mg PO DAILY [facility]


   Last Admin: 03/25/18 08:58 Dose:  10 mg


Tiotropium Bromide (Spiriva Cap.Inh*)  1 cap INH QAM [facility]


   Last Admin: 03/25/18 08:22 Dose:  1 cap


Warfarin Sodium (Coumadin Tab(*))  2 mg PO QPM [facility]


   PRN Reason: Protocol


   Last Admin: 03/24/18 20:15 Dose:  2 mg








 Vital Signs - 8 hr











  03/25/18 03/25/18 03/25/18





  10:32 11:04 11:29


 


Temperature   


 


Pulse Rate  68 


 


Respiratory 14 16 14





Rate   


 


Blood Pressure   





(mmHg)   


 


O2 Sat by Pulse  98 





Oximetry   














  03/25/18 03/25/18 03/25/18





  11:44 11:49 13:00


 


Temperature  98.5 F 


 


Pulse Rate  72 


 


Respiratory 22 16 18





Rate   


 


Blood Pressure  125/97 





(mmHg)   


 


O2 Sat by Pulse  97 





Oximetry   








Oxygen Devices in Use Now: Nasal Cannula


Appearance: NAD


Eyes: No Scleral Icterus


Ears/Nose/Mouth/Throat: Clear Oropharnyx


Neck: Trachea Midline


Respiratory: Symmetrical Chest Expansion and Respiratory Effort, Clear to 

Auscultation


Cardiovascular: RRR


Abdominal: No Hepatosplenomegaly


Lymphatic: No Cervical Adenopathy


Extremities: No Edema


Skin: No Rash or Ulcers


Neurological: Alert and Oriented x 3


Lines/Tubes/Other Access: Clean, Dry and Intact Peripheral IV


Nutrition: Taking PO's


Result Diagrams: 


 03/25/18 05:57





 03/25/18 05:51


Additional Lab and Data: 


.


Microbiology and Other Data: 


 Microbiology











 03/24/18 18:12 Influenza Types A,B Antigen (ARABELLA) - Final





 Nasal    Specimen received for Influenza A/B Molecular testing














Assess/Plan/Problems-Billing


.


Assessment: 


72 year old woman with PMH of Crohn's disease (s/p ileostom), s/p radiation for 

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 

and worsening back pain. patient MET criteria for sepsis at admission.











- Patient Problems


(1) Acute on chronic renal failure


Current Visit: No   Status: Acute   Priority: High   Onset Date: 11/17/14   Code

(s): N17.9 - ACUTE KIDNEY FAILURE, UNSPECIFIED; N18.9 - CHRONIC KIDNEY DISEASE, 

UNSPECIFIED   SNOMED Code(s): 160621221


   Comment: 


- Cr up to 2.1 from 1.8 at admission


- Baseline, in Dec 2017, was 1.49.   





(2) Anxiety


Current Visit: No   Status: Acute   Code(s): F41.9 - ANXIETY DISORDER, 

UNSPECIFIED   SNOMED Code(s): 61452930


   Comment: 


Continue 0.5 mg ativan BID with additional breakthrough prn with panic attacks 

after she changes ostomy   





(3) Pharyngitis


Current Visit: Yes   Status: Acute   Priority: High   Code(s): J02.9 - ACUTE 

PHARYNGITIS, UNSPECIFIED   Comment: 


- bacterial causes would be covered with piperacillin


- lozenges ordered


- IVF 1 week